# Patient Record
Sex: FEMALE | Race: WHITE | NOT HISPANIC OR LATINO | Employment: FULL TIME | URBAN - METROPOLITAN AREA
[De-identification: names, ages, dates, MRNs, and addresses within clinical notes are randomized per-mention and may not be internally consistent; named-entity substitution may affect disease eponyms.]

---

## 2018-12-17 ENCOUNTER — TELEPHONE (OUTPATIENT)
Dept: PSYCHIATRY | Facility: CLINIC | Age: 23
End: 2018-12-17

## 2018-12-17 NOTE — TELEPHONE ENCOUNTER
PSYCHIATRY CLINIC PHONE INTAKE     SERVICES REQUESTED / INTERESTED IN          Med Management    Presenting Problem and Brief History                              What would you like to be seen for? (brief description):  Patient has had diagnoses since around 2013. She experiences panic attacks a couple times per week. OCD has been better managed with the help of meds and therapeutic techniques. Obsessive ordering of things, arranging things a certain way (color coded, ordered in height), upsetting if not arranged correctly.   Have you received a mental health diagnosis? Yes   Which one (s): MDD, MADIHA, OCD, panic attacks  Is there any history of developmental delay?  No   Are you currently seeing a mental health provider?  No            Who / month last seen:  Saw a therapist last month - moved from [place]  Do you have mental health records elsewhere?  Yes  Will you sign a release so we can obtain them?  Yes    Have you ever been hospitalized for psychiatric reasons?  No  Describe:      Do you have current thoughts of self-harm?  No    Do you currently have thoughts of harming others?  No       Substance Use History     Do you have any history of alcohol / illicit drug use?  No  Describe:    Have you ever received treatment for this?  No    Describe:       Social History     Does the patient have a guardian?  No    Name / number:   Have you had an ACT team in last 12 months?  No  Describe:    Do you have any current or past legal issues?  No  Describe:    OK to leave a detailed voicemail?  Yes    Medical/ Surgical History                                 There is no problem list on file for this patient.         Medications             No current outpatient medications on file.     Sertraline 200mg  Klonopin 0.5mg PRN  Gabapentin 600mg 3x daily - anxiety, migraines, chronic pain  Propranolol XR 60mg  topamax - migraine  Oxycodone 3x daily -   Flexeril 3x daily  Phenergan (nausea) 25mg 2x daily or PRN  Doesn't take  "pain pills and klonopin together \"ever\"    DISPOSITION      Completed phone screen with patient. Scheduled for one time controlled substance evaluation with Dr. Blunt.    Lexi Fountain,   "

## 2019-01-08 ENCOUNTER — OFFICE VISIT (OUTPATIENT)
Dept: INTERNAL MEDICINE | Facility: CLINIC | Age: 24
End: 2019-01-08
Payer: COMMERCIAL

## 2019-01-08 VITALS
SYSTOLIC BLOOD PRESSURE: 119 MMHG | OXYGEN SATURATION: 97 % | DIASTOLIC BLOOD PRESSURE: 82 MMHG | HEART RATE: 99 BPM | WEIGHT: 121.3 LBS | RESPIRATION RATE: 16 BRPM

## 2019-01-08 DIAGNOSIS — N80.9 ENDOMETRIOSIS: Primary | ICD-10-CM

## 2019-01-08 DIAGNOSIS — G44.009 CLUSTER HEADACHE, NOT INTRACTABLE, UNSPECIFIED CHRONICITY PATTERN: ICD-10-CM

## 2019-01-08 RX ORDER — TOPIRAMATE 50 MG/1
50 TABLET, FILM COATED ORAL 2 TIMES DAILY
COMMUNITY
End: 2019-03-28

## 2019-01-08 RX ORDER — SERTRALINE HYDROCHLORIDE 100 MG/1
200 TABLET, FILM COATED ORAL DAILY
COMMUNITY
End: 2019-01-10

## 2019-01-08 RX ORDER — PROPRANOLOL HYDROCHLORIDE 60 MG/1
60 TABLET ORAL AT BEDTIME
COMMUNITY
End: 2019-01-10

## 2019-01-08 RX ORDER — OXYCODONE AND ACETAMINOPHEN 5; 325 MG/1; MG/1
1 TABLET ORAL EVERY 6 HOURS PRN
COMMUNITY
End: 2019-03-28

## 2019-01-08 RX ORDER — CYCLOBENZAPRINE HCL 10 MG
10 TABLET ORAL 3 TIMES DAILY PRN
COMMUNITY
End: 2024-05-10

## 2019-01-08 RX ORDER — GABAPENTIN 100 MG/1
600 CAPSULE ORAL 3 TIMES DAILY
COMMUNITY
End: 2019-01-08

## 2019-01-08 RX ORDER — GABAPENTIN 300 MG/1
600 CAPSULE ORAL 3 TIMES DAILY
COMMUNITY

## 2019-01-08 RX ORDER — PROMETHAZINE HYDROCHLORIDE 25 MG/1
25 TABLET ORAL EVERY 8 HOURS PRN
COMMUNITY
End: 2019-07-16

## 2019-01-08 RX ORDER — CLONAZEPAM 0.5 MG/1
0.5 TABLET ORAL PRN
COMMUNITY
End: 2019-02-01

## 2019-01-08 ASSESSMENT — PAIN SCALES - GENERAL: PAINLEVEL: SEVERE PAIN (7)

## 2019-01-08 NOTE — PROGRESS NOTES
PRIMARY CARE CENTER         HPI:       Ingris Trevino is a 23 year old female with history of depression, anxiety, chronic pain, endometriosis, headaches (chronic paroxysmal hemicrania), herniated lumbar disc, and cystic acne (on Accutane) who presents to [facility] care and for request of referrals after recently moving to [place] from [place].     Today, Ingris complains of feeling severely depressed. She reports having little interest in doing things, feeling depressed and hopeless, having trouble falling asleep, and having little energy. She reports that she is currently on sertraline, propranolol, gabapentin, and clonazepam for anxiety and depression. She reports having major depressive episodes despite being on medication. She has a history of several suicide attempts but no current SI/HI. She feels her current worsening depression is related to her recently moving and not being employed. 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 She is scheduled to be evaluated by Psychiatry and have review of medications on 1/10/18.     She also complains of chronic pain due to endometriosis, herniated lumbar disk, and headaches. She has been on Percocet for 5+ years. Ingris has not seen a Gynecologist for endometriosis. She has a Paraguard IUD. At age [age] she was on progesterone OCPs but this made her acne worse. Menstrual cycles are irregular, heavy and extremely painful, causing her to miss out on several days of work every month. She also has pain due to herniated lumbar disk and has gotten several injections into the lumbar joint with minimal to moderate pain relief. She reports that the pain has been 6-7/10 for 5+ years. She has requested an increase in dose of Percocet, but previous providers were hesitant to provide this. She reports trying other ways to manage pain- scheduled NSAIDs/Tylenol, meditation- but with little to no effect. She also takes  cyclobenzaprine for lower back muscle spasms. She also reports frequent severe headaches due to chronic paroxysmal hemicrania for which she takes topiramate, indomethacin, and gabapentin. These headaches are associated with nausea, for which she takes promethazine. She requests neurology referral.     Other topics discussed:   1. Flu vaccine: due  2. Td booster: 2014/2015 (pt to send in records)  3. Lipid panel: per pt, normal in 2016/2017 (pt to send in records)  4. PAP: most recent in 2016, NILM per patient (pt to send in records)  5. STI screening: patient declines.   6. Cystic acne: well managed on Accutane    Medications per patient:  Clonazepam   Cyclobenzaprine   Gabapentin  Percocet  Promethazine  Propranolol   Topiramate  Zoloft      Problem, Medication and Allergy Lists were reviewed and are current.  Patient is a new patient patient of this clinic. Medical and surgical history reviewed. Family history reviewed. Medications reviewed.      PMHx  Endometriosis  Chronic headaches  Depression  Chronic pain, chronic opioid use  Degenerative disc disease  Past Surgical History:   Procedure Laterality Date     HC TOOTH EXTRACTION W/FORCEP      local anaesthesia     Family History   Problem Relation Age of Onset     Bipolar Disorder Maternal Grandmother      Depression Mother      Depression Father      Social History     Socioeconomic History     Marital status:      Spouse name: Not on file     Number of children: Not on file     Years of education: Not on file     Highest education level: Not on file   Social Needs     Financial resource strain: Not on file     Food insecurity - worry: Not on file     Food insecurity - inability: Not on file     Transportation needs - medical: Not on file     Transportation needs - non-medical: Not on file   Occupational History     Not on file   Tobacco Use     Smoking status: Never Smoker     Smokeless tobacco: Never Used   Substance and Sexual Activity     Alcohol  use: Not on file     Drug use: Not on file     Sexual activity: Not on file   Other Topics Concern     Not on file   Social History Narrative     Not on file     Current Outpatient Medications   Medication     clonazePAM (KLONOPIN) 0.5 MG tablet     cyclobenzaprine (FLEXERIL) 10 MG tablet     gabapentin (NEURONTIN) 300 MG capsule     INDOMETHACIN PO     oxyCODONE-acetaminophen (PERCOCET) 5-325 MG tablet     promethazine (PHENERGAN) 25 MG tablet     topiramate (TOPAMAX) 50 MG tablet     mirtazapine (REMERON) 15 MG tablet     propranolol (INDERAL) 60 MG tablet     sertraline (ZOLOFT) 100 MG tablet     No current facility-administered medications for this visit.      No Known Allergies                   Review of Systems:   ROS  I have personally reviewed and updated the complete ROS on the day of the visit.    10 point ROS of systems including Constitutional, Eyes, Respiratory, Cardiovascular, Gastroenterology, Genitourinary, Integumentary, Muscularskeletal, Psychiatric were all negative except for pertinent positives noted in my HPI.         Physical Exam:   /82   Pulse 99   Resp 16   Wt 55 kg (121 lb 4.8 oz)   SpO2 97%   There is no height or weight on file to calculate BMI.  Vitals were reviewed       GENERAL APPEARANCE: healthy, alert and no distress     EYES: EOMI, PERRL     HENT: ear canals and TM's normal and nose and mouth without ulcers or lesions     NECK: no adenopathy, no asymmetry, masses, or scars and thyroid normal to palpation     RESP: lungs clear to auscultation - no rales, rhonchi or wheezes     CV: regular rates and rhythm, normal S1 S2, no S3 or S4 and no murmur, click or rub     ABDOMEN:  Obese. Abdominal striae. soft, nontender, no HSM or masses and bowel sounds normal     MS: extremities normal- no gross deformities noted, no evidence of inflammation in joints, FROM in all extremities.     SKIN: no suspicious lesions or rashes     NEURO: Normal strength and tone, sensory exam grossly  normal, mentation intact and speech normal though slightly delayed/slow     PSYCH: mentation appears normal. and affect normal/bright     LYMPHATICS: No cervical adenopathy      Results:      Results from the last 24 hoursNo results found for this or any previous visit (from the past 24 hour(s)).  Assessment and Plan       Ingris Trevino is a 23 year old female with history of depression, anxiety, chronic pain, endometriosis, headaches (chronic paroxysmal hemicrania), herniated lumbar disc, and cystic acne (on Accutane) who presents to [facility] care and for request of referrals after recently moving to [place] from [place].     Diagnoses and all orders for this visit:    1. Chronic pain: Due to herniated lumbar disc, endometriosis, headaches (chronic paroxysmal hemicrania)       -     PAIN MANAGEMENT REFERRAL       -     Additional referrals as below    2. Endometriosis  -     OB/GYN REFERRAL  -     PAIN MANAGEMENT REFERRAL    3. Headache due to chronic paroxysmal hemicrania  -     NEUROLOGY ADULT REFERRAL  -     PAIN MANAGEMENT REFERRAL    4. Health maintenance  -     FLU VACCINE, AGE >= 3 YR  -     Patient to bring in outside records    5. Depression        -     Patient to continue taking outside medications as prescribed and follow up with Psychiatry in 2 days per previously scheduled appointment      Options for treatment and follow-up care were reviewed with the patient. Ingris Trevino engaged in the decision making process and verbalized understanding of the options discussed and agreed with the final plan.    Tia Leonardo MD MPH  Intern, Internal Medicine  Jan 8, 2019    Pt was seen and plan of care discussed with Dr. SHAI Portillo.     Teaching Physician Note:  I was present during the visit and the patient was seen and examined by me.   I discussed the case with the resident and agree with the note as documented by the resident with the following exceptions:  None.    Helena Portillo,  M.D.  Internal Medicine   pager [phone]

## 2019-01-08 NOTE — NURSING NOTE
Chief Complaint   Patient presents with     Establish Care     pt is here to establish care with new PCP     Referral     pt is here for referrals        Fe Kim CMA at 8:44 AM on 1/8/2019

## 2019-01-08 NOTE — NURSING NOTE
Ingris Trevino    1.  Has the patient received the information for the influenza vaccine? YES    2.  Does the patient have any of the following contraindications?     Allergy to eggs? No     Allergic reaction to previous influenza vaccines? No     Any other problems to previous influenza vaccines? No     Paralyzed by Guillain-Clayton syndrome? No     Currently pregnant? NO     Current moderate or severe illness? No     Allergy to contact lens solution? No    3.  The vaccine has been administered in the usual fashion and the patient was instructed to wait 20 minutes before leaving the building in the event of an allergic reaction: YES    Recorded by Fe Kim CMA

## 2019-01-08 NOTE — PATIENT INSTRUCTIONS
Primary Care Center Phone Number [phone]  Primary Care Center Medication Refill Request Information:  * Please contact your pharmacy regarding ANY request for medication refills.  ** [facility] Prescription Fax = [phone]  * Please allow 3 business days for routine medication refills.  * Please allow 5 business days for controlled substance medication refills.     Primary Care Center Test Result notification information:  *You will be notified with in 7-10 days of your appointment day regarding the results of your test.  If you are on MyChart you will be notified as soon as the provider has reviewed the results and signed off on them.               [facility]         Internal Medicine Resident                   Continuity Clinic    Who We Are    Resident Continuity Clinic is a part of the [facility] Primary Care Clinic.  Resident physicians see patients independently and establish a relationship with them over the course of their three-year residency program.  As with the Primary Care Clinic, our Resident Continuity Clinic models a group practice.  If your doctor is not available, you will be able to see another resident physician.  At the end of a resident s training, patients will be transitioned to a new resident physician for ongoing care.     We treat patients with a wide array of medical needs from routine physicals, to acute illnesses, to diabetes and blood pressure management, to complex medical illness.  What is a Resident Physician?    Resident physicians hold medical degrees and are doctors. They are training to become specialists in Internal Medicine. They work under the supervision of board-certified faculty physicians.  Expectations for Your Care    We strive to provide accessible, quality care at all times.    In order to provide this care, it is best to see your primary care resident doctor consistently rather switching between providers.  In the event you do see another physician, you  should schedule a follow-up visit with your usual primary care doctor.    If you are transitioning your care from another clinic, it is helpful to have your records available for your doctor to review.    We do not prescribe controlled substances, such as ADD medications or narcotic pain medications, on your first visit.  We will review your health records and concerns prior to devising a treatment plan with you in order to provide the best care.      Clinic Services     Extended clinic hours; patient  to help navigate your visit;  parking; laboratory and imaging services with evening and weekend hours    Multiple medical and surgical specialties in one building    Complementary services, including Nutrition, Integrative Medicine, Pharmacy consultations, Mental and Behavioral Health, Sports Medicine and Physical Therapy    Thank You    We would like to thank you for choosing the [facility] Internal Medicine Resident Continuity Clinic for your primary care. You are making a priceless contribution to the training of the next generation of health care practitioners.     Contact us at [phone] for appointments or questions.    Resident Clinic Hours are Tuesdays and Thursdays, 7:30am-5:00pm    Residents   Edy Dockery MD  (Male)   Margareth Escudero MD (Female)  Fe Ortega MD   (Female)   July Cam MD   (Female)   Anthony Rinaldi MD  (Male)   Caleb Padilla MD  (Male)   Isabella Rubin MD    (Female)   Brandon Childs MD  (Male)   Harry Felix MD  (Male)    Tia Leonardo MD  (Female)   Isaac Betancur MD  (Male)   Shanika Brizuela MD  (Female)   Yesi Friedman MD   (Female)   Rock Luna MD  (Male)   Ko Lux MD  (Male)   Caleb Charles MD (Male)   Rigo Stafford MD  (Male)   Tali Mckeon MD (Female)    Bea Mock MD (Female)   Romina Lazo MD  (Male)   Tatiana Fulton MD    (Female)   Tracy Paulino MD  (Female)    Supervising  Physicians   MD Hermelinda Izaguirre MD Briar Duffy, MD James Langland, MD Mary Logeais, MD Tanya Melnik, MD Charles Moldow, MD Heather Thompson Buum, MD Kathleen Watson, MD            General resources in case you are feeling increasingly depressed and/or suicidal:    Call 911 or go directly to an Emergency Room (such as [facility] or [facility]) if you need help immediately      24/7 Crisis Hotlines:    National Suicide Prevention Hotline                                660-978-FMBB (8285)                [facility] HOPELINE NETWORK  3-671-227-7122 (9-289-GEZOKWN)     [facility]                                                                                                       [phone]    Additional Crisis Hotlines:  Crisis Connection                                                                                                                        993.857.3461  [facility] Social Service (S)                                                                                                          [phone]  National Suicide Prevention Hotline                                                                                        161-522-KZVD (1889)    Suicide Hotline                                                               637.908.9481    [employer] (formerly First Call for Help)                                                                                Dial 2-1-2 (127-597-0019)     [facility]                             [phone]  (24 hours a day)  [address], [place] [address]  DROP IN:  Monday - Friday: 8:00 am - 7:00 pm  Saturday: 11:00 am - 3:00 pm   Sunday and Holidays: Closed     Walk-In Centers and Mobile Teams:  [facility] Walk-In Crisis Intervention                   [phone]    [facility] (18+) Crisis Mobile Team                                  [phone]    [facility] Child (under 17) Crisis Mobile Team                 [phone]     [facility] Center                                                                       [phone] [address]:   MILAGROS BAILEY, F:  1:00-3:00PM    M-Th:  6:30-8:30PM                 [facility]IS [facility],        [phone]               [facility]                                                                                         [phone]  [facility] [place]:          LYNN, W:      5:00-7:00PM       [facility]                                                                                                                        [phone]  [address] [facility]        T, Th:      6:00-8:00PM         - Please schedule appointments with GYN, Neurology, and Pain Management    - Flu shot today

## 2019-01-10 ENCOUNTER — OFFICE VISIT (OUTPATIENT)
Dept: PSYCHIATRY | Facility: CLINIC | Age: 24
End: 2019-01-10
Attending: PSYCHIATRY & NEUROLOGY
Payer: COMMERCIAL

## 2019-01-10 VITALS — HEART RATE: 91 BPM | WEIGHT: 208.6 LBS | SYSTOLIC BLOOD PRESSURE: 123 MMHG | DIASTOLIC BLOOD PRESSURE: 81 MMHG

## 2019-01-10 DIAGNOSIS — F33.2 SEVERE EPISODE OF RECURRENT MAJOR DEPRESSIVE DISORDER, WITHOUT PSYCHOTIC FEATURES (H): Primary | ICD-10-CM

## 2019-01-10 PROCEDURE — G0463 HOSPITAL OUTPT CLINIC VISIT: HCPCS | Mod: ZF

## 2019-01-10 RX ORDER — MIRTAZAPINE 15 MG/1
15 TABLET, FILM COATED ORAL AT BEDTIME
Qty: 30 TABLET | Refills: 1 | Status: SHIPPED | OUTPATIENT
Start: 2019-01-10 | End: 2019-03-01

## 2019-01-10 RX ORDER — PROPRANOLOL HYDROCHLORIDE 60 MG/1
60 TABLET ORAL AT BEDTIME
Qty: 30 TABLET | Refills: 1 | Status: SHIPPED | OUTPATIENT
Start: 2019-01-10 | End: 2019-01-23 | Stop reason: DRUGHIGH

## 2019-01-10 RX ORDER — SERTRALINE HYDROCHLORIDE 100 MG/1
200 TABLET, FILM COATED ORAL DAILY
Qty: 60 TABLET | Refills: 1 | Status: SHIPPED | OUTPATIENT
Start: 2019-01-10 | End: 2019-03-01

## 2019-01-10 ASSESSMENT — PAIN SCALES - GENERAL: PAINLEVEL: SEVERE PAIN (7)

## 2019-01-10 ASSESSMENT — PATIENT HEALTH QUESTIONNAIRE - PHQ9: SUM OF ALL RESPONSES TO PHQ QUESTIONS 1-9: 23

## 2019-01-10 NOTE — Clinical Note
Alcides, we are giving you this patient by way of the [facility] consult clinic. We should talk off-line about her.

## 2019-01-10 NOTE — PROGRESS NOTES
"[facility] PSYCHIATRY CLINIC  Substance Use Disorder Consultation (and General Adult Eval)      CARE TEAM:  PCP- Tia Leonardo    Specialty Providers- unknown    Therapist- none currently  Community  Team- no.    Ingris EMERITA Trevino is a 23 year old female who prefers the name Margarita & pronouns she, her.    Referred by:  Self  Referred for evaluation of:  depression and substance use  History Provided by:  patient who was a good historian.    THIS APPOINTMENT IS A ONE-TIME CONSULTATION -- SEE BELOW FOR RECOMMENDATIONS REGARDING ONGOING PSYCHIATRIC CARE    CHIEF COMPLAINT                                                                                                    \"I called for an appointment and they told me I have a drug problem and I had to come see you.\"     HISTORY OF PRESENT ILLNESS                                                         4, 4      Pertinent Background:  This patient first experienced mental health issues in early teenage years and has received treatment   for depression and chronic pain.      Psych critical item history includes suicide attempt [unknown] and suicidal ideation.    .   MOST RECENT HISTORY    Patient reports she is in need of re-establishing psychiatric care after recently relocating back to [place] from [place]. Saw a new PCP yesterday, who referred to Pain Mgmt Clinic and Neurology.  Patient reports that she has a history of major depressive disorder, social anxiety, panic attacks, and \"suicidal tendencies.\"  She also reports medical diagnoses of \"central nerve syndrome.\"  Patient states that for the last 4-5 weeks she has been in the midst of a depressive episode.  Describes symptoms of insomnia, low appetite, passive thoughts about being dead, anergia, and anhedonia. \"I'm pretty much apathetic about everything right now.\" She typically enjoys crafting, but this has not been able to start anything of late. \"Absolutely no sex drive.\" She has been spending most " "of her time at home.  Unable to leave the house and spend a lot of time in bed.  Other times she will binge eat and feel extremely hungry.  In interview she is quite irritable with me and very defensive, though she did eventually open up about her symptoms.  She has had negative past experiences with mental health providers.  She also grew up with her mother threatening to \"institutionalize her\".     Patient endorses ongoing treatment with opioids for chronic non-malignant pain.  She does point out that the pain symptoms seem to intensify when she is depressed.  However can continue to have ongoing pain even when euthymic.  While the patient was living in [place], she reports that she knew at least 20 people who overdosed and  from opioid related deaths.  Their neighbor there was a  who, after [facility], committed suicide in his garage by hanging himself.    Patient indicates oscillating between being really depressed and feeling anxious and struggling with OCD. She also struggles with panic attacks.  She had a panic attack this morning after reading an article in the newspaper about opioid epidemic in [place].  She describes symptoms of shortness of breath, chest discomfort and tightness, feelings of being overwhelmed, unable to hear what other people are saying around her.  Sometimes she is struck by sudden panic attacks that come out of the blue.  Historically she has responded well, per her report to clonazepam.  However, a psychiatrist in [place] discontinue the clonazepam.  It was at that time that she was eventually put on propranolol for panic.    Patient describes a history of at least one suicide attempt.  Most recent was by carbon monoxide poisoning in .  She was found by her wife.  She was not hospitalized and did not get acute psychiatric care.  Patient states that she is currently having thoughts about being better off dead, but denies " active plan or intent about suicide.  She says that in the last couple of days she has felt perhaps a little bit less depressed.  She has applied for handful of jobs and has started to increase self-care, such as by taking a shower and opening up the blinds to her bedroom to get more daylight.    Has enjoyed being back in [place]. [place] [facility] and went to [employer]. Community much more accepting of her and her wife.    RECENT SYMPTOMS:   DEPRESSION:  reports-suicidal ideation without plan, without intent, depressed mood, anhedonia, low energy, insomnia, hypersomnia, appetite changes, feeling worthless and feeling hopeless;  DENIES- negative  MARIAH/HYPOMANIA:  reports-none;  DENIES- increased energy, decreased sleep need, increased activity, grandiosity and racing thoughts  PSYCHOSIS:  reports-none;  DENIES- delusions and auditory hallucinations  DYSREGULATION:  reports-mood dysregulation and irritable;  DENIES- SIB and physically agitated  PANIC ATTACK:  See above   ANXIETY:  Patient reports that she is too apathetic to have anxiety at this time.  TRAUMA RELATED:  Unable to discuss in greater detail  COMPULSIVE:  Did not discuss at length, though patient reports that historically she sought mental health evaluation to rule out bipolar disorder because she had a intrusive thought about having this diagnosis; chart review indicates she has concerns about OCD  ATTENTION:  none reported  SLEEP: insomnia during night but then will often sleep during the day  EATING DISORDER: variable eating patterns; binges during depressive episodes    RECENT SUBSTANCE USE:     ALCOHOL- none, reporting she and wife had had a single bottle of vodka in their house for at least 2 years      TOBACCO- no     CAFFEINE- Not discussed  OPIOIDS- 1 Percocet tablet 3x daily for chronic pain         NARCAN KIT- unknown        CANNABIS- unknown            OTHER ILLICIT DRUGS- none      CURRENT SOCIAL HISTORY:  FINANCIAL  SUPPORT- wife has job , pt currently searching for employment   CHILDREN- unknown      LIVING SITUATION- with wife in apt in [place]      SOCIAL/ SPIRITUAL SUPPORT- wife     FEELS SAFE AT HOME- yes     MEDICAL ROS (2,10):  Reports A comprehensive review of systems was performed and is negative other than noted in the HPI.      Denies A comprehensive review of systems was performed and is negative other than noted in the HPI.    SUBSTANCE USE HISTORY                                                                 Past Use- Rx opioids and benzos  Treatment- #- no   Most Recent- N/A  Medical Consequences- Patient reports physiologic dependence/tolerance to opioids  HIV/Hepatitis- no  Legal Consequences- no     PSYCHIATRIC HISTORY     SIB- unknown  Suicidal Ideation Hx- yes, see above  Suicide Attempt- #- At least once, most recent- 2014      Violence/Aggression Hx- no  Psychosis Hx- no  Psych Hosp- #- no, most recent- N/A   ECT- no    Eating Disorder: unknown  Outpatient Programs [ DBT, Day Treatment, Eating Disorder etc] : no    SOCIAL and FAMILY HISTORY                        patient reported                                 1ea, 1ea   Financial Support- documented above  Living Situation/Family/Relationships- documented above;  Children- documented above                                 Trauma History (self-report)- possible though unable to discuss at this time  Legal- unknown  Cultural/ Social/ Spiritual Support- primarily her wife  Early History/Education-  Grew in [place], [place]. Graduated HS. Attended college in [place], [place] and has bachelor's in psychology.    FAMILY MENTAL HEALTH HX  - Maternal Great-Gma had bipolar  - Depression in family; both mom and dad, maternal aunt (h/o SAs)     PAST PSYCH MED TRIALS     Per patient report:  Trazodone 150 mg  Quetiapine 25-50 mg  Hydroxyzine 25 mg  Buspirone 7.5 mg  Citalopram 20 mg  Escitalopram 10 mg  Nortriptyline 10-20 mg  Bupropion 100-150  mg  Fluoxetine 20  Brexpiprazole 0.5-1mg   Zolpidem     MEDICAL / SURGICAL HISTORY                                   Pregnant or breastfeeding- no      Contraception- unknown    Neurologic Hx- No history of head trauma or loss of consciousness or seizures   There is no problem list on file for this patient.      Past Surgical History:   Procedure Laterality Date     HC TOOTH EXTRACTION W/FORCEP      local anaesthesia      ALLERGY                                Patient has no known allergies.  MEDICATIONS                               Current Outpatient Medications   Medication Sig Dispense Refill     clonazePAM (KLONOPIN) 0.5 MG tablet Take 0.5 mg by mouth as needed for anxiety       cyclobenzaprine (FLEXERIL) 10 MG tablet Take 10 mg by mouth 3 times daily as needed for muscle spasms       gabapentin (NEURONTIN) 300 MG capsule Take 600 mg by mouth 3 times daily       INDOMETHACIN PO Take 50 mg by mouth 3 times daily       oxyCODONE-acetaminophen (PERCOCET) 5-325 MG tablet Take 1 tablet by mouth every 6 hours as needed for severe pain       promethazine (PHENERGAN) 25 MG tablet Take 25 mg by mouth every 8 hours as needed for nausea       propranolol (INDERAL) 60 MG tablet Take 60 mg by mouth At Bedtime       sertraline (ZOLOFT) 100 MG tablet Take 200 mg by mouth daily       topiramate (TOPAMAX) 50 MG tablet Take 50 mg by mouth 2 times daily       [place]:  Covering last 12 months, and including [place] database, indicates no clonazepam Rx since March 2018; single prescriber of several months of oxycodone-APAP 5-325 TID    VITALS                                                                                                                            3, 3   There were no vitals taken for this visit.   MENTAL STATUS EXAM                                                                                    9, 14 cog gs     Alertness: alert  and oriented  Appearance: casually groomed  Behavior/Demeanor: Patient  was initially irritable and guarded but became more forward and engaged in the interview as time went on, with good  eye contact   Speech: normal and regular rate and rhythm  Language: intact and no problems  Psychomotor: normal or unremarkable  Mood: depressed and irritable  Affect: full range; was congruent to mood; was congruent to content  Thought Process/Associations: unremarkable  Thought Content:  Reports Consistent with interim history provided above;  Denies violent ideation, delusions and obsessions   Perception:  Reports none;  Denies auditory hallucinations, visual hallucinations, depersonalization and derealization  Insight: adequate  Judgment: adequate for safety  Cognition: (6) does  appear grossly intact; formal cognitive testing was not done  Gait and Station: Walks slowly with a limp    LABS and DATA     AIMS:  N/A    PHQ9 TODAY = 26  No flowsheet data found.            D.I.R.E Score: Patient Selection for Chronic Opioid Analgesia    For each factor, rate the patient's score from 1 - 3 based on the explanations on the right.       Diagnosis             1 1 = Benign chronic condition with minimal objective findings or no definite medical diagnosis.  Examples:  fibromyalgia, migraine, headaches, non-specific back pain.  2 = Slowly progressive condition concordant with moderate pain, or fixed condition with moderate objective findings.  Examples: failed back surgery syndrome, back pain with moderate degenerative changes, neuropathic pain.  3 = Advanced condition concordant with severe pain with objective findings.  Examples: severe ischemic vascular disease, advanced neuropathy, severe spinal stenosis.    Intractability             1 1 = Few therapies have been tried and the patient takes a passive role in his/her pain management process.   2 = Most costomary treatments have been tried but the patient is not fully engaged in the pain management process, or barriers prevent (insurance, transportation,  medical illness)  3 = Patient fully engaged in a spectrum of appropriate treatments but with inadequate response.    Risk   (Risk = Total of P+C+R+S below)       Psychological            2 1 = Serious personality dysfunction or mental illness interfering with care.  Examples: personality disorder, severe affective disorder, significant personality issues.  2 = Personality or mental health interferes moderately.  Example: depression or anxiety disorder.  3 = Good communication with the clinic.  No significant personality dysfunction or mental illness.       Chemical      Health            3 1 = Active or very recent use of illicit drugs, excessive alcohol, or prescription drug abuse.  2 = Chemical coper (uses medications to cope with stress) or history of chemical dependency in remission.  3 = No CD history.  Not drug-focused or chemically reliant       Reliability             2 1 = History of numerous problems: medication misuse, missed appointments, rarely follows through.  2 = Occasional difficulties with compliance, but generally reliable.  3 = Highly reliable patient with medications, appointments and treatment.       Social      Support             2 1= Life in chaos.  Little family support and few close relationships.  Loss of most normal life roles.  2 = Reduction in some relationships and life roles.  3 = Supportive family/close relationships.  Involved in work or school and no social isolation.    Efficacy score             2 1 = Poor function or minimal pain relief despite moderate to high doses.  2 = Moderate benefit with function improved in a number of ways (or insufficient info - hasn't tried opioid yet or very low doses or too short a trial.  3 = Good improvement in pain and function and quality of life with stable doses over time.                                    13    Total score = D + I + R + E    Score 7-13: Not a suitable candidate for long-term opioid analgesia  Score 14-21: May be a good  "candidate for long-term opioid analgesia    Copyright 2013 Manjinder Jones MD, The DIRE Score: Predicting Outcomes of Opioid Prescribing for Chronic Pain. The Journal of Pain. 7(9) (September), 2006:671-681      PSYCHIATRIC DIAGNOSES                                                                                               Major Depressive Disorder, Recurrent, Severe; h/o suicide attempt  Panic Disorder  Obsessive-Compulsive Disorder, by pt report  Social Anxiety Disorder, by pt report     ASSESSMENT                                                                                                          m2, h3     Margarita Trevino is a 23-year-old  female who presents today for what essentially amounted to a general adult psychiatry evaluation.  Though, she did come to me by way of referral for a substance use consultation. Will require additional information regarding her reported diagnoses of OCD and social anxiety. Initially, patient presented quite guarded and irritated by the suggestion from scheduling staff that she may have \"a substance abuse problem.\"  Today we focused primarily on her depressive symptoms. She reports symptoms consistent with an active major depressive episode. She has also been suffering from panic attacks when she is not depressed and she reports that she has more significant issues with anxiety and obsessive-compulsive disorder symptoms.  She says that her current depressive episode was consistent with past ones and is hopeful it will pass. In the last few days has started to feel a bit better. She expresses a desire to continue on her sertraline and propranolol.  We did discuss adding on mirtazapine which she agrees to try, given her sleep difficulties.    Patient is not a candidate for benzodiazepine therapy given ongoing prescription opioid use.  This was discussed with the patient. I am concerned about patient's polypharmacy. Gradual efforts should be made to simplify med " "regimen. Particularly concerned about opioids, cyclobenzaprine. Topiramate is also a neuro-sedative. Pt does meet criteria for MDD but likely is also at higher risk for worsened mood symptoms given preponderance of neurosedative use.    Unclear that chronic opioids appropriate for her chronic non-malignant pain states. That said, with what information we have, no clear indication that she has used non-medically, escalated in use, or any other concerns. Patient may benefit from pharmacotherapy for opioid use, particularly if the new pain management provider will no longer provide opiate therapy for chronic pain, nor deem taper a safe plan. I did not have time to discuss this with her, but this could be an option at a future date.    Patient also expresses an interest in GeneSite testing, as she reports that she feels she \"rapidly metabolizes\" all medications.    Pt reports propranolol is helpful for anxiety and so will continue for now. Discussed that this is technically off-label use.    Will transfer pt to Dr. Shearer for ongoing psychiatric care. Also discussed referral for psychotherapy but at this time, she declines given past negative experiences.    SUICIDE RISK ASSESSMENT:  Ingris Trevino reports passive SI, no plan/intent.  In addition, she has notable risk factors for self-harm including previous suicide attempt, severe insomnia, financial/legal stress, significant pain and on opiates.  However, risk is mitigated by no plan or intent, symptom improvement, future oriented, none to minimal alcohol use , commitment to family and stable housing.  Based on all available evidence she does not appear to be at imminent risk for self-harm therefore does not meet criteria for a 72-hr hold/  involuntary hospitalization.  However, based on degree of symptoms close psych FU was recommended which the pt did agree to.    [place] PRESCRIPTION MONITORING PROGRAM [] was checked today:  indicates Rx opioids from [place] " provider.    PSYCHOTROPIC DRUG INTERACTIONS:   Concurrent use of OXYCODONE and SERTRALINE may result in an increased risk of serotonin syndrome (tachycardia, hyperthermia, myoclonus, mental status changes).    Concurrent use of MIRTAZAPINE and SEROTONERGIC AGENTS may result in increased risk of serotonin syndrome.  Concurrent use of OXYCODONE and CNS DEPRESSANTS may result in increased risk of respiratory and CNS depression.  Concurrent use of OXYCODONE and SEROTONERGIC AGENTS may result in increased risk of serotonin syndrome.  Concurrent use of MIRTAZAPINE and SEROTONERGIC AGENTS may result in increased risk of serotonin syndrome.  MANAGEMENT:  Monitoring for adverse effects, routine vitals and periodic EKGs     PLAN                                                                                                                        m2, h3     1) PSYCHOTROPIC MEDICATIONS:  - Start mirtazapine 15mg at bedtime for adjunctive MDD and insomnia treatment  - Continue sertraline 200mg daily  - Continue propranolol 60mg daily for anxiety disorder    2) THERAPY:    declined, but would continue to discuss    3) NEXT DUE:    Labs- N/A  EKG- recommend at next  Rating Scales- N/A    4) REFERRALS:    pending referrals for pain mgmt and neurology   Consider GeneSite testing at next appt, for which she should receive MTM referral    5) RTC: 2-4 weeks with Dr. Shearer for longitudinal psychiatric care    6) CRISIS NUMBERS:   Provided routinely in AVS.   [facility] [phone] (clinic)    [phone] (after hours)  ONLY if a [facility]: [facility] [phone] (clinic), [phone] (after hours)     TREATMENT RISK STATEMENT:  The risks, benefits, alternatives and potential adverse effects have been discussed and   are understood by the pt. The pt understands the risks of using street drugs or alcohol. There are no medical contraindications,   the pt agrees to treatment with the ability to do so. The pt knows to call  the clinic for any problems or to access emergency   care if needed.  Medical and substance use concerns are documented above.  Psychotropic drug interaction check was   done, including changes made today.    PROVIDER: Gomez Blunt MD    Patient staffed in clinic with Dr. Spencer who will sign the note.     Physician Attestation   I, Huan Spencer, saw this patient with the resident, Dr. Blunt, was present for key portions of history and exam, and agree with the findings and plan of care as documented in his note as edited by me.      Items personally reviewed/procedural attestation: vitals, labs, medication record, , and available chart notes.    Forty minutes of this 60 minute visit was spent in face-to-face supportive counseling, psychoeducation, and care coordination.     Huan Spencer MD

## 2019-01-10 NOTE — PATIENT INSTRUCTIONS
Margarita,    Thanks for coming in today. Here is a brief summary:    - We will set you up with a resident for ongoing psychiatric care. Follow-up in 2-4 weeks.  - Start the mirtazapine 15mg at bedtime as adjunct treatment for current depressive episode and sleep aid.  - Continue sertraline and propranolol as before.    Thanks!    Dr. MENDEZ Euceda Crisis Numbers:     Urgent Care Adult Mental Health: Drop-in, 24/7 crisis line and [place] Mobile Team [[phone]]   [facility] -   [phone]  Crisis Residence [facility]   [phone]  Walk-in counseling [facility]       [phone]  Walk-in counseling [facility]            [phone]    Thank you for coming to the PSYCHIATRY CLINIC.    Lab Testing:  If you had lab testing today and your results are reassuring or normal they will be mailed to you or sent through [facility] within 7 days.   If the lab tests need quick action we will call you with the results.  The phone number we will call with results is # [phone] (home) . If this is not the best number please call our clinic and change the number.    Medication Refills:  If you need any refills please call your pharmacy and they will contact us. Our fax number for refills is [phone]. Please allow three business for refill processing.   If you need to  your refill at a new pharmacy, please contact the new pharmacy directly. The new pharmacy will help you get your medications transferred.     Scheduling:  If you have any concerns about today's visit or wish to schedule another appointment please call our office during normal business hours [phone] (8-5:00 M-F)    Contact Us:  Please call [phone] during business hours (8-5:00 M-F).  If after clinic hours, or on the weekend, please call  [phone].    Financial Assistance [phone]  [employer] B[facility] [phone]  [place] [facility] [phone]  Medical Records  [phone]      MENTAL HEALTH CRISIS NUMBERS:  [facility]:   [facility] - [phone]   Crisis Residence [facility] - [phone]   Walk-In Counseling Center [facility] - [phone]   COPE 24/7 [place] Mobile Team for Adults - [[phone]]; Child - [[phone]]     Crisis Connection - [phone]     [facility] - [phone]   Walk-in counseling [facility] - [phone]   Walk-in counseling [facility] - [phone]   Crisis Residence [facility] - [phone]   Urgent Care Adult Mental Health:   --Drop-in, 24/7 crisis line, and [place] Mobile Team [[phone]]    CRISIS TEXT LINE: Text [phone] from anywhere, anytime, any crisis 24/7;    OR SEE [email or web address]     Poison Control Center - [phone]    CHILD: [facility] needs assessment team - [phone]     [facility] Lifeline - [phone]; or [facility] Lifeline - [phone]    If you have a medical emergency please call 911or go to the nearest ER.                    _____________________________________________    Again thank you for choosing PSYCHIATRY CLINIC and please let us know how we can best partner with you to improve you and your family's health.  You may be receiving a survey in the mail regarding this appointment. We would love to have your feedback, both positive and negative, so please fill out the survey and return it using the provided envelope. The survey is done by an external company, so your answers are anonymous.

## 2019-01-10 NOTE — NURSING NOTE
Chief Complaint   Patient presents with     Eval/Assessment     Severe episode of recurrent major depressive disorder, without psychotic features

## 2019-01-11 NOTE — TELEPHONE ENCOUNTER
FUTURE VISIT INFORMATION      FUTURE VISIT INFORMATION:    Date: 1/30/19    Time: 2p    [place] [facility]  REFERRAL INFORMATION:    Referring provider:  Dr. Helena Portillo/Dr. Tia Leonardo    Referring providers clinic:  [facility]    Reason for visit/diagnosis  Cluster headache    RECORDS REQUESTED FROM:       Clinic name Comments Records Status Imaging Status   [facility] Dr. Portillo/Dr. Leonardo 1/8/19 OV Internal                                      1/11/19: Internal referral from Dr. Portillo/Dr. Leonardo at [facility]. Records are in [facility].

## 2019-01-23 ENCOUNTER — MYC MEDICAL ADVICE (OUTPATIENT)
Dept: PSYCHIATRY | Facility: CLINIC | Age: 24
End: 2019-01-23

## 2019-01-23 DIAGNOSIS — F33.2 SEVERE EPISODE OF RECURRENT MAJOR DEPRESSIVE DISORDER, WITHOUT PSYCHOTIC FEATURES (H): ICD-10-CM

## 2019-01-23 RX ORDER — PROPRANOLOL HCL 60 MG
60 CAPSULE, EXTENDED RELEASE 24HR ORAL AT BEDTIME
Qty: 30 CAPSULE | Refills: 1 | Status: SHIPPED | OUTPATIENT
Start: 2019-01-23 | End: 2019-03-01

## 2019-01-23 NOTE — TELEPHONE ENCOUNTER
Writer reviewed pt's chart and it appears the provider sent a Rx of Zoloft to [facility] [employer] in [place]. Will inform pt of this and forward question about propranolol to the provider.

## 2019-01-23 NOTE — TELEPHONE ENCOUNTER
Gomez Blunt MD Pratt, Laura, RN   Caller: Unspecified (Today,  9:32 AM)   Phone Number: [phone]             Yep, that's fine. Thanks!      Writer refilled Rx as ER with request to discontinue regular tabs after receiving approval from the provider. Will notify pt via [employer].

## 2019-01-30 ENCOUNTER — OFFICE VISIT (OUTPATIENT)
Dept: NEUROLOGY | Facility: CLINIC | Age: 24
End: 2019-01-30

## 2019-01-30 ENCOUNTER — PRE VISIT (OUTPATIENT)
Dept: NEUROLOGY | Facility: CLINIC | Age: 24
End: 2019-01-30

## 2019-01-30 VITALS
HEIGHT: 64 IN | WEIGHT: 211.6 LBS | SYSTOLIC BLOOD PRESSURE: 135 MMHG | DIASTOLIC BLOOD PRESSURE: 85 MMHG | TEMPERATURE: 98 F | BODY MASS INDEX: 36.12 KG/M2 | OXYGEN SATURATION: 98 % | HEART RATE: 104 BPM | RESPIRATION RATE: 16 BRPM

## 2019-01-30 DIAGNOSIS — G43.719 INTRACTABLE CHRONIC MIGRAINE WITHOUT AURA AND WITHOUT STATUS MIGRAINOSUS: Primary | ICD-10-CM

## 2019-01-30 DIAGNOSIS — G44.049 CHRONIC PAROXYSMAL HEMICRANIA, NOT INTRACTABLE: ICD-10-CM

## 2019-01-30 RX ORDER — TOPIRAMATE 50 MG/1
50 TABLET, FILM COATED ORAL 2 TIMES DAILY
Qty: 180 TABLET | Refills: 3 | Status: SHIPPED | OUTPATIENT
Start: 2019-01-30 | End: 2019-07-16

## 2019-01-30 SDOH — HEALTH STABILITY: MENTAL HEALTH: HOW OFTEN DO YOU HAVE A DRINK CONTAINING ALCOHOL?: 2-4 TIMES A MONTH

## 2019-01-30 ASSESSMENT — PAIN SCALES - GENERAL: PAINLEVEL: SEVERE PAIN (6)

## 2019-01-30 ASSESSMENT — MIFFLIN-ST. JEOR: SCORE: 1688.78

## 2019-01-30 NOTE — LETTER
1/30/2019       RE: Ingris Trevino  [address]  [place] [address]     Dear Colleague,    Thank you for referring your patient, Ingris Trevino, to the [facility] NEUROLOGY at [facility]. Please see a copy of my visit note below.    [facility]   Clinics and Surgery Center  Neurology Consult     Ingris Trevino MRN# [id number]   YOB: 1995 Age: 24 year old     Requesting physician: Dr. Tia Leonardo         Assessment and Recommendations:   Ingris Trevino is a 24 year old woman who was referred to the neurology clinic to establish care for headaches.    Her headache presentation has been previously diagnosed as chronic paroxysmal hemicrania. Reviewing the diagnostic criteria, she does not meet all of the criteria, but has many of the features. She has had severe, unilateral headaches in the orbital, supraorbital, and temporal regions, lasting initially shorter than the 2-30 minutes described in the criteria. She did not endorse clear autonomic features today or restlessness today, although perhaps this was more prominent at onset. It previously occurred more than 5 times per day and has responded to to indomethacin, although not absolutely. She has not had a period of remission lasting at least three months, and her headaches have been occurring for over one year.    Alternatively, her headaches also have some features of chronic migraine without aura, with pain that switches side, association with photophobia, phonophobia, nausea, and vomiting, avoiding activity during headaches, prefers to lay down and sleep. Her headaches do not typically last at least four hours, however. There is a family history of migraine.    Overall, her headaches do not neatly fall into either category. Her neurologic exam in intact today. I did not recommend any further workup for her headaches, as they have not significantly changed and  reportedly, previous workup was negative. She is happy with her current headache treatments and does not desire to change medications today.  -She will continue indomethacin 50 mg three times daily.  -She will continue topiramate 50 mg twice daily. I offered nightly dosing of 100 mg as an alternative, and she declined this.  -She will continue promethazine for nausea.  -If additional therapies are needed in the future, amitriptyline retrial (if not on other antidepressants), Depakote (if not on topiramate), verapamil (if not on propranolol), botulinum toxin injections, or a CGRP inhibitor could be considered.    I will plan to see her back in 6 months, or sooner if needed.    Margarita Corbin MD  Neurology  Pager: [phone]            Chief Complaint:   Chief Complaint   Patient presents with     Consult     [facility] NEW CLUSTER HEADACHES     Headache           History is obtained from the patient and medical record.      Ingris Trevino is a 24 year old female who has been suffering from headaches since November 2016.  She was getting something from her garage, when she had a funny feeling and lost consciousness.  When she awoke, her fiancé said that she had a seizure, and they went to the hospital.  She returned to her baseline quickly.  She then developed a blinding pain lasting between 10 and 30 seconds, that was described as a dull ache over the left side of her head.  This was occurring 15-20 times a day, in addition to a constant background pain.  This was associated with nausea.  Her gabapentin was increased at that time, and topiramate was started, but this was not particularly effective.  She underwent testing with an EEG as well as sleep study, and these returned normal, reportedly.    She began looking for diagnoses online, and found that paroxysmal hemicrania continua best fit to the symptoms that she was describing.  She brought this information to her treating physician, and indomethacin trial was  initiated.  Her gabapentin and topiramate were decreased.  She reports significant improvement with indomethacin, and has continued taking this ever since, taking it since mid to late 2017.    She reports decreasing her dose of topiramate indomethacin over the last year, and her headaches returned, so her dosing was increased to her previous dose.    Currently, she reports 1-2 headaches a day.  They are described as retro-orbital blinding pain, occurring on the right side 85% of the time, and the left side 15% of the time.  The pain is described as an ice pick stabbing pain with reverberations lasting between 30 seconds and 2 minutes.  After this time, she reports an aftermath of a dull aching pain that lasts between 30 minutes and 2 hours, with treatment.  It is unclear how long this would last without treatment.  The severe pain rates between a 7 and 9 out of 10.  This pain is associated with photophobia, phonophobia, and nausea.  She also has rare vomiting.  This pain is generally worse with stress or decreased sleep.  She finds that her headache pain is improved with sleep.  Her pain is triggered by bright lights, such as the lighting at large stores.  She denies any positional component to her pain.  She denies autonomic features.  She denies fevers or other illness associated with headaches.    For treatment of her headache, she currently takes topiramate 50 mg twice a day and indomethacin 50 mg 3 times a day.  She will try deep breathing and ginger ale, cooling gel packs on her eyes and forehead, essential oils on her temples, and laying down.  She also takes Excedrin and cyclobenzaprine about once a week.    She has previously received IM medication injected in a clinic setting on one occasion as well as 3 courses of methylprednisolone, lasting between 5 days in a week since 2016.    She previously tried gabapentin up to 600 mg 3 times a day, but this made her feel tired and gain weight.  She is also tried  promethazine, NSAIDs, acetaminophen, cyclobenzaprine, Excedrin, Percocet, and meditation, without significant relief.  For prevention, she has tried mirtazapine (currently takes for psychiatric reasons), propranolol (currently takes for psychiatric reasons), sertraline (currently takes for psychiatric reasons), clonazepam, without significant relief.  She also recalls taking amitriptyline at a young age either 10 or 11, but is not tried this since the onset of her current headaches.      She denies ever taking Depakote, verapamil, botulinum toxin injections, or a CGRP inhibitor.              Past Medical History:   Endometriosis, depression, chronic pain  Past Medical History:   Diagnosis Date     Degenerative disc disease at L5-S1 level               Past Surgical History:     Past Surgical History:   Procedure Laterality Date     HC TOOTH EXTRACTION W/FORCEP      local anaesthesia             Social History:   She is .  She is currently unemployed.  She does not have any children.  She has 3 puppies at home.  She denies smoking, drinks alcohol rarely, and denies drug use.  She drinks 1 cup of coffee a day.  Social History     Socioeconomic History     Marital status:      Spouse name: Not on file     Number of children: Not on file     Years of education: Not on file     Highest education level: Not on file   Social Needs     Financial resource strain: Not on file     Food insecurity - worry: Not on file     Food insecurity - inability: Not on file     Transportation needs - medical: Not on file     Transportation needs - non-medical: Not on file   Occupational History     Not on file   Tobacco Use     Smoking status: Never Smoker     Smokeless tobacco: Never Used   Substance and Sexual Activity     Alcohol use: Yes     Frequency: 2-4 times a month     Drug use: No     Sexual activity: No   Other Topics Concern     Not on file   Social History Narrative     Not on file             Family History:  "  There is a family history of migraines in her mother and grandmother.  Family History   Problem Relation Age of Onset     Bipolar Disorder Maternal Grandmother      Depression Mother      Depression Father              Allergies:      Allergies   Allergen Reactions     Seasonal Allergies              Medications:     Current Outpatient Medications:      clonazePAM (KLONOPIN) 0.5 MG tablet, Take 0.5 mg by mouth as needed for anxiety, Disp: , Rfl:      cyclobenzaprine (FLEXERIL) 10 MG tablet, Take 10 mg by mouth 3 times daily as needed for muscle spasms, Disp: , Rfl:      gabapentin (NEURONTIN) 300 MG capsule, Take 600 mg by mouth 3 times daily, Disp: , Rfl:      INDOMETHACIN PO, Take 50 mg by mouth 3 times daily, Disp: , Rfl:      mirtazapine (REMERON) 15 MG tablet, Take 1 tablet (15 mg) by mouth At Bedtime, Disp: 30 tablet, Rfl: 1     oxyCODONE-acetaminophen (PERCOCET) 5-325 MG tablet, Take 1 tablet by mouth every 6 hours as needed for severe pain, Disp: , Rfl:      promethazine (PHENERGAN) 25 MG tablet, Take 25 mg by mouth every 8 hours as needed for nausea, Disp: , Rfl:      propranolol ER (INDERAL LA) 60 MG 24 hr capsule, Take 1 capsule (60 mg) by mouth At Bedtime, Disp: 30 capsule, Rfl: 1     sertraline (ZOLOFT) 100 MG tablet, Take 2 tablets (200 mg) by mouth daily, Disp: 60 tablet, Rfl: 1     topiramate (TOPAMAX) 50 MG tablet, Take 50 mg by mouth 2 times daily, Disp: , Rfl:           Review of Systems:   Negative, with the exception of chronic pain, depression, and as noted in the HPI.         Physical Exam:   /85   Pulse 104   Temp 98  F (36.7  C) (Oral)   Resp 16   Ht 1.632 m (5' 4.25\")   Wt 96 kg (211 lb 9.6 oz)   SpO2 98%   BMI 36.04 kg/m      Physical Exam:   General: NAD  Neurologic:   Mental Status Exam: Alert, awake and oriented to situation. No dysarthria. Speech of normal fluency.   Cranial Nerves: Fundoscopic exam with clear disc margins bilaterally. PERRLA, pupils 6 mm bilaterally, " EOMs intact, no nystagmus, facial movements symmetric, facial sensation intact to light touch, hearing intact to conversation, palate and uvula rise symmetrically, no deviation in uvula or tongue, trapezius and SCMs 5/5 bilaterally, tongue midline and fully mobile. No atrophy or fasciculations.    Motor: Normal tone in all four extremities, no atrophy or fasciculations. 5/5 strength bilaterally in shoulder abduction, elbow extensors and flexors, , hip flexors, knee extensors and flexors, dorsi- and plantarflexion. No tremors or abnormal movements noetd.   Sensory: Sensation intact to light touch on arms and legs bilaterally.    Coordination: Finger-nose-finger intact bilaterally. Normal Romberg.   Reflexes: 2+ and symmetric in triceps, biceps, brachioradialis, patellar, Achilles, and plantars downgoing bilaterally.   Gait: Normal gait. Tandem gait normal.  Head: Normocephalic, atraumatic. No tenderness to palpation over the supraorbital notches, occipital nerves, or temples.   Neck: Normal range of motion with lateral head movements and neck flexion.  Eyes: No conjunctival injection, no scleral icterus.   Mouth: No oral lesions, no erythema or exudate in the oropharynx.  Respiratory: No increased work of breathing.  Cardiovascular: No lower extremity edema.  Extremities: Warm, dry.          Data:   Previous head imaging not immediately available today, and is reportedly unremarkable.    Again, thank you for allowing me to participate in the care of your patient.      Sincerely,    Margarita Corbin MD

## 2019-01-30 NOTE — NURSING NOTE
Chief Complaint   Patient presents with     Consult     [facility] [employer]ER HEADACHES     Headache     Aubrey Watson, EMT

## 2019-01-30 NOTE — PROGRESS NOTES
[facility]  Neurology Consult     Ingris Trevino MRN# [id number]   YOB: 1995 Age: 24 year old     Requesting physician: Dr. Tia Leonardo         Assessment and Recommendations:   Ingris Trevino is a 24 year old woman who was referred to the neurology clinic to establish care for headaches.    Her headache presentation has been previously diagnosed as chronic paroxysmal hemicrania. Reviewing the diagnostic criteria, she does not meet all of the criteria, but has many of the features. She has had severe, unilateral headaches in the orbital, supraorbital, and temporal regions, lasting initially shorter than the 2-30 minutes described in the criteria. She did not endorse clear autonomic features today or restlessness today, although perhaps this was more prominent at onset. It previously occurred more than 5 times per day and has responded to to indomethacin, although not absolutely. She has not had a period of remission lasting at least three months, and her headaches have been occurring for over one year.    Alternatively, her headaches also have some features of chronic migraine without aura, with pain that switches side, association with photophobia, phonophobia, nausea, and vomiting, avoiding activity during headaches, prefers to lay down and sleep. Her headaches do not typically last at least four hours, however. There is a family history of migraine.    Overall, her headaches do not neatly fall into either category. Her neurologic exam in intact today. I did not recommend any further workup for her headaches, as they have not significantly changed and reportedly, previous workup was negative. She is happy with her current headache treatments and does not desire to change medications today.  -She will continue indomethacin 50 mg three times daily.  -She will continue topiramate 50 mg twice daily. I offered nightly dosing of 100 mg  as an alternative, and she declined this.  -She will continue promethazine for nausea.  -If additional therapies are needed in the future, amitriptyline retrial (if not on other antidepressants), Depakote (if not on topiramate), verapamil (if not on propranolol), botulinum toxin injections, or a CGRP inhibitor could be considered.    I will plan to see her back in 6 months, or sooner if needed.    Margarita Corbin MD  Neurology  Pager: [phone]            Chief Complaint:   Chief Complaint   Patient presents with     Consult     [facility] NEW CLUSTER HEADACHES     Headache           History is obtained from the patient and medical record.      Ingris Trevino is a 24 year old female who has been suffering from headaches since November 2016.  She was getting something from her garage, when she had a funny feeling and lost consciousness.  When she awoke, her fiancé said that she had a seizure, and they went to the hospital.  She returned to her baseline quickly.  She then developed a blinding pain lasting between 10 and 30 seconds, that was described as a dull ache over the left side of her head.  This was occurring 15-20 times a day, in addition to a constant background pain.  This was associated with nausea.  Her gabapentin was increased at that time, and topiramate was started, but this was not particularly effective.  She underwent testing with an EEG as well as sleep study, and these returned normal, reportedly.    She began looking for diagnoses online, and found that paroxysmal hemicrania continua best fit to the symptoms that she was describing.  She brought this information to her treating physician, and indomethacin trial was initiated.  Her gabapentin and topiramate were decreased.  She reports significant improvement with indomethacin, and has continued taking this ever since, taking it since mid to late 2017.    She reports decreasing her dose of topiramate indomethacin over the last year, and her headaches  returned, so her dosing was increased to her previous dose.    Currently, she reports 1-2 headaches a day.  They are described as retro-orbital blinding pain, occurring on the right side 85% of the time, and the left side 15% of the time.  The pain is described as an ice pick stabbing pain with reverberations lasting between 30 seconds and 2 minutes.  After this time, she reports an aftermath of a dull aching pain that lasts between 30 minutes and 2 hours, with treatment.  It is unclear how long this would last without treatment.  The severe pain rates between a 7 and 9 out of 10.  This pain is associated with photophobia, phonophobia, and nausea.  She also has rare vomiting.  This pain is generally worse with stress or decreased sleep.  She finds that her headache pain is improved with sleep.  Her pain is triggered by bright lights, such as the lighting at large stores.  She denies any positional component to her pain.  She denies autonomic features.  She denies fevers or other illness associated with headaches.    For treatment of her headache, she currently takes topiramate 50 mg twice a day and indomethacin 50 mg 3 times a day.  She will try deep breathing and ginger ale, cooling gel packs on her eyes and forehead, essential oils on her temples, and laying down.  She also takes Excedrin and cyclobenzaprine about once a week.    She has previously received IM medication injected in a clinic setting on one occasion as well as 3 courses of methylprednisolone, lasting between 5 days in a week since 2016.    She previously tried gabapentin up to 600 mg 3 times a day, but this made her feel tired and gain weight.  She is also tried promethazine, NSAIDs, acetaminophen, cyclobenzaprine, Excedrin, Percocet, and meditation, without significant relief.  For prevention, she has tried mirtazapine (currently takes for psychiatric reasons), propranolol (currently takes for psychiatric reasons), sertraline (currently takes for  psychiatric reasons), clonazepam, without significant relief.  She also recalls taking amitriptyline at a young age either 10 or 11, but is not tried this since the onset of her current headaches.      She denies ever taking Depakote, verapamil, botulinum toxin injections, or a CGRP inhibitor.              Past Medical History:   Endometriosis, depression, chronic pain  Past Medical History:   Diagnosis Date     Degenerative disc disease at L5-S1 level               Past Surgical History:     Past Surgical History:   Procedure Laterality Date     HC TOOTH EXTRACTION W/FORCEP      local anaesthesia             Social History:   She is .  She is currently unemployed.  She does not have any children.  She has 3 puppies at home.  She denies smoking, drinks alcohol rarely, and denies drug use.  She drinks 1 cup of coffee a day.  Social History     Socioeconomic History     Marital status:      Spouse name: Not on file     Number of children: Not on file     Years of education: Not on file     Highest education level: Not on file   Social Needs     Financial resource strain: Not on file     Food insecurity - worry: Not on file     Food insecurity - inability: Not on file     Transportation needs - medical: Not on file     Transportation needs - non-medical: Not on file   Occupational History     Not on file   Tobacco Use     Smoking status: Never Smoker     Smokeless tobacco: Never Used   Substance and Sexual Activity     Alcohol use: Yes     Frequency: 2-4 times a month     Drug use: No     Sexual activity: No   Other Topics Concern     Not on file   Social History Narrative     Not on file             Family History:   There is a family history of migraines in her mother and grandmother.  Family History   Problem Relation Age of Onset     Bipolar Disorder Maternal Grandmother      Depression Mother      Depression Father              Allergies:      Allergies   Allergen Reactions     Seasonal Allergies   "            Medications:     Current Outpatient Medications:      clonazePAM (KLONOPIN) 0.5 MG tablet, Take 0.5 mg by mouth as needed for anxiety, Disp: , Rfl:      cyclobenzaprine (FLEXERIL) 10 MG tablet, Take 10 mg by mouth 3 times daily as needed for muscle spasms, Disp: , Rfl:      gabapentin (NEURONTIN) 300 MG capsule, Take 600 mg by mouth 3 times daily, Disp: , Rfl:      INDOMETHACIN PO, Take 50 mg by mouth 3 times daily, Disp: , Rfl:      mirtazapine (REMERON) 15 MG tablet, Take 1 tablet (15 mg) by mouth At Bedtime, Disp: 30 tablet, Rfl: 1     oxyCODONE-acetaminophen (PERCOCET) 5-325 MG tablet, Take 1 tablet by mouth every 6 hours as needed for severe pain, Disp: , Rfl:      promethazine (PHENERGAN) 25 MG tablet, Take 25 mg by mouth every 8 hours as needed for nausea, Disp: , Rfl:      propranolol ER (INDERAL LA) 60 MG 24 hr capsule, Take 1 capsule (60 mg) by mouth At Bedtime, Disp: 30 capsule, Rfl: 1     sertraline (ZOLOFT) 100 MG tablet, Take 2 tablets (200 mg) by mouth daily, Disp: 60 tablet, Rfl: 1     topiramate (TOPAMAX) 50 MG tablet, Take 50 mg by mouth 2 times daily, Disp: , Rfl:           Review of Systems:   Negative, with the exception of chronic pain, depression, and as noted in the HPI.         Physical Exam:   /85   Pulse 104   Temp 98  F (36.7  C) (Oral)   Resp 16   Ht 1.632 m (5' 4.25\")   Wt 96 kg (211 lb 9.6 oz)   SpO2 98%   BMI 36.04 kg/m     Physical Exam:   General: NAD  Neurologic:   Mental Status Exam: Alert, awake and oriented to situation. No dysarthria. Speech of normal fluency.   Cranial Nerves: Fundoscopic exam with clear disc margins bilaterally. PERRLA, pupils 6 mm bilaterally, EOMs intact, no nystagmus, facial movements symmetric, facial sensation intact to light touch, hearing intact to conversation, palate and uvula rise symmetrically, no deviation in uvula or tongue, trapezius and SCMs 5/5 bilaterally, tongue midline and fully mobile. No atrophy or fasciculations. "    Motor: Normal tone in all four extremities, no atrophy or fasciculations. 5/5 strength bilaterally in shoulder abduction, elbow extensors and flexors, , hip flexors, knee extensors and flexors, dorsi- and plantarflexion. No tremors or abnormal movements noetd.   Sensory: Sensation intact to light touch on arms and legs bilaterally.    Coordination: Finger-nose-finger intact bilaterally. Normal Romberg.   Reflexes: 2+ and symmetric in triceps, biceps, brachioradialis, patellar, Achilles, and plantars downgoing bilaterally.   Gait: Normal gait. Tandem gait normal.  Head: Normocephalic, atraumatic. No tenderness to palpation over the supraorbital notches, occipital nerves, or temples.   Neck: Normal range of motion with lateral head movements and neck flexion.  Eyes: No conjunctival injection, no scleral icterus.   Mouth: No oral lesions, no erythema or exudate in the oropharynx.  Respiratory: No increased work of breathing.  Cardiovascular: No lower extremity edema.  Extremities: Warm, dry.          Data:   Previous head imaging not immediately available today, and is reportedly unremarkable.

## 2019-01-31 ENCOUNTER — TELEPHONE (OUTPATIENT)
Dept: PSYCHIATRY | Facility: CLINIC | Age: 24
End: 2019-01-31

## 2019-01-31 NOTE — TELEPHONE ENCOUNTER
1/31/2019, writer gave verbal ok to Sam/Pharmacist at [facility] [phone] regarding fax clarification for Propranolol ER and NOT Propranolol IR, per note from Sandra Liriano RN. See below.HALINA Ritter Laura, RN          1/23/19 12:27 PM   Note      Gomez Blunt MD Pratt, Laura, RN   Caller: Unspecified (Today,  9:32 AM)   Phone Number: [phone]              Yep, that's fine. Thanks!       Writer refilled Rx as ER with request to discontinue regular tabs after receiving approval from the provider. Will notify pt via [employer].

## 2019-02-01 ENCOUNTER — OFFICE VISIT (OUTPATIENT)
Dept: PSYCHIATRY | Facility: CLINIC | Age: 24
End: 2019-02-01
Attending: PSYCHIATRY & NEUROLOGY
Payer: COMMERCIAL

## 2019-02-01 VITALS — BODY MASS INDEX: 36.28 KG/M2 | SYSTOLIC BLOOD PRESSURE: 131 MMHG | DIASTOLIC BLOOD PRESSURE: 87 MMHG | WEIGHT: 213 LBS

## 2019-02-01 DIAGNOSIS — F33.2 SEVERE EPISODE OF RECURRENT MAJOR DEPRESSIVE DISORDER, WITHOUT PSYCHOTIC FEATURES (H): ICD-10-CM

## 2019-02-01 PROCEDURE — G0463 HOSPITAL OUTPT CLINIC VISIT: HCPCS | Mod: ZF

## 2019-02-01 RX ORDER — MIRTAZAPINE 7.5 MG/1
TABLET, FILM COATED ORAL
Qty: 30 TABLET | Refills: 0 | Status: SHIPPED | OUTPATIENT
Start: 2019-02-01 | End: 2019-03-01

## 2019-02-01 RX ORDER — PROPRANOLOL HCL 60 MG
60 CAPSULE, EXTENDED RELEASE 24HR ORAL AT BEDTIME
Qty: 30 CAPSULE | Refills: 1 | Status: CANCELLED | OUTPATIENT
Start: 2019-02-01

## 2019-02-01 RX ORDER — SERTRALINE HYDROCHLORIDE 100 MG/1
200 TABLET, FILM COATED ORAL DAILY
Qty: 60 TABLET | Refills: 1 | Status: CANCELLED | OUTPATIENT
Start: 2019-02-01

## 2019-02-01 ASSESSMENT — PAIN SCALES - GENERAL: PAINLEVEL: SEVERE PAIN (7)

## 2019-02-01 NOTE — PROGRESS NOTES
"[facility] PSYCHIATRY CLINIC TRANSFER DIAGNOSTIC ASSESSMENT       CARE TEAM:  PCP- Tia Leonardo    Specialty Providers- Neurology, ObGyn, Pain Management    Therapist- no    Community  Team- no    Ingris FELDER Uriel is a 24 year old female who prefers the name Margarita & pronouns she, her, hers, herself. Date of initial diagnostic assessment is 1/10/2019.  Date of most recent transfer of care assessment is 02/01/19.     Pertinent Background:  This patient first experienced mental health issues in late adolescence and has received treatment for depression and chronic pain.  First prescribed citalopram at the age of 14 and then later switched to sertraline in college which she reports was helpful for both mood and OCD.  Has also done CBT for OCD to endorsed benefit.  No history of hospitalizations but patient allows at least one suicide attempt via CO poisoning in 2014 (her most recent SA) and chart review suggests as many as 6 other SA's prior to that.  Notably, patient's first contact with this clinic was a diagnostic assessment completed as a one time SACHIN eval on 1/10/19, which had been recommended in the context of establishing care with new providers upon recently returning to live in [place] given her prolonged use of opiates.  From that DA: \"Unclear that chronic opioids appropriate for her chronic non-malignant pain states. That said, with what information we have, no clear indication that she has used non-medically, escalated in use, or any other concerns.\"  Chronic pain condition(s) experienced by the patient include endometriosis, herniated lumbar disk and migraines.  Social history highlights include raised in [place], received BA in Psychology from [employer] in [place], [place], where she met her present wife Jacqui, then lived in [place] from Dec 2015 up to recent return to [place] in Nov 2018.    Psych critical item history includes suicide attempt [via CO poisoning/asphyxiation] and suicidal " "ideation.    INTERIM HISTORY                                                                                              4, 4   The patient reports good treatment adherence.  History was provided by the patient who was a good historian.  The last visit ended with the following med change: initiation of mirtazapine at 15 mg qHS.   Since the last visit:   -The patient presents well groomed, in dark attire and heavy makeup.  Explains that she is leaving town shortly after our visit to drive to [place] in order to attend the  for her wife's grandfather.  States that his death has been a major event between this visit and her initial consult in the clinic.  Explains that she is extremely anxious about the trip to [place] because she will be forced to deal with her wife's family who are not universally supportive regarding their relationship.  -States that overall she is currently doing \"okay\" in terms of depressive symptoms with mild interval improvement since intake.  Elaborates that she thinks this is at least partly due to being very busy/focused on dealing with both the death in her wife's family as well as continuing to settle into their new life in [place].  Talks about how they had been told they needed to relocate here and given very little time to do so 2.5 months ago in 2018.  States that selling their house and transitioning her life from [place] to [place] has been a \"whirl-wind,\" and she is still figuring lots of things out, such as setting up medical care with a new batch of providers and \"waiting for the dust to settle.\"  Also is working on getting a job and has an interview next Tuesday.  -Discussed her past psychiatric history and she emphasized depression/anxiety in her teens which followed her to college, and her eventual recognition of OCD while attending school at [employer], with particular burden of cleaning/organizing.  Did CBT and started sertraline around the same time and says " "that together \"it really helped.\"  Denies counting or checking behaviors.  -In terms of depressive symptoms, states that she has depressive episodes over an overlay of her \"normal depressive state.\"  During depressive episodes has trouble enjoying things and feels marked apathy.  Elaborates that \"if I'm in my normal depressive state, I can enjoy things just fine.\"  Has had suicidal ideation off and on over the years, and when asked about her last SI answers \"probably spring of 2016.\"  Notably, at intake related background/passive thoughts of death, however does not report this today when asked directly about suicidal ideation.  -In terms of anxiety, she relates frequent panic episodes and states that she anticipates notable anxiety while in [place] visiting her wife's family for the .  Becomes tearful while discussing anxious acuity and states: \"My Klonopin is the only thing that has ever really helped with the panic attacks.\"  Allows that she has been told in the past she could take additional propranolol as needed during episodes of marked anxiousness.  Also states that she knows skills that she can use, teaching back the TIPP skills unprompted.  -In terms of the recent addition of mirtazapine to her regimen, she says that it has helped somewhat with helping her become sleepy in the evenings however apart from that it \"hasn't had a ton of impact.\"  -As we were discussing recommendations, asked the patient if there was anything she was hoping to address/request today.  She asked if we might consider prescribing Klonopin, and the concerns regarding this were reviewed, including the risk for increased respiratory suppression when paired with her narcotic pain relievers, in addition to the fact that these medications are not recommended for long-term use and carry the potential for dependence.  Stated that other PRN medications could be considered, however before these options could be related she voiced her " preference to not add another medication at this time.  Elaborated that she would use her present medications and skills to deal with the trip to [place].  Ultimately did agree to raise the dose of her mirtazapine for additional mood/sleep support.    RECENT SYMPTOMS:   DEPRESSION:  reports-depressed mood, anhedonia, low energy, hypersomnia, feeling worthless, excessive crying and overwhelmed;  DENIES- suicidal ideation and self-destructive thoughts  MARIAH/HYPOMANIA:  reports-none;  DENIES- increased energy, decreased sleep need, increased activity and grandiosity  PSYCHOSIS:  reports-none;  DENIES- delusions, auditory hallucinations and visual hallucinations  DYSREGULATION:  reports-mood dysregulation and irritable;  DENIES- suicidal ideation, violent ideation, SIB and aggressive  PANIC ATTACK:  SOB and chest tightness and inability to hear what those around her are saying, occur 1-2x a week  ANXIETY:  excessive worry, feeling fearful, social anxiety and nervous/overwhelmed  TRAUMA RELATED:  none  COMPULSIVE:  organization and arranging things  SLEEP:  some difficultly sleeping at night with napping during the day   EATING DISORDER: some intermittent binging behavior     RECENT SUBSTANCE USE:     ALCOHOL- none, reports she and wife had had a single bottle of vodka in their house for at least 2 years      TOBACCO- no  CAFFEINE- coffee  OPIOIDS- 1 Percocet tablet 3x daily for chronic pain         NARCAN KIT- unknown, will discuss at next vist  CANNABIS- none  OTHER ILLICIT DRUGS- none    CURRENT SOCIAL HISTORY:  FINANCIAL SUPPORT- wife has job, patient currently searching for employment  CHILDREN- none  LIVING SITUATION- with wife in apt in [place]      SOCIAL/ SPIRITUAL SUPPORT- wife, family, three dogs (Kerline a border collie aged 5-6 years, Sheila-Sheila a bichon frise aged 3 years, and Sarah lieberman constantin-poo aged 2 years)  FEELS SAFE AT HOME- yes    MEDICAL ROS (2,10):  A comprehensive review of systems was performed and is  "negative other than noted in the HPI.    SUBSTANCE USE HISTORY                                                                             Past Use- Rx opioids and benzos   Treatment- none, most recent- N/A  Medical Consequences- Patient reports physiologic dependence/tolerance to opioids  HIV/Hepatitis- no  Legal Consequences- no  PSYCHIATRIC HISTORY     SIB- denies  Suicidal Ideation Hx- yes, both active and passive, last active SI was \"spring of 2016\"  Suicide Attempt- #- patient has described at least one SA which was an attempted CO poisoning/asphyxiation in 2014, found by her wife, with none since - however a chart note from a provider in [place] alludes to 6 attempts previous to that    Violence/Aggression Hx- no  Psychosis Hx- no  Psych Hosp- #- no, most recent- N/A   ECT- no    Eating Disorder- yes, binging behavior  Outpatient Programs [DBT, [facility], Eating Disorder etc]- CBT for OCD and anxiety which was \"very helpful\"    SOCIAL and FAMILY HISTORY                           patient reported                          1ea, 1ea     Financial Support- documented above    Living Situation/Family/Relationships- documented above;  Children- documented above    Trauma History (self-report)- denies history of abuse    Legal- denies    Cultural/ Social/ Spiritual Support- wife, pets, family    Early History/Education-  Grew up in [place], [place]. Parents  when she was 7 years old and later got . Has one younger sister. Graduated HS. Attended college in [place], [place] and has BA in psychology.    Family Mental Health History:  Maternal Great-Grandmother had BPAD; Depression in family, both mom and dad, maternal aunt.    PAST PSYCH MED TRIALS   see EMR Problem List: Hx of psychiatric care    MEDICAL / SURGICAL HISTORY                                   Pregnant or breastfeeding- no      Contraception- Paraguard IUD    Neurologic Hx- no history of TBI or serizure    Patient Active Problem List "   Diagnosis     Intractable chronic migraine without aura and without status migrainosus      *  History of endometriosis     *  History of herniated lumbar disk     Past Surgical History:   Procedure Laterality Date     HC TOOTH EXTRACTION W/FORCEP      local anaesthesia     ALLERGY                                Seasonal allergies     MEDICATIONS                               **Clonazepam is not an active/current medication.**    Current Outpatient Medications   Medication Sig Dispense Refill     clonazePAM (KLONOPIN) 0.5 MG tablet Take 0.5 mg by mouth as needed for anxiety       cyclobenzaprine (FLEXERIL) 10 MG tablet Take 10 mg by mouth 3 times daily as needed for muscle spasms       gabapentin (NEURONTIN) 300 MG capsule Take 600 mg by mouth 3 times daily       INDOMETHACIN PO Take 50 mg by mouth 3 times daily       mirtazapine (REMERON) 15 MG tablet Take 1 tablet (15 mg) by mouth At Bedtime 30 tablet 1     oxyCODONE-acetaminophen (PERCOCET) 5-325 MG tablet Take 1 tablet by mouth every 6 hours as needed for severe pain       promethazine (PHENERGAN) 25 MG tablet Take 25 mg by mouth every 8 hours as needed for nausea       propranolol ER (INDERAL LA) 60 MG 24 hr capsule Take 1 capsule (60 mg) by mouth At Bedtime 30 capsule 1     sertraline (ZOLOFT) 100 MG tablet Take 2 tablets (200 mg) by mouth daily 60 tablet 1     topiramate (TOPAMAX) 50 MG tablet Take 1 tablet (50 mg) by mouth 2 times daily 180 tablet 3     topiramate (TOPAMAX) 50 MG tablet Take 50 mg by mouth 2 times daily       VITALS                                                                                                                                  3, 3   /87   Wt 96.6 kg (213 lb)   BMI 36.28 kg/m       MENTAL STATUS EXAM                                                                         9, 14 cog gs     Alertness: alert  and oriented  Appearance: well groomed  Behavior/Demeanor: cooperative and pleasant, with good eye contact    Speech: normal  Language: good  Psychomotor: normal or unremarkable  Mood: depressed, anxious and labile  Affect: full range, labile and dramatic; was congruent to mood; was congruent to content  Thought Process/Associations: unremarkable  Thought Content:  Reports none;  Denies suicidal ideation, violent ideation and delusions  Perception:  Reports none;  Denies auditory hallucinations and visual hallucinations  Insight: good  Judgment: good  Cognition: (6) oriented: time, person, and place  attention span: intact  concentration: intact  recent memory: intact  remote memory: intact  fund of knowledge: appropriate  Gait and Station: unremarkable    LABS and DATA     AIMS:  N/A    PHQ9 TODAY = Patient did not complete  PHQ-9 SCORE 1/10/2019   PHQ-9 Total Score 23     DIAGNOSIS     Major Depressive Disorder, recurrent episode, moderate   Rule out Persistent Depressive Disorder  Panic Disorder  Obsessive-Compulsive Disorder  Social Anxiety Disorder    ASSESSMENT                                                                                                   m2, h3     TODAY:  Margarita Trevino presented to [facility] Psychiatry for continuing medication management of MDD, Panic and OCD.  This patient has recently moved back to [place], where she grew up, from several years living in [place].  Is  to same-sex partner, Jacqui, and states that [place] had been a very conservative/unwelcoming environment for them, elaborating that so far the [facility] has been much preferable in this regard.  Relates mild interval mood improvements in terms of depression since her intake interview, which she partially attributes to having been very busy continuing to adjust to their new home and also dealing with the death of her wife's grandfather.  States that they are leaving this afternoon to travel to [place] for the , and that she's worried about having to spend time with her wife's family.  She asked if we might consider  re-adding Klonopin to her regimen for anxiety/panic, an agent which she states has been highly efficacious for her in the past.  We reviewed the contraindications of this regimen adjustment and while it was related that other PRN medications could be trialed for anxious acuity, she stated she would manage with her present meds and previously learned skills.  Notably, this patient has multiple sources of chronic pain, including endometriosis, lumbar disc disease and chronic migraines.  Initially had been seen here in the clinic for SACHIN assessment due to her long-term opiate use, however at that visit it was assessed by the consulting provider that the patient has demonstrated no evidence of excessive/non-medical opiate use.  Unclear at this time as to whether these medications will be perpetuated after she establishes with pain management here at [place].  Her current regimen includes topiramate, prescribed for migraine prophylaxis, and gabapentin, as well as sertraline, propranolol and recently initiated mirtazapine.  She states that sertraline has been beneficial for her OCD, something which had been worse while she was in college and notably improved with Zoloft and CBT.  Allows continued preoccupation with cleaning and organizing, but states that OCD is not a present chief complaint.  In particular, relates panic as a limiting presentation, elaborating that it reduces her willingness to leave the house and embrace a broader sphere of activity.  Have recommended that she consider reestablishing with a psychotherapy provider, and she states that she's willing to do this however would prefer to find a job first, adding that she has an interview next Tuesday.  Finally, in terms of depressive symptoms today she denies SI/SIB thoughts, and when asked about SI states that she hasn't had this for greater than 2 years.  This patient does have a history of suicide attempt (2014), and at her intake interview reported  background/passive thoughts of death, something which she does not report/reiterate today.  Describes a pattern of depressive episodes overlaying a baseline depressive condition.  Given this, moving forward will consider whether she merits a diagnosis of Persistent Depressive Disorder and will clarify the degree of symptom burden that typically characterizes this depressive baseline.  Today she relates tolerance of recently initiated mirtazapine, and due to her endorsement of improved but continued depressed mood and sleep difficulty, have agreed to increase the dose of this medication to 22.5 mg.  Otherwise, we will maintain her present regimen.    SUICIDE RISK ASSESSMENT:  Ingris Trevino reports an absence of present SI.  This patient does have notable risk factors for self-harm including previous suicide attempt, severe anxiety, significant pain and on opiates.  However, risk is mitigated by no plan or intent, describes a safety plan, symptom improvement, none to minimal alcohol use , commitment to family and stable housing.  Based on all available evidence she does not appear to be at imminent risk for self-harm therefore does not meet criteria for a 72-hr hold/  involuntary hospitalization.  However, based on degree of symptoms therapy and close psych FU was recommended which the pt did agree to.    [place] PRESCRIPTION MONITORING PROGRAM [] was checked today:  indicates that patient filled scripts for #90 tabs oxycodone-acetaminophen 5-325 on 12/5/2018 and 1/7/2019 which had both been prescribed by Dalia Sandoval in [place], [place] on 8/27/2018.  Also filled #270 capsules of gabapentin 600 mg on 1/28/2019 which had been prescribed by Francisco Hyatt MD in [place], [place] on 1/15/2019.    PSYCHOTROPIC DRUG INTERACTIONS:    Concurrent use of OXYCODONE and SERTRALINE may result in an increased risk of serotonin syndrome (tachycardia, hyperthermia, myoclonus, mental status changes).    Concurrent use of  MIRTAZAPINE and SEROTONERGIC AGENTS may result in increased risk of serotonin syndrome.    Concurrent use of OXYCODONE and CNS DEPRESSANTS may result in increased risk of respiratory and CNS depression.    Concurrent use of OXYCODONE and SEROTONERGIC AGENTS may result in increased risk of serotonin syndrome.    Concurrent use of MIRTAZAPINE and SEROTONERGIC AGENTS may result in increased risk of serotonin syndrome.    MANAGEMENT:  Monitoring for adverse effects, routine vitals and patient is aware of risks     PLAN                                                                                                              m2, h3     1) PSYCHOTROPIC MEDICATIONS:  - Increase mirtazapine to 22.5 mg at bedtime  - Continue sertraline 200 mg daily  - Continue propranolol 60 mg daily    2) THERAPY:  recommended, patient agrees to consider after she finds a job    3) NEXT DUE:    Labs- no  EKG- PRN  Rating Scales- N/A    4) REFERRALS:  MTM- have provided information for the gene testing/MTM study     5) RTC: 4-6 weeks    6) CRISIS NUMBERS:   Provided routinely in [facility].   [facility] [phone] (clinic)    [phone] (after hours)  CRISIS TEXT LINE: Text [id number] from anywhere in [place], anytime, any crisis 24/7;  OR SEE www.crisistextline.org    TREATMENT RISK STATEMENT:  The risks, benefits, alternatives and potential adverse effects have been discussed and are understood by the pt. The pt understands the risks of using street drugs or alcohol. There are no medical contraindications, the pt agrees to treatment with the ability to do so. The pt knows to call the clinic for any problems or to access emergency care if needed.  Medical and substance use concerns are documented above.  Psychotropic drug interaction check was done, including changes made today.    PROVIDER: Alcides Shearer, DO    Patient not staffed in clinic.  Note will be reviewed and signed by supervisor Dr. Miller.    Attestation:  I did not see this  patient directly. I have reviewed the documentation and I agree with the resident's plan of care.   Mateo Miller MD

## 2019-02-01 NOTE — NURSING NOTE
Chief Complaint   Patient presents with     Recheck Medication     Severe episode of recurrent major depressive disorder, without psychotic features

## 2019-02-11 ENCOUNTER — OFFICE VISIT (OUTPATIENT)
Dept: PALLIATIVE MEDICINE | Facility: CLINIC | Age: 24
End: 2019-02-11
Payer: COMMERCIAL

## 2019-02-11 VITALS
OXYGEN SATURATION: 98 % | SYSTOLIC BLOOD PRESSURE: 134 MMHG | HEART RATE: 85 BPM | BODY MASS INDEX: 35.94 KG/M2 | WEIGHT: 211 LBS | DIASTOLIC BLOOD PRESSURE: 79 MMHG

## 2019-02-11 DIAGNOSIS — M79.7 FIBROMYALGIA: Primary | ICD-10-CM

## 2019-02-11 PROCEDURE — 99205 OFFICE O/P NEW HI 60 MIN: CPT | Performed by: PHYSICAL MEDICINE & REHABILITATION

## 2019-02-11 ASSESSMENT — PAIN SCALES - GENERAL: PAINLEVEL: SEVERE PAIN (7)
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
## 2019-02-12 ENCOUNTER — TELEPHONE (OUTPATIENT)
Dept: INTERNAL MEDICINE | Facility: CLINIC | Age: 24
End: 2019-02-12

## 2019-02-12 NOTE — TELEPHONE ENCOUNTER
Patient was reached by phone and an appt was made for her to see Dr. Portillo tomorrow morning @ 0800 for follow up.    Rossi Braun RN on 2/12/2019 at 5:29 PM

## 2019-02-12 NOTE — TELEPHONE ENCOUNTER
Health Call Center    Phone Message    May a detailed message be left on voicemail: yes    Reason for Call: Medication Question or concern regarding medication   Prescription Clarification  Name of Medication: percocet  Prescribing Provider: none yet   Pharmacy: [facility] [id number] [facility] - [place], [place] - [address]   What on the order needs clarification? The patient was referred to see a specialist by her pcpc and the specialist  told her that her primary care provider is the one that needs to prescribe this medication patient needs this medication asap please call her asap to address her concerns            Action Taken: Message routed to:  [facility] ([facility]

## 2019-02-14 ENCOUNTER — OFFICE VISIT (OUTPATIENT)
Dept: INTERNAL MEDICINE | Facility: CLINIC | Age: 24
End: 2019-02-14

## 2019-02-14 VITALS
BODY MASS INDEX: 35.94 KG/M2 | HEART RATE: 99 BPM | WEIGHT: 211 LBS | SYSTOLIC BLOOD PRESSURE: 135 MMHG | RESPIRATION RATE: 16 BRPM | DIASTOLIC BLOOD PRESSURE: 83 MMHG

## 2019-02-14 DIAGNOSIS — M79.7 FIBROMYALGIA: Primary | ICD-10-CM

## 2019-02-14 ASSESSMENT — PAIN SCALES - GENERAL: PAINLEVEL: EXTREME PAIN (9)

## 2019-02-14 NOTE — PATIENT INSTRUCTIONS
Please schedule a visit with ,  or  who can offer you regular visits.   As mentioned please ensure you are NOT scheduled for a visit in the resident clinic again as we are unable to offer you regular appointments with the same provider or longitudinal conversations regarding your narcotic prescription.      Primary Care Center Phone Number [phone]  Primary Care Center Medication Refill Request Information:  * Please contact your pharmacy regarding ANY request for medication refills.  ** [facility] Prescription Fax = [phone]  * Please allow 3 business days for routine medication refills.  * Please allow 5 business days for controlled substance medication refills.     Primary Care Center Test Result notification information:  *You will be notified with in 7-10 days of your appointment day regarding the results of your test.  If you are on MyChart you will be notified as soon as the provider has reviewed the results and signed off on them.               [facility]         Internal Medicine Resident                   Continuity Clinic    Who We Are    Resident Continuity Clinic is a part of the [facility].  Resident physicians see patients independently and establish a relationship with them over the course of their three-year residency program.  As with the Primary Care Clinic, our Resident Continuity Clinic models a group practice.  If your doctor is not available, you will be able to see another resident physician.  At the end of a resident s training, patients will be transitioned to a new resident physician for ongoing care.     We treat patients with a wide array of medical needs from routine physicals, to acute illnesses, to diabetes and blood pressure management, to complex medical illness.  What is a Resident Physician?    Resident physicians hold medical degrees and are doctors. They are training to become specialists in Internal Medicine. They  work under the supervision of board-certified faculty physicians.  Expectations for Your Care    We strive to provide accessible, quality care at all times.    In order to provide this care, it is best to see your primary care resident doctor consistently rather switching between providers.  In the event you do see another physician, you should schedule a follow-up visit with your usual primary care doctor.    If you are transitioning your care from another clinic, it is helpful to have your records available for your doctor to review.    We do not prescribe controlled substances, such as ADD medications or narcotic pain medications, on your first visit.  We will review your health records and concerns prior to devising a treatment plan with you in order to provide the best care.      Clinic Services     Extended clinic hours; patient  to help navigate your visit;  parking; laboratory and imaging services with evening and weekend hours    Multiple medical and surgical specialties in one building    Complementary services, including Nutrition, Integrative Medicine, Pharmacy consultations, Mental and Behavioral Health, Sports Medicine and Physical Therapy    Thank You    We would like to thank you for choosing the [facility] Internal Medicine Resident Continuity Clinic for your primary care. You are making a priceless contribution to the training of the next generation of health care practitioners.     Contact us at [phone] for appointments or questions.    Resident Clinic Hours are Tuesdays and Thursdays, 7:30am-5:00pm    Residents   Edy Dockery MD  (Male)   Margareth Escudero MD (Female)  Fe Ortega MD   (Female)   July Cam MD   (Female)   Anthony Rinaldi MD  (Male)   Caleb Padilla MD  (Male)   Isabella Rubin MD    (Female)   Brandon Childs MD  (Male)   Harry Felix MD  (Male)    Tia Leonardo MD  (Female)   Isaac Betancur MD  (Male)   Shanika Brizuela,  MD  (Female)   Yesi Friedman MD   (Female)   Rock Luna MD  (Male)   Ko Lux MD  (Male)   Caleb Charles MD (Male)   Rigo Stafford MD  (Male)   Tali Mckeon MD (Female)    Bea Mock MD (Female)   Romina Lazo MD  (Male)   Tatiana Fulton MD    (Female)   Tracy Paulino MD  (Female)    Supervising Physicians   MD Hermelinda Izaguirre, MD Camden Feliz, MD Evangelist Camacho, MD Shiar Beckwith, MD Amber Swann, MD Shalom Esteban, MD Jazzy Taylor, MD Whitney Green MD

## 2019-02-14 NOTE — NURSING NOTE
Chief Complaint   Patient presents with     Pain     pt is here to follow up on pain       Fe Kim CMA at 12:42 PM on 2/14/2019

## 2019-02-14 NOTE — PROGRESS NOTES
PRIMARY CARE CENTER       SUBJECTIVE:  Ingris Trevino is a 24 year old female with a PMHx of anxiet, depression, fibromyalgia and chronic pain syndrome who presents today to discuss pain.     The patient seen by primary care physician Dr. Leonardo, and he was last seen on 01/08/2019.  At that time, her depression was discussed as well as complaints of chronic pain due to endometriosis, herniated lumbar disc as well as headache.  She has been on Percocet for 5+ years, and at the time had requested an increase in the dose of Percocet however previous providers were hesitant given age.  She was ultimately offered a pain management referral, and OB/GYN referral as well as a neurology referral to discuss chronic pain symptoms, endometriosis as well as her headache.  She saw neurology on 01/30/19 at which point for headaches and was told to continue indomethacin, topiramate as well as promethazine for nausea.  She was offered a repeat visit in 6 months.    She also saw psychiatry on 02/01/2019 and ultimately, was recommended to increase her mirtazapine, continue sertraline, continue propranolol for anxiety.  She was offered crisis numbers, and was recommended therapy.    She also saw pain and palliative management on 02/11/19 at which point a number of integrated pain management options were recommended however the patient was reluctant to participate in these therapy modalities and wanted a Percocet refill which was not offered.  She reportedly left the clinic shortly thereafter.    Today she wishes to discuss her pain. She tells me she knows that we are unable to provide pain medications and narcotics but was hoping for a brief interim refill until she can see an outside pain clinic.   She has no other complaints today.  She tells me that she used to seek care at [facility] in [place] at which point, Percocet was prescribed to her for her pain.  She also tells me she uses the  Percocet primarily for her back pain, not for fibromyalgia and is aware when her fibromyalgia flares.    Medications and allergies reviewed by me today.     ROS:   10 point ros negative apart from above. Positive for diffuse pain throughout body, back pain, arm pain, leg pain.     OBJECTIVE:    /83   Pulse 99   Resp 16   Wt 95.7 kg (211 lb)   BMI 35.94 kg/m     Wt Readings from Last 1 Encounters:   02/14/19 95.7 kg (211 lb)       GENERAL APPEARANCE: Chronically ill-appearing, somewhat disheveled female.  Obese.  Tearful at times, and with appropriate affect thereafter.  Seems somewhat emotionally labile.     RESP: lungs clear to auscultation - no rales, rhonchi or wheezes     CV: regular rates and rhythm, normal S1 S2, no S3 or S4 and no murmur, click or rub     ABDOMEN: Deferred     NEURO/MS: Patient did not let me palpate her extremities.  She was unwilling to perform a number of range of motion tasks given pain.  Neurologic exam was markedly limited     SKIN: no suspicious lesions or rashes     PSYCH: Tearful, worried affect.  Initially quite appropriate however when information delivered but was not amenable to patient's plan, she became extraordinarily tearful, and somewhat hostile.     ASSESSMENT/PLAN:  Ingris Trevino is a 24 year old female with a PMHx of anxiet, depression, fibromyalgia and chronic pain syndrome who presents today to discuss pain.     Chronic Pain syndrome   Likely secondary to her fibromyalgia with some added component of lumbosacral pain.    Per chart review, as noted in pain management clinic note, she has had MRI of cervical, thoracic spine and lumbar spine in 2016, which commented on posterior disc bulging at L5-S1 without evidence of spinal canal or foraminal stenosis.  Degenerative L2-L3, L3-L4 disc disease with loss of disc height but overall preservation of disc intensity.  Agree with pain management approach to more interdisciplinary approach to treating her pain,  "however the patient became extremely tearful when I mentioned this.  She states that she can barely function without her Percocet and has been on it for a number of years.  I discussed with her at length that Percocet is not a treatment for fibromyalgia, and her lumbar disc disease can be treated with physical therapy, and alternate modalities.    I also clearly mentioned to her that the resident clinic is not a clinic that offers narcotic prescriptions consistently for patients, and offered alternate providers in the [facility] that can offer her ongoing discussions as well as therapeutic relationship for possible narcotic wean.  I clearly mentioned to her at that given her age, and her chronic diagnoses, most providers would likely attempt to wean her Percocet use to which she did not seem pleased.  At one point she stated that she \"would rather die\" to which I asked her whether she truly felt this way and had a plan; she does have prolonged history of suicidal attempts. Ultimately she stated \"no, I don't want to kill myself, I don't have a plan.\"   She has been off oxycodone since 2/5/19 and thus withdrawal is not of significant concern at this time.      - Referrals provided for alternate providers.   - Patient should NOT return to resident clinic until she established care with a pain clinic that will offer her the capacity for continued management of her narcotics as we are unable to perform this function.   - Encouraged her to seek care in ED if she experiences suicidal thoughts   - I expressed my genuine concern that she does have pain however in our clinic we are unable to offer narcotic prescriptions consistently; she seemed understanding of this.     Tatiana Fulton MD  Feb 14, 2019    Pt was discussed with     While the patient was in clinic, I reviewed the pertinent medical history and results.  I discussed the current findings on physical examination, as well as the patient s " diagnosis and treatment plan with the resident and agree with the information as documented with the following exceptions: none.   Camden Feliz MD

## 2019-02-15 ENCOUNTER — HEALTH MAINTENANCE LETTER (OUTPATIENT)
Age: 24
End: 2019-02-15

## 2019-02-15 ENCOUNTER — ANCILLARY PROCEDURE (OUTPATIENT)
Dept: GENERAL RADIOLOGY | Facility: CLINIC | Age: 24
End: 2019-02-15
Attending: FAMILY MEDICINE

## 2019-02-15 ENCOUNTER — OFFICE VISIT (OUTPATIENT)
Dept: FAMILY MEDICINE | Facility: CLINIC | Age: 24
End: 2019-02-15

## 2019-02-15 VITALS — SYSTOLIC BLOOD PRESSURE: 134 MMHG | HEART RATE: 93 BPM | DIASTOLIC BLOOD PRESSURE: 80 MMHG

## 2019-02-15 DIAGNOSIS — G89.29 CHRONIC BACK PAIN GREATER THAN 3 MONTHS DURATION: ICD-10-CM

## 2019-02-15 DIAGNOSIS — M54.9 CHRONIC BACK PAIN GREATER THAN 3 MONTHS DURATION: ICD-10-CM

## 2019-02-15 DIAGNOSIS — G89.29 CHRONIC BACK PAIN GREATER THAN 3 MONTHS DURATION: Primary | ICD-10-CM

## 2019-02-15 DIAGNOSIS — M54.9 CHRONIC BACK PAIN GREATER THAN 3 MONTHS DURATION: Primary | ICD-10-CM

## 2019-02-15 RX ORDER — OXYCODONE AND ACETAMINOPHEN 5; 325 MG/1; MG/1
TABLET ORAL
Qty: 90 TABLET | Refills: 0 | Status: SHIPPED | OUTPATIENT
Start: 2019-02-15 | End: 2022-12-06

## 2019-02-15 ASSESSMENT — PAIN SCALES - GENERAL: PAINLEVEL: EXTREME PAIN (9)

## 2019-02-15 NOTE — NURSING NOTE
Chief Complaint   Patient presents with     Pain     pt here for pain in lower back and knees       Lynn Jones CMA at 12:08 PM on 2/15/2019.

## 2019-02-15 NOTE — PROGRESS NOTES
[facility]  Primary Care Center   Francisco Leiva MD  02/15/2019      Chief Complaint:   Pain       History of Present Illness:   Ingris Trevino is a 24 year old female with a history of endometriosis, fibromyalgia, chronic pain, degenerative disc disease at L5-S1 and intractable chronic migraines who presents for evaluation of chronic pain.     Chronic pain: She has been treated for chronic pain due to endometriosis, herniated disc and headache with Percocet for 5+ years. She visited pain and palliative management on 02/11/2019 where she was given a number of integrated pain management options. However, she was reluctant to participate in the therapies offered and wanted a Percocet Refill which was not offered. Yesterday, she visited Dr. Abel where it was discussed that her pain was most likely due to Fibromyalgia and lumbar disease and that Percocet was not a treatment. They discussed that a better treatment option would be physical therapy.   Yesterday, she began to use a wheelchair due to excruciating pain. If she were to not to use a wheelchair, she would be hunched over and limping. Notably, she has had the wheelchair for 2 years and has a chair in her shower.   Today, she is here for the second time in two days. She can not walk and has not showered in a week. She describes her pain at a 10/10.  She just got a job that starts next week and she can not walk. She reports that she has no quality of life. She has been taking pain medication for 6 years for degenerative disc disorder. She was visiting a spine doctor in [place] for 3 years who recommended Cortisone injections which did not help and made it significantly worse, physical therapy and spinal cord stimulator. She believes that the injections were in the S1 region. Her pain radiates to other areas of her body, most significant in her hips. She brought in CDs with all of her imaging and diagnostics. She denied trying heated pool therapy,  however, she has tried pool therapy on her own without a physical therapist. She denies tingling, redness or loss of bowel control. Notably, she has fibromyalgia (not in mychart) and takes gabapentin for relief. Doctors have tried increasing her gabapentin dosage in the past to help with pain, however, increasing the dosage made her not feel well. She only wants a single script of pain medication with a referral to the pain clinic.   Notably. She was also diagnosed with endometriosis and has a copper IUD placed. She has not noticed any reduction in frequency of periods or pain.   She was given oral steroids in the past, however, she did not like how they made her gain weight. The steroids were mainly used to treat her headaches and did not help her back pain.   Notably, the last medication she was prescribed in [place] was 5 325 mg Percocet 3 times per day and 10 mg Flexeril 3 times per day. Notably, she has about 20 Flexeril left.     Headaches: Her wife has known her since 2015 where her pain was still pretty bad. In 2016, she began to experience seizures causing her to fall as a result of her severe headaches. She feels like her pain became worse after this episode where everything seemed to spiral. Around the same times she began to experience seizures, she went through a lot of stress and trauma in a short period of time. She visited neurology on 01/30/2019 and was told to continue Indomethacin, Topiramate and Promethazine (for nausea).     Anxiety: She visited psychiatry on 02/01/2019 and was told to increase her Mirtazapine, continue Sertraline and continue Propranolol.  Today, she reports that she does not feel suicidal thoughts. However, if she were to go off her Sertraline, that may be a possibility.     Snoring: She never wakes up and feels rested. Her wife reports that she snores every night. She had a sleep study performed in 2017 which showed no sleep apnea. Her weight has not changed since the study.      Other:  1. Flu vaccination- completed   2. Last pap smear in 2016.     Review of Systems:   Pertinent items are noted in HPI, remainder of complete ROS is negative.      Active Medications:      cyclobenzaprine (FLEXERIL) 10 MG tablet, Take 10 mg by mouth 3 times daily as needed for muscle spasms, Disp: , Rfl:      gabapentin (NEURONTIN) 300 MG capsule, Take 600 mg by mouth 3 times daily, Disp: , Rfl:      INDOMETHACIN PO, Take 50 mg by mouth 3 times daily, Disp: , Rfl:      mirtazapine (REMERON) 7.5 MG tablet, Take 1 tablet (7.5 mg) along with 1 tablet (15 mg) for a total dose of 22.5 mg at bedtime., Disp: 30 tablet, Rfl: 0     oxyCODONE-acetaminophen (PERCOCET) 5-325 MG tablet, Take 1 tablet by mouth every 6 hours as needed for severe pain, Disp: , Rfl:      promethazine (PHENERGAN) 25 MG tablet, Take 25 mg by mouth every 8 hours as needed for nausea, Disp: , Rfl:      propranolol ER (INDERAL LA) 60 MG 24 hr capsule, Take 1 capsule (60 mg) by mouth At Bedtime, Disp: 30 capsule, Rfl: 1     topiramate (TOPAMAX) 50 MG tablet, Take 1 tablet (50 mg) by mouth 2 times daily, Disp: 180 tablet, Rfl: 3     topiramate (TOPAMAX) 50 MG tablet, Take 50 mg by mouth 2 times daily, Disp: , Rfl:      mirtazapine (REMERON) 15 MG tablet, Take 1 tablet (15 mg) by mouth At Bedtime, Disp: 30 tablet, Rfl: 1     sertraline (ZOLOFT) 100 MG tablet, Take 2 tablets (200 mg) by mouth daily, Disp: 60 tablet, Rfl: 1      Allergies:   Seasonal allergies      Past Medical History:  Endometriosis   Fibromyalgia   Chronic pain   Degenerative disc disease at L5-S1 level   Intractable chronic migraine   Seizure     Past Surgical History:  Tooth extraction   IUD placement- [id number]    Family History:   Bipolar disorder   Depression   Rheumatoid arthritis   Acute spondylolisthesis   Several back surgeries      Social History:   The patient was accompanied to the appointment by: wife   Smoking Status: never   Smokeless Tobacco: never    Alcohol Use:  yes    She has a desk job      Physical Exam:   /80   Pulse 93    Constitutional: In wheelchair. Alert. In no distress.  Head: Normocephalic.   Cardiovascular: RRR. No murmurs, clicks, gallops, or rub.  Respiratory: Clear to auscultation bilaterally, no wheezes or crackles.  Musculoskeletal: Very tender over low back muscles and SI joints.  Walks to table and gets up/down very slowly due to back pain but does do this.   Neurologic: Lower extremety neurvascular status intact.   Psychiatric: Tearful affect describing pain and its limitations, worried affect. Normal congnition. Mentation appears normal. Normal affect.      Assessment and Plan:  Chronic back pain greater than 3 months duration  We had a one hour visit with the majority discussing her current back pain and previous treatment.  Her mother has acute spondylolisthesis. Per her memory, no one has imaged her SI joints, therefore, we will do so today. I check [place] database with no unusual prescriptions seen. She agreed to have a urine drug screen. I agreed to a one month of Percocet with the discussion that I will not fill again. This will function as a bridge until she visits the pain clinic as I have referred today. They may or may continue narcotics. She has been off of narcotics for a week as seen in chart notes. Does not clinically have any signs of withdraw. She is going to pain clinic soon so we will not start any new therapies. I did recommended pool therapy as she has not done in the past.   - PAIN MANAGEMENT REFERRAL  - XR Sacroiliac Joint 1/2 Views  - XR Lumbar Spine 2-3 Views  - Urine drug screen  - oxyCODONE-acetaminophen (PERCOCET) 5-325 MG tablet  Dispense: 90 tablet; Refill: 0    Endometriosis:   She agrees to reschedule with gynecology on her own.     Anxiety:   She states that she will keep following with psychiatry and keep her medications as is in the meantime.     1 hour visit over half [facility] care, lengthy discussion with pt and  wife about chronic pain eval/treat options       Follow-up: Data Unavailable         Scribe Disclosure:   I, Pili Rodriguez, am serving as a scribe to document services personally performed by Francisco Leiva MD at this visit, based upon the provider's statements to me. All documentation has been reviewed by the aforementioned provider prior to being entered into the official medical record.     Portions of this medical record were completed by a scribe. UPON MY REVIEW AND AUTHENTICATION BY ELECTRONIC SIGNATURE, this confirms (a) I performed the applicable clinical services, and (b) the record is accurate.   Francisco Leiva MD

## 2019-02-15 NOTE — PATIENT INSTRUCTIONS
[facility] Medication Refill Request Information:  * Please contact your pharmacy regarding ANY request for medication refills.  ** [facility] Prescription Fax = [phone]  * Please allow 3 business days for routine medication refills.  * Please allow 5 business days for controlled substance medication refills.     [facility] Test Result notification information:  *You will be notified with in 7-10 days of your appointment day regarding the results of your test.  If you are on MyChart you will be notified as soon as the provider has reviewed the results and signed off on them.    [facility]): [phone]

## 2019-02-16 LAB
ACETAMINOPHEN QUAL: NEGATIVE
AMANTADINE: NEGATIVE
AMITRIPTYLINE QUAL: NEGATIVE
AMOXAPINE: NEGATIVE
AMPHETAMINES QUAL: NEGATIVE
ATROPINE: NEGATIVE
BENZODIAZ UR QL: NEGATIVE
BUPROPION QUAL: NEGATIVE
CAFFEINE QUAL: POSITIVE
CANNABINOIDS UR QL SCN: NEGATIVE
CARBAMAZEPINE QUAL: NEGATIVE
CHLORPHENIRAMINE: NEGATIVE
CHLORPROMAZINE: NEGATIVE
CITALOPRAM QUAL: NEGATIVE
CLOMIPRAMINE QUAL: NEGATIVE
COCAINE QUAL: NEGATIVE
COCAINE UR QL: NEGATIVE
CODEINE QUAL: NEGATIVE
DESIPRAMINE QUAL: NEGATIVE
DEXTROMETHORPHAN: NEGATIVE
DIPHENHYDRAMINE: NEGATIVE
DOXEPIN/METABOLITE: NEGATIVE
DOXYLAMINE: NEGATIVE
EPHEDRINE OR PSEUDO: NEGATIVE
FENTANYL QUAL: NEGATIVE
FLUOXETINE AND METAB: NEGATIVE
HYDROCODONE QUAL: NEGATIVE
HYDROMORPHONE QUAL: NEGATIVE
IBUPROFEN QUAL: NEGATIVE
IMIPRAMINE QUAL: NEGATIVE
KETAMINE QUAL: NEGATIVE
LAMOTRIGINE QUAL: NEGATIVE
LIDOCAIN SPEC QL: NEGATIVE
LOXAPINE: NEGATIVE
MAPROTYLINE: NEGATIVE
MDMA QUAL: NEGATIVE
MEPERIDINE QUAL: NEGATIVE
METHAMPHETAMINE: NEGATIVE
METHODONE QUAL: NEGATIVE
MIRTAZAPINE QUAL: POSITIVE
MORPHINE QUAL: NEGATIVE
NICOTINE: NEGATIVE
NORTRIPTYLINE QUAL: NEGATIVE
OLANZAPINE QUAL: NEGATIVE
OPIATES UR QL SCN: NEGATIVE
OXYCODONE QUAL: NEGATIVE
PENTAZOCINE: NEGATIVE
PHENCYCLIDINE QUAL: NEGATIVE
PHENTERMINE: NEGATIVE
PROPOFOL QUAL: NEGATIVE
PROPOXPHENE QUAL: NEGATIVE
PROPRANOLOL QUAL: NEGATIVE
PYRILAMINE: NEGATIVE
QUETIAPINE METAB QUAL: NEGATIVE
SALICYLATE QUAL: NEGATIVE
SERTRALINE QUAL: NEGATIVE
THEOBROMINE: POSITIVE
TOPIRAMATE QUAL: NEGATIVE
TRAMADOL QUAL: NEGATIVE
TRIMIPRAMINE QUAL: NEGATIVE
VENLAFAXINE QUAL: NEGATIVE

## 2019-03-01 ENCOUNTER — OFFICE VISIT (OUTPATIENT)
Dept: PSYCHIATRY | Facility: CLINIC | Age: 24
End: 2019-03-01
Attending: PSYCHIATRY & NEUROLOGY
Payer: COMMERCIAL

## 2019-03-01 VITALS
SYSTOLIC BLOOD PRESSURE: 114 MMHG | WEIGHT: 213 LBS | BODY MASS INDEX: 36.28 KG/M2 | DIASTOLIC BLOOD PRESSURE: 70 MMHG | HEART RATE: 91 BPM

## 2019-03-01 DIAGNOSIS — F33.2 SEVERE EPISODE OF RECURRENT MAJOR DEPRESSIVE DISORDER, WITHOUT PSYCHOTIC FEATURES (H): ICD-10-CM

## 2019-03-01 DIAGNOSIS — F41.9 ANXIETY: Primary | ICD-10-CM

## 2019-03-01 PROCEDURE — G0463 HOSPITAL OUTPT CLINIC VISIT: HCPCS | Mod: ZF

## 2019-03-01 RX ORDER — PROPRANOLOL HCL 60 MG
60 CAPSULE, EXTENDED RELEASE 24HR ORAL AT BEDTIME
Qty: 30 CAPSULE | Refills: 1 | Status: SHIPPED | OUTPATIENT
Start: 2019-03-18 | End: 2019-05-15

## 2019-03-01 RX ORDER — PROPRANOLOL HYDROCHLORIDE 10 MG/1
10 TABLET ORAL DAILY PRN
Qty: 30 TABLET | Refills: 0 | Status: SHIPPED | OUTPATIENT
Start: 2019-03-01 | End: 2019-04-05

## 2019-03-01 RX ORDER — SERTRALINE HYDROCHLORIDE 100 MG/1
200 TABLET, FILM COATED ORAL DAILY
Qty: 60 TABLET | Refills: 2 | Status: SHIPPED | OUTPATIENT
Start: 2019-03-01 | End: 2019-05-15

## 2019-03-01 RX ORDER — MIRTAZAPINE 30 MG/1
30 TABLET, FILM COATED ORAL AT BEDTIME
Qty: 30 TABLET | Refills: 1 | Status: SHIPPED | OUTPATIENT
Start: 2019-03-01 | End: 2019-04-08

## 2019-03-01 ASSESSMENT — PAIN SCALES - GENERAL: PAINLEVEL: SEVERE PAIN (6)

## 2019-03-01 NOTE — PROGRESS NOTES
"  [facility] PSYCHIATRY CLINIC PROGRESS NOTE     CARE TEAM:  PCP- Tia Leonardo    Specialty Providers- Neurology, ObGyn, Pain Management    Therapist- no    Community  Team- no     Ingris FELDER Uriel is a 24 year old female who prefers the name Margarita & pronouns she, her, hers, herself. Date of initial diagnostic assessment is 1/10/2019.  Date of most recent transfer of care assessment is 02/01/19.      Pertinent Background:  This patient first experienced mental health issues in late adolescence and has received treatment for depression and chronic pain.  First prescribed citalopram at the age of 14 and then later switched to sertraline in college which she reports was helpful for both mood and OCD.  Has also done CBT for OCD to endorsed benefit.  No history of hospitalizations but patient allows at least one suicide attempt via CO poisoning in 2014 (her most recent SA) and chart review suggests as many as 6 other SA's prior to that.  Notably, patient's first contact with this clinic was a diagnostic assessment completed as a one time SACHIN eval on 1/10/19, which had been recommended in the context of establishing care with new providers upon recently returning to live in [place] given her prolonged use of opiates.  From that DA: \"Unclear that chronic opioids appropriate for her chronic non-malignant pain states. That said, with what information we have, no clear indication that she has used non-medically, escalated in use, or any other concerns.\"  Chronic pain condition(s) experienced by the patient include endometriosis, herniated lumbar disk and migraines.  Social history highlights include raised in [place], received BA in Psychology from [employer] in [place], [place], where she met her present wife Jacqui, then lived in [place] from Dec 2015 up to recent return to [place] in Nov 2018.     Psych critical item history includes suicide attempt [via CO poisoning/asphyxiation] and suicidal ideation.    INTERIM " "HISTORY                                                                                                                 4, 4   The patient reports good treatment adherence.  History was provided by the patient who was a good historian.  The last visit ended with the following med change: increase mirtazapine to 22.5 mg at bedtime.   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  Likes the job so far.  States that having a job has improved her sleeping pattern, and that she is sleeping at night again, stating \"working has regulated my sleep cycle.\"  Is uncertain about the benefits provided by mirtazapine, but allows it does provide some help with sleep initiation and otherwise has no side effect concerns regarding it.  Endorses that overall her interim mood has been low/stable, with one notable exception which she discusses at length.  Evidently had gone off of her opiate pain relievers due to running out and this led to a significant fibromyalgia flare.  Is frustrated regarding her pain management options and states that negotiating care/management for her chronic pain conditions has been challenging for her.  Relates that the combination of experiencing opiate withdrawal (from suddenly going without narcotic pain relievers) in addition to her pain flare led to her feeling helpless and depressed, elaborating that due to this associated stress her fibromyalgia became severe enough that she wound up \"confined to a wheelchair\" for a short period of time.  Ultimately, she was able to work with another provider and received a month supply of her previous opiate regimen.  States that her pain flare/withdrawal subsequently resolved.  Allows that she experienced fleeting SI without intent or plan during the worst of her pain episode, approximately two weeks ago, " "characterized as \"I don't know if I can live this way,\" which she states that she processed with her wife at the time.  Reports that this instance of SI passed and has not reoccurred since this episode.  We reviewed her safety plan and she states that this involves speaking with her wife, who will ensure that she would not have access to pills or other means in the event the SI should be marked, and that from there they would seek emergency assessment/management if patient felt/appeared unsafe.  Counseled the patient regarding crisis resources and also revisited previous recommendations of finding psychotherapy, which she agreed was an important addition to her mental health regimen.  We agreed that the [facility] center would likely be convenient as she works in the building and comes here for psychiatry.  In terms of other psychiatric symptoms, she relates that she has had continued generalized anxiety which worsens when she leaves the house.  We discussed her regimen, and in addition to her propranolol long-acting agreed to add a small dose of propranolol IR that she could use as needed.  Notably, this patient had taken clonazepam for some time prior to coming to our clinic, and transitioning her off of that medication has constituted a positive step forward in terms of establishing a safer/more sustainable regimen.  Also agreed to increase her mirtazapine to 30 mg for additional mood support.    RECENT SYMPTOMS:   DEPRESSION:  reports-depressed mood, anhedonia, low energy, hypersomnia, feeling worthless, excessive crying and overwhelmed;  DENIES- suicidal ideation and self-destructive thoughts  MARIAH/HYPOMANIA:  reports-none;  DENIES- increased energy, decreased sleep need, increased activity and grandiosity  PSYCHOSIS:  reports-none;  DENIES- delusions, auditory hallucinations and visual hallucinations  DYSREGULATION:  reports-mood dysregulation and irritable;  DENIES- suicidal ideation, violent " ideation, SIB and aggressive  PANIC ATTACK:  SOB and chest tightness and inability to hear what those around her are saying, occur 1-2x a week   ANXIETY:  excessive worry, feeling fearful, social anxiety and nervous/overwhelmed  TRAUMA RELATED:  none  COMPULSIVE:  organization and arranging things  SLEEP:  reports normalization of sleep pattern since last visit  EATING DISORDER: some intermittent binging behavior    RECENT SUBSTANCE USE:     ALCOHOL- none, reports she and wife had had a single bottle of vodka in their house for at least 2 years      TOBACCO- no  CAFFEINE- coffee  OPIOIDS- 1 Percocet tablet 3x daily for chronic pain         NARCAN KIT- unknown, will discuss at next vist  CANNABIS- none  OTHER ILLICIT DRUGS- none     CURRENT SOCIAL HISTORY:  FINANCIAL SUPPORT- wife has job, patient currently searching for employment  CHILDREN- none  LIVING SITUATION- with wife in apt in [place]      SOCIAL/ SPIRITUAL SUPPORT- wife, family, three dogs (Kerline a border collie aged 5-6 years, Serene a bichon frise aged 3 years, and Sarah lieberman constantin-poo aged 2 years)  FEELS SAFE AT HOME- yes    MEDICAL ROS (2,10):  A comprehensive review of systems was performed and is negative other than noted in the HPI.    PSYCH and CD Critical Summary Points since July 2018 January 2019: Clinic intake/CD assessment and initiation of mirtazapine at 15 mg at bedtime.  February 2019: Mirtazapine dose increased to 22.5 mg at bedtime.    PAST PSYCH MED TRIALS   see EMR Problem List: Hx of psychiatric care    MEDICAL / SURGICAL HISTORY                                   Pregnant or breastfeeding- no      Contraception- Paraguard IUD    Neurologic Hx-  no history of TBI or serizure    Patient Active Problem List   Diagnosis     Intractable chronic migraine without aura and without status migrainosus     Past Surgical History:   Procedure Laterality Date     HC TOOTH EXTRACTION W/FORCEP      local anaesthesia     ALLERGY                                 Seasonal allergies     MEDICATIONS                               Current Outpatient Medications   Medication Sig Dispense Refill     cyclobenzaprine (FLEXERIL) 10 MG tablet Take 10 mg by mouth 3 times daily as needed for muscle spasms       gabapentin (NEURONTIN) 300 MG capsule Take 600 mg by mouth 3 times daily       INDOMETHACIN PO Take 50 mg by mouth 3 times daily       mirtazapine (REMERON) 15 MG tablet Take 1 tablet (15 mg) by mouth At Bedtime 30 tablet 1     mirtazapine (REMERON) 7.5 MG tablet Take 1 tablet (7.5 mg) along with 1 tablet (15 mg) for a total dose of 22.5 mg at bedtime. 30 tablet 0     oxyCODONE-acetaminophen (PERCOCET) 5-325 MG tablet Take one po tid prn severe pain. 90 tablet 0     oxyCODONE-acetaminophen (PERCOCET) 5-325 MG tablet Take 1 tablet by mouth every 6 hours as needed for severe pain       promethazine (PHENERGAN) 25 MG tablet Take 25 mg by mouth every 8 hours as needed for nausea       propranolol ER (INDERAL LA) 60 MG 24 hr capsule Take 1 capsule (60 mg) by mouth At Bedtime 30 capsule 1     sertraline (ZOLOFT) 100 MG tablet Take 2 tablets (200 mg) by mouth daily 60 tablet 1     topiramate (TOPAMAX) 50 MG tablet Take 1 tablet (50 mg) by mouth 2 times daily 180 tablet 3     topiramate (TOPAMAX) 50 MG tablet Take 50 mg by mouth 2 times daily       VITALS                                                                                                                          3, 3   /70   Pulse 91   Wt 96.6 kg (213 lb)   BMI 36.28 kg/m       MENTAL STATUS EXAM                                                                                           9, 14 cog gs     Alertness: alert  and oriented  Appearance: well groomed  Behavior/Demeanor: cooperative and pleasant, with good eye contact   Speech: normal  Language: good  Psychomotor: normal or unremarkable  Mood: from consistent with euthymia to worried and irritable  Affect: labile and dramatic; was congruent to mood;  was congruent to content  Thought Process/Associations: unremarkable  Thought Content:  Reports none;  Denies suicidal ideation, violent ideation and delusions  Perception:  Reports none;  Denies auditory hallucinations and visual hallucinations  Insight: fair  Judgment: good  Cognition: (6) oriented: time, person, and place  attention span: intact  concentration: intact  recent memory: intact  remote memory: intact  fund of knowledge: appropriate  Gait and Station: unremarkable    LABS and DATA     AIMS:  N/A    PHQ9 TODAY = Patient did not complete  PHQ-9 SCORE 1/10/2019   PHQ-9 Total Score 23       DIAGNOSIS     Major Depressive Disorder, recurrent episode, moderate  R/o Persistent Depressive Disorder  Panic Disorder  Obsessive-Compulsive Disorder  Social Anxiety Disorder    ASSESSMENT                                                                                                                   m2, h3     TODAY:  Margarita Trevino presents to [facility] Psych[facility] for continuing medication management of MDD, Panic and OCD. Initially had been seen here in the clinic for SACHIN assessment due to her long-term opiate use, however at that visit it was assessed by the consulting provider that the patient has demonstrated no evidence of excessive/non-medical opiate use.  Today she relates overall interim stable/low mood with one notably low/depressed episode two weeks ago wherein she had fleeting SI without intent/plan in the setting of a severe pain flare that left her using a wheelchair after her narcotic pain relief regimen was withdrawn.  Managed to work with another provider who prescribed a month's worth of her previous opiate regimen, reporting that her symptoms improved after that.  Relates that when she experienced the suicidal thoughts, she processed them with her wife and that the thoughts passed and have not returned since then.  We reviewed her safety plan, which features reaching out to her wife who would  "remove medications/other possible means and help the patient to reach out for emergent assessment/management in the event that her SI was severe enough to present a safety concern.  Also counseled the patient that she can reach out to this provider/this clinic as well if SI should return/worsen, and explained that we have an after hours number with someone available to talk at all times.  Also provided a thorough list of crisis resources in her after visit summary.  Revisited our previous recommendation of finding psychotherapy, and she stated that now that she has a job she feels ready for this.  Agreed that the [facility] might be a good option as she works here in the building.  In terms of medications, we agreed to a further dose increase of mirtazapine to 30 mg for mood support.  Additionally, the patient has expressed interest in finding a helpful PRN medication for her anxiety, and as she is already taking propranolol LA 60 mg, have agreed to try a small dose of propranolol IR which she could use as needed for anxious acuity.  Have agreed to start at propranolol IR 10 mg daily as needed.  We will plan to also continue her sertraline 200 mg unadjusted.  Moving forward, would wish to encourage the patient towards a consideration of meeting with pain psychology to help with her experience of chronic pain - notably however, the patient has spoken critically of such resources during our visits, alluding to her perception that this form of therapy constitutes telling people to \"suck it up.\"  Has voiced her experience of receiving these referrals as being invalidating.  We will plan to broach this subject carefully in the future, and in the meantime encouraging/referring the patient for general psychotherapy constitutes a vital next step in addressing multiple concerns.    SUICIDE RISK ASSESSMENT:  Ingris Trevino reports an absence of present SI, however does allow passing SI without intent or " plan that occurred approximately two weeks ago.  This patient does have notable risk factors for self-harm including previous suicide attempt, severe anxiety, significant pain and on opiates.  However, risk is mitigated by no plan or intent, describes a safety plan, symptom improvement, none to minimal alcohol use, commitment to family and stable housing.  Based on all available evidence she does not appear to be at imminent risk for self-harm therefore does not meet criteria for a 72-hr hold/  involuntary hospitalization.  However, based on degree of symptoms, psychotherapy, medication adjustments and close psych FU was recommended which the pt did agree to.    [place] PRESCRIPTION MONITORING PROGRAM [] was not checked today:  will be checked next visit.    PSYCHOTROPIC DRUG INTERACTIONS:    Concurrent use of OXYCODONE and SERTRALINE may result in an increased risk of serotonin syndrome (tachycardia, hyperthermia, myoclonus, mental status changes).    Concurrent use of MIRTAZAPINE and SEROTONERGIC AGENTS may result in increased risk of serotonin syndrome.     Concurrent use of OXYCODONE and CNS DEPRESSANTS may result in increased risk of respiratory and CNS depression.     Concurrent use of OXYCODONE and SEROTONERGIC AGENTS may result in increased risk of serotonin syndrome.     Concurrent use of MIRTAZAPINE and SEROTONERGIC AGENTS may result in increased risk of serotonin syndrome.    MANAGEMENT:  Monitoring for adverse effects, routine vitals and patient is aware of risks     PLAN                                                                                                                                m2, h3     1) PSYCHOTROPIC MEDICATIONS:  - Increase mirtazapine to 30 mg at bedtime  - Continue sertraline 200 mg daily  - Continue propranolol LA 60 mg daily  - Add propranolol IR 10 mg PRN daily for acute anxiety    2) THERAPY: strongly recommended    3) NEXT DUE:    Labs- no  EKG- PRN  Rating Scales- N/A    4)  REFERRALS:  Therapy- suggested [facility]    5) RTC: 3-4 weeks    6) CRISIS NUMBERS:   Provided routinely in AVS.  [facility] [phone] (clinic)    [phone] (after hours)  [facility] - [phone]  Crisis Residence [facility] - [phone]  Walk-In Counseling Center  [facility]     [phone]  C[facility]is Connection - [phone]  Urgent Care Adult Mental Health: Drop-in, 24/7 crisis line and Iker Edgar Mobile Team [[phone]]   [facility] -   [phone]  Crisis Residence [facility]   [phone]  Walk-in counseling [facility]       [phone]  Walk-in counseling [facility]            [phone]  CRISIS TEXT LINE: Text 027015 from anywhere in [place], anytime, any crisis 24/7;  OR SEE www.crisistextline.org  ONLY if a [facility] PT: [facility] [place] [facility] [phone] (clinic), [phone] (after hours)     TREATMENT RISK STATEMENT:  The risks, benefits, alternatives and potential adverse effects have been discussed and are understood by the pt. The pt understands the risks of using street drugs or alcohol. There are no medical contraindications, the pt agrees to treatment with the ability to do so. The pt knows to call the clinic for any problems or to access emergency care if needed.  Medical and substance use concerns are documented above.  Psychotropic drug interaction check was done, including changes made today.     PSYCHIATRY CLINIC INDIVIDUAL PSYCHOTHERAPY NOTE                                                [16]   Start time- N/A        End time- N/A  Date last reviewed - 03/01/19       Date next due - 5/30/19 (or 12 months if Medicare)     Subjective: This supportive psychotherapy session addressed issues related to orientation to therapy, goals of therapy, patient's history, current stressors, life stressors, relationship, work, family of origin and health.  Patient's reaction:  Contemplation in the context of mental status appropriate for ambulatory setting.  Progress: fair  Plan: RTC 4-6 weeks  Psychotherapy services during this visit included  myself and the patient.   TREATMENT  PLAN          SYMPTOMS;PROBLEMS   MEASURABLE GOALS;    FUNCTIONAL IMPROVEMENT INTERVENTIONS;    GAINS MADE DISCHARGE CRITERIA   Depression: depressed mood, hypersomnia, feeling hopelesss and excessive crying   reduce depressive symptoms, reduce depressive episodes and find enjoyment at least once a day Supportive and cognitive psychotherapeutic interventions. marked symptom improvement   Anxiety: excessive worry, social anxiety and nervous/overwhelmed   make a plan to manage 2-3 anxiety-provoking situations, reduce feeling overwhelmed/ improve decision making skills and walk away from situations that trigger strong emotions Supportive and cognitive psychotherapeutic interventions. marked symptom improvement     PROVIDER:  Alcides Shearer, DO    Patient not staffed in clinic.  Note will be reviewed and signed by supervisor Dr. Miller.    Attestation:  I did not see Ingris Trevino directly. I have reviewed the documentation and I agree with the resident's plan of care.   Mateo Miller MD

## 2019-03-01 NOTE — NURSING NOTE
Chief Complaint   Patient presents with     Recheck Medication     Severe episode of recurrent major depressive disorder, without psychotic features (H)

## 2019-03-01 NOTE — PATIENT INSTRUCTIONS
- Increase mirtazapine to 30 mg at bedtime  - Continue sertraline 200 mg daily  - Continue propranolol LA 60 mg daily  - Add propranolol IR 10 mg PRN daily for acute anxiety  - Check out [facility] next to [address] here at [facility] for therapy options    CRISIS NUMBERS:  [facility] [phone] (clinic)    [phone] (after hours)  [facility] - [phone]  Crisis Residence [facility] - [phone]  Walk-In Counseling [facility]     [phone]  [facility] - [phone]  Urgent Care Adult Mental Health: Drop-in, 24/7 crisis line and Iker Edgar Mobile Team [[phone]]   [facility] -   [phone]  Crisis Residence [facility]   [phone]  Walk-in counseling [facility]       [phone]  Walk-in counseling [facility]            [phone]  CRISIS TEXT LINE: Text [id number] from anywhere in [place], anytime, any crisis 24/7;  OR SEE www.crisistextline.org  ONLY if a [facility] PT: [facility] [place] [facility] [phone] (clinic), [phone] (after hours)

## 2019-03-28 ENCOUNTER — HOSPITAL ENCOUNTER (OUTPATIENT)
Facility: CLINIC | Age: 24
Setting detail: OBSERVATION
Discharge: HOME | End: 2019-03-29
Attending: EMERGENCY MEDICINE | Admitting: INTERNAL MEDICINE
Payer: COMMERCIAL

## 2019-03-28 ENCOUNTER — APPOINTMENT (OUTPATIENT)
Dept: GENERAL RADIOLOGY | Facility: CLINIC | Age: 24
End: 2019-03-28
Attending: EMERGENCY MEDICINE
Payer: COMMERCIAL

## 2019-03-28 ENCOUNTER — APPOINTMENT (OUTPATIENT)
Dept: CT IMAGING | Facility: CLINIC | Age: 24
End: 2019-03-28
Attending: EMERGENCY MEDICINE
Payer: COMMERCIAL

## 2019-03-28 DIAGNOSIS — R19.7 DIARRHEA, UNSPECIFIED TYPE: ICD-10-CM

## 2019-03-28 DIAGNOSIS — R19.7 NAUSEA VOMITING AND DIARRHEA: Primary | ICD-10-CM

## 2019-03-28 DIAGNOSIS — R11.2 NAUSEA VOMITING AND DIARRHEA: Primary | ICD-10-CM

## 2019-03-28 DIAGNOSIS — R11.2 INTRACTABLE VOMITING WITH NAUSEA, UNSPECIFIED VOMITING TYPE: ICD-10-CM

## 2019-03-28 LAB
ALBUMIN SERPL-MCNC: 3.9 G/DL (ref 3.4–5)
ALBUMIN UR-MCNC: NEGATIVE MG/DL
ALP SERPL-CCNC: 108 U/L (ref 40–150)
ALT SERPL W P-5'-P-CCNC: 42 U/L (ref 0–50)
ANION GAP SERPL CALCULATED.3IONS-SCNC: 12 MMOL/L (ref 3–14)
APPEARANCE UR: ABNORMAL
AST SERPL W P-5'-P-CCNC: 24 U/L (ref 0–45)
BACTERIA #/AREA URNS HPF: ABNORMAL /HPF
BASOPHILS # BLD AUTO: 0 10E9/L (ref 0–0.2)
BASOPHILS NFR BLD AUTO: 0.4 %
BILIRUB SERPL-MCNC: 0.3 MG/DL (ref 0.2–1.3)
BILIRUB UR QL STRIP: NEGATIVE
BUN SERPL-MCNC: 9 MG/DL (ref 7–30)
CALCIUM SERPL-MCNC: 9 MG/DL (ref 8.5–10.1)
CHLORIDE SERPL-SCNC: 106 MMOL/L (ref 94–109)
CO2 BLDCOV-SCNC: 19 MMOL/L (ref 21–28)
CO2 SERPL-SCNC: 21 MMOL/L (ref 20–32)
COLOR UR AUTO: YELLOW
CREAT SERPL-MCNC: 0.67 MG/DL (ref 0.52–1.04)
DIFFERENTIAL METHOD BLD: NORMAL
EOSINOPHIL # BLD AUTO: 0.1 10E9/L (ref 0–0.7)
EOSINOPHIL NFR BLD AUTO: 1.2 %
ERYTHROCYTE [DISTWIDTH] IN BLOOD BY AUTOMATED COUNT: 13.2 % (ref 10–15)
FLUAV+FLUBV AG SPEC QL: NEGATIVE
FLUAV+FLUBV AG SPEC QL: NEGATIVE
GFR SERPL CREATININE-BSD FRML MDRD: >90 ML/MIN/{1.73_M2}
GLUCOSE SERPL-MCNC: 113 MG/DL (ref 70–99)
GLUCOSE UR STRIP-MCNC: NEGATIVE MG/DL
HCG SERPL QL: NEGATIVE
HCT VFR BLD AUTO: 42.5 % (ref 35–47)
HGB BLD-MCNC: 13.9 G/DL (ref 11.7–15.7)
HGB UR QL STRIP: ABNORMAL
IMM GRANULOCYTES # BLD: 0 10E9/L (ref 0–0.4)
IMM GRANULOCYTES NFR BLD: 0.2 %
KETONES UR STRIP-MCNC: 10 MG/DL
LACTATE BLD-SCNC: 0.9 MMOL/L (ref 0.7–2.1)
LEUKOCYTE ESTERASE UR QL STRIP: ABNORMAL
LIPASE SERPL-CCNC: 120 U/L (ref 73–393)
LYMPHOCYTES # BLD AUTO: 3.1 10E9/L (ref 0.8–5.3)
LYMPHOCYTES NFR BLD AUTO: 37.4 %
MAGNESIUM SERPL-MCNC: 2.1 MG/DL (ref 1.6–2.3)
MCH RBC QN AUTO: 29.5 PG (ref 26.5–33)
MCHC RBC AUTO-ENTMCNC: 32.7 G/DL (ref 31.5–36.5)
MCV RBC AUTO: 90 FL (ref 78–100)
MONOCYTES # BLD AUTO: 0.5 10E9/L (ref 0–1.3)
MONOCYTES NFR BLD AUTO: 5.5 %
MUCOUS THREADS #/AREA URNS LPF: PRESENT /LPF
NEUTROPHILS # BLD AUTO: 4.6 10E9/L (ref 1.6–8.3)
NEUTROPHILS NFR BLD AUTO: 55.3 %
NITRATE UR QL: NEGATIVE
NRBC # BLD AUTO: 0 10*3/UL
NRBC BLD AUTO-RTO: 0 /100
PCO2 BLDV: 36 MM HG (ref 40–50)
PH BLDV: 7.32 PH (ref 7.32–7.43)
PH UR STRIP: 5.5 PH (ref 5–7)
PLATELET # BLD AUTO: 274 10E9/L (ref 150–450)
PO2 BLDV: 40 MM HG (ref 25–47)
POTASSIUM SERPL-SCNC: 3.1 MMOL/L (ref 3.4–5.3)
POTASSIUM SERPL-SCNC: 3.4 MMOL/L (ref 3.4–5.3)
PROT SERPL-MCNC: 8.2 G/DL (ref 6.8–8.8)
RBC # BLD AUTO: 4.71 10E12/L (ref 3.8–5.2)
RBC #/AREA URNS AUTO: 2 /HPF (ref 0–2)
SAO2 % BLDV FROM PO2: 71 %
SODIUM SERPL-SCNC: 139 MMOL/L (ref 133–144)
SOURCE: ABNORMAL
SP GR UR STRIP: 1.01 (ref 1–1.03)
SPECIMEN SOURCE: NORMAL
SQUAMOUS #/AREA URNS AUTO: 5 /HPF (ref 0–1)
TSH SERPL DL<=0.005 MIU/L-ACNC: 1.86 MU/L (ref 0.4–4)
UROBILINOGEN UR STRIP-MCNC: NORMAL MG/DL (ref 0–2)
WBC # BLD AUTO: 8.4 10E9/L (ref 4–11)
WBC #/AREA URNS AUTO: 3 /HPF (ref 0–5)

## 2019-03-28 PROCEDURE — 80053 COMPREHEN METABOLIC PANEL: CPT | Performed by: EMERGENCY MEDICINE

## 2019-03-28 PROCEDURE — 83735 ASSAY OF MAGNESIUM: CPT | Performed by: EMERGENCY MEDICINE

## 2019-03-28 PROCEDURE — 96375 TX/PRO/DX INJ NEW DRUG ADDON: CPT

## 2019-03-28 PROCEDURE — 25000125 ZZHC RX 250: Performed by: STUDENT IN AN ORGANIZED HEALTH CARE EDUCATION/TRAINING PROGRAM

## 2019-03-28 PROCEDURE — 82803 BLOOD GASES ANY COMBINATION: CPT

## 2019-03-28 PROCEDURE — 84132 ASSAY OF SERUM POTASSIUM: CPT | Performed by: NURSE PRACTITIONER

## 2019-03-28 PROCEDURE — 36415 COLL VENOUS BLD VENIPUNCTURE: CPT | Performed by: NURSE PRACTITIONER

## 2019-03-28 PROCEDURE — 83605 ASSAY OF LACTIC ACID: CPT

## 2019-03-28 PROCEDURE — 85025 COMPLETE CBC W/AUTO DIFF WBC: CPT | Performed by: EMERGENCY MEDICINE

## 2019-03-28 PROCEDURE — 87804 INFLUENZA ASSAY W/OPTIC: CPT | Performed by: EMERGENCY MEDICINE

## 2019-03-28 PROCEDURE — 71046 X-RAY EXAM CHEST 2 VIEWS: CPT

## 2019-03-28 PROCEDURE — 84443 ASSAY THYROID STIM HORMONE: CPT | Performed by: EMERGENCY MEDICINE

## 2019-03-28 PROCEDURE — 99285 EMERGENCY DEPT VISIT HI MDM: CPT | Mod: 25 | Performed by: EMERGENCY MEDICINE

## 2019-03-28 PROCEDURE — 74177 CT ABD & PELVIS W/CONTRAST: CPT

## 2019-03-28 PROCEDURE — 83690 ASSAY OF LIPASE: CPT | Performed by: EMERGENCY MEDICINE

## 2019-03-28 PROCEDURE — 96366 THER/PROPH/DIAG IV INF ADDON: CPT | Performed by: EMERGENCY MEDICINE

## 2019-03-28 PROCEDURE — 25800030 ZZH RX IP 258 OP 636: Performed by: NURSE PRACTITIONER

## 2019-03-28 PROCEDURE — G0378 HOSPITAL OBSERVATION PER HR: HCPCS

## 2019-03-28 PROCEDURE — 96365 THER/PROPH/DIAG IV INF INIT: CPT | Mod: 59 | Performed by: EMERGENCY MEDICINE

## 2019-03-28 PROCEDURE — 25000128 H RX IP 250 OP 636: Performed by: EMERGENCY MEDICINE

## 2019-03-28 PROCEDURE — 25000128 H RX IP 250 OP 636: Performed by: STUDENT IN AN ORGANIZED HEALTH CARE EDUCATION/TRAINING PROGRAM

## 2019-03-28 PROCEDURE — 84703 CHORIONIC GONADOTROPIN ASSAY: CPT | Performed by: EMERGENCY MEDICINE

## 2019-03-28 PROCEDURE — 96376 TX/PRO/DX INJ SAME DRUG ADON: CPT | Performed by: EMERGENCY MEDICINE

## 2019-03-28 PROCEDURE — 81001 URINALYSIS AUTO W/SCOPE: CPT | Performed by: EMERGENCY MEDICINE

## 2019-03-28 PROCEDURE — 96375 TX/PRO/DX INJ NEW DRUG ADDON: CPT | Performed by: EMERGENCY MEDICINE

## 2019-03-28 PROCEDURE — 99219 ZZC INITIAL OBSERVATION CARE,LEVL II: CPT | Mod: Z6 | Performed by: NURSE PRACTITIONER

## 2019-03-28 PROCEDURE — 96361 HYDRATE IV INFUSION ADD-ON: CPT | Performed by: EMERGENCY MEDICINE

## 2019-03-28 PROCEDURE — 25000128 H RX IP 250 OP 636: Performed by: NURSE PRACTITIONER

## 2019-03-28 RX ORDER — NALOXONE HYDROCHLORIDE 0.4 MG/ML
.1-.4 INJECTION, SOLUTION INTRAMUSCULAR; INTRAVENOUS; SUBCUTANEOUS
Status: DISCONTINUED | OUTPATIENT
Start: 2019-03-28 | End: 2019-03-29 | Stop reason: HOSPADM

## 2019-03-28 RX ORDER — DIPHENHYDRAMINE HYDROCHLORIDE 50 MG/ML
25 INJECTION INTRAMUSCULAR; INTRAVENOUS ONCE
Status: COMPLETED | OUTPATIENT
Start: 2019-03-28 | End: 2019-03-28

## 2019-03-28 RX ORDER — LIDOCAINE 40 MG/G
CREAM TOPICAL
Status: DISCONTINUED | OUTPATIENT
Start: 2019-03-28 | End: 2019-03-29 | Stop reason: HOSPADM

## 2019-03-28 RX ORDER — GABAPENTIN 300 MG/1
600 CAPSULE ORAL 3 TIMES DAILY
Status: DISCONTINUED | OUTPATIENT
Start: 2019-03-28 | End: 2019-03-29 | Stop reason: HOSPADM

## 2019-03-28 RX ORDER — POTASSIUM CL/LIDO/0.9 % NACL 10MEQ/0.1L
10 INTRAVENOUS SOLUTION, PIGGYBACK (ML) INTRAVENOUS ONCE
Status: COMPLETED | OUTPATIENT
Start: 2019-03-28 | End: 2019-03-28

## 2019-03-28 RX ORDER — MIRTAZAPINE 30 MG/1
30 TABLET, FILM COATED ORAL AT BEDTIME
Status: DISCONTINUED | OUTPATIENT
Start: 2019-03-28 | End: 2019-03-29 | Stop reason: HOSPADM

## 2019-03-28 RX ORDER — ACETAMINOPHEN 325 MG/1
650 TABLET ORAL EVERY 4 HOURS PRN
Status: DISCONTINUED | OUTPATIENT
Start: 2019-03-28 | End: 2019-03-29 | Stop reason: HOSPADM

## 2019-03-28 RX ORDER — ONDANSETRON 2 MG/ML
4 INJECTION INTRAMUSCULAR; INTRAVENOUS EVERY 6 HOURS PRN
Status: DISCONTINUED | OUTPATIENT
Start: 2019-03-28 | End: 2019-03-29 | Stop reason: HOSPADM

## 2019-03-28 RX ORDER — LORAZEPAM 2 MG/ML
1 INJECTION INTRAMUSCULAR ONCE
Status: COMPLETED | OUTPATIENT
Start: 2019-03-28 | End: 2019-03-28

## 2019-03-28 RX ORDER — SODIUM CHLORIDE 9 MG/ML
1000 INJECTION, SOLUTION INTRAVENOUS CONTINUOUS
Status: DISCONTINUED | OUTPATIENT
Start: 2019-03-28 | End: 2019-03-28

## 2019-03-28 RX ORDER — DEXTROSE MONOHYDRATE, SODIUM CHLORIDE, AND POTASSIUM CHLORIDE 50; 1.49; 4.5 G/1000ML; G/1000ML; G/1000ML
INJECTION, SOLUTION INTRAVENOUS CONTINUOUS
Status: DISCONTINUED | OUTPATIENT
Start: 2019-03-28 | End: 2019-03-29 | Stop reason: HOSPADM

## 2019-03-28 RX ORDER — INDOMETHACIN 50 MG/1
50 CAPSULE ORAL
COMMUNITY
End: 2019-07-16

## 2019-03-28 RX ORDER — TOPIRAMATE 50 MG/1
50 TABLET, FILM COATED ORAL 2 TIMES DAILY
Status: DISCONTINUED | OUTPATIENT
Start: 2019-03-28 | End: 2019-03-29 | Stop reason: HOSPADM

## 2019-03-28 RX ORDER — OXYCODONE AND ACETAMINOPHEN 5; 325 MG/1; MG/1
1 TABLET ORAL EVERY 8 HOURS PRN
Status: DISCONTINUED | OUTPATIENT
Start: 2019-03-28 | End: 2019-03-29 | Stop reason: HOSPADM

## 2019-03-28 RX ORDER — PROMETHAZINE HYDROCHLORIDE 25 MG/ML
12.5 INJECTION, SOLUTION INTRAMUSCULAR; INTRAVENOUS EVERY 6 HOURS PRN
Status: DISCONTINUED | OUTPATIENT
Start: 2019-03-28 | End: 2019-03-29 | Stop reason: HOSPADM

## 2019-03-28 RX ORDER — SERTRALINE HYDROCHLORIDE 100 MG/1
200 TABLET, FILM COATED ORAL DAILY
Status: DISCONTINUED | OUTPATIENT
Start: 2019-03-29 | End: 2019-03-29 | Stop reason: HOSPADM

## 2019-03-28 RX ORDER — PROMETHAZINE HYDROCHLORIDE 25 MG/1
25 TABLET ORAL EVERY 8 HOURS PRN
Status: DISCONTINUED | OUTPATIENT
Start: 2019-03-28 | End: 2019-03-29 | Stop reason: HOSPADM

## 2019-03-28 RX ORDER — IOPAMIDOL 755 MG/ML
123 INJECTION, SOLUTION INTRAVASCULAR ONCE
Status: COMPLETED | OUTPATIENT
Start: 2019-03-28 | End: 2019-03-28

## 2019-03-28 RX ORDER — ONDANSETRON 2 MG/ML
4 INJECTION INTRAMUSCULAR; INTRAVENOUS ONCE
Status: COMPLETED | OUTPATIENT
Start: 2019-03-28 | End: 2019-03-28

## 2019-03-28 RX ORDER — PROPRANOLOL HCL 60 MG
60 CAPSULE, EXTENDED RELEASE 24HR ORAL AT BEDTIME
Status: DISCONTINUED | OUTPATIENT
Start: 2019-03-28 | End: 2019-03-29 | Stop reason: HOSPADM

## 2019-03-28 RX ORDER — ONDANSETRON 4 MG/1
4 TABLET, ORALLY DISINTEGRATING ORAL EVERY 6 HOURS PRN
Status: DISCONTINUED | OUTPATIENT
Start: 2019-03-28 | End: 2019-03-29 | Stop reason: HOSPADM

## 2019-03-28 RX ORDER — DIPHENHYDRAMINE HYDROCHLORIDE 50 MG/ML
25 INJECTION INTRAMUSCULAR; INTRAVENOUS EVERY 6 HOURS PRN
Status: DISCONTINUED | OUTPATIENT
Start: 2019-03-28 | End: 2019-03-29 | Stop reason: HOSPADM

## 2019-03-28 RX ADMIN — PROMETHAZINE HYDROCHLORIDE 12.5 MG: 25 INJECTION INTRAMUSCULAR; INTRAVENOUS at 21:44

## 2019-03-28 RX ADMIN — ONDANSETRON 4 MG: 2 INJECTION INTRAMUSCULAR; INTRAVENOUS at 06:42

## 2019-03-28 RX ADMIN — PROCHLORPERAZINE EDISYLATE 10 MG: 5 INJECTION INTRAMUSCULAR; INTRAVENOUS at 07:47

## 2019-03-28 RX ADMIN — DIPHENHYDRAMINE HYDROCHLORIDE 25 MG: 50 INJECTION INTRAMUSCULAR; INTRAVENOUS at 08:27

## 2019-03-28 RX ADMIN — SODIUM CHLORIDE 1000 ML: 9 INJECTION, SOLUTION INTRAVENOUS at 06:42

## 2019-03-28 RX ADMIN — PROCHLORPERAZINE EDISYLATE 10 MG: 5 INJECTION INTRAMUSCULAR; INTRAVENOUS at 17:13

## 2019-03-28 RX ADMIN — SODIUM CHLORIDE, PRESERVATIVE FREE 81 ML: 5 INJECTION INTRAVENOUS at 11:16

## 2019-03-28 RX ADMIN — SODIUM CHLORIDE 1000 ML: 9 INJECTION, SOLUTION INTRAVENOUS at 07:46

## 2019-03-28 RX ADMIN — LORAZEPAM 1 MG: 2 INJECTION INTRAMUSCULAR; INTRAVENOUS at 11:33

## 2019-03-28 RX ADMIN — SODIUM CHLORIDE 1000 ML: 9 INJECTION, SOLUTION INTRAVENOUS at 17:14

## 2019-03-28 RX ADMIN — POTASSIUM CHLORIDE, DEXTROSE MONOHYDRATE AND SODIUM CHLORIDE: 150; 5; 450 INJECTION, SOLUTION INTRAVENOUS at 21:45

## 2019-03-28 RX ADMIN — SODIUM CHLORIDE 1000 ML: 9 INJECTION, SOLUTION INTRAVENOUS at 11:31

## 2019-03-28 RX ADMIN — IOPAMIDOL 123 ML: 755 INJECTION, SOLUTION INTRAVENOUS at 11:16

## 2019-03-28 RX ADMIN — Medication 10 MEQ: at 09:24

## 2019-03-28 RX ADMIN — Medication 10 MEQ: at 13:08

## 2019-03-28 RX ADMIN — Medication 10 MEQ: at 08:57

## 2019-03-28 RX ADMIN — DIPHENHYDRAMINE HYDROCHLORIDE 25 MG: 50 INJECTION INTRAMUSCULAR; INTRAVENOUS at 19:34

## 2019-03-28 ASSESSMENT — ENCOUNTER SYMPTOMS
NECK STIFFNESS: 0
CONFUSION: 0
COUGH: 1
EYE REDNESS: 0
HEADACHES: 0
DIARRHEA: 1
COLOR CHANGE: 0
FEVER: 1
DIFFICULTY URINATING: 0
ABDOMINAL PAIN: 0
SHORTNESS OF BREATH: 0
ARTHRALGIAS: 0
VOMITING: 1
NAUSEA: 1

## 2019-03-28 ASSESSMENT — MIFFLIN-ST. JEOR: SCORE: 1632.19

## 2019-03-28 NOTE — PHARMACY-ADMISSION MEDICATION HISTORY
Admission medication history interview status for the 3/28/2019 admission is complete. See Epic admission navigator for allergy information, pharmacy, prior to admission medications and immunization status.     Medication history interview sources:  patient, [facility], [facility]    Changes made to PTA medication list (reason)  Added: none    Deleted:   -Oxycodone (duplicate)  -Topiramate (duplicate)    Changed:   -Oxycodone-acetaminophen 5-325 mg tab: take 1 tablet PO TID PRN severe pain --> patient taking 1 tablet PO TID scheduled    Additional medication history information (including reliability of information, actions taken by pharmacist):  -Patient was a reliable medication historian as she knew the names, strengths, and frequencies of all her medications  -Of note, patient has not taken any of her medications since Sunday morning due to not feeling well  -Patient reports requiring her PRN propranolol about 2 to 3 times per week  -Patient reports requiring her PRN cyclobenzaprine about 3 to 5 times per week  -Confirmed patient has no known drug allergies  -Influenza vaccine received 1/8/19      Prior to Admission medications    Medication Sig Last Dose Taking? Auth Provider   cyclobenzaprine (FLEXERIL) 10 MG tablet Take 10 mg by mouth 3 times daily as needed for muscle spasms Past Week at Unknown time Yes Reported, Patient   indomethacin (INDOCIN) 50 MG capsule Take 50 mg by mouth 3 times daily (with meals) 3/24/2019 at AM Yes Unknown, Entered By History   promethazine (PHENERGAN) 25 MG tablet Take 25 mg by mouth every 8 hours as needed for nausea associated with migranes Past Week at Unknown time Yes Reported, Patient   propranolol (INDERAL) 10 MG tablet Take 1 tablet (10 mg) by mouth daily as needed (for anxiety) Past Week at Unknown time Yes Mateo Miller MD   gabapentin (NEURONTIN) 300 MG capsule Take 600 mg by mouth 3 times daily 3/24/2019 at AM  Reported,  Patient   mirtazapine (REMERON) 30 MG tablet Take 1 tablet (30 mg) by mouth At Bedtime 3/23/2019 at PM  Mateo Miller MD   oxyCODONE-acetaminophen (PERCOCET) 5-325 MG tablet Take one po tid prn severe pain.  Patient taking differently: Take 1 tablet by mouth 3 times daily Take one po tid prn severe pain. 3/24/2019 at AM  Francisco Leiva MD   propranolol ER (INDERAL LA) 60 MG 24 hr capsule Take 1 capsule (60 mg) by mouth At Bedtime 3/23/2019 at PM  Mateo Miller MD   sertraline (ZOLOFT) 100 MG tablet Take 2 tablets (200 mg) by mouth daily 3/24/2019 at AM  Mateo Miller MD   topiramate (TOPAMAX) 50 MG tablet Take 1 tablet (50 mg) by mouth 2 times daily 3/24/2019 at AM  Margarita Corbin MD         Medication history completed by:     Kathryn Shannon, PharmD  PGY2 Infectious Diseases Pharmacy Resident  Pager: [phone]

## 2019-03-28 NOTE — ED NOTES
[facility], [place]   ED Nurse to Floor Handoff     Ingris Trevino is a 24 year old female who speaks English and lives with a spouse,  in a home  They arrived in the ED by car from home    ED Chief Complaint: Flu Symptoms    ED Dx;   Final diagnoses:   Intractable vomiting with nausea, unspecified vomiting type   Diarrhea, unspecified type         Needed?: No    Allergies:   Allergies   Allergen Reactions     Seasonal Allergies    .  Past Medical Hx:   Past Medical History:   Diagnosis Date     Chronic pain      Degenerative disc disease at L5-S1 level       Baseline Mental status: WDL  Current Mental Status changes: at basesline    Infection present or suspected this encounter: no  Sepsis suspected: No  Isolation type: Enteric     Activity level - Baseline/Home:  Independent  Activity Level - Current:   Independent    Bariatric equipment needed?: No    In the ED these meds were given:   Medications   potassium chloride 10 mEq in 100 mL intermittent infusion with 10 mg lidocaine (10 mEq Intravenous New Bag 3/28/19 1308)   0.9% sodium chloride BOLUS (0 mLs Intravenous Stopped 3/28/19 0741)   ondansetron (ZOFRAN) injection 4 mg (4 mg Intravenous Given 3/28/19 0642)   0.9% sodium chloride BOLUS (0 mLs Intravenous Stopped 3/28/19 0900)   prochlorperazine (COMPAZINE) injection 10 mg (10 mg Intravenous Given 3/28/19 0747)   diphenhydrAMINE (BENADRYL) injection 25 mg (25 mg Intravenous Given 3/28/19 0827)   potassium chloride 10 mEq in 100 mL intermittent infusion with 10 mg lidocaine (0 mEq Intravenous Stopped 3/28/19 1001)   potassium chloride 10 mEq in 100 mL intermittent infusion with 10 mg lidocaine (0 mEq Intravenous Stopped 3/28/19 1110)   0.9% sodium chloride BOLUS (0 mLs Intravenous Stopped 3/28/19 1234)   LORazepam (ATIVAN) injection 1 mg (1 mg Intravenous Given 3/28/19 1133)   iopamidol (ISOVUE-370) solution 123 mL (123 mLs Intravenous Given 3/28/19 1116)   sodium  chloride 0.9 % bag 500mL for CT scan flush use (81 mLs Intravenous Given 3/28/19 1116)       Drips running?  No    Home pump  No    Current LDAs  Peripheral IV 03/28/19 Right Lower forearm (Active)   Site Assessment WDL 3/28/2019  7:25 AM   Line Status Infusing 3/28/2019  7:25 AM   Phlebitis Scale 0-->no symptoms 3/28/2019  7:25 AM   Infiltration Scale 0 3/28/2019  7:25 AM   Number of days: 0       Labs results:   Labs Ordered and Resulted from Time of ED Arrival Up to the Time of Departure from the ED   COMPREHENSIVE METABOLIC PANEL - Abnormal; Notable for the following components:       Result Value    Potassium 3.1 (*)     Glucose 113 (*)     All other components within normal limits   ROUTINE UA WITH MICROSCOPIC - Abnormal; Notable for the following components:    Ketones Urine 10 (*)     Blood Urine Moderate (*)     Leukocyte Esterase Urine Small (*)     Bacteria Urine Few (*)     Squamous Epithelial /HPF Urine 5 (*)     Mucous Urine Present (*)     All other components within normal limits   ISTAT  GASES LACTATE GOPAL POCT - Abnormal; Notable for the following components:    PCO2 Venous 36 (*)     Bicarbonate Venous 19 (*)     All other components within normal limits   CBC WITH PLATELETS DIFFERENTIAL   HCG QUALITATIVE   MAGNESIUM   LIPASE   TSH WITH FREE T4 REFLEX   ISTAT CG4 GASES LACTATE GOPAL NURSING POCT   INFLUENZA A/B ANTIGEN   ENTERIC BACTERIA AND VIRUS PANEL BY KEZIA STOOL   CLOSTRIDIUM DIFFICILE TOXIN B       Imaging Studies:   Recent Results (from the past 24 hour(s))   XR Chest 2 Views    Narrative    PRELIMINARY REPORT - The following report is a preliminary  interpretation.      Impression    Impression: No acute cardiopulmonary abnormalities.   CT Abdomen Pelvis w Contrast    Narrative    Exam: CT abdomen and pelvis with contrast    Comparison: None    History: Epigastric and left-sided abdominal pain, vomiting    Technique: CT of the abdomen and pelvis was obtained following the  administration of  "intravenous contrast. Coronal and sagittal  reconstructions were obtained and reviewed.    Contrast dose: 123ml isovue 370    Findings:    Lower chest: No pulmonary nodules. No consolidative airspace  opacities. No pleural effusion or pneumothorax. Heart size is normal.  No pericardial effusion.    Abdomen/pelvis: No enhancing hepatic lesions. Mild hypoattenuation of  the liver at the falciform ligament, likely focal fatty infiltration.  No intra or extrahepatic biliary ductal dilatation. Normal  gallbladder, pancreas, spleen, and adrenal glands.    Symmetric enhancement of the kidneys. Nonobstructing right renal  calculi. Small right renal cyst. Normal left kidney. No hydronephrosis  or hydroureter. Bladder is partially distended and unremarkable.  Normal uterus with appropriately placed IUD normal ovaries with  multiple small follicular cysts. No pelvic masses.    Normal caliber of the small and large bowel. Normal appendix. No bowel  wall thickening. No diverticulosis. No free air or free fluid in the  abdomen or pelvis. No abdominal or pelvic lymphadenopathy.    Bones: Mild retrolisthesis of L5 on S1. No acute or suspicious bony  abnormality. Soft tissues are within normal limits.      Impression    Impression:   1. No acute findings in the abdomen or pelvis to explain patient's  symptoms. Specifically, no evidence of pancreatitis, obstructing  kidney stone, ovarian pathology, or diverticulitis.  2. Nonobstructing right renal calculi.  3. Appropriate positioning of the IUD.    I have personally reviewed the examination and initial interpretation  and I agree with the findings.    YADIEL JOINER MD       Recent vital signs:   /72   Pulse 110   Temp 98.3  F (36.8  C) (Oral)   Resp 20   Ht 1.626 m (5' 4\")   Wt 90.7 kg (200 lb)   LMP 03/27/2019 (Exact Date)   SpO2 97%   BMI 34.33 kg/m      Cardiac Rhythm: Normal Sinus  Pt needs tele? No  Skin/wound Issues: None    Code Status: Full Code    Pain control: " pt had none    Nausea control: fair    Abnormal labs/tests/findings requiring intervention: Low potassium, given 30mEq K+    Family present during ED course? Yes   Family Comments/Social Situation comments: Wife at bedside and supportive.    Tasks needing completion: None    Jory Vieira RN  [facility]-- [phone] [place] [facility]  [phone] [facility]

## 2019-03-28 NOTE — ED PROVIDER NOTES
"  History     Chief Complaint   Patient presents with     Flu Symptoms     HPI  Ingris Trevino is a 24 year old female who presents to the emergency department with a 4-day history of fever, cough, vomiting, and diarrhea.  Patient states that she began feeling ill on Sunday.  At that time, she developed a low-grade fever to 100.4.  She is also had a cough is been nonproductive.  She denies any chest pain or dyspnea.  Patient has had multiple episodes of vomiting and diarrhea over the past several days.  She states that she vomits gastric contents.  Her diarrhea has been yellow in color.  There is been no blood in either her emesis or diarrhea.  Patient does have some occasional epigastric abdominal pain.  She denies any dysuria, urgency, or frequency.  Patient denies any recent antibiotic use.  She was around her younger sister and stepmother this weekend who ultimately became ill with the same symptoms but theirs were more short-lived.  Patient denies any recent travel.    Patient did take Imodium and has not had a bowel movement today.    I have reviewed the Medications, Allergies, Past Medical and Surgical History, and Social History in the Epic system.    Review of Systems   Constitutional: Positive for fever.   HENT: Negative for congestion.    Eyes: Negative for redness.   Respiratory: Positive for cough. Negative for shortness of breath.    Cardiovascular: Negative for chest pain.   Gastrointestinal: Positive for diarrhea, nausea and vomiting. Negative for abdominal pain.   Genitourinary: Negative for difficulty urinating.   Musculoskeletal: Negative for arthralgias and neck stiffness.   Skin: Negative for color change.   Neurological: Negative for headaches.   Psychiatric/Behavioral: Negative for confusion.   All other systems reviewed and are negative.      Physical Exam   BP: (!) 142/97  Pulse: 121  Temp: 98.3  F (36.8  C)  Resp: 20  Height: 162.6 cm (5' 4\")  Weight: 90.7 kg (200 lb)  SpO2: 97 " %      Physical Exam   Constitutional: She appears well-developed and well-nourished. She appears distressed.   HENT:   Head: Normocephalic and atraumatic.   Mouth/Throat: Oropharynx is clear and moist. Mucous membranes are dry. No oropharyngeal exudate.   Eyes: Pupils are equal, round, and reactive to light. No scleral icterus.   Neck: Normal range of motion. No Brudzinski's sign and no Kernig's sign noted.   Cardiovascular: Normal heart sounds and intact distal pulses. Tachycardia present.   Pulmonary/Chest: Effort normal and breath sounds normal. No respiratory distress.   Abdominal: Soft. Bowel sounds are normal. There is no tenderness.   Musculoskeletal: Normal range of motion. She exhibits no edema or tenderness.   Neurological: She is alert. She has normal strength.   Skin: Skin is warm. Capillary refill takes less than 2 seconds. No rash noted. She is not diaphoretic.   Psychiatric: She has a normal mood and affect. Her behavior is normal.   Nursing note and vitals reviewed.      ED Course        Procedures            Critical Care time:    Results for orders placed or performed during the hospital encounter of 03/28/19   XR Chest 2 Views    Narrative    PRELIMINARY REPORT - The following report is a preliminary  interpretation.      Impression    Impression: No acute cardiopulmonary abnormalities.   CT Abdomen Pelvis w Contrast    Narrative    Exam: CT abdomen and pelvis with contrast    Comparison: None    History: Epigastric and left-sided abdominal pain, vomiting    Technique: CT of the abdomen and pelvis was obtained following the  administration of intravenous contrast. Coronal and sagittal  reconstructions were obtained and reviewed.    Contrast dose: 123ml isovue 370    Findings:    Lower chest: No pulmonary nodules. No consolidative airspace  opacities. No pleural effusion or pneumothorax. Heart size is normal.  No pericardial effusion.    Abdomen/pelvis: No enhancing hepatic lesions. Mild  hypoattenuation of  the liver at the falciform ligament, likely focal fatty infiltration.  No intra or extrahepatic biliary ductal dilatation. Normal  gallbladder, pancreas, spleen, and adrenal glands.    Symmetric enhancement of the kidneys. Nonobstructing right renal  calculi. Small right renal cyst. Normal left kidney. No hydronephrosis  or hydroureter. Bladder is partially distended and unremarkable.  Normal uterus with appropriately placed IUD normal ovaries with  multiple small follicular cysts. No pelvic masses.    Normal caliber of the small and large bowel. Normal appendix. No bowel  wall thickening. No diverticulosis. No free air or free fluid in the  abdomen or pelvis. No abdominal or pelvic lymphadenopathy.    Bones: Mild retrolisthesis of L5 on S1. No acute or suspicious bony  abnormality. Soft tissues are within normal limits.      Impression    Impression:   1. No acute findings in the abdomen or pelvis to explain patient's  symptoms. Specifically, no evidence of pancreatitis, obstructing  kidney stone, ovarian pathology, or diverticulitis.  2. Nonobstructing right renal calculi.  3. Appropriate positioning of the IUD.    I have personally reviewed the examination and initial interpretation  and I agree with the findings.    YADIEL JOINER MD   Comprehensive metabolic panel   Result Value Ref Range    Sodium 139 133 - 144 mmol/L    Potassium 3.1 (L) 3.4 - 5.3 mmol/L    Chloride 106 94 - 109 mmol/L    Carbon Dioxide 21 20 - 32 mmol/L    Anion Gap 12 3 - 14 mmol/L    Glucose 113 (H) 70 - 99 mg/dL    Urea Nitrogen 9 7 - 30 mg/dL    Creatinine 0.67 0.52 - 1.04 mg/dL    GFR Estimate >90 >60 mL/min/[1.73_m2]    GFR Estimate If Black >90 >60 mL/min/[1.73_m2]    Calcium 9.0 8.5 - 10.1 mg/dL    Bilirubin Total 0.3 0.2 - 1.3 mg/dL    Albumin 3.9 3.4 - 5.0 g/dL    Protein Total 8.2 6.8 - 8.8 g/dL    Alkaline Phosphatase 108 40 - 150 U/L    ALT 42 0 - 50 U/L    AST 24 0 - 45 U/L   CBC with platelets differential    Result Value Ref Range    WBC 8.4 4.0 - 11.0 10e9/L    RBC Count 4.71 3.8 - 5.2 10e12/L    Hemoglobin 13.9 11.7 - 15.7 g/dL    Hematocrit 42.5 35.0 - 47.0 %    MCV 90 78 - 100 fl    MCH 29.5 26.5 - 33.0 pg    MCHC 32.7 31.5 - 36.5 g/dL    RDW 13.2 10.0 - 15.0 %    Platelet Count 274 150 - 450 10e9/L    Diff Method Automated Method     % Neutrophils 55.3 %    % Lymphocytes 37.4 %    % Monocytes 5.5 %    % Eosinophils 1.2 %    % Basophils 0.4 %    % Immature Granulocytes 0.2 %    Nucleated RBCs 0 0 /100    Absolute Neutrophil 4.6 1.6 - 8.3 10e9/L    Absolute Lymphocytes 3.1 0.8 - 5.3 10e9/L    Absolute Monocytes 0.5 0.0 - 1.3 10e9/L    Absolute Eosinophils 0.1 0.0 - 0.7 10e9/L    Absolute Basophils 0.0 0.0 - 0.2 10e9/L    Abs Immature Granulocytes 0.0 0 - 0.4 10e9/L    Absolute Nucleated RBC 0.0    UA with Microscopic   Result Value Ref Range    Color Urine Yellow     Appearance Urine Slightly Cloudy     Glucose Urine Negative NEG^Negative mg/dL    Bilirubin Urine Negative NEG^Negative    Ketones Urine 10 (A) NEG^Negative mg/dL    Specific Gravity Urine 1.014 1.003 - 1.035    Blood Urine Moderate (A) NEG^Negative    pH Urine 5.5 5.0 - 7.0 pH    Protein Albumin Urine Negative NEG^Negative mg/dL    Urobilinogen mg/dL Normal 0.0 - 2.0 mg/dL    Nitrite Urine Negative NEG^Negative    Leukocyte Esterase Urine Small (A) NEG^Negative    Source Midstream Urine     WBC Urine 3 0 - 5 /HPF    RBC Urine 2 0 - 2 /HPF    Bacteria Urine Few (A) NEG^Negative /HPF    Squamous Epithelial /HPF Urine 5 (H) 0 - 1 /HPF    Mucous Urine Present (A) NEG^Negative /LPF   HCG qualitative   Result Value Ref Range    HCG Qualitative Serum Negative NEG^Negative   Magnesium   Result Value Ref Range    Magnesium 2.1 1.6 - 2.3 mg/dL   Lipase   Result Value Ref Range    Lipase 120 73 - 393 U/L   TSH with free T4 reflex   Result Value Ref Range    TSH 1.86 0.40 - 4.00 mU/L   ISTAT gases lactate glenis POCT   Result Value Ref Range    Ph Venous 7.32 7.32 -  7.43 pH    PCO2 Venous 36 (L) 40 - 50 mm Hg    PO2 Venous 40 25 - 47 mm Hg    Bicarbonate Venous 19 (L) 21 - 28 mmol/L    O2 Sat Venous 71 %    Lactic Acid 0.9 0.7 - 2.1 mmol/L   Influenza A/B antigen   Result Value Ref Range    Influenza A/B Agn Specimen Nasopharyngeal     Influenza A Negative NEG^Negative    Influenza B Negative NEG^Negative      Medications   potassium chloride 10 mEq in 100 mL intermittent infusion with 10 mg lidocaine (10 mEq Intravenous New Bag 3/28/19 1308)   0.9% sodium chloride BOLUS (0 mLs Intravenous Stopped 3/28/19 0741)   ondansetron (ZOFRAN) injection 4 mg (4 mg Intravenous Given 3/28/19 0642)   0.9% sodium chloride BOLUS (0 mLs Intravenous Stopped 3/28/19 0900)   prochlorperazine (COMPAZINE) injection 10 mg (10 mg Intravenous Given 3/28/19 0747)   diphenhydrAMINE (BENADRYL) injection 25 mg (25 mg Intravenous Given 3/28/19 0827)   potassium chloride 10 mEq in 100 mL intermittent infusion with 10 mg lidocaine (0 mEq Intravenous Stopped 3/28/19 1001)   potassium chloride 10 mEq in 100 mL intermittent infusion with 10 mg lidocaine (0 mEq Intravenous Stopped 3/28/19 1110)   0.9% sodium chloride BOLUS (0 mLs Intravenous Stopped 3/28/19 1234)   LORazepam (ATIVAN) injection 1 mg (1 mg Intravenous Given 3/28/19 1133)   iopamidol (ISOVUE-370) solution 123 mL (123 mLs Intravenous Given 3/28/19 1116)   sodium chloride 0.9 % bag 500mL for CT scan flush use (81 mLs Intravenous Given 3/28/19 1116)      11:01 AM again feeling nauseous.  Persistently tachycardic.  Will check lactate and perform abdominal CT.  Abdomen is soft with some tenderness in the epigastrium and left upper and mid abdomen.  Lorazepam ordered for nausea.  Third liter of IV fluids ordered.              Assessments & Plan (with Medical Decision Making)   24 year old female to the emergency department with several days of vomiting and diarrhea.  The patient took Imodium yesterday and has not had diarrhea since 3:00 this morning.   Patient was treated with IV fluids and Zofran initially with little improvement.  Compazine and Benadryl was subsequently given with improvement although patient felt quite nauseous when attempting to take ice chips.  Additionally, she was persistently tachycardic to the 120s without being febrile.  A third Liter of IV fluids were given as well as a milligram of Ativan for her nausea.  The patient is still nauseous subsequent to that treatment and unable to take p.o.  Due to her ongoing symptoms, abdominal CT was performed which did not reveal any acute intra-abdominal pathology.  The patient has normal hemodynamics and heart rate improved to the low 100s.  Discussion was held with the patient regarding observation admission.  She would like to pursue that for further IV fluids and IV antiemetics.    I have reviewed the nursing notes.    I have reviewed the findings, diagnosis, plan and need for follow up with the patient.       Medication List      There are no discharge medications for this visit.         Final diagnoses:   Intractable vomiting with nausea, unspecified vomiting type   Diarrhea, unspecified type       3/28/2019   [facility], [place], EMERGENCY DEPARTMENT     Luca Avelar MD  03/28/19 [id number]

## 2019-03-28 NOTE — H&P
"[facility]   History & Physical    Ingris Trevino MRN# [id number]   Age: 24 year old YOB: 1995     Date of Admission: 3/28/2019    Primary Care Provider: Tia Leonardo         Chief Complaint:   \"nausea, vomiting, diarhea\"         History of Present Illness:   Ingris Trevino is a 24 year old female with a history of chronic pain who presented to the ED with nausea, vomiting, diarrhea.  Per ER MD, \"Patient states that she began feeling ill on Sunday.  At that time, she developed a low-grade fever to 100.4.  She is also had a cough is been nonproductive.  She denies any chest pain or dyspnea.  Patient has had multiple episodes of vomiting and diarrhea over the past several days.  She states that she vomits gastric contents.  Her diarrhea has been yellow in color.  There is been no blood in either her emesis or diarrhea.  Patient does have some occasional epigastric abdominal pain.  She denies any dysuria, urgency, or frequency.  Patient denies any recent antibiotic use.  She was around her younger sister and stepmother this weekend who ultimately became ill with the same symptoms but theirs were more short-lived.  Patient denies any recent travel.     Patient did take Imodium and has not had a bowel movement today.\"    In the ED the patient's vital signs were stable despite her HR being 110-120's.  She was afebrile.  Labs: CMP unremarkable besides potassium 3.1. HCG negative.  CBC unremarkable.  TSH 1.86.  Lipase 120. Influenza negative.  UA with small LE and 3 WBCs.  Lactic acid 0.9.  CT abdomen pelvis negative for acute cause of the patient's pain.  Chest x-ray unremarkable.  She was given 3L boluses of NS, benadryl, ativan, zofran, 30 MEQ potassium, compazine.  She continued to have nausea and was subsequently admitted to the observation unit.            Review of Systems:     All others reviewed and are negative         Past Medical " History:     Past Medical History:   Diagnosis Date     Chronic pain      Degenerative disc disease at L5-S1 level              Past Surgical History:      Past Surgical History:   Procedure Laterality Date     HC TOOTH EXTRACTION W/FORCEP      local anaesthesia             Family History:     Family History   Problem Relation Age of Onset     Bipolar Disorder Maternal Grandmother      Depression Mother      Depression Father              Social History:     Social History     Tobacco Use     Smoking status: Never Smoker     Smokeless tobacco: Never Used   Substance Use Topics     Alcohol use: Yes     Frequency: 2-4 times a month             Medications:     No current facility-administered medications on file prior to encounter.   Current Outpatient Medications on File Prior to Encounter:  cyclobenzaprine (FLEXERIL) 10 MG tablet Take 10 mg by mouth 3 times daily as needed for muscle spasms   gabapentin (NEURONTIN) 300 MG capsule Take 600 mg by mouth 3 times daily   INDOMETHACIN PO Take 50 mg by mouth 3 times daily   mirtazapine (REMERON) 30 MG tablet Take 1 tablet (30 mg) by mouth At Bedtime   oxyCODONE-acetaminophen (PERCOCET) 5-325 MG tablet Take one po tid prn severe pain.   oxyCODONE-acetaminophen (PERCOCET) 5-325 MG tablet Take 1 tablet by mouth every 6 hours as needed for severe pain   promethazine (PHENERGAN) 25 MG tablet Take 25 mg by mouth every 8 hours as needed for nausea   propranolol (INDERAL) 10 MG tablet Take 1 tablet (10 mg) by mouth daily as needed (for anxiety)   propranolol ER (INDERAL LA) 60 MG 24 hr capsule Take 1 capsule (60 mg) by mouth At Bedtime   sertraline (ZOLOFT) 100 MG tablet Take 2 tablets (200 mg) by mouth daily   topiramate (TOPAMAX) 50 MG tablet Take 1 tablet (50 mg) by mouth 2 times daily   topiramate (TOPAMAX) 50 MG tablet Take 50 mg by mouth 2 times daily            Allergies:     Allergies   Allergen Reactions     Seasonal Allergies              Physical Exam:   /72    "Pulse 110   Temp 98.3  F (36.8  C) (Oral)   Resp 20   Ht 1.626 m (5' 4\")   Wt 90.7 kg (200 lb)   LMP 03/27/2019 (Exact Date)   SpO2 97%   BMI 34.33 kg/m     GENERAL: Alert and oriented x 3. NAD.   HEENT: Anicteric sclera. PERRL. Mucous membranes moist and without lesions.   CV: RRR. S1, S2. No murmurs appreciated.   RESPIRATORY: Effort normal. Lungs CTAB with no wheezing, rales, rhonchi.   GI: Abdomen soft and non distended with normoactive bowel sounds present in all quadrants. No tenderness, rebound, guarding.   MUSCULOSKELETAL: No joint swelling or tenderness. Moves all extremities.   NEUROLOGICAL: No focal deficits. Strength 5/5 bilaterally in upper and lower extremities.   EXTREMITIES: No peripheral edema. Intact bilateral pedal pulses.   SKIN: No jaundice. No rashes.          Labs:   CBC:  Recent Labs   Lab Test 03/28/19  0626   WBC 8.4   RBC 4.71   HGB 13.9   HCT 42.5   MCV 90   MCH 29.5   MCHC 32.7   RDW 13.2          CMP:  Recent Labs   Lab Test 03/28/19  0626      POTASSIUM 3.1*   CHLORIDE 106   MAURICIO 9.0   CO2 21   BUN 9   CR 0.67   *   AST 24   ALT 42   BILITOTAL 0.3   ALBUMIN 3.9   PROTTOTAL 8.2   ALKPHOS 108       INR:   No results for input(s): INR in the last 07869 hours.         Imaging:   Exam: CT abdomen and pelvis with contrast     Comparison: None     History: Epigastric and left-sided abdominal pain, vomiting     Technique: CT of the abdomen and pelvis was obtained following the  administration of intravenous contrast. Coronal and sagittal  reconstructions were obtained and reviewed.     Contrast dose: 123ml isovue 370     Findings:     Lower chest: No pulmonary nodules. No consolidative airspace  opacities. No pleural effusion or pneumothorax. Heart size is normal.  No pericardial effusion.     Abdomen/pelvis: No enhancing hepatic lesions. Mild hypoattenuation of  the liver at the falciform ligament, likely focal fatty infiltration.  No intra or extrahepatic biliary " ductal dilatation. Normal  gallbladder, pancreas, spleen, and adrenal glands.     Symmetric enhancement of the kidneys. Nonobstructing right renal  calculi. Small right renal cyst. Normal left kidney. No hydronephrosis  or hydroureter. Bladder is partially distended and unremarkable.  Normal uterus with appropriately placed IUD normal ovaries with  multiple small follicular cysts. No pelvic masses.     Normal caliber of the small and large bowel. Normal appendix. No bowel  wall thickening. No diverticulosis. No free air or free fluid in the  abdomen or pelvis. No abdominal or pelvic lymphadenopathy.     Bones: Mild retrolisthesis of L5 on S1. No acute or suspicious bony  abnormality. Soft tissues are within normal limits.                                                                      Impression:   1. No acute findings in the abdomen or pelvis to explain patient's  symptoms. Specifically, no evidence of pancreatitis, obstructing  kidney stone, ovarian pathology, or diverticulitis.  2. Nonobstructing right renal calculi.  3. Appropriate positioning of the IUD.     I have personally reviewed the examination and initial interpretation  and I agree with the findings.     YADIEL JOINER MD    PRELIMINARY REPORT - The following report is a preliminary  interpretation.                                                                      Impression: No acute cardiopulmonary abnormalities.         Assessment and Plan:   Ingris Trevino is a 24 year old female with a history of chronic pain who presented to the ED with nausea, vomiting, diarrhea.      1. Nausea, vomiting, diarrhea:  In the ED the patient's vital signs were stable despite her HR being 110-120's.  She was afebrile.  Labs: CMP unremarkable besides potassium 3.1. HCG negative.  CBC unremarkable.  TSH 1.86.  Lipase 120. Influenza negative.  UA with small LE and 3 WBCs.  Lactic acid 0.9.  CT abdomen pelvis negative for acute cause of the patient's pain.   Chest x-ray unremarkable.  She was given 3L boluses of NS, benadryl, ativan, zofran, 30 MEQ potassium, compazine.  She continued to have nausea and was subsequently admitted to the observation unit.   -admit to the observation unit   -continue IVF (D5 1/2 NS with 20K+ at 100 ml/hour)  -anti emetics prn: zofran, phenergan PO   -obtain stool samples if able  -clear liquid diet for now  -repeat CBC and BMP in am    Chronic medical problems:  #chronic pain:  Continue PTA percocet 1 tab TID PRN (she filled 90 day prescription on 2/15), gabapentin.  Hold indomethacin and flexeril for now.  #anxiety: continue PTA Remeron, sertraline, propranolol  #migraines: continue PTA topamax    Discussed with Dr. Garcia.     FEN: clear liquid  Prophylaxis: anticipate short stay  Code Status: Full        Jacqui Ye, APRN, CNP  Ascom #[id number]

## 2019-03-29 VITALS
TEMPERATURE: 99 F | HEIGHT: 64 IN | BODY MASS INDEX: 34.15 KG/M2 | SYSTOLIC BLOOD PRESSURE: 122 MMHG | WEIGHT: 200 LBS | DIASTOLIC BLOOD PRESSURE: 79 MMHG | OXYGEN SATURATION: 95 % | HEART RATE: 82 BPM | RESPIRATION RATE: 16 BRPM

## 2019-03-29 LAB
ALBUMIN SERPL-MCNC: 3.5 G/DL (ref 3.4–5)
ALP SERPL-CCNC: 91 U/L (ref 40–150)
ALT SERPL W P-5'-P-CCNC: 41 U/L (ref 0–50)
ANION GAP SERPL CALCULATED.3IONS-SCNC: 11 MMOL/L (ref 3–14)
AST SERPL W P-5'-P-CCNC: 25 U/L (ref 0–45)
BASOPHILS # BLD AUTO: 0 10E9/L (ref 0–0.2)
BASOPHILS NFR BLD AUTO: 0.5 %
BILIRUB SERPL-MCNC: 0.3 MG/DL (ref 0.2–1.3)
BUN SERPL-MCNC: 3 MG/DL (ref 7–30)
CALCIUM SERPL-MCNC: 8.6 MG/DL (ref 8.5–10.1)
CHLORIDE SERPL-SCNC: 111 MMOL/L (ref 94–109)
CO2 SERPL-SCNC: 21 MMOL/L (ref 20–32)
CREAT SERPL-MCNC: 0.61 MG/DL (ref 0.52–1.04)
DIFFERENTIAL METHOD BLD: ABNORMAL
EOSINOPHIL # BLD AUTO: 0.1 10E9/L (ref 0–0.7)
EOSINOPHIL NFR BLD AUTO: 1.3 %
ERYTHROCYTE [DISTWIDTH] IN BLOOD BY AUTOMATED COUNT: 13.2 % (ref 10–15)
GFR SERPL CREATININE-BSD FRML MDRD: >90 ML/MIN/{1.73_M2}
GLUCOSE SERPL-MCNC: 98 MG/DL (ref 70–99)
HCT VFR BLD AUTO: 40.4 % (ref 35–47)
HGB BLD-MCNC: 12.5 G/DL (ref 11.7–15.7)
IMM GRANULOCYTES # BLD: 0.1 10E9/L (ref 0–0.4)
IMM GRANULOCYTES NFR BLD: 0.8 %
LIPASE SERPL-CCNC: 128 U/L (ref 73–393)
LYMPHOCYTES # BLD AUTO: 3 10E9/L (ref 0.8–5.3)
LYMPHOCYTES NFR BLD AUTO: 38.3 %
MCH RBC QN AUTO: 29.4 PG (ref 26.5–33)
MCHC RBC AUTO-ENTMCNC: 30.9 G/DL (ref 31.5–36.5)
MCV RBC AUTO: 95 FL (ref 78–100)
MONOCYTES # BLD AUTO: 0.4 10E9/L (ref 0–1.3)
MONOCYTES NFR BLD AUTO: 4.8 %
NEUTROPHILS # BLD AUTO: 4.2 10E9/L (ref 1.6–8.3)
NEUTROPHILS NFR BLD AUTO: 54.3 %
NRBC # BLD AUTO: 0 10*3/UL
NRBC BLD AUTO-RTO: 0 /100
PLATELET # BLD AUTO: 216 10E9/L (ref 150–450)
POTASSIUM SERPL-SCNC: 3.7 MMOL/L (ref 3.4–5.3)
PROT SERPL-MCNC: 6.9 G/DL (ref 6.8–8.8)
RBC # BLD AUTO: 4.25 10E12/L (ref 3.8–5.2)
SODIUM SERPL-SCNC: 143 MMOL/L (ref 133–144)
WBC # BLD AUTO: 7.7 10E9/L (ref 4–11)

## 2019-03-29 PROCEDURE — 36415 COLL VENOUS BLD VENIPUNCTURE: CPT | Performed by: NURSE PRACTITIONER

## 2019-03-29 PROCEDURE — 85025 COMPLETE CBC W/AUTO DIFF WBC: CPT | Performed by: NURSE PRACTITIONER

## 2019-03-29 PROCEDURE — 99217 ZZC OBSERVATION CARE DISCHARGE: CPT | Mod: Z6 | Performed by: EMERGENCY MEDICINE

## 2019-03-29 PROCEDURE — 25000132 ZZH RX MED GY IP 250 OP 250 PS 637: Performed by: NURSE PRACTITIONER

## 2019-03-29 PROCEDURE — 83690 ASSAY OF LIPASE: CPT | Performed by: NURSE PRACTITIONER

## 2019-03-29 PROCEDURE — 25000131 ZZH RX MED GY IP 250 OP 636 PS 637: Performed by: NURSE PRACTITIONER

## 2019-03-29 PROCEDURE — 25000128 H RX IP 250 OP 636: Performed by: NURSE PRACTITIONER

## 2019-03-29 PROCEDURE — 80053 COMPREHEN METABOLIC PANEL: CPT | Performed by: NURSE PRACTITIONER

## 2019-03-29 PROCEDURE — G0378 HOSPITAL OBSERVATION PER HR: HCPCS

## 2019-03-29 RX ORDER — ONDANSETRON 4 MG/1
4 TABLET, ORALLY DISINTEGRATING ORAL EVERY 6 HOURS PRN
Qty: 20 TABLET | Refills: 0 | Status: SHIPPED | OUTPATIENT
Start: 2019-03-29 | End: 2019-07-16

## 2019-03-29 RX ADMIN — TOPIRAMATE 50 MG: 50 TABLET ORAL at 07:51

## 2019-03-29 RX ADMIN — ONDANSETRON 4 MG: 4 TABLET, ORALLY DISINTEGRATING ORAL at 02:15

## 2019-03-29 RX ADMIN — SERTRALINE HYDROCHLORIDE 200 MG: 100 TABLET ORAL at 07:51

## 2019-03-29 RX ADMIN — SODIUM CHLORIDE 1000 ML: 9 INJECTION, SOLUTION INTRAVENOUS at 03:28

## 2019-03-29 RX ADMIN — GABAPENTIN 600 MG: 300 CAPSULE ORAL at 07:51

## 2019-03-29 RX ADMIN — GABAPENTIN 600 MG: 300 CAPSULE ORAL at 14:46

## 2019-03-29 RX ADMIN — ONDANSETRON 4 MG: 4 TABLET, ORALLY DISINTEGRATING ORAL at 07:54

## 2019-03-29 NOTE — PLAN OF CARE
Outpatient/Observation goals to be met before discharge home:     - Nausea/vomiting (diarrhea if present) improved: PENDING, no emesis or diarrhea overnight but pt continues to c/o nausea with minimal relief from antiemetics     - Tolerating oral intake to maintain hydration: YES    - Vital signs normal or at patient baseline and orthostatic vitals are normal and patient not lightheaded with standing: NO, tachy    - Diagnostic tests and consults completed and resulted if ordered: YES    - Adequate pain control on oral analgesia: YES    - Tolerating oral antibiotics if ordered: NA    - Safe disposition plan has been identified: PENDING

## 2019-03-29 NOTE — PLAN OF CARE
Discharge medication and instructions reviewed with patient, verbalized the understanding. Took all her belongings and Zofran prescription.

## 2019-03-29 NOTE — PROGRESS NOTES
The patient was seen and examined by me and the case was discussed with the BRENNAN. We are in agreement with the assessment and plan.    Past Medical History:   Diagnosis Date     Chronic pain      Degenerative disc disease at L5-S1 level      Social History     Socioeconomic History     Marital status:      Spouse name: Not on file     Number of children: Not on file     Years of education: Not on file     Highest education level: Not on file   Occupational History     Not on file   Social Needs     Financial resource strain: Not on file     Food insecurity:     Worry: Not on file     Inability: Not on file     Transportation needs:     Medical: Not on file     Non-medical: Not on file   Tobacco Use     Smoking status: Never Smoker     Smokeless tobacco: Never Used   Substance and Sexual Activity     Alcohol use: Yes     Frequency: 2-4 times a month     Drug use: No     Sexual activity: No   Lifestyle     Physical activity:     Days per week: Not on file     Minutes per session: Not on file     Stress: Not on file   Relationships     Social connections:     Talks on phone: Not on file     Gets together: Not on file     Attends Taoism service: Not on file     Active member of club or organization: Not on file     Attends meetings of clubs or organizations: Not on file     Relationship status: Not on file     Intimate partner violence:     Fear of current or ex partner: Not on file     Emotionally abused: Not on file     Physically abused: Not on file     Forced sexual activity: Not on file   Other Topics Concern     Not on file   Social History Narrative     Not on file     This is a 24-year-old female admitted through the emergency department for nausea vomiting small amount of diarrhea.  She has a history of chronic pain thought to be related to degenerative change in her spine.  In the emergency department she had a negative workup including chest x-ray CT abdomen.  No infection was found she has not had  any diarrhea since she is been up here she is getting IV fluids and her baseline oral pain medication.  She is doing fine this morning and can be discharged home.    Physical exam:  General: Awake alert no acute distress  Cardiac: Regular rate and rhythm no murmurs rubs or gallops  Respiratory: Lungs are clear  Abdomen: Obese soft and nontender no right upper quadrant tenderness    Assessment and plan: Resolving symptoms of mild gastroenteritis without objective findings on imaging or labs.  Patient is tolerating p.o. and can be discharged home today.  Doubt significant enteric pathogens given the lack of any diarrhea since admitted.

## 2019-03-29 NOTE — PLAN OF CARE
Outpatient/Observation goals to be met before discharge home:     - Nausea/vomiting (diarrhea if present) improved: PENDING     - Tolerating oral intake to maintain hydration: YES    - Vital signs normal or at patient baseline and orthostatic vitals are normal and patient not lightheaded with standing: NO, tachy    - Diagnostic tests and consults completed and resulted if ordered: YES    - Adequate pain control on oral analgesia: YES    - Tolerating oral antibiotics if ordered: NA    - Safe disposition plan has been identified: PENDING

## 2019-03-29 NOTE — PLAN OF CARE
Outpatient/Observation goals to be met before discharge home:     - Nausea/vomiting (diarrhea if present) improved:No emesis or diarrhea noted at the moment, but continues to c/o nausea with minimal relief from antiemetics.     - Tolerating oral intake to maintain hydration: YES    - Vital signs normal or at patient baseline and orthostatic vitals are normal and patient not lightheaded with standing:VSS    - Diagnostic tests and consults completed and resulted if ordered: YES    - Adequate pain control on oral analgesia: YES    - Tolerating oral antibiotics if ordered: NA    - Safe disposition plan has been identified: Discharge order in place

## 2019-03-29 NOTE — DISCHARGE SUMMARY
Discharge Summary    Ingris Trevino MRN# [id number]   YOB: 1995 Age: 24 year old     Date of Admission:  3/28/2019  Date of Discharge:  3/29/2019  Admitting Physician:  Lety Garcia MD  Discharge Physician:  ARLEY LAMAR  Discharging Service:  Emergency Department Observation Unit     Primary Provider: Tia Leonardo          Discharge Diagnosis:     Gastroenteritis              Discharge Disposition:   Discharged to home           Condition on Discharge:   Discharge condition: Stable   Code status on discharge: Full Code           Procedures:   Imaging performed:   Abdomen CT             Discharge Medications:     Current Discharge Medication List      START taking these medications    Details   ondansetron (ZOFRAN-ODT) 4 MG ODT tab Take 1 tablet (4 mg) by mouth every 6 hours as needed for nausea or vomiting  Qty: 20 tablet, Refills: 0    Associated Diagnoses: Nausea vomiting and diarrhea         CONTINUE these medications which have NOT CHANGED    Details   cyclobenzaprine (FLEXERIL) 10 MG tablet Take 10 mg by mouth 3 times daily as needed for muscle spasms      indomethacin (INDOCIN) 50 MG capsule Take 50 mg by mouth 3 times daily (with meals)      promethazine (PHENERGAN) 25 MG tablet Take 25 mg by mouth every 8 hours as needed for nausea associated with migranes      propranolol (INDERAL) 10 MG tablet Take 1 tablet (10 mg) by mouth daily as needed (for anxiety)  Qty: 30 tablet, Refills: 0    Associated Diagnoses: Anxiety      gabapentin (NEURONTIN) 300 MG capsule Take 600 mg by mouth 3 times daily      mirtazapine (REMERON) 30 MG tablet Take 1 tablet (30 mg) by mouth At Bedtime  Qty: 30 tablet, Refills: 1    Associated Diagnoses: Severe episode of recurrent major depressive disorder, without psychotic features (H)      oxyCODONE-acetaminophen (PERCOCET) 5-325 MG tablet Take one po tid prn severe pain.  Qty: 90 tablet, Refills: 0    Comments: No further  refills.  Associated Diagnoses: Chronic back pain greater than 3 months duration      propranolol ER (INDERAL LA) 60 MG 24 hr capsule Take 1 capsule (60 mg) by mouth At Bedtime  Qty: 30 capsule, Refills: 1    Associated Diagnoses: Severe episode of recurrent major depressive disorder, without psychotic features (H)      sertraline (ZOLOFT) 100 MG tablet Take 2 tablets (200 mg) by mouth daily  Qty: 60 tablet, Refills: 2    Associated Diagnoses: Severe episode of recurrent major depressive disorder, without psychotic features (H)      topiramate (TOPAMAX) 50 MG tablet Take 1 tablet (50 mg) by mouth 2 times daily  Qty: 180 tablet, Refills: 3    Associated Diagnoses: Intractable chronic migraine without aura and without status migrainosus                   Consultations:   No consultations were requested during this admission             Brief History of Illness:   Ingris Trevino is a 24 year old female with a history of chronic pain who presented to the ED with nausea, vomiting, diarrhea.            Hospital Course:   1. Nausea, vomiting, diarrhea: Ingris Trevino is a 24 year old female with a history of chronic pain who presented to the ED with nausea, vomiting, diarrhea. In the ED the patient's vital signs were stable despite her HR being 110-120's.  She was afebrile.  Labs: CMP unremarkable besides potassium 3.1. HCG negative.  CBC unremarkable.  TSH 1.86.  Lipase 120. Influenza negative.  UA with small LE and 3 WBCs.  Lactic acid 0.9.  CT abdomen pelvis negative for acute cause of the patient's pain.  Chest x-ray unremarkable.  She was given 3L boluses of NS, benadryl, ativan, zofran, 30 MEQ potassium, compazine.  She continued to have nausea and was subsequently admitted to the observation unit for symptomatic management. Patient is able to tolerated po this morning. She is afebrile. No leukocytosis. Abdominal pain, nausea, vomiting, and diarrhea significantly improved. Suspect viral gastroenteritis  "given symptoms. Advised to increase liquid intake to stay hydrated and bland diet. Follow up with primary as needed.    Chronic medical problems:  #chronic pain:  Continue PTA percocet 1 tab TID PRN (she filled 90 day prescription on 2/15), gabapentin.   #anxiety: Continue PTA Remeron, sertraline, propranolol  #migraines: Continue PTA topamax               Final Day of Progress before Discharge:       Physical Exam:  Blood pressure 122/79, pulse 82, temperature 99  F (37.2  C), temperature source Oral, resp. rate 16, height 1.626 m (5' 4\"), weight 90.7 kg (200 lb), last menstrual period 03/27/2019, SpO2 95 %.    EXAM:  Physical Exam   Constitutional: Pt is oriented to person, place, and time.Pt appears well-developed and well-nourished.   HENT:   Head: Normocephalic and atraumatic.   Eyes: Conjunctivae are normal. Pupils are equal, round, and reactive to light.   Neck: Normal range of motion. Neck supple.   Cardiovascular: Normal rate, regular rhythm, normal heart sounds and intact distal pulses.    Pulmonary/Chest: Effort normal and breath sounds normal. No respiratory distress. Pt has no wheezes. Pt has no rales  Abdominal: Soft. Bowel sounds are normal. Pt exhibits no distension and no mass. No tenderness. Pt has no rebound and no guarding.   Musculoskeletal: Normal range of motion. Pt exhibits no edema.   Neurological: Pt is alert and oriented to person, place, and time. Normal reflexes.   Skin: Skin is warm and dry. No rash noted.   Psychiatric: Pt has a normal mood and affect. Behavior is normal. Judgment and thought content normal.             Data:  All laboratory data reviewed             Significant Results:   None  Results for orders placed or performed during the hospital encounter of 03/28/19   XR Chest 2 Views    Narrative    Exam:  Chest X-ray 3/28/2019 8:11 AM    History: fever, cough    Comparison: None available    Findings: Cardiac size within normal limits. Pulmonary vasculature is  distinct. No " pleural effusion or pneumothorax. No focal pulmonary  opacities. No acute bony abnormalities.      Impression    Impression: No acute cardiopulmonary abnormalities.    I have personally reviewed the examination and initial interpretation  and I agree with the findings.    SUMIT BASS MD   CT Abdomen Pelvis w Contrast    Narrative    Exam: CT abdomen and pelvis with contrast    Comparison: None    History: Epigastric and left-sided abdominal pain, vomiting    Technique: CT of the abdomen and pelvis was obtained following the  administration of intravenous contrast. Coronal and sagittal  reconstructions were obtained and reviewed.    Contrast dose: 123ml isovue 370    Findings:    Lower chest: No pulmonary nodules. No consolidative airspace  opacities. No pleural effusion or pneumothorax. Heart size is normal.  No pericardial effusion.    Abdomen/pelvis: No enhancing hepatic lesions. Mild hypoattenuation of  the liver at the falciform ligament, likely focal fatty infiltration.  No intra or extrahepatic biliary ductal dilatation. Normal  gallbladder, pancreas, spleen, and adrenal glands.    Symmetric enhancement of the kidneys. Nonobstructing right renal  calculi. Small right renal cyst. Normal left kidney. No hydronephrosis  or hydroureter. Bladder is partially distended and unremarkable.  Normal uterus with appropriately placed IUD normal ovaries with  multiple small follicular cysts. No pelvic masses.    Normal caliber of the small and large bowel. Normal appendix. No bowel  wall thickening. No diverticulosis. No free air or free fluid in the  abdomen or pelvis. No abdominal or pelvic lymphadenopathy.    Bones: Mild retrolisthesis of L5 on S1. No acute or suspicious bony  abnormality. Soft tissues are within normal limits.      Impression    Impression:   1. No acute findings in the abdomen or pelvis to explain patient's  symptoms. Specifically, no evidence of pancreatitis, obstructing  kidney stone, ovarian  pathology, or diverticulitis.  2. Nonobstructing right renal calculi.  3. Appropriate positioning of the IUD.    I have personally reviewed the examination and initial interpretation  and I agree with the findings.    YADIEL JOINER MD   Comprehensive metabolic panel   Result Value Ref Range    Sodium 139 133 - 144 mmol/L    Potassium 3.1 (L) 3.4 - 5.3 mmol/L    Chloride 106 94 - 109 mmol/L    Carbon Dioxide 21 20 - 32 mmol/L    Anion Gap 12 3 - 14 mmol/L    Glucose 113 (H) 70 - 99 mg/dL    Urea Nitrogen 9 7 - 30 mg/dL    Creatinine 0.67 0.52 - 1.04 mg/dL    GFR Estimate >90 >60 mL/min/[1.73_m2]    GFR Estimate If Black >90 >60 mL/min/[1.73_m2]    Calcium 9.0 8.5 - 10.1 mg/dL    Bilirubin Total 0.3 0.2 - 1.3 mg/dL    Albumin 3.9 3.4 - 5.0 g/dL    Protein Total 8.2 6.8 - 8.8 g/dL    Alkaline Phosphatase 108 40 - 150 U/L    ALT 42 0 - 50 U/L    AST 24 0 - 45 U/L   CBC with platelets differential   Result Value Ref Range    WBC 8.4 4.0 - 11.0 10e9/L    RBC Count 4.71 3.8 - 5.2 10e12/L    Hemoglobin 13.9 11.7 - 15.7 g/dL    Hematocrit 42.5 35.0 - 47.0 %    MCV 90 78 - 100 fl    MCH 29.5 26.5 - 33.0 pg    MCHC 32.7 31.5 - 36.5 g/dL    RDW 13.2 10.0 - 15.0 %    Platelet Count 274 150 - 450 10e9/L    Diff Method Automated Method     % Neutrophils 55.3 %    % Lymphocytes 37.4 %    % Monocytes 5.5 %    % Eosinophils 1.2 %    % Basophils 0.4 %    % Immature Granulocytes 0.2 %    Nucleated RBCs 0 0 /100    Absolute Neutrophil 4.6 1.6 - 8.3 10e9/L    Absolute Lymphocytes 3.1 0.8 - 5.3 10e9/L    Absolute Monocytes 0.5 0.0 - 1.3 10e9/L    Absolute Eosinophils 0.1 0.0 - 0.7 10e9/L    Absolute Basophils 0.0 0.0 - 0.2 10e9/L    Abs Immature Granulocytes 0.0 0 - 0.4 10e9/L    Absolute Nucleated RBC 0.0    UA with Microscopic   Result Value Ref Range    Color Urine Yellow     Appearance Urine Slightly Cloudy     Glucose Urine Negative NEG^Negative mg/dL    Bilirubin Urine Negative NEG^Negative    Ketones Urine 10 (A) NEG^Negative mg/dL     Specific Gravity Urine 1.014 1.003 - 1.035    Blood Urine Moderate (A) NEG^Negative    pH Urine 5.5 5.0 - 7.0 pH    Protein Albumin Urine Negative NEG^Negative mg/dL    Urobilinogen mg/dL Normal 0.0 - 2.0 mg/dL    Nitrite Urine Negative NEG^Negative    Leukocyte Esterase Urine Small (A) NEG^Negative    Source Midstream Urine     WBC Urine 3 0 - 5 /HPF    RBC Urine 2 0 - 2 /HPF    Bacteria Urine Few (A) NEG^Negative /HPF    Squamous Epithelial /HPF Urine 5 (H) 0 - 1 /HPF    Mucous Urine Present (A) NEG^Negative /LPF   HCG qualitative   Result Value Ref Range    HCG Qualitative Serum Negative NEG^Negative   Magnesium   Result Value Ref Range    Magnesium 2.1 1.6 - 2.3 mg/dL   Lipase   Result Value Ref Range    Lipase 120 73 - 393 U/L   TSH with free T4 reflex   Result Value Ref Range    TSH 1.86 0.40 - 4.00 mU/L   Potassium   Result Value Ref Range    Potassium 3.4 3.4 - 5.3 mmol/L   Comprehensive metabolic panel   Result Value Ref Range    Sodium 143 133 - 144 mmol/L    Potassium 3.7 3.4 - 5.3 mmol/L    Chloride 111 (H) 94 - 109 mmol/L    Carbon Dioxide 21 20 - 32 mmol/L    Anion Gap 11 3 - 14 mmol/L    Glucose 98 70 - 99 mg/dL    Urea Nitrogen 3 (L) 7 - 30 mg/dL    Creatinine 0.61 0.52 - 1.04 mg/dL    GFR Estimate >90 >60 mL/min/[1.73_m2]    GFR Estimate If Black >90 >60 mL/min/[1.73_m2]    Calcium 8.6 8.5 - 10.1 mg/dL    Bilirubin Total 0.3 0.2 - 1.3 mg/dL    Albumin 3.5 3.4 - 5.0 g/dL    Protein Total 6.9 6.8 - 8.8 g/dL    Alkaline Phosphatase 91 40 - 150 U/L    ALT 41 0 - 50 U/L    AST 25 0 - 45 U/L   CBC with platelets differential   Result Value Ref Range    WBC 7.7 4.0 - 11.0 10e9/L    RBC Count 4.25 3.8 - 5.2 10e12/L    Hemoglobin 12.5 11.7 - 15.7 g/dL    Hematocrit 40.4 35.0 - 47.0 %    MCV 95 78 - 100 fl    MCH 29.4 26.5 - 33.0 pg    MCHC 30.9 (L) 31.5 - 36.5 g/dL    RDW 13.2 10.0 - 15.0 %    Platelet Count 216 150 - 450 10e9/L    Diff Method Automated Method     % Neutrophils 54.3 %    % Lymphocytes  38.3 %    % Monocytes 4.8 %    % Eosinophils 1.3 %    % Basophils 0.5 %    % Immature Granulocytes 0.8 %    Nucleated RBCs 0 0 /100    Absolute Neutrophil 4.2 1.6 - 8.3 10e9/L    Absolute Lymphocytes 3.0 0.8 - 5.3 10e9/L    Absolute Monocytes 0.4 0.0 - 1.3 10e9/L    Absolute Eosinophils 0.1 0.0 - 0.7 10e9/L    Absolute Basophils 0.0 0.0 - 0.2 10e9/L    Abs Immature Granulocytes 0.1 0 - 0.4 10e9/L    Absolute Nucleated RBC 0.0    Lipase   Result Value Ref Range    Lipase 128 73 - 393 U/L   ISTAT gases lactate glenis POCT   Result Value Ref Range    Ph Venous 7.32 7.32 - 7.43 pH    PCO2 Venous 36 (L) 40 - 50 mm Hg    PO2 Venous 40 25 - 47 mm Hg    Bicarbonate Venous 19 (L) 21 - 28 mmol/L    O2 Sat Venous 71 %    Lactic Acid 0.9 0.7 - 2.1 mmol/L   Influenza A/B antigen   Result Value Ref Range    Influenza A/B Agn Specimen Nasopharyngeal     Influenza A Negative NEG^Negative    Influenza B Negative NEG^Negative      Recent Results (from the past 48 hour(s))   XR Chest 2 Views    Narrative    Exam:  Chest X-ray 3/28/2019 8:11 AM    History: fever, cough    Comparison: None available    Findings: Cardiac size within normal limits. Pulmonary vasculature is  distinct. No pleural effusion or pneumothorax. No focal pulmonary  opacities. No acute bony abnormalities.      Impression    Impression: No acute cardiopulmonary abnormalities.    I have personally reviewed the examination and initial interpretation  and I agree with the findings.    SUMIT BASS MD   CT Abdomen Pelvis w Contrast    Narrative    Exam: CT abdomen and pelvis with contrast    Comparison: None    History: Epigastric and left-sided abdominal pain, vomiting    Technique: CT of the abdomen and pelvis was obtained following the  administration of intravenous contrast. Coronal and sagittal  reconstructions were obtained and reviewed.    Contrast dose: 123ml isovue 370    Findings:    Lower chest: No pulmonary nodules. No consolidative airspace  opacities. No  pleural effusion or pneumothorax. Heart size is normal.  No pericardial effusion.    Abdomen/pelvis: No enhancing hepatic lesions. Mild hypoattenuation of  the liver at the falciform ligament, likely focal fatty infiltration.  No intra or extrahepatic biliary ductal dilatation. Normal  gallbladder, pancreas, spleen, and adrenal glands.    Symmetric enhancement of the kidneys. Nonobstructing right renal  calculi. Small right renal cyst. Normal left kidney. No hydronephrosis  or hydroureter. Bladder is partially distended and unremarkable.  Normal uterus with appropriately placed IUD normal ovaries with  multiple small follicular cysts. No pelvic masses.    Normal caliber of the small and large bowel. Normal appendix. No bowel  wall thickening. No diverticulosis. No free air or free fluid in the  abdomen or pelvis. No abdominal or pelvic lymphadenopathy.    Bones: Mild retrolisthesis of L5 on S1. No acute or suspicious bony  abnormality. Soft tissues are within normal limits.      Impression    Impression:   1. No acute findings in the abdomen or pelvis to explain patient's  symptoms. Specifically, no evidence of pancreatitis, obstructing  kidney stone, ovarian pathology, or diverticulitis.  2. Nonobstructing right renal calculi.  3. Appropriate positioning of the IUD.    I have personally reviewed the examination and initial interpretation  and I agree with the findings.    YADIEL JOINER MD                Pending Results:   Unresulted Labs Ordered in the Past 30 Days of this Admission     No orders found for last 61 day(s).                  Discharge Instructions and Follow-Up:     Discharge Procedure Orders   Reason for your hospital stay   Order Comments: Gastroenteritis     Follow Up and recommended labs and tests   Order Comments: Follow up with primary in 1 week     Activity   Order Comments: Your activity upon discharge: activity as tolerated     Order Specific Question Answer Comments   Is discharge order? Yes       When to contact your care team   Order Comments: Return to the ED with fever, uncontrolled nausea, vomiting, unrelieved pain, dizziness, chest pain, shortness of breath, loss of consciousness, and any new or concerning symptoms.     Discharge Instructions   Order Comments: You were admitted for abdominal pain, nausea, vomiting, and diarhea. You blood work up was unremarkable. Your symptoms are improving after you were treated with intervenous fluids and anti nausea medications. Drink liquids as directed.  Eat bland foods. When you feel hungry, begin eating soft, bland foods. Examples are bananas, clear soup, potatoes, and applesauce.  Do not have dairy products, alcohol, sugary drinks, or drinks with caffeine until you feel better.  Rest as much as possible. Slowly start to do more each day when you begin to feel better.     Full Code     Order Specific Question Answer Comments   Code status determined by: Discussion with patient/legal decision maker      Diet   Order Comments: Follow this diet upon discharge: Advance to a regular diet as tolerated     Order Specific Question Answer Comments   Is discharge order? Yes           Attestation:  Mare Burnette PA-C

## 2019-03-29 NOTE — PLAN OF CARE
Outpatient/Observation goals to be met before discharge home:     - Nausea/vomiting (diarrhea if present) improved:No emesis or diarrhea noted at the moment, but continues to c/o nausea with minimal relief from antiemetics.     - Tolerating oral intake to maintain hydration: YES    - Vital signs normal or at patient baseline and orthostatic vitals are normal and patient not lightheaded with standing:VSS    - Diagnostic tests and consults completed and resulted if ordered: YES    - Adequate pain control on oral analgesia: YES    - Tolerating oral antibiotics if ordered: NA    - Safe disposition plan has been identified: PENDING

## 2019-04-02 ENCOUNTER — OFFICE VISIT (OUTPATIENT)
Dept: PSYCHIATRY | Facility: CLINIC | Age: 24
End: 2019-04-02
Attending: PSYCHIATRY & NEUROLOGY
Payer: COMMERCIAL

## 2019-04-02 VITALS
WEIGHT: 212.8 LBS | DIASTOLIC BLOOD PRESSURE: 74 MMHG | SYSTOLIC BLOOD PRESSURE: 110 MMHG | BODY MASS INDEX: 36.53 KG/M2 | HEART RATE: 83 BPM

## 2019-04-02 DIAGNOSIS — F33.2 SEVERE EPISODE OF RECURRENT MAJOR DEPRESSIVE DISORDER, WITHOUT PSYCHOTIC FEATURES (H): ICD-10-CM

## 2019-04-02 PROCEDURE — G0463 HOSPITAL OUTPT CLINIC VISIT: HCPCS | Mod: ZF

## 2019-04-02 ASSESSMENT — PAIN SCALES - GENERAL: PAINLEVEL: SEVERE PAIN (6)

## 2019-04-02 NOTE — PATIENT INSTRUCTIONS
Thank you for coming to the [facility].    Lab Testing:  If you had lab testing today and your results are reassuring or normal they will be mailed to you or sent through [employer] within 7 days.   If the lab tests need quick action we will call you with the results.  The phone number we will call with results is # [phone] (home) . If this is not the best number please call our clinic and change the number.    Medication Refills:  If you need any refills please call your pharmacy and they will contact us. Our fax number for refills is [phone]. Please allow three business for refill processing.   If you need to  your refill at a new pharmacy, please contact the new pharmacy directly. The new pharmacy will help you get your medications transferred.     Scheduling:  If you have any concerns about today's visit or wish to schedule another appointment please call our office during normal business hours [phone] (8-5:00 M-F)    Contact Us:  Please call [phone] during business hours (8-5:00 M-F).  If after clinic hours, or on the weekend, please call  [phone].    Financial Assistance [phone]  [facility] [phone]  [facility] Billing [phone]  Medical Records [phone]      MENTAL HEALTH CRISIS NUMBERS:  [facility]:   [facility] - [phone]   Crisis Residence [facility] - [phone]   Walk-In Counseling [facility] [phone]   COPE 24/7 [place] Mobile Team for Adults - [[phone]]; Child - [[phone]]        [facility]:   [facility] - [phone]   Walk-in counseling [facility] - [phone]   Walk-in counseling [facility] - [phone]   Crisis Residence [facility] - [phone]   Urgent Care Adult Mental Health:   --Drop-in, 24/7 crisis line, and [place] Mobile Team [[phone]]    CRISIS TEXT LINE: Text [phone] from anywhere,  anytime, any crisis 24/7;    OR SEE www.crisistextline.org     Poison Control Center - [phone]    CHILD: [facility] needs assessment team - [phone]     [facility] - [phone]; or [employer] - [phone]    If you have a medical emergency please call 911or go to the nearest ER.                    _____________________________________________    Again thank you for choosing [facility] and please let us know how we can best partner with you to improve you and your family's health.  You may be receiving a survey in the mail regarding this appointment. We would love to have your feedback, both positive and negative, so please fill out the survey and return it using the provided envelope. The survey is done by an external company, so your answers are anonymous.

## 2019-04-02 NOTE — PROGRESS NOTES
"  [facility] PSYCHIATRY CLINIC PROGRESS NOTE     CARE TEAM:  PCP- Tia Leonardo    Specialty Providers- Neurology, ObGyn, Pain Management    Therapist- no    Community  Team- no     Ingris FELDER Uriel is a 24 year old female who prefers the name Margarita & pronouns she, her, hers, herself. Date of initial diagnostic assessment is 1/10/2019.  Date of most recent transfer of care assessment is 02/01/19.      Pertinent Background:  This patient first experienced mental health issues in late adolescence and has received treatment for depression and chronic pain.  First prescribed citalopram at the age of 14 and then later switched to sertraline in college which she reports was helpful for both mood and OCD.  Has also done CBT for OCD to endorsed benefit.  No history of hospitalizations but patient allows at least one suicide attempt via CO poisoning in 2014 (her most recent SA) and chart review suggests as many as 6 other SA's prior to that.  Notably, patient's first contact with this clinic was a diagnostic assessment completed as a one time SACHIN eval on 1/10/19, which had been recommended in the context of establishing care with new providers upon recently returning to live in [place] given her prolonged use of opiates.  From that DA: \"Unclear that chronic opioids appropriate for her chronic non-malignant pain states. That said, with what information we have, no clear indication that she has used non-medically, escalated in use, or any other concerns.\"  Chronic pain condition(s) experienced by the patient include endometriosis, herniated lumbar disk and migraines.  Social history highlights include raised in [place], received BA in Psychology from [employer] in [place], [place], where she met her present wife Jacqui, then lived in [place] from Dec 2015 up to recent return to [place] in Nov 2018.     Psych critical item history includes suicide attempt [via CO poisoning/asphyxiation] and suicidal ideation.    INTERIM " "HISTORY                                                                                                                 4, 4   The patient reports good treatment adherence.  History was provided by the patient who was a good historian.  The last visit ended with the following med change: increased mirtazapine to 30 mg at bedtime and added propranolol IR 10 mg PRN daily for acute anxiety.   Since the last visit:   Margarita appears well groomed, with bright affect.  States that she is doing \"okay.\"  Elaborates that mood is \"pretty good,\" and that while she still has some low moods, has not been feeling markedly anxious, sad or depressed.  States that things are going well with her job and that she really likes it.  Reports \"I love my coworkers, and I really like it here.\"  States that she likes the propranolol IR, and has been using it 3-4 times a week during intermittent anxiousness.  States that her sleep is better.  Denies SI/SIB thoughts, violent/aggressive ideation, elevated mood hallmarks, panic attacks, OCD symptom flare or other additional hallmarks of psychiatric decompensation or dangerousness.  Finally, states that she has found a pain management doctor who will assume management of her opiate pain relief regimen.  Endorses her feeling that medications are presently efficacious/well tolerated and agreed to continue same without adjustment.    RECENT SYMPTOMS:   DEPRESSION:  reports-low mood (which has improved), anhedonia, low energy, hypersomnia and overwhelmed;  DENIES- suicidal ideation and self-destructive thoughts  MARIAH/HYPOMANIA:  reports-none;  DENIES- increased energy, decreased sleep need, increased activity and grandiosity  PSYCHOSIS:  reports-none;  DENIES- delusions, auditory hallucinations and visual hallucinations  DYSREGULATION:  reports-mood dysregulation and irritable at times;  DENIES- suicidal ideation, violent ideation, SIB and aggressive  PANIC ATTACK:  SOB and chest tightness and " inability to hear what those around her are saying, occur 1-2x a week   ANXIETY:  excessive worry, feeling fearful, social anxiety and nervous/overwhelmed  TRAUMA RELATED:  none  COMPULSIVE:  organization and arranging things  SLEEP:  recent improvements  EATING DISORDER: some intermittent binging behavior    RECENT SUBSTANCE USE:     ALCOHOL- none, reports she and wife had had a single bottle of vodka in their house for at least 2 years      TOBACCO- no  CAFFEINE- coffee  OPIOIDS- 1 Percocet tablet 3x daily for chronic pain (as prescribed)        NARCAN KIT- unknown, will discuss at next vist  CANNABIS- none  OTHER ILLICIT DRUGS- none     CURRENT SOCIAL HISTORY:  FINANCIAL SUPPORT- wife has job, patient currently searching for employment  CHILDREN- none  LIVING SITUATION- with wife in apt in [place]      SOCIAL/ SPIRITUAL SUPPORT- wife, family, three dogs (Kerline a border collie aged 5-6 years, Serene a bichon frise aged 3 years, and Sarah lieberman constantin-poo aged 2 years)  FEELS SAFE AT HOME- yes    MEDICAL ROS (2,10):  A comprehensive review of systems was performed and is negative other than noted in the HPI.    PSYCH and CD Critical Summary Points since July 2018 January 2019: Clinic intake/CD assessment and initiation of mirtazapine at 15 mg at bedtime.  February 2019: Mirtazapine dose increased to 22.5 mg at bedtime.  March 2019: Mirtazapine dose increased to 30 mg at bedtime and added propranolol IR 10 mg PRN daily for acute anxiety.    PAST PSYCH MED TRIALS   see EMR Problem List: Hx of psychiatric care    MEDICAL / SURGICAL HISTORY                                   Pregnant or breastfeeding- no      Contraception- Paraguard IUD    Neurologic Hx-  no history of TBI or serizure    Patient Active Problem List   Diagnosis     Intractable chronic migraine without aura and without status migrainosus     Nausea vomiting and diarrhea     Past Surgical History:   Procedure Laterality Date     HC TOOTH EXTRACTION  W/FORCEP      local anaesthesia     ALLERGY                                Seasonal allergies     MEDICATIONS                               Current Outpatient Medications   Medication Sig Dispense Refill     cyclobenzaprine (FLEXERIL) 10 MG tablet Take 10 mg by mouth 3 times daily as needed for muscle spasms       gabapentin (NEURONTIN) 300 MG capsule Take 600 mg by mouth 3 times daily       indomethacin (INDOCIN) 50 MG capsule Take 50 mg by mouth 3 times daily (with meals)       mirtazapine (REMERON) 30 MG tablet Take 1 tablet (30 mg) by mouth At Bedtime 30 tablet 1     ondansetron (ZOFRAN-ODT) 4 MG ODT tab Take 1 tablet (4 mg) by mouth every 6 hours as needed for nausea or vomiting 20 tablet 0     oxyCODONE-acetaminophen (PERCOCET) 5-325 MG tablet Take one po tid prn severe pain. (Patient taking differently: Take 1 tablet by mouth 3 times daily Take one po tid prn severe pain.) 90 tablet 0     promethazine (PHENERGAN) 25 MG tablet Take 25 mg by mouth every 8 hours as needed for nausea associated with migranes       propranolol (INDERAL) 10 MG tablet Take 1 tablet (10 mg) by mouth daily as needed (for anxiety) 30 tablet 0     propranolol ER (INDERAL LA) 60 MG 24 hr capsule Take 1 capsule (60 mg) by mouth At Bedtime 30 capsule 1     sertraline (ZOLOFT) 100 MG tablet Take 2 tablets (200 mg) by mouth daily 60 tablet 2     topiramate (TOPAMAX) 50 MG tablet Take 1 tablet (50 mg) by mouth 2 times daily 180 tablet 3     VITALS                                                                                                                          3, 3   /74   Pulse 83   Wt 96.5 kg (212 lb 12.8 oz)   LMP 03/27/2019 (Exact Date)   BMI 36.53 kg/m       MENTAL STATUS EXAM                                                                                           9, 14 cog gs     Alertness: alert  and oriented  Appearance: well groomed  Behavior/Demeanor: cooperative and pleasant, with good eye contact   Speech:  normal  Language: good  Psychomotor: normal or unremarkable  Mood: description consistent with euthymia  Affect: labile and dramatic; was congruent to mood; was congruent to content  Thought Process/Associations: unremarkable  Thought Content:  Reports none;  Denies suicidal ideation, violent ideation and delusions  Perception:  Reports none;  Denies auditory hallucinations and visual hallucinations  Insight: fair  Judgment: good  Cognition: (6) oriented: time, person, and place  attention span: intact  concentration: intact  recent memory: intact  remote memory: intact  fund of knowledge: appropriate  Gait and Station: unremarkable    LABS and DATA     AIMS:  N/A    PHQ9 TODAY = 5  PHQ-9 SCORE 1/10/2019   PHQ-9 Total Score 23       DIAGNOSIS     Major Depressive Disorder, recurrent episode, partial remission to mild  R/o Persistent Depressive Disorder  Panic Disorder  Obsessive-Compulsive Disorder  Social Anxiety Disorder    ASSESSMENT                                                                                                                   m2, h3     TODAY:  Margarita Trevino presents to [facility] for continuing medication management of MDD, Panic and OCD.  Today she relates relative interim improvements in mood, stating that she has not been feeling markedly depressed or anxious since our last visit.  Also denies SI/SIB thoughts, violent/aggressive ideation, elevated mood states, panic attacks, OCD symptom flare or other hallmarks of psychiatric decompensation or dangerousness.  Does have occasional anxiousness which she states that the recently added propranolol IR PRN has been helpful for.  Also feels like the mirtazapine we have titrated to 30 mg has been beneficial.  Finally, states that she has found a pain management doctor who has agreed to manage her opiate pain relievers, and has signed a pain contract with him.  States this has been a major relief for her.  Endorses her self-assessment of  the efficacy/tolerability of her present regimen and we have agreed to continue same without adjustment.    SUICIDE RISK ASSESSMENT:  Ingris Trevino denies present SI.  This patient does have notable risk factors for self-harm including previous suicide attempt, severe anxiety, significant pain and on opiates.  However, risk is mitigated by no plan or intent, describes a safety plan, symptom improvement, none to minimal alcohol use, commitment to family and stable housing.  Based on all available evidence she does not appear to be at imminent risk for self-harm therefore does not meet criteria for a 72-hr hold/  involuntary hospitalization.  However, based on degree of symptoms, psychotherapy, medication adjustments and close psych FU was recommended which the pt did agree to.    [place] PRESCRIPTION MONITORING PROGRAM [] was not checked today:  will be checked next visit.    PSYCHOTROPIC DRUG INTERACTIONS:    Concurrent use of OXYCODONE and SERTRALINE may result in an increased risk of serotonin syndrome (tachycardia, hyperthermia, myoclonus, mental status changes).    Concurrent use of MIRTAZAPINE and SEROTONERGIC AGENTS may result in increased risk of serotonin syndrome.     Concurrent use of OXYCODONE and CNS DEPRESSANTS may result in increased risk of respiratory and CNS depression.     Concurrent use of MIRTAZAPINE and SEROTONERGIC AGENTS may result in increased risk of serotonin syndrome.    MANAGEMENT:  Monitoring for adverse effects, routine vitals and patient is aware of risks     PLAN                                                                                                                                m2, h3     1) PSYCHOTROPIC MEDICATIONS:  - Continue mirtazapine 30 mg at bedtime  - Continue sertraline 200 mg daily  - Continue propranolol LA 60 mg daily  - Continue propranolol IR 10 mg PRN daily for acute anxiety    2) THERAPY: recommended    3) NEXT DUE:    Labs- no  EKG- PRN  Rating Scales-  N/A    4) REFERRALS:  Therapy- suggested [facility]    5) RTC: 3-4 weeks    6) CRISIS NUMBERS:   Provided routinely in [facility].  [facility] [phone] (clinic)    [phone] (after hours)  CRISIS TEXT LINE: Text 147290 from anywhere in [place], anytime, any crisis 24/7;  OR SEE www.crisistextline.org    TREATMENT RISK STATEMENT:  The risks, benefits, alternatives and potential adverse effects have been discussed and are understood by the pt. The pt understands the risks of using street drugs or alcohol. There are no medical contraindications, the pt agrees to treatment with the ability to do so. The pt knows to call the clinic for any problems or to access emergency care if needed.  Medical and substance use concerns are documented above.  Psychotropic drug interaction check was done, including changes made today.     PSYCHIATRY CLINIC INDIVIDUAL PSYCHOTHERAPY NOTE                                                [16]   Start time- N/A        End time- N/A  Date last reviewed - 03/01/19       Date next due - 5/30/19 (or 12 months if Medicare)     Subjective: This supportive psychotherapy session addressed issues related to orientation to therapy, goals of therapy, patient's history, current stressors, life stressors, relationship, work, family of origin and health.  Patient's reaction: Contemplation in the context of mental status appropriate for ambulatory setting.  Progress: fair  Plan: RTC 4-6 weeks  Psychotherapy services during this visit included  myself and the patient.   TREATMENT  PLAN          SYMPTOMS;PROBLEMS   MEASURABLE GOALS;    FUNCTIONAL IMPROVEMENT INTERVENTIONS;    GAINS MADE DISCHARGE CRITERIA   Depression: depressed mood, hypersomnia, feeling hopelesss and excessive crying   reduce depressive symptoms, reduce depressive episodes and find enjoyment at least once a day Supportive and cognitive psychotherapeutic interventions. marked symptom improvement   Anxiety: excessive worry,  social anxiety and nervous/overwhelmed   make a plan to manage 2-3 anxiety-provoking situations, reduce feeling overwhelmed/ improve decision making skills and walk away from situations that trigger strong emotions Supportive and cognitive psychotherapeutic interventions. marked symptom improvement     PROVIDER:  Alcides Shearer, DO    Patient not staffed in clinic.  Note will be reviewed and signed by supervisor Dr. Miller.    Attestation:  I did not see Ingris Trevino directly. I have reviewed the documentation and I agree with the resident's plan of care.   Mateo Miller MD

## 2019-04-05 DIAGNOSIS — F41.9 ANXIETY: ICD-10-CM

## 2019-04-05 RX ORDER — PROPRANOLOL HYDROCHLORIDE 10 MG/1
10 TABLET ORAL DAILY PRN
Qty: 30 TABLET | Refills: 2 | Status: SHIPPED | OUTPATIENT
Start: 2019-04-05 | End: 2019-07-06

## 2019-04-05 NOTE — TELEPHONE ENCOUNTER
Medication requested: propranolol (INDERAL) 10 MG tablet  Last refilled: 3/1/19  Qty: 30      Last seen: 4/2/19  RTC: 3-4 weeks  Cancel: 0  No-show: 0  Next appt: 5/14/19    Refill decision:   Refill pended and routed to the provider for review/determination due to note from 4/2/19 is not signed

## 2019-04-08 RX ORDER — MIRTAZAPINE 30 MG/1
30 TABLET, FILM COATED ORAL AT BEDTIME
Qty: 30 TABLET | Refills: 2 | Status: SHIPPED | OUTPATIENT
Start: 2019-04-26 | End: 2019-08-23

## 2019-04-08 ASSESSMENT — PATIENT HEALTH QUESTIONNAIRE - PHQ9: SUM OF ALL RESPONSES TO PHQ QUESTIONS 1-9: 5

## 2019-05-07 ENCOUNTER — THERAPY VISIT (OUTPATIENT)
Dept: PHYSICAL THERAPY | Facility: CLINIC | Age: 24
End: 2019-05-07
Payer: COMMERCIAL

## 2019-05-07 DIAGNOSIS — G89.29 CHRONIC BILATERAL LOW BACK PAIN WITH BILATERAL SCIATICA: Primary | ICD-10-CM

## 2019-05-07 DIAGNOSIS — M54.41 CHRONIC BILATERAL LOW BACK PAIN WITH BILATERAL SCIATICA: Primary | ICD-10-CM

## 2019-05-07 DIAGNOSIS — M54.42 CHRONIC BILATERAL LOW BACK PAIN WITH BILATERAL SCIATICA: Primary | ICD-10-CM

## 2019-05-07 DIAGNOSIS — M54.2 CERVICALGIA: ICD-10-CM

## 2019-05-07 PROCEDURE — 97112 NEUROMUSCULAR REEDUCATION: CPT | Mod: GP | Performed by: PHYSICAL THERAPIST

## 2019-05-07 PROCEDURE — 97162 PT EVAL MOD COMPLEX 30 MIN: CPT | Mod: GP | Performed by: PHYSICAL THERAPIST

## 2019-05-07 PROCEDURE — 97110 THERAPEUTIC EXERCISES: CPT | Mod: GP | Performed by: PHYSICAL THERAPIST

## 2019-05-07 NOTE — PROGRESS NOTES
[facility] Initial Evaluation -- Cervical and Lumbar    Date: May 7, 2019  Ingris Trevino is a 24 year old female with a Cervcal and LB condition.   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  Occas bilat Upper arm and forearm  LB:  Bilat Lumbar, buttocks, hips, and post thigh and calf - sharp stabbing, burning  Pain quality (sharp/shooting/stabbing/aching/burning/cramping):  See above   Paresthesia (yes/no):  no    Present since (onset date): 2012-13.  MD referral 4-.     Symptoms (improving/unchanging/worsening):  worsening.     Symptoms commenced as a result of: Unknown - was doing [place] and Kickboxing at the time   Condition occurred in the following environment:   unknown     Cervical:  Symptoms at onset (neck/arm/forearm/headache): neck??  Constant symptoms (neck/arm/forearm/headache): neck and UB  Intermittent symptoms (neck/arm/forearm/headache): arm    Lumbar:  Symptoms at onset (back/thigh/leg): back??  Constant symptoms (back/thigh/leg): LB, hips and buttocks  Intermittent symptoms (back/thigh/leg): leg    Cervical:  Symptoms are made worse with the following: Always Bending, Sometimes Turning, time of day - Always AM and Sometimes When still   Symptoms are made better with the following: heat and ice, CBD biofreeze, moving, TNS    Lumbar:  Symptoms are made worse with the following: Always Bending, Always Sitting, Always Rising, Always Standing, Always Walking and Time of day - Sometimes AM and Always as  the day progresses   Symptoms are made better with the following: Other - heat TNS, CBD Biofreeze, moving    Disturbed sleep (yes/no):  Yes- wakes 2-3 x/night  Sleeping postures (prone/sup/side R/L): L side > back  Number of pillows:  1 pillow head    Previous episodes (0/1-5/6-10/11+): 0 Year of first episode: na    Previous history: na  Previous treatments: Chiro and PT - heat, e-stim and exer - still does exer occas, steroid injections      Specific Questions:  Cough/Sneeze/Strain (pos/neg): sometimes pos  Bowel/Bladder (normal/abnormal): normal  Dizziness/Tinnitus/Nausea/Swallowing (pos/neg): neg  Gait/Upper Limbs (normal/abnormal): occas drops things with flare ups  Medications (nil/NSAIDS/analg/steroids/anticoag/other):  Narcotics/Opiods, Muscle relaxants and Other - Anti-seizure, Sleep and Anti-depressants  Medical allergies:  NKA  General health (excellent/good/fair/poor):  Poor  Pertinent medical history:  Asthma, Depression, Fibromyalgia, High blood pressure, Mental illness, Migraines/Headaches and Overweight  Imaging (None/Xray/MRI/Other):  None recently - MRI 2018 - HNP  Recent or major surgery (yes/no):  none  Night pain (yes/no): yes can usually reposition and get comfortable  Accidents (yes/no): no  Unexplained weight loss (yes/no): no  Barriers at home: Occas needs assist  Other red flags: pain at rest, severe HA's    EXAMINATION    Posture:   Sitting (good/fair/poor): fair-poor  Standing (good/fair/poor):good    Protruded head (yes/no): yes    Wry Neck (right/left/nil):  nil  Relevant (yes/no):      Lordosis (red/acc/normal): normal  Lateral Shift (right/left/nil): nil  Relevant (yes/no):      Correction of posture (better/worse/no effect): better    Other Observations:     Neurological:    Motor deficit:  NA d/t time constraints  Reflexes:  NA  Sensory deficit:  NA  Dural signs:  NA    Movement Loss:  Cervical:   NA - complete next session   Antony Mod Min Nil Pain   Protrusion         Flexion         Retraction        Extension        Lateral flexion R        Lateral flexion L        Rotation R        Rotation L        Movement Loss:  Lumbar   Antony Mod Min Nil Pain   Flexion  X   To knees - [ulling LB   Extension  X   LBP   Side Gliding R  X   R LBP   Side Gliding L  X   R LBP       Test Movements:  Cervical  During: produces, abolishes, increases, decreases, no effect, centralizing, peripheralizing  After: better, worse, no better, no worse, no effect, centralized, peripheralized    Pretest symptoms sitting: NA   Symptoms During Symptoms After ROM increased ROM decreased No Effect   PRO        Rep PRO        RET        Rep RET        RET EXT        Rep RET EXT          Test Movements: Lumbar   During: produces, abolishes, increases, decreases, no effect, centralizing, peripheralizing   After: better, worse, no better, no worse, no effect, centralized, peripheralized    Pretest symptoms standing: Bilat LB and buttocks   Symptoms During Symptoms After ROM increased ROM decreased No Effect   FIS No Effect    No Effect         Rep FIS No Effect    No Effect      X   EIS Increases  LB    No Worse         Rep EIS Increases  LB    No Worse    X       Static Tests:  Cervical  Protrusion:  NA  Flexion:  NA  Retraction:  NA  Extension (sitting/prone/supine):  NA    Static Tests:  Lumbar  Sitting slouched:  Worse  Sitting erect:  better  Standing slouched:  NA   Standing erect:  NA  Lying prone in extension:  NA Long sitting:  NA    Other Tests:     Provisional Classification:    Cervical:  Assessment not completed    Lumbar:  Inconclusive/Other - Derangement ??? Needs further assessment    Principle of Management:  Education:  Posture - Neutral Spine, use of L-roll, affect of posture on spine/pain, no couch, recliner, or propping up on pillows in bed, specificity of exer, centralisation/peripheralisation, and HEP   Equipment provided:  none  Mechanical therapy (Y/N):  Y??   Extension principle:  EIS (Hips against the  table/counter) x10 6 x/day  Lateral Principle:    Flexion principle:    Other:      ASSESSMENT/PLAN:    Patient is a 24 year old female with cervical and lumbar complaints.    Patient has the following significant findings with corresponding treatment plan.                Diagnosis 1:  Cervicalgia   Pain -  manual therapy, education and home program  Decreased ROM/flexibility - manual therapy, therapeutic exercise and home program  Decreased function - therapeutic activities and home program  Impaired posture - neuro re-education and home program  Diagnosis 2:  LBP   Pain -  manual therapy, education, directional preference exercise and home program  Decreased ROM/flexibility - manual therapy, therapeutic exercise and home program  Decreased function - therapeutic activities and home program  Impaired posture - neuro re-education and home program    Therapy Evaluation Codes:   1) History comprised of:   Personal factors that impact the plan of care:      Past/current experiences and Time since onset of symptoms.    Comorbidity factors that impact the plan of care are:      Asthma, Depression, Fibromyalgia, High blood pressure, Mental illness, Migraines/headaches, Overweight and Pain at night/rest.     Medications impacting care: Anti-depressant, Muscle relaxant, Pain, Sleep and anti-seizure.  2) Examination of Body Systems comprised of:   Body structures and functions that impact the plan of care:      Cervical spine and Lumbar spine.   Activity limitations that impact the plan of care are:      Bending, Driving, Sitting, Walking and Sleeping.  3) Clinical presentation characteristics are:   Evolving/Changing.  4) Decision-Making    Moderate complexity using standardized patient assessment instrument and/or measureable assessment of functional outcome.  Cumulative Therapy Evaluation is: Moderate complexity.    Previous and current functional limitations:  (See Goal Flow Sheet for this information)    Short term and  Long term goals: (See Goal Flow Sheet for this information)     Communication ability:  Patient appears to be able to clearly communicate and understand verbal and written communication and follow directions correctly.  Treatment Explanation - The following has been discussed with the patient:   RX ordered/plan of care  Anticipated outcomes  Possible risks and side effects  This patient would benefit from PT intervention to resume normal activities.   Rehab potential is good.    Frequency:  1 X week, once daily  Duration:  for 8 weeks  Discharge Plan:  Achieve all LTG.  Independent in home treatment program.  Reach maximal therapeutic benefit.    Please refer to the daily flowsheet for treatment today, total treatment time and time spent performing 1:1 timed codes.

## 2019-05-07 NOTE — LETTER
[facility] ATHLETIC [facility] PHYSICAL THERAPY  [address]  [facility] [address]  [phone]    May 13, 2019    Re: Ingris Trevino   :   1995  MRN:  [id number]   REFERRING PHYSICIAN:   Josep Gillis    [facility] ATHLETIC [facility] PHYSICAL THERAPY    Date of Initial Evaluation:  2019  Visits:   1  Reason for Referral:     Cervicalgia  Chronic bilateral low back pain with bilateral sciatica    [facility] Athletic [facility] Initial Evaluation -- Cervical and Lumbar  Date: May 7, 2019  Ingris Trevino is a 24 year old female with a Cervcal and LB condition.   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  Occas bilat Upper arm and forearm  LB:  Bilat Lumbar, buttocks, hips, and post thigh and calf - sharp stabbing, burning  Pain quality (sharp/shooting/stabbing/aching/burning/cramping):  See above   Paresthesia (yes/no):  no  Present since (onset date): .  MD referral 2019.     Symptoms (improving/unchanging/worsening):  worsening.   Symptoms commenced as a result of: Unknown - was doing [place] and Kickboxing at the time   Condition occurred in the following environment:   unknown     Cervical:  Symptoms at onset (neck/arm/forearm/headache): neck??  Constant symptoms (neck/arm/forearm/headache): neck and UB  Intermittent symptoms (neck/arm/forearm/headache): arm    Lumbar:  Symptoms at onset (back/thigh/leg): back??  Constant symptoms (back/thigh/leg): LB, hips and  buttocks  Intermittent symptoms (back/thigh/leg): leg    Re: Ingris Trevino   :   1995    Cervical:  Symptoms are made worse with the following: Always Bending, Sometimes Turning, time of day - Always AM and Sometimes When still   Symptoms are made better with the following: heat and ice, CBD biofreeze, moving, TNS    Lumbar:  Symptoms are made worse with the following: Always Bending, Always Sitting, Always Rising, Always Standing, Always Walking and Time of day - Sometimes AM and Always as the day progresses   Symptoms are made better with the following: Other - heat TNS, CBD Biofreeze, moving  Disturbed sleep (yes/no):  Yes- wakes 2-3 x/night    Sleeping postures (prone/sup/side R/L): L side > back  Number of pillows:  1 pillow head  Previous episodes (0/1-5/6-10/11+): 0   Year of first episode: na  Previous history: na  Previous treatments: Chiro and PT - heat, e-stim and exer - still does exer occas, steroid injections    Specific Questions:  Cough/Sneeze/Strain (pos/neg): sometimes pos  Bowel/Bladder (normal/abnormal): normal  Dizziness/Tinnitus/Nausea/Swallowing (pos/neg): neg  Gait/Upper Limbs (normal/abnormal): occas drops things with flare ups  Medications (nil/NSAIDS/analg/steroids/anticoag/other):  Narcotics/Opiods, Muscle relaxants and Other - Anti-seizure, Sleep and Anti-depressants  Medical allergies:  NKA  General health (excellent/good/fair/poor):  Poor  Pertinent medical history:  Asthma, Depression, Fibromyalgia, High blood pressure, Mental illness, Migraines/Headaches and Overweight  Imaging (None/Xray/MRI/Other):  None recently - MRI 2018 - HNP  Recent or major surgery (yes/no):  none  Night pain (yes/no): yes can usually reposition and get comfortable  Accidents (yes/no): no  Unexplained weight loss (yes/no): no  Barriers at home: Occas needs assist  Other red flags: pain at rest, severe HA's    EXAMINATION  Posture:   Sitting (good/fair/poor): fair-poor  Standing  (good/fair/poor):good  Protruded head (yes/no): yes    Wry Neck (right/left/nil):  nil    Relevant (yes/no):    Lordosis (red/acc/normal): normal  Lateral Shift (right/left/nil): nil  Relevant (yes/no):    Correction of posture (better/worse/no effect): better  Re: Ingris Trevino   :   1995    Other Observations:   Neurological:  Motor deficit:  NA d/t time constraints    Reflexes:  NA  Sensory deficit:  NA    Dural signs:  NA  Movement Loss:  Cervical:   NA - complete next session   Antony Mod Min Nil Pain   Protrusion         Flexion        Retraction        Extension        Lateral flexion R        Lateral flexion L        Rotation R        Rotation L        Movement Loss:  Lumbar   Antony Mod Min Nil Pain   Flexion  X   To knees - [ulling LB   Extension  X   LBP   Side Gliding R  X   R LBP   Side Gliding L  X   R LBP   Test Movements:  Cervical  During: produces, abolishes, increases, decreases, no effect, centralizing, peripheralizing  After: better, worse, no better, no worse, no effect, centralized, peripheralized  Pretest symptoms sitting: NA   Symptoms During Symptoms After ROM increased ROM decreased No Effect   PRO        Rep PRO        RET        Rep RET        RET EXT        Rep RET EXT        Test Movements: Lumbar   During: produces, abolishes, increases, decreases, no effect, centralizing, peripheralizing   After: better, worse, no better, no worse, no effect, centralized, peripheralized                      Re: Ingris Trevino   :   1995    Pretest symptoms standing: Bilat LB and buttocks   Symptoms During Symptoms After ROM increased ROM decreased No Effect   FIS No Effect    No Effect         Rep FIS No Effect    No Effect      X   EIS Increases  LB    No Worse         Rep EIS Increases  LB    No Worse    X     Static Tests:  Cervical  Protrusion:  NA  Flexion:  NA  Retraction:  NA  Extension (sitting/prone/supine):  NA  Static Tests:  Lumbar  Sitting slouched:  Worse  Sitting  erect:  better  Standing slouched:  NA   Standing erect:  NA  Lying prone in extension:  NA Long sitting:  NA    Other Tests:   Provisional Classification:    Cervical:  Assessment not completed    Lumbar:  Inconclusive/Other - Derangement ??? Needs further assessment  Principle of Management:  Education:  Posture - Neutral Spine, use of L-roll, affect of posture on spine/pain, no couch, recliner, or propping up on pillows in bed, specificity of exer, centralisation/peripheralisation, and HEP     Equipment provided:  none  Mechanical therapy (Y/N):  Y??   Extension principle:  EIS (Hips against the table/counter) x10 6 x/day   Lateral Principle:    Flexion principle:      Other:      ASSESSMENT/PLAN:  Patient is a 24 year old female with cervical and lumbar complaints.    Patient has the following significant findings with corresponding treatment plan.                Diagnosis 1:  Cervicalgia   Pain -  manual therapy, education and home program  Decreased ROM/flexibility - manual therapy, therapeutic exercise and home program  Decreased function - therapeutic activities and home program  Impaired posture - neuro re-education and home program  Diagnosis 2:  LBP   Pain -  manual therapy, education, directional preference exercise and home program  Decreased ROM/flexibility - manual therapy, therapeutic exercise and home program  Decreased function - therapeutic activities and home program  Impaired posture - neuro re-education and home program      Re: Ingris Trevino   :   1995    Therapy Evaluation Codes:   1) History comprised of:   Personal factors that impact the plan of care:      Past/current experiences and Time since onset of symptoms.    Comorbidity factors that impact the plan of care are:      Asthma, Depression, Fibromyalgia, High blood pressure, Mental illness,    Migraines/headaches, Overweight and Pain at night/rest.     Medications impacting care: Anti-depressant, Muscle relaxant, Pain,  Sleep and    anti-seizure.  2) Examination of Body Systems comprised of:   Body structures and functions that impact the plan of care:      Cervical spine and Lumbar spine.   Activity limitations that impact the plan of care are:      Bending, Driving, Sitting, Walking and Sleeping.  3) Clinical presentation characteristics are:   Evolving/Changing.  4) Decision-Making    Moderate complexity using standardized patient assessment instrument and/or   measureable assessment of functional outcome.  Cumulative Therapy Evaluation is: Moderate complexity.    Previous and current functional limitations:  (See Goal Flow Sheet for this information)    Short term and Long term goals: (See Goal Flow Sheet for this information)   Communication ability:  Patient appears to be able to clearly communicate and understand verbal and written communication and follow directions correctly.  Treatment Explanation - The following has been discussed with the patient:   RX ordered/plan of care, Anticipated outcomes, Possible risks and side effects    This patient would benefit from PT intervention to resume normal activities.   Rehab potential is good.  Frequency:  1 X week, once daily  Duration:  for 8 weeks  Discharge Plan:  Achieve all LTG.  Independent in home treatment program.  Reach maximal therapeutic benefit.    Thank you for your referral.    INQUIRIES        Therapist:  Carisa Irene, PT, Cert. MDT  [facility] PHYSICAL THERAPY  [address] [facility] [address]  [facility] [address]  Phone: [phone]  Fax: [phone]

## 2019-05-11 PROBLEM — M54.41 CHRONIC BILATERAL LOW BACK PAIN WITH BILATERAL SCIATICA: Status: ACTIVE | Noted: 2019-05-11

## 2019-05-11 PROBLEM — G89.29 CHRONIC BILATERAL LOW BACK PAIN WITH BILATERAL SCIATICA: Status: ACTIVE | Noted: 2019-05-11

## 2019-05-11 PROBLEM — M54.42 CHRONIC BILATERAL LOW BACK PAIN WITH BILATERAL SCIATICA: Status: ACTIVE | Noted: 2019-05-11

## 2019-05-11 PROBLEM — M54.2 CERVICALGIA: Status: ACTIVE | Noted: 2019-05-11

## 2019-05-14 ENCOUNTER — OFFICE VISIT (OUTPATIENT)
Dept: PSYCHIATRY | Facility: CLINIC | Age: 24
End: 2019-05-14
Attending: PSYCHIATRY & NEUROLOGY
Payer: COMMERCIAL

## 2019-05-14 VITALS
BODY MASS INDEX: 36.42 KG/M2 | SYSTOLIC BLOOD PRESSURE: 127 MMHG | WEIGHT: 212.2 LBS | DIASTOLIC BLOOD PRESSURE: 85 MMHG | HEART RATE: 98 BPM

## 2019-05-14 DIAGNOSIS — F33.2 SEVERE EPISODE OF RECURRENT MAJOR DEPRESSIVE DISORDER, WITHOUT PSYCHOTIC FEATURES (H): ICD-10-CM

## 2019-05-14 PROCEDURE — G0463 HOSPITAL OUTPT CLINIC VISIT: HCPCS | Mod: ZF

## 2019-05-14 ASSESSMENT — PAIN SCALES - GENERAL: PAINLEVEL: SEVERE PAIN (6)

## 2019-05-14 NOTE — PROGRESS NOTES
"  [facility] PSYCHIATRY CLINIC PROGRESS NOTE     CARE TEAM:  PCP- Tia Leonardo    Specialty Providers- Neurology, ObGyn, Pain Management    Therapist- no    Community  Team- no     Ingris FELDER Uriel is a 24 year old female who prefers the name Margarita & pronouns she, her, hers, herself. Date of initial diagnostic assessment is 1/10/2019.  Date of most recent transfer of care assessment is 02/01/19.      Pertinent Background:  This patient first experienced mental health issues in late adolescence and has received treatment for depression and chronic pain.  First prescribed citalopram at the age of 14 and then later switched to sertraline in college which she reports was helpful for both mood and OCD.  Has also done CBT for OCD to endorsed benefit.  No history of hospitalizations but patient allows at least one suicide attempt via CO poisoning in 2014 (her most recent SA) and chart review suggests as many as 6 other SA's prior to that.  Notably, patient's first contact with this clinic was a diagnostic assessment completed as a one time SACHIN eval on 1/10/19, which had been recommended in the context of establishing care with new providers upon recently returning to live in [place] given her prolonged use of opiates.  From that DA: \"Unclear that chronic opioids appropriate for her chronic non-malignant pain states. That said, with what information we have, no clear indication that she has used non-medically, escalated in use, or any other concerns.\"  Chronic pain condition(s) experienced by the patient include endometriosis, herniated lumbar disk and migraines.  Social history highlights include raised in [place], received BA in Psychology from [employer] in [place], [place], where she met her present wife Jacqui, then lived in [place] from Dec 2015 up to recent return to [place] in Nov 2018.     Psych critical item history includes suicide attempt [via CO poisoning/asphyxiation] and suicidal ideation.    INTERIM " "HISTORY                                                                                                                 4, 4   The patient reports good treatment adherence.  History was provided by the patient who was a good historian.  The last visit ended with no change to the med regimen.   Since the last visit:   -Patient states that she's been \"good.\"  Endorses stability of depressed mood and anxiety.  -Has had some frustration with the recent change of her work milieu to an office in the [place], and proximity to a particularly irritating coworker.  States that she is managing her way through this however and has had no acute concerns related to same.  -Says that her pain is presently stable.  Had had a recent flare but that is better.  -One item she brought up was feeling as if she has unprocessed grief from the passing of her stepfather and is looking into grief therapy at a place and [place].  Provided the patient with positive reinforcement for this step.  -States that overall, things are going well with meds.  Also she and her wife regularly go to the gym, and have some fun things planned for the summer.  -Agreed to maintain present regimen.    RECENT SYMPTOMS:   DEPRESSION:  reports-low mood (which has improved), low energy, hypersomnia and overwhelmed at times;  DENIES- suicidal ideation, self-destructive thoughts and feeling hopeless  MARIAH/HYPOMANIA:  reports-none;  DENIES- increased energy, decreased sleep need, increased activity and grandiosity  PSYCHOSIS:  reports-none;  DENIES- delusions, auditory hallucinations and visual hallucinations  DYSREGULATION:  reports-mood dysregulation and irritable at times;  DENIES- suicidal ideation, violent ideation, SIB and aggressive  PANIC ATTACK:  SOB and chest tightness and inability to hear what those around her are saying, occur 1-2x a week   ANXIETY:  excessive worry, feeling fearful, social anxiety and nervous/overwhelmed  TRAUMA RELATED:  " none  COMPULSIVE:  organization and arranging things  SLEEP:  recent improvements  EATING DISORDER: some intermittent binging behavior    RECENT SUBSTANCE USE:     ALCOHOL- none, reports she and wife had had a single bottle of vodka in their house for at least 2 years      TOBACCO- no  CAFFEINE- coffee  OPIOIDS- 1 Percocet tablet 3x daily for chronic pain (as prescribed)        NARCAN KIT- unknown, will discuss at next vist  CANNABIS- none  OTHER ILLICIT DRUGS- none     CURRENT SOCIAL HISTORY:  FINANCIAL SUPPORT- 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  SOCIAL/ SPIRITUAL SUPPORT- wife, family, three dogs (Kerline a border collie aged 5-6 years, Serene a bichon frise aged 3 years, and Sarah lieberman constantin-poo aged 2 years)  FEELS SAFE AT HOME- yes    MEDICAL ROS (2,10):  A comprehensive review of systems was performed and is negative other than noted in the HPI.    PSYCH and CD Critical Summary Points since July 2018 January 2019:  Clinic intake/CD assessment and initiation of mirtazapine at 15 mg at bedtime.  February 2019:  Mirtazapine dose increased to 22.5 mg at bedtime.  March 2019:  Mirtazapine dose increased to 30 mg at bedtime and added propranolol IR 10 mg PRN daily for acute anxiety.    PAST PSYCH MED TRIALS   see EMR Problem List: Hx of psychiatric care    MEDICAL / SURGICAL HISTORY                                   Pregnant or breastfeeding- no      Contraception- Paraguard IUD    Neurologic Hx-  no history of TBI or serizure    Patient Active Problem List   Diagnosis     Intractable chronic migraine without aura and without status migrainosus     Nausea vomiting and diarrhea     Cervicalgia     Chronic bilateral low back pain with bilateral sciatica     Past Surgical History:   Procedure Laterality Date     HC TOOTH EXTRACTION W/FORCEP      local anaesthesia     ALLERGY                                 Seasonal allergies     MEDICATIONS                               Current Outpatient Medications   Medication Sig Dispense Refill     cyclobenzaprine (FLEXERIL) 10 MG tablet Take 10 mg by mouth 3 times daily as needed for muscle spasms       gabapentin (NEURONTIN) 300 MG capsule Take 600 mg by mouth 3 times daily       indomethacin (INDOCIN) 50 MG capsule Take 50 mg by mouth 3 times daily (with meals)       mirtazapine (REMERON) 30 MG tablet Take 1 tablet (30 mg) by mouth At Bedtime 30 tablet 2     ondansetron (ZOFRAN-ODT) 4 MG ODT tab Take 1 tablet (4 mg) by mouth every 6 hours as needed for nausea or vomiting 20 tablet 0     oxyCODONE-acetaminophen (PERCOCET) 5-325 MG tablet Take one po tid prn severe pain. (Patient taking differently: Take 1 tablet by mouth 3 times daily Take one po tid prn severe pain.) 90 tablet 0     promethazine (PHENERGAN) 25 MG tablet Take 25 mg by mouth every 8 hours as needed for nausea associated with migranes       propranolol (INDERAL) 10 MG tablet Take 1 tablet (10 mg) by mouth daily as needed (for anxiety) 30 tablet 2     propranolol ER (INDERAL LA) 60 MG 24 hr capsule Take 1 capsule (60 mg) by mouth At Bedtime 30 capsule 1     sertraline (ZOLOFT) 100 MG tablet Take 2 tablets (200 mg) by mouth daily 60 tablet 2     topiramate (TOPAMAX) 50 MG tablet Take 1 tablet (50 mg) by mouth 2 times daily 180 tablet 3     VITALS                                                                                                                          3, 3   /85   Pulse 98   Wt 96.3 kg (212 lb 3.2 oz)   BMI 36.42 kg/m       MENTAL STATUS EXAM                                                                                           9, 14 cog gs     Alertness: alert  and oriented  Appearance: well groomed  Behavior/Demeanor: cooperative and pleasant, with good eye contact   Speech: normal  Language: good  Psychomotor: normal or unremarkable  Mood: description consistent with euthymia  Affect:  labile and dramatic at times, less so today; was congruent to mood; was congruent to content  Thought Process/Associations: unremarkable  Thought Content:  Reports none;  Denies suicidal ideation, violent ideation and delusions  Perception:  Reports none;  Denies auditory hallucinations and visual hallucinations  Insight: fair  Judgment: good  Cognition: (6) oriented: time, person, and place  attention span: intact  concentration: intact  recent memory: intact  remote memory: intact  fund of knowledge: appropriate  Gait and Station: unremarkable    LABS and DATA     AIMS:  N/A    PHQ9 TODAY = 6  PHQ-9 SCORE 1/10/2019 4/2/2019   PHQ-9 Total Score 23 5       DIAGNOSIS     Major Depressive Disorder, recurrent episode, partial remission to mild  R/o Persistent Depressive Disorder  Panic Disorder  Obsessive-Compulsive Disorder  Social Anxiety Disorder    ASSESSMENT                                                                                                                   m2, h3     TODAY:  Margarita Trevino presents to [facility] Psychiatry for continuing medication management of MDD, Panic and OCD.  Today she relates interim stability of mood, including depression and anxiety.  Also denies SI/SIB thoughts, violent/aggressive ideation, elevated mood states, panic attacks, OCD symptom flare or other hallmarks of psychiatric decompensation or dangerousness.  One item that came up was she is considering pursuing grief counseling due to unprocessed grief related to the passing of her stepfather.  Provided her with positive reinforcement for taking this initiative.  Overall, endorses her self-assessment of the efficacy/tolerability of her present regimen and we have agreed to continue same without adjustment.    SUICIDE RISK ASSESSMENT:  Ingris Trevino denies present SI.  This patient does have notable risk factors for self-harm including previous suicide attempt, severe anxiety, significant pain and on opiates.   However, risk is mitigated by no plan or intent, describes a safety plan, symptom improvement, none to minimal alcohol use, commitment to family and stable housing.  Based on all available evidence she does not appear to be at imminent risk for self-harm therefore does not meet criteria for a 72-hr hold/involuntary hospitalization.  However, based on degree of symptoms, psychotherapy, medication adjustments and close psych FU was recommended which the pt did agree to.    [place] PRESCRIPTION MONITORING PROGRAM [] was not checked today:  will be checked next visit.    PSYCHOTROPIC DRUG INTERACTIONS:    Concurrent use of OXYCODONE and SERTRALINE may result in an increased risk of serotonin syndrome (tachycardia, hyperthermia, myoclonus, mental status changes).    Concurrent use of MIRTAZAPINE and SEROTONERGIC AGENTS may result in increased risk of serotonin syndrome.     Concurrent use of OXYCODONE and CNS DEPRESSANTS may result in increased risk of respiratory and CNS depression.     Concurrent use of MIRTAZAPINE and SEROTONERGIC AGENTS may result in increased risk of serotonin syndrome.    MANAGEMENT:  Monitoring for adverse effects, routine vitals and patient is aware of risks     PLAN                                                                                                                                m2, h3     1) PSYCHOTROPIC MEDICATIONS:  Continue mirtazapine 30 mg at bedtime.  Continue sertraline 200 mg daily.  Continue propranolol LA 60 mg daily.  Continue propranolol IR 10 mg PRN daily for acute anxiety.    2) THERAPY: recommended    3) NEXT DUE:    Labs- no  EKG- PRN  Rating Scales- N/A    4) REFERRALS:  Therapy- patient currently looking into [facility] in [place]    5) RTC: 6-8 weeks    6) CRISIS NUMBERS:   Provided routinely in [facility] [phone] (clinic)    [phone] (after hours)  CRISIS TEXT LINE: Text [id number] from anywhere in [place], anytime, any crisis  24/7;  OR SEE www.crisistextline.org    TREATMENT RISK STATEMENT:  The risks, benefits, alternatives and potential adverse effects have been discussed and are understood by the pt. The pt understands the risks of using street drugs or alcohol. There are no medical contraindications, the pt agrees to treatment with the ability to do so. The pt knows to call the clinic for any problems or to access emergency care if needed.  Medical and substance use concerns are documented above.  Psychotropic drug interaction check was done, including changes made today.     PSYCHIATRY CLINIC INDIVIDUAL PSYCHOTHERAPY NOTE                                                [16]   Start time- N/A        End time- N/A  Date last reviewed - 03/01/19       Date next due - 5/30/19 (or 12 months if Medicare)     Subjective: This supportive psychotherapy session addressed issues related to orientation to therapy, goals of therapy, patient's history, current stressors, life stressors, relationship, work, family of origin and health.  Patient's reaction: Contemplation in the context of mental status appropriate for ambulatory setting.  Progress: fair  Plan: RTC 4-6 weeks  Psychotherapy services during this visit included  myself and the patient.   TREATMENT  PLAN          SYMPTOMS;PROBLEMS   MEASURABLE GOALS;    FUNCTIONAL IMPROVEMENT INTERVENTIONS;    GAINS MADE DISCHARGE CRITERIA   Depression: depressed mood, hypersomnia, feeling hopelesss and excessive crying   reduce depressive symptoms, reduce depressive episodes and find enjoyment at least once a day Supportive and cognitive psychotherapeutic interventions. marked symptom improvement   Anxiety: excessive worry, social anxiety and nervous/overwhelmed   make a plan to manage 2-3 anxiety-provoking situations, reduce feeling overwhelmed/ improve decision making skills and walk away from situations that trigger strong emotions Supportive and cognitive psychotherapeutic interventions. marked  symptom improvement     PROVIDER:  Alcides Shearer, DO    Patient not staffed in clinic.  Note will be reviewed and signed by supervisor Dr. Miller.    Attestation:  I did not see Ingris Trevino directly. I have reviewed the documentation and I agree with the resident's plan of care.   Mateo Miller MD

## 2019-05-15 RX ORDER — PROPRANOLOL HCL 60 MG
60 CAPSULE, EXTENDED RELEASE 24HR ORAL AT BEDTIME
Qty: 30 CAPSULE | Refills: 2 | Status: SHIPPED | OUTPATIENT
Start: 2019-05-15 | End: 2019-08-23

## 2019-05-15 RX ORDER — SERTRALINE HYDROCHLORIDE 100 MG/1
200 TABLET, FILM COATED ORAL DAILY
Qty: 60 TABLET | Refills: 2 | Status: SHIPPED | OUTPATIENT
Start: 2019-05-15 | End: 2019-08-23

## 2019-05-16 ENCOUNTER — THERAPY VISIT (OUTPATIENT)
Dept: PHYSICAL THERAPY | Facility: CLINIC | Age: 24
End: 2019-05-16
Payer: COMMERCIAL

## 2019-05-16 DIAGNOSIS — G89.29 CHRONIC BILATERAL LOW BACK PAIN WITH BILATERAL SCIATICA: ICD-10-CM

## 2019-05-16 DIAGNOSIS — M54.2 CERVICALGIA: ICD-10-CM

## 2019-05-16 DIAGNOSIS — M54.41 CHRONIC BILATERAL LOW BACK PAIN WITH BILATERAL SCIATICA: ICD-10-CM

## 2019-05-16 DIAGNOSIS — M54.42 CHRONIC BILATERAL LOW BACK PAIN WITH BILATERAL SCIATICA: ICD-10-CM

## 2019-05-16 PROCEDURE — 97530 THERAPEUTIC ACTIVITIES: CPT | Mod: GP | Performed by: PHYSICAL THERAPIST

## 2019-05-16 PROCEDURE — 97110 THERAPEUTIC EXERCISES: CPT | Mod: GP | Performed by: PHYSICAL THERAPIST

## 2019-05-16 NOTE — LETTER
[facility] ATHLETIC [facility] PHYSICAL THERAPY  [address] [place] [address]  [phone]    May 17, 2019    Re: Ingris Trevino   :   1995  MRN:  [id number]   REFERRING PHYSICIAN:   Josep Gillis    [facility] ATHLETIC [facility] PHYSICAL [facility]  Date of Initial Evaluation:  19  Visits:  Rxs Used: 2  Reason for Referral:     Cervicalgia  Chronic bilateral low back pain with bilateral sciatica    PROGRESS  REPORT  Progress reporting period is from 2019 to 2019.       SUBJECTIVE  No significant change with the lumbar extension.  Pain goes down both sides and is more at the base of the neck.  Pain is worse in the morning.     Current pain level is 3/10  .     Previous pain level was 3/10(Cerv ranges 2-8/10, Lumbar 5/10, ranges 5-9/10).   Changes in function:  Yes (See Goal flowsheet attached for changes in current functional level)  Adverse reaction to treatment or activity: None    OBJECTIVE  Changes noted in objective findings:  The objective findings below are from DOS 2019    Shoulder: AROM WFL screen (-)     Palpation: mod TTP L suboccipital, hypomobility L C3, C5, R C4.  Tightness through suboccipital region.      Passive traction: no significant change    Cervical/Thoracic Evaluation  AROM:  AROM Cervical:  Flexion:            32  Extension:       27  Rotation:         Left: 29     Right: 42  Side Bend:      Left: 26     Right:  22    Headaches: cervical    ASSESSMENT/PLAN  Updated problem list and treatment plan: Diagnosis 1:  Neck and low back pain    Pain -  hot/cold therapy, US, mechanical traction, manual therapy, self management, education and home program  Decreased ROM/flexibility - manual therapy and therapeutic exercise  Decreased joint mobility - manual therapy and therapeutic exercise  Impaired muscle performance - neuro re-education  Decreased function - therapeutic activities  Re: Ingris Trevino     Impaired  posture - neuro re-education  STG/LTGs have been met or progress has been made towards goals:  Yes (See Goal flow sheet completed today.)  Assessment of Progress: The patient's condition has potential to improve.  Self Management Plans:  Patient has been instructed in a home treatment program.  Patient  has been instructed in self management of symptoms.  I have re-evaluated this patient and find that the nature, scope, duration and intensity of the therapy is appropriate for the medical condition of the patient.  Ingris continues to require the following intervention to meet STG and LTG's:  PT    Recommendations:  This patient would benefit from continued therapy.     Frequency:  1 X week, once daily  Duration:  for 2 months  - Per original POC, 8 visits total    Please refer to the daily flowsheet for treatment today, total treatment time and time spent performing 1:1 timed codes.    Thank you for your referral.    INQUIRIES  Therapist: Kathryn Medeiros DPT  [facility] PHYSICAL THERAPY  [address] [place] [address]  Phone: [phone]  Fax: [phone]

## 2019-05-16 NOTE — PROGRESS NOTES
Subjective:  HPI                    Objective:  System              Cervical/Thoracic Evaluation    AROM:  AROM Cervical:    Flexion:            32  Extension:       27  Rotation:         Left: 29     Right: 42  Side Bend:      Left: 26     Right:  22      Headaches: cervical                                                            General     ROS    Assessment/Plan:    PROGRESS  REPORT    Progress reporting period is from 5/7/2019 to 5/16/2019.       SUBJECTIVE  No significant change with the lumbar extension.  Pain goes down both sides and is more at the base of the neck.  Pain is worse in the morning.     Current pain level is 3/10  .     Previous pain level was 3/10(Cerv ranges 2-8/10, Lumbar 5/10, ranges 5-9/10).   Changes in function:  Yes (See Goal flowsheet attached for changes in current functional level)  Adverse reaction to treatment or activity: None    OBJECTIVE  Changes noted in objective findings:  The objective findings below are from DOS 5/16/2019    Shoulder: AROM WFL screen (-)     Palpation: mod TTP L suboccipital, hypomobility L C3, C5, R C4.  Tightness through suboccipital region.      Passive traction: no significant change              ASSESSMENT/PLAN  Updated problem list and treatment plan: Diagnosis 1:  Neck and low back pain    Pain -  hot/cold therapy, US, mechanical traction, manual therapy, self management, education and home program  Decreased ROM/flexibility - manual therapy and therapeutic exercise  Decreased joint mobility - manual therapy and therapeutic exercise  Impaired muscle performance - neuro re-education  Decreased function - therapeutic activities  Impaired posture - neuro re-education  STG/LTGs have been met or progress has been made towards goals:  Yes (See Goal flow sheet completed today.)  Assessment of Progress: The patient's condition has potential to improve.  Self Management Plans:  Patient has been instructed in a home treatment program.  Patient  has been  instructed in self management of symptoms.  I have re-evaluated this patient and find that the nature, scope, duration and intensity of the therapy is appropriate for the medical condition of the patient.  Ingris continues to require the following intervention to meet STG and LTG's:  PT    Recommendations:  This patient would benefit from continued therapy.     Frequency:  1 X week, once daily  Duration:  for 2 months  - Per original POC, 8 visits total      Please refer to the daily flowsheet for treatment today, total treatment time and time spent performing 1:1 timed codes.

## 2019-05-23 ENCOUNTER — THERAPY VISIT (OUTPATIENT)
Dept: PHYSICAL THERAPY | Facility: CLINIC | Age: 24
End: 2019-05-23
Payer: COMMERCIAL

## 2019-05-23 DIAGNOSIS — M54.41 CHRONIC BILATERAL LOW BACK PAIN WITH BILATERAL SCIATICA: ICD-10-CM

## 2019-05-23 DIAGNOSIS — M54.2 CERVICALGIA: ICD-10-CM

## 2019-05-23 DIAGNOSIS — M54.42 CHRONIC BILATERAL LOW BACK PAIN WITH BILATERAL SCIATICA: ICD-10-CM

## 2019-05-23 DIAGNOSIS — G89.29 CHRONIC BILATERAL LOW BACK PAIN WITH BILATERAL SCIATICA: ICD-10-CM

## 2019-05-23 PROCEDURE — 97140 MANUAL THERAPY 1/> REGIONS: CPT | Mod: GP | Performed by: PHYSICAL THERAPIST

## 2019-05-23 PROCEDURE — 97010 HOT OR COLD PACKS THERAPY: CPT | Mod: GP | Performed by: PHYSICAL THERAPIST

## 2019-05-23 PROCEDURE — 97110 THERAPEUTIC EXERCISES: CPT | Mod: GP | Performed by: PHYSICAL THERAPIST

## 2019-05-23 PROCEDURE — 97112 NEUROMUSCULAR REEDUCATION: CPT | Mod: GP | Performed by: PHYSICAL THERAPIST

## 2019-05-28 ENCOUNTER — THERAPY VISIT (OUTPATIENT)
Dept: PHYSICAL THERAPY | Facility: CLINIC | Age: 24
End: 2019-05-28
Payer: COMMERCIAL

## 2019-05-28 DIAGNOSIS — M54.2 CERVICALGIA: ICD-10-CM

## 2019-05-28 DIAGNOSIS — M54.41 CHRONIC BILATERAL LOW BACK PAIN WITH BILATERAL SCIATICA: ICD-10-CM

## 2019-05-28 DIAGNOSIS — G89.29 CHRONIC BILATERAL LOW BACK PAIN WITH BILATERAL SCIATICA: ICD-10-CM

## 2019-05-28 DIAGNOSIS — M54.42 CHRONIC BILATERAL LOW BACK PAIN WITH BILATERAL SCIATICA: ICD-10-CM

## 2019-05-28 PROCEDURE — 97110 THERAPEUTIC EXERCISES: CPT | Mod: GP | Performed by: PHYSICAL THERAPIST

## 2019-05-28 PROCEDURE — 97140 MANUAL THERAPY 1/> REGIONS: CPT | Mod: GP | Performed by: PHYSICAL THERAPIST

## 2019-05-28 PROCEDURE — 97112 NEUROMUSCULAR REEDUCATION: CPT | Mod: GP | Performed by: PHYSICAL THERAPIST

## 2019-05-28 PROCEDURE — 97010 HOT OR COLD PACKS THERAPY: CPT | Mod: GP | Performed by: PHYSICAL THERAPIST

## 2019-06-06 ENCOUNTER — THERAPY VISIT (OUTPATIENT)
Dept: PHYSICAL THERAPY | Facility: CLINIC | Age: 24
End: 2019-06-06
Payer: COMMERCIAL

## 2019-06-06 DIAGNOSIS — G89.29 CHRONIC BILATERAL LOW BACK PAIN WITH BILATERAL SCIATICA: ICD-10-CM

## 2019-06-06 DIAGNOSIS — M54.42 CHRONIC BILATERAL LOW BACK PAIN WITH BILATERAL SCIATICA: ICD-10-CM

## 2019-06-06 DIAGNOSIS — M54.41 CHRONIC BILATERAL LOW BACK PAIN WITH BILATERAL SCIATICA: ICD-10-CM

## 2019-06-06 DIAGNOSIS — M54.2 CERVICALGIA: ICD-10-CM

## 2019-06-06 PROCEDURE — 97140 MANUAL THERAPY 1/> REGIONS: CPT | Mod: GP | Performed by: PHYSICAL THERAPIST

## 2019-06-06 PROCEDURE — 97110 THERAPEUTIC EXERCISES: CPT | Mod: GP | Performed by: PHYSICAL THERAPIST

## 2019-06-13 ENCOUNTER — THERAPY VISIT (OUTPATIENT)
Dept: PHYSICAL THERAPY | Facility: CLINIC | Age: 24
End: 2019-06-13
Payer: COMMERCIAL

## 2019-06-13 DIAGNOSIS — M54.2 CERVICALGIA: ICD-10-CM

## 2019-06-13 DIAGNOSIS — M54.42 CHRONIC BILATERAL LOW BACK PAIN WITH BILATERAL SCIATICA: ICD-10-CM

## 2019-06-13 DIAGNOSIS — M54.41 CHRONIC BILATERAL LOW BACK PAIN WITH BILATERAL SCIATICA: ICD-10-CM

## 2019-06-13 DIAGNOSIS — G89.29 CHRONIC BILATERAL LOW BACK PAIN WITH BILATERAL SCIATICA: ICD-10-CM

## 2019-06-13 PROCEDURE — 97140 MANUAL THERAPY 1/> REGIONS: CPT | Mod: GP | Performed by: PHYSICAL THERAPIST

## 2019-06-13 PROCEDURE — 97112 NEUROMUSCULAR REEDUCATION: CPT | Mod: GP | Performed by: PHYSICAL THERAPIST

## 2019-06-13 PROCEDURE — 97110 THERAPEUTIC EXERCISES: CPT | Mod: GP | Performed by: PHYSICAL THERAPIST

## 2019-06-20 ENCOUNTER — THERAPY VISIT (OUTPATIENT)
Dept: PHYSICAL THERAPY | Facility: CLINIC | Age: 24
End: 2019-06-20
Payer: COMMERCIAL

## 2019-06-20 DIAGNOSIS — M54.42 CHRONIC BILATERAL LOW BACK PAIN WITH BILATERAL SCIATICA: ICD-10-CM

## 2019-06-20 DIAGNOSIS — M54.2 CERVICALGIA: ICD-10-CM

## 2019-06-20 DIAGNOSIS — G89.29 CHRONIC BILATERAL LOW BACK PAIN WITH BILATERAL SCIATICA: ICD-10-CM

## 2019-06-20 DIAGNOSIS — M54.41 CHRONIC BILATERAL LOW BACK PAIN WITH BILATERAL SCIATICA: ICD-10-CM

## 2019-06-20 PROCEDURE — 97110 THERAPEUTIC EXERCISES: CPT | Mod: GP | Performed by: PHYSICAL THERAPIST

## 2019-06-20 PROCEDURE — 97140 MANUAL THERAPY 1/> REGIONS: CPT | Mod: GP | Performed by: PHYSICAL THERAPIST

## 2019-06-20 PROCEDURE — 97012 MECHANICAL TRACTION THERAPY: CPT | Mod: GP | Performed by: PHYSICAL THERAPIST

## 2019-07-06 DIAGNOSIS — F41.9 ANXIETY: ICD-10-CM

## 2019-07-09 RX ORDER — PROPRANOLOL HYDROCHLORIDE 10 MG/1
10 TABLET ORAL DAILY PRN
Qty: 30 TABLET | Refills: 1 | Status: SHIPPED | OUTPATIENT
Start: 2019-07-09 | End: 2019-08-23

## 2019-07-10 DIAGNOSIS — F41.9 ANXIETY: ICD-10-CM

## 2019-07-10 NOTE — TELEPHONE ENCOUNTER
Medication requested: propranolol (INDERAL) 10 MG tablet  Last refilled: 6/3/19  Qty: 30      Last seen: 5/14/19  RTC: 6 weeks  Cancel: 0  No-show: 0  Next appt: 8/23/19    Refill decision:   Refilled for 30 days per protocol.

## 2019-07-11 RX ORDER — PROPRANOLOL HYDROCHLORIDE 10 MG/1
10 TABLET ORAL DAILY PRN
Qty: 30 TABLET | Refills: 2 | OUTPATIENT
Start: 2019-07-11

## 2019-07-16 ENCOUNTER — OFFICE VISIT (OUTPATIENT)
Dept: NEUROLOGY | Facility: CLINIC | Age: 24
End: 2019-07-16

## 2019-07-16 VITALS — HEART RATE: 104 BPM | SYSTOLIC BLOOD PRESSURE: 141 MMHG | DIASTOLIC BLOOD PRESSURE: 91 MMHG | OXYGEN SATURATION: 97 %

## 2019-07-16 DIAGNOSIS — G43.719 INTRACTABLE CHRONIC MIGRAINE WITHOUT AURA AND WITHOUT STATUS MIGRAINOSUS: ICD-10-CM

## 2019-07-16 RX ORDER — TOPIRAMATE 50 MG/1
50 TABLET, FILM COATED ORAL 2 TIMES DAILY
Qty: 180 TABLET | Refills: 3 | Status: SHIPPED | OUTPATIENT
Start: 2019-07-16 | End: 2019-11-11

## 2019-07-16 RX ORDER — PROMETHAZINE HYDROCHLORIDE 25 MG/1
25 TABLET ORAL EVERY 8 HOURS PRN
Qty: 30 TABLET | Refills: 11 | Status: SHIPPED | OUTPATIENT
Start: 2019-07-16 | End: 2021-04-14

## 2019-07-16 RX ORDER — INDOMETHACIN 50 MG/1
50 CAPSULE ORAL
Qty: 90 CAPSULE | Refills: 11 | Status: SHIPPED | OUTPATIENT
Start: 2019-07-16 | End: 2021-04-14

## 2019-07-16 ASSESSMENT — PAIN SCALES - GENERAL: PAINLEVEL: SEVERE PAIN (6)

## 2019-07-16 NOTE — PROGRESS NOTES
[facility]  Neurology Progress Note    Subjective:    Margarita Trevino is a 24yr old female w/ PMH anxiety, depression, and back pain who comes to Neurology clinic for follow up of chronic headaches suspected to be chronic Migraines w/o aura vs. Paroxysmal hemicrania.   Overall her symptoms have improved with her current regiment (topiramate 50mg BID and indomethecin TID). She currently has only 10-15 headache days per month that last 2hrs. The severity and nature has not changed and remains a 10/10 burning stabbing pain for first 10-30 seconds followed by a 1-3hr dull (1-3/10)ache. Her R side is still affected more than her left but symptoms remain on both sides, non-radiating or spreading. Endorses nausea, blurry vision, and smell sensitivity with episodes. Denies changes in hearing, taste, weakness, numbness or tingling. Of note states her life has recently been a lot less stressful and she attributes much of her symptoms improvement to her medication regiment and stress reduction.  She has had no significant changes in her meds or medical history but was hospitalized in February with viral gastroenteritis. She has also recently started PT for her back pain which is also focusing on her neck and she states she feels she is improving with therapy, however she still takes percocet up to 3 times a day for back pain.    Objective:    Vitals: BP (!) 141/91 (BP Location: Left arm, Patient Position: Sitting, Cuff Size: Adult Large)   Pulse 104   SpO2 97%   General: Cooperative, NAD  Head: Atraumatic  Neck: Normal range of motion  Cardiac: no lower extremity edema  Neurologic:  Mental Status: Fully alert, attentive and oriented. Speech clear and fluent.   Cranial Nerves: Facial movements symmetric.   Motor: No abnormal movements.  Moving all extremities.    Station/Gait: Normal casual gait.     Assessment/Plan:   Margarita Trevino is a 24yr old female w/ PMH  anxiety, depression, and back pain who comes to Neurology clinic for follow up of chronic headaches suspected to be chronic Migraines w/o aura vs. Paroxysmal hemicrania. The nature of her chronic headaches is still undetermined but overall her symptoms have improved drastically. Previously she had 2 episodes a day and now is improved to one episode every 2-3days without any changes in meds but with significant reduction in psychosocial stressors.     Plan:  1. Continue Indomethacin 50mg TID PRN, consider reducing dosage if headaches continue improving, refilled prescription  2. Continue topiramate 50mg BID, refilled prescription  3. Continue Promethazine 25mg PRN, refilled prescription  4. Follow up Neurology clinic 1 yr or sooner if needed    I spent 15 minutes with the patient, over half of which was counseling.    Margarita Corbin MD  Neurology   Pager [phone]

## 2019-07-16 NOTE — LETTER
7/16/2019       RE: Ingris Trevino  [address]  [place] [address]     Dear Colleague,    Thank you for referring your patient, Ingris Trevino, to the [facility] NEUROLOGY at [facility]. Please see a copy of my visit note below.    [facility]    Clinics and Surgery Center  Neurology Progress Note    Subjective:    Margarita Trevino is a 24yr old female w/ PMH anxiety, depression, and back pain who comes to Neurology clinic for follow up of chronic headaches suspected to be chronic Migraines w/o aura vs. Paroxysmal hemicrania.   Overall her symptoms have improved with her current regiment (topiramate 50mg BID and indomethecin TID). She currently has only 10-15 headache days per month that last 2hrs. The severity and nature has not changed and remains a 10/10 burning stabbing pain for first 10-30 seconds followed by a 1-3hr dull (1-3/10)ache. Her R side is still affected more than her left but symptoms remain on both sides, non-radiating or spreading. Endorses nausea, blurry vision, and smell sensitivity with episodes. Denies changes in hearing, taste, weakness, numbness or tingling. Of note states her life has recently been a lot less stressful and she attributes much of her symptoms improvement to her medication regiment and stress reduction.  She has had no significant changes in her meds or medical history but was hospitalized in February with viral gastroenteritis. She has also recently started PT for her back pain which is also focusing on her neck and she states she feels she is improving with therapy, however she still takes percocet up to 3 times a day for back pain.    Objective:    Vitals: BP (!) 141/91 (BP Location: Left arm, Patient Position: Sitting, Cuff Size: Adult Large)   Pulse 104   SpO2 97%   General: Cooperative, NAD  Head: Atraumatic  Neck: Normal range of motion  Cardiac: no lower extremity  edema  Neurologic:  Mental Status: Fully alert, attentive and oriented. Speech clear and fluent.   Cranial Nerves: Facial movements symmetric.   Motor: No abnormal movements.  Moving all extremities.    Station/Gait: Normal casual gait.     Assessment/Plan:   Margarita Trevino is a 24yr old female w/ PMH anxiety, depression, and back pain who comes to Neurology clinic for follow up of chronic headaches suspected to be chronic Migraines w/o aura vs. Paroxysmal hemicrania. The nature of her chronic headaches is still undetermined but overall her symptoms have improved drastically. Previously she had 2 episodes a day and now is improved to one episode every 2-3days without any changes in meds but with significant reduction in psychosocial stressors.     Plan:  1. Continue Indomethacin 50mg TID PRN, consider reducing dosage if headaches continue improving, refilled prescription  2. Continue topiramate 50mg BID, refilled prescription  3. Continue Promethazine 25mg PRN, refilled prescription  4. Follow up Neurology clinic 1 yr or sooner if needed    I spent 15 minutes with the patient, over half of which was counseling.    Margarita Corbin MD  Neurology   Pager [phone]

## 2019-07-16 NOTE — NURSING NOTE
Chief Complaint   Patient presents with     RECHECK     [facility] RETURN - 6 MONTH FOLLOW UP       Dominguez Cast, EMT

## 2019-07-18 ENCOUNTER — THERAPY VISIT (OUTPATIENT)
Dept: PHYSICAL THERAPY | Facility: CLINIC | Age: 24
End: 2019-07-18
Payer: COMMERCIAL

## 2019-07-18 DIAGNOSIS — M54.41 CHRONIC BILATERAL LOW BACK PAIN WITH BILATERAL SCIATICA: ICD-10-CM

## 2019-07-18 DIAGNOSIS — G89.29 CHRONIC BILATERAL LOW BACK PAIN WITH BILATERAL SCIATICA: ICD-10-CM

## 2019-07-18 DIAGNOSIS — M54.2 CERVICALGIA: ICD-10-CM

## 2019-07-18 DIAGNOSIS — M54.42 CHRONIC BILATERAL LOW BACK PAIN WITH BILATERAL SCIATICA: ICD-10-CM

## 2019-07-18 PROCEDURE — 97110 THERAPEUTIC EXERCISES: CPT | Mod: GP | Performed by: PHYSICAL THERAPIST

## 2019-07-18 PROCEDURE — 97112 NEUROMUSCULAR REEDUCATION: CPT | Mod: GP | Performed by: PHYSICAL THERAPIST

## 2019-07-18 NOTE — LETTER
[facility] ATHLETIC [facility] PHYSICAL THERAPY   [address] [address]  [phone]    2019    Re: Ingris Trevino   :   1995  MRN:  [id number]   REFERRING PHYSICIAN:   Josep Gillis    [facility] ATHLETIC [facility] PHYSICAL [facility]    Date of Initial Evaluation:  19  Visits:  Rxs Used: 8  Reason for Referral:     Cervicalgia  Chronic bilateral low back pain with bilateral sciatica    PROGRESS  REPORT  Progress reporting period is from 2019 to 2019.       SUBJECTIVE  I was doing really really well until this week.  I think it is the weather.  We also took a road trip down to [place] this weekend and maybe the 6 horus of siting messed things up.  I am worried about that because [place] a 5 hour drive.  The exercises are going well.  The hips are aching on my side and I cannot sleep on my side.           Previous pain level was  3/10(Cerv ranges 2-8/10, Lumbar 5/10, ranges 5-9/10).   Changes in function:  Yes (See Goal flowsheet attached for changes in current functional level)  Adverse reaction to treatment or activity: None  Oswestry Score: 66 %     OBJECTIVE  Changes noted in objective findings:  The objective findings below are from DOS 2019.  Focused today on continued exercise progression and management for current level of dysfunction.  Continued deficits evident with lower abdominal; strength 3/5.  Encouraged continued extension movement pattern with use of a lumbar roll for prolonged driving in the car.      Measurements from 2019  AROM: lumbar flexion 60%, ext 50% Rot R WFL, L 25% painful, SB WFL B painfree. Cervical flexion 30 deg, ext 22 deg, rotation 55 deg bilaterally; SB R 28 deg, L 26 deg.  More pain to the left with nothing down into the UE.  Supine: slight SB L at rest     ASSESSMENT/PLAN  Updated problem list and treatment plan: Diagnosis 1:  Cervical and lumbar    Pain -  hot/cold therapy, US, manual therapy, self management, education and home program  Decreased ROM/flexibility - manual therapy and therapeutic exercise  Decreased strength - therapeutic exercise and therapeutic activities  Impaired balance - neuro re-education and therapeutic activities  Impaired muscle performance - neuro re-education  Decreased function - therapeutic activities  Impaired posture - neuro re-education  STG/LTGs have been met or progress has been made towards goals:  Yes (See Goal flow sheet completed today.)  Assessment of Progress: The patient's condition has potential to improve.  Self Management Plans:  Patient has been instructed in a home treatment program.  Re: Ingris Trevino    Patient  has been instructed in self management of symptoms.  I have re-evaluated this patient and find that the nature, scope, duration and intensity of the therapy is appropriate for the medical condition of the patient.  Ingris continues to require the following intervention to meet STG and LTG's:  PT    Recommendations:  This patient would benefit from continued therapy.     Frequency:  3 X a month, once daily  Duration:  for 2 months    Please refer to the daily flowsheet for treatment today, total treatment time and time spent performing 1:1 timed codes.    Thank you for your referral.    INQUIRIES  Therapist: Kathryn Medeiros DPT   [facility] PHYSICAL THERAPY  [address] [place] [address]  Phone: [phone]  Fax: [phone]

## 2019-07-19 NOTE — PROGRESS NOTES
Subjective:  HPI  Oswestry Score: 66 %                 Objective:  System    Physical Exam    General     ROS    Assessment/Plan:    PROGRESS  REPORT    Progress reporting period is from 5/16/2019 to 7/18/2019.       SUBJECTIVE  I was doing really really well until this week.  I think it is the weather.  We also took a road trip down to [place] this weekend and maybe the 6 horus of siting messed things up.  I am worried about that because [place] it is a 5 hour drive.  The exercises are going well.  The hips are aching on my side and I cannot sleep on my side.           Previous pain level was  3/10(Cerv ranges 2-8/10, Lumbar 5/10, ranges 5-9/10).   Changes in function:  Yes (See Goal flowsheet attached for changes in current functional level)  Adverse reaction to treatment or activity: None    OBJECTIVE  Changes noted in objective findings:  The objective findings below are from DOS 7/18/2019.  Focused today on continued exercise progression and management for current level of dysfunction.  Continued deficits evident with lower abdominal; strength 3/5.  Encouraged continued extension movement pattern with use of a lumbar roll for prolonged driving in the car.      Measurements from 6/20/2019  AROM: lumbar flexion 60%, ext 50% Rot R WFL, L 25% painful, SB WFL B painfree. Cervical flexion 30 deg, ext 22 deg, rotation 55 deg bilaterally; SB R 28 deg, L 26 deg.  More pain to the left with nothing down into the UE.  Supine: slight SB L at rest     ASSESSMENT/PLAN  Updated problem list and treatment plan: Diagnosis 1:  Cervical and lumbar   Pain -  hot/cold therapy, US, manual therapy, self management, education and home program  Decreased ROM/flexibility - manual therapy and therapeutic exercise  Decreased strength - therapeutic exercise and therapeutic activities  Impaired balance - neuro re-education and therapeutic activities  Impaired muscle performance - neuro re-education  Decreased  function - therapeutic activities  Impaired posture - neuro re-education  STG/LTGs have been met or progress has been made towards goals:  Yes (See Goal flow sheet completed today.)  Assessment of Progress: The patient's condition has potential to improve.  Self Management Plans:  Patient has been instructed in a home treatment program.  Patient  has been instructed in self management of symptoms.  I have re-evaluated this patient and find that the nature, scope, duration and intensity of the therapy is appropriate for the medical condition of the patient.  Ingris continues to require the following intervention to meet STG and LTG's:  PT    Recommendations:  This patient would benefit from continued therapy.     Frequency:  3 X a month, once daily  Duration:  for 2 months        Please refer to the daily flowsheet for treatment today, total treatment time and time spent performing 1:1 timed codes.

## 2019-07-23 ENCOUNTER — THERAPY VISIT (OUTPATIENT)
Dept: PHYSICAL THERAPY | Facility: CLINIC | Age: 24
End: 2019-07-23
Payer: COMMERCIAL

## 2019-07-23 DIAGNOSIS — M54.42 CHRONIC BILATERAL LOW BACK PAIN WITH BILATERAL SCIATICA: ICD-10-CM

## 2019-07-23 DIAGNOSIS — M54.41 CHRONIC BILATERAL LOW BACK PAIN WITH BILATERAL SCIATICA: ICD-10-CM

## 2019-07-23 DIAGNOSIS — G89.29 CHRONIC BILATERAL LOW BACK PAIN WITH BILATERAL SCIATICA: ICD-10-CM

## 2019-07-23 DIAGNOSIS — M54.2 CERVICALGIA: ICD-10-CM

## 2019-07-23 PROCEDURE — 97110 THERAPEUTIC EXERCISES: CPT | Mod: GP | Performed by: PHYSICAL THERAPIST

## 2019-07-23 PROCEDURE — 97112 NEUROMUSCULAR REEDUCATION: CPT | Mod: GP | Performed by: PHYSICAL THERAPIST

## 2019-07-23 PROCEDURE — 97140 MANUAL THERAPY 1/> REGIONS: CPT | Mod: GP | Performed by: PHYSICAL THERAPIST

## 2019-07-24 DIAGNOSIS — F33.2 SEVERE EPISODE OF RECURRENT MAJOR DEPRESSIVE DISORDER, WITHOUT PSYCHOTIC FEATURES (H): ICD-10-CM

## 2019-07-24 NOTE — TELEPHONE ENCOUNTER
Last Written Prescription Date:  04/8/2019  Last Fill Quantity: 30,  # refills: 2   Last office visit: Previous visit found with prescribing provider:  07/16/2019   Future Office Visit: 08/07/2019

## 2019-08-05 RX ORDER — MIRTAZAPINE 30 MG/1
30 TABLET, FILM COATED ORAL AT BEDTIME
Qty: 30 TABLET | Refills: 2 | OUTPATIENT
Start: 2019-08-05

## 2019-08-23 ENCOUNTER — OFFICE VISIT (OUTPATIENT)
Dept: PSYCHIATRY | Facility: CLINIC | Age: 24
End: 2019-08-23
Attending: PSYCHIATRY & NEUROLOGY
Payer: COMMERCIAL

## 2019-08-23 VITALS
BODY MASS INDEX: 36.56 KG/M2 | DIASTOLIC BLOOD PRESSURE: 81 MMHG | SYSTOLIC BLOOD PRESSURE: 118 MMHG | WEIGHT: 213 LBS | HEART RATE: 97 BPM

## 2019-08-23 DIAGNOSIS — F33.2 SEVERE EPISODE OF RECURRENT MAJOR DEPRESSIVE DISORDER, WITHOUT PSYCHOTIC FEATURES (H): ICD-10-CM

## 2019-08-23 DIAGNOSIS — F41.9 ANXIETY: ICD-10-CM

## 2019-08-23 PROCEDURE — G0463 HOSPITAL OUTPT CLINIC VISIT: HCPCS | Mod: ZF

## 2019-08-23 RX ORDER — MIRTAZAPINE 30 MG/1
30 TABLET, FILM COATED ORAL AT BEDTIME
Qty: 30 TABLET | Refills: 2 | Status: SHIPPED | OUTPATIENT
Start: 2019-08-23 | End: 2019-10-24

## 2019-08-23 RX ORDER — SERTRALINE HYDROCHLORIDE 100 MG/1
200 TABLET, FILM COATED ORAL DAILY
Qty: 60 TABLET | Refills: 2 | Status: SHIPPED | OUTPATIENT
Start: 2019-08-23 | End: 2019-10-24

## 2019-08-23 RX ORDER — PROPRANOLOL HYDROCHLORIDE 10 MG/1
10 TABLET ORAL DAILY PRN
Qty: 30 TABLET | Refills: 1 | Status: SHIPPED | OUTPATIENT
Start: 2019-08-23 | End: 2019-10-24

## 2019-08-23 RX ORDER — PROPRANOLOL HCL 60 MG
60 CAPSULE, EXTENDED RELEASE 24HR ORAL AT BEDTIME
Qty: 30 CAPSULE | Refills: 2 | Status: SHIPPED | OUTPATIENT
Start: 2019-08-23 | End: 2019-10-24

## 2019-08-23 ASSESSMENT — PAIN SCALES - GENERAL: PAINLEVEL: SEVERE PAIN (7)

## 2019-08-23 ASSESSMENT — PATIENT HEALTH QUESTIONNAIRE - PHQ9: SUM OF ALL RESPONSES TO PHQ QUESTIONS 1-9: 8

## 2019-08-23 NOTE — PATIENT INSTRUCTIONS
For therapy intake, you can call [facility] at [phone].   Say you are struggling with anxiety and feeling overwhelmed and have benefited from CBT in the past.  No medication changes today.  Please come back in 8 weeks.  Please call with any concerns.      Thank you for coming to the PSYCHIATRY CLINIC.    Lab Testing:  If you had lab testing today and your results are reassuring or normal they will be mailed to you or sent through TalkSession within 7 days.   If the lab tests need quick action we will call you with the results.  The phone number we will call with results is # [phone] (home) [phone] (work). If this is not the best number please call our clinic and change the number.    Medication Refills:  If you need any refills please call your pharmacy and they will contact us. Our fax number for refills is [phone]. Please allow three business for refill processing.   If you need to  your refill at a new pharmacy, please contact the new pharmacy directly. The new pharmacy will help you get your medications transferred.     Scheduling:  If you have any concerns about today's visit or wish to schedule another appointment please call our office during normal business hours [phone] (8-5:00 M-F)    Contact Us:  Please call [phone] during business hours (8-5:00 M-F).  If after clinic hours, or on the weekend, please call  [phone].    Financial Assistance [phone]  [facility] Billing [phone]  Central Billing Office, [facility]: [phone]  [place] Billing [phone]  Medical Records [phone]      MENTAL HEALTH CRISIS NUMBERS:  [facility]:   [facility] - [phone]   Crisis Residence [facility] - [phone]   Walk-In Counseling Center [facility] - [phone]   COPE 24/7 [place] Mobile Team for Adults - [[phone]]; Child - [[phone]]        [facility]:   [facility] - [phone]   Walk-in  counseling [facility] - [phone]   Walk-in counseling [facility] - [phone]   Crisis Residence [facility] - [phone]   Urgent Care Adult Mental Health:   --Drop-in, 24/7 crisis line, and [facility] Mobile Team [[phone]]    CRISIS TEXT LINE: Text [phone] from anywhere, anytime, any crisis 24/7;    OR SEE www.crisistextline.org     Poison Control Center - [phone]    CHILD: [facility] needs assessment team - [phone]     [facility] Lifeline - [phone]; or [employer] [facility] - [phone]    If you have a medical emergency please call 911or go to the nearest ER.                    _____________________________________________    Again thank you for choosing PSYCHIATRY CLINIC and please let us know how we can best partner with you to improve you and your family's health.  You may be receiving a survey in the mail regarding this appointment. We would love to have your feedback, both positive and negative, so please fill out the survey and return it using the provided envelope. The survey is done by an external company, so your answers are anonymous.

## 2019-08-23 NOTE — PROGRESS NOTES
"     [facility]  Psychiatry Clinic  TRANSFER of CARE DIAGNOSTIC ASSESSMENT     Date of initial Diagnostic Assessment (DA) is 1/10/2019 and most recent Transfer of Care DA is 08/23/19.    CARE TEAM:  PCP- Tia Leonardo    Specialties: Neuro, Ob-Gyn, Pain.              Ingris Trevino is a 24 year old female who prefers the name Margarita & pronouns she, her, hers, herself.      Pertinent Background:  Previous diagnoses include MDD, OCD, Panic Disorder, and chronic pain. She first began citalopram for depression at the age of 14 and then later switched to sertraline in college which she reports was helpful for both mood and OCD.  Has also done CBT for OCD to endorsed benefit.  No history of hospitalizations but patient allows at least one suicide attempt via CO poisoning in 2014 (her most recent SA) and chart review suggests as many as 6 other SA's prior to that.  Notably, patient's first contact with this clinic was a diagnostic assessment completed as a one time SACHIN eval on 1/10/19, which had been recommended in the context of establishing care with new providers upon recently returning to live in [place] given her prolonged use of opiates.  From that DA: \"Unclear that chronic opioids appropriate for her chronic non-malignant pain states. That said, with what information we have, no clear indication that she has used non-medically, escalated in use, or any other concerns.\"  Chronic pain condition(s) experienced by the patient include endometriosis, herniated lumbar disk and migraines.  Social history highlights include raised in [place], received BA in Psychology from [employer] in [place], [place], where she met her present wife Jacqui, then lived in [place] from Dec 2015 up to recent return to [place] in Nov 2018.    Psych critical item history includes suicide attempt [multiple] and suicidal ideation.    INTERIM HISTORY      [4, 4]   The patient reports good treatment " "adherence.  History was provided by the patient who was a good historian.    The last visit ended with no change to the med regimen.   Since the last visit:   Margarita reports that things have been \"pretty good,\" though she does experience significant job stress; she works for the [employer]. She reports that after the merger with [employer], [employer] had to \"take over a bunch of people's jobs,\" which has caused tension between the former [employer] employees and the [employer] ones. She feels like she is \"doing 3 jobs\" at work.     She describes her mood as \"overwhelmed\" and reports she feels she has a tremendous amount of emotional labor to do at home. This is particularly around her relationship with her wife, who she does not feel takes enough initiative with helping around the house, making appointments, etc. Her wife also doesn't like her job, and is bored there. It frustrates Margarita when she complains about it because she \"was willing to pay for a graduate degree and professional training, and has always been in a job she loves.\" Margarita wishes her wife would just put up with the boring job for the time being, in order to make money, like she is. She says the conflict with her wife \"might break her,\" but denies any recent SI and is not worried that she is going to develop SI.    In her free time, she has been enjoying container gardening and taking care of her three dogs that are \"annoying and crazy\"--they are a border collie, a Bichon, and a constantin-poo. She likes volunteering, describing herself as \"very social-justice minded.\" She has been involved in [employer] activities and a softball team, but is not currently. She likes to stay at home, read books, and craft--particularly making paper crafts.    She continues to grieve the death of her stepfather, who was hit and killed by a drunk  in 2011. She tried reaching out to [facility] in [place], and hasn't gotten a call back. She reports that " "CBT for OCD was helpful; she was never able to get rid of skin picking around nails and lips.    RECENT PSYCH ROS:   Depression:  depressed mood and low energy  Elevated:  none  Psychosis:  none  Anxiety:  excessive worry  Panic Attack:  SOB, chest tightness and derealization, frequency varies but none in past month  Trauma Related:  none  Dysregulation:  irritable  Eating Disorder: no     Pertinent Negative Symptoms:  NO self-injurious behavior/urges, suicidal and violent ideation, aggression, psychosis, jackie and hypomania    RECENT SUBSTANCE USE:     ALCOHOL- none, reports she and wife had had a single bottle of vodka in their house for at least 2 years      TOBACCO- no  CAFFEINE- coffee  OPIOIDS- 1 Percocet tablet 3x daily for chronic pain (as prescribed)        NARCAN KIT- unknown, will discuss at next vist  CANNABIS- none  OTHER ILLICIT DRUGS- none    CURRENT SOCIAL HISTORY:  FINANCIAL SUPPORT- 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, wife is also working  CHILDREN- none  LIVING SITUATION- with wife in [facility] in [place]  SOCIAL/ SPIRITUAL SUPPORT- wife, family, three dogs (Kerline a border collie aged 5-6 years, Serene a bichon frise aged 3 years, and Sarah lieberman constantin-poo aged 2 years)  FEELS SAFE AT HOME- yes    PSYCHIATRIC HISTORY                                                            SIB- denies  Suicidal Ideation Hx- yes, both active and passive, last active SI was \"spring of 2016\"  Suicide Attempt- #- patient has described at least one SA which was an attempted CO poisoning/asphyxiation in 2014, found by her wife, with none since - however a chart note from a provider in [place] alludes to 6 attempts previous to that     Violence/Aggression Hx- no  Psychosis Hx- no  Psych Hosp- #- no, most recent- N/A   ECT- no     Eating Disorder- yes, binging behavior  Outpatient Programs [[facility], [facility], Eating Disorder etc]- CBT for OCD and anxiety which was \"very helpful\"  Past Psychotropic Med Trials- " see next section    PAST MED TRIALS                                                          Per patient report:  Trazodone 150 mg  Quetiapine 25-50 mg  Hydroxyzine 25 mg  Buspirone 7.5 mg  Citalopram 20 mg  Escitalopram 10 mg  Nortriptyline 10-20 mg  Bupropion 100-150 mg  Fluoxetine 20  Brexpiprazole 0.5-1mg   Zolpidem     SUBSTANCE USE HISTORY                                               Past Use- occasional alcohol use. On chronic opioids for pain, seen by pain specialist. No diagnosis of use disorder  Treatment- #, most recent- none  Medical Consequences- no  HIV/Hepatitis- no  Legal Consequences- no    SOCIAL and FAMILY HISTORY      [1ea, 1ea]       patient reported                  Financial Support- documented above  Living Situation/Family/Relationships- documented above;  Children- documented above  Trauma History (self-report)- denies history of abuse  Legal- denies   Cultural/ Social/ Spiritual Support- wife, pets, family  Early History/Education-  Grew up in [place], [place]. Parents  when she was 7 years old and later got . Has one younger sister. Graduated HS. Attended college in [place], [place] and has BA in psychology.  Family Mental Health History:  Maternal Great-Grandmother had BPAD; Depression in family, both mom and dad, maternal aunt.  MEDICAL / SURGICAL HISTORY          Pregnant or breastfeeding- no      Contraception- Paragard IUD    Patient Active Problem List   Diagnosis     Intractable chronic migraine without aura and without status migrainosus     Nausea vomiting and diarrhea     Cervicalgia     Chronic bilateral low back pain with bilateral sciatica       Major Surgery-   Past Surgical History:   Procedure Laterality Date     HC TOOTH EXTRACTION W/FORCEP      local anaesthesia       MEDICAL REVIEW OF SYSTEMS    [2, 10]     A comprehensive review of systems was performed and is negative other than noted in the HPI.    ALLERGY       Seasonal  "allergies  MEDICATIONS        Current Outpatient Medications   Medication Sig Dispense Refill     cyclobenzaprine (FLEXERIL) 10 MG tablet Take 10 mg by mouth 3 times daily as needed for muscle spasms       gabapentin (NEURONTIN) 300 MG capsule Take 600 mg by mouth 3 times daily       indomethacin (INDOCIN) 50 MG capsule Take 1 capsule (50 mg) by mouth 3 times daily (with meals) 90 capsule 11     mirtazapine (REMERON) 30 MG tablet Take 1 tablet (30 mg) by mouth At Bedtime 30 tablet 2     oxyCODONE-acetaminophen (PERCOCET) 5-325 MG tablet Take one po tid prn severe pain. (Patient taking differently: Take 1 tablet by mouth 3 times daily Take one po tid prn severe pain.) 90 tablet 0     promethazine (PHENERGAN) 25 MG tablet Take 1 tablet (25 mg) by mouth every 8 hours as needed for nausea associated with migranes 30 tablet 11     propranolol (INDERAL) 10 MG tablet Take 1 tablet (10 mg) by mouth daily as needed (for anxiety) 30 tablet 1     propranolol ER (INDERAL LA) 60 MG 24 hr capsule Take 1 capsule (60 mg) by mouth At Bedtime 30 capsule 2     sertraline (ZOLOFT) 100 MG tablet Take 2 tablets (200 mg) by mouth daily 60 tablet 2     topiramate (TOPAMAX) 50 MG tablet Take 1 tablet (50 mg) by mouth 2 times daily 180 tablet 3     VITALS      [3, 3]   /81   Pulse 97   Wt 96.6 kg (213 lb)   BMI 36.56 kg/m     MENTAL STATUS EXAM      [9, 14 cog gs]     Alertness: alert  and oriented  Appearance: well groomed and casually dressed  Behavior/Demeanor: cooperative and pleasant, with good  eye contact   Speech: moderate volume, often with long pauses to think  Language: intact  Psychomotor: normal or unremarkable  Mood: \"OK\"  Affect: restricted and tearful at times was congruent to mood; was congruent to content  Thought Process/Associations: unremarkable and though there are pauses in speech, no evidence of thought blocking  Thought Content:  Reports none;  Denies suicidal ideation, violent ideation and paranoid " ideation  Perception:  Reports none;  Denies auditory hallucinations and visual hallucinations  Insight: good  Judgment: good and adequate for safety  Cognition: (6) does  appear grossly intact; formal cognitive testing was not done  Gait and Station: unremarkable    LABS and DATA     AIMS:  not needed    PHQ9 TODAY = 8  PHQ-9 SCORE 1/10/2019 4/2/2019 8/23/2019   PHQ-9 Total Score 23 5 8       Recent Labs   Lab Test 03/29/19  0647 03/28/19  0626   CR 0.61 0.67   GFRESTIMATED >90 >90     Recent Labs   Lab Test 03/29/19  0647 03/28/19  0626   AST 25 24   ALT 41 42   ALKPHOS 91 108       PSYCHOTROPIC DRUG INTERACTIONS     Concurrent use of OXYCODONE and SERTRALINE may result in an increased risk of serotonin syndrome (tachycardia, hyperthermia, myoclonus, mental status changes).    Concurrent use of MIRTAZAPINE and SEROTONERGIC AGENTS may result in increased risk of serotonin syndrome.     Concurrent use of OXYCODONE and CNS DEPRESSANTS may result in increased risk of respiratory and CNS depression.     Concurrent use of MIRTAZAPINE and SEROTONERGIC AGENTS may result in increased risk of serotonin syndrome.     MANAGEMENT:  Monitoring for adverse effects, routine vitals and patient is aware of risks    RISK STATEMENT for SAFETY     Ingris Trevino did not appear to be an imminent safety risk to self or others.    PSYCHIATRIC DIAGNOSIS     Major Depressive Disorder, recurrent episode, partial remission to mild  R/o Persistent Depressive Disorder  Panic Disorder  Obsessive-Compulsive Disorder  Social Anxiety Disorder     ASSESSMENT      [m2, h3]     TODAY:  Margarita Trevino presents [facility] for continuing medication management of MDD, Panic Disorder and OCD. She relates interim stability of mood, including depression and anxiety.  Also denies SI/SIB thoughts, violent/aggressive ideation, elevated mood states, OCD symptom flare or other hallmarks of psychiatric decompensation or dangerousness. Having  infrequent panic attacks that she manages with propranolol and coping skills. She expressed interest in returning to individual therapy and is thinking about pursuing couples' therapy with her wife as well.  Provided her with the number for [facility] and related that we have an LMFT in clinic.  Overall, endorses her self-assessment of the efficacy/tolerability of her present regimen and we have agreed to continue same without adjustment.     PLAN      [m2, h3]     1) PSYCHOTROPIC MEDICATIONS:  Continue mirtazapine 30 mg at bedtime.  Continue sertraline 200 mg daily.  Continue propranolol LA 60 mg daily.  Continue propranolol IR 10 mg PRN daily for acute anxiety.    2) [place]  was checked today: indicates expected use in accordance with chart review/patient report.    3) THERAPY:    -Provided with [facility] number as patient would like to pursue CBT again for anxiety and feeling overwhelmed.  -Possibly interested in couples' therapy, offered referral to Alicia Miranda in clinic. She will discuss with her wife.    4) NEXT DUE:    Labs- N/A  EKG- PRN  Rating Scales- N/A    5) REFERRALS:    No Referrals needed  Therapy- see above     6) RTC: 8 weeks     7) CRISIS NUMBERS:   Provided routinely in AVS   CRISIS TEXT LINE: Text 261980 from anywhere in [place], anytime, any crisis 24/7;  OR SEE www.crisistextline.org  ONLY if a KIM PT: [facility] [phone] (clinic), [phone] (after hours)     TREATMENT RISK STATEMENT:  The risks, benefits, alternatives and potential adverse effects have been discussed and are understood by the pt. The pt understands the risks of using street drugs or alcohol. There are no medical contraindications, the pt agrees to treatment with the ability to do so. The pt knows to call the clinic for any problems or to access emergency care if needed.  Medical and substance use concerns are documented above.  Psychotropic drug interaction check was done, including  changes made today.    PROVIDER: Esthela Dye MD    Patient not staffed in clinic.  Note will be reviewed and signed by supervisor Dr. Miller.    Attestation:  I did not see Ingris Trevino directly. I have reviewed the documentation and I agree with the resident's plan of care.   Mateo Miller MD, MD

## 2019-09-30 ENCOUNTER — HEALTH MAINTENANCE LETTER (OUTPATIENT)
Age: 24
End: 2019-09-30

## 2019-10-24 ENCOUNTER — TELEPHONE (OUTPATIENT)
Dept: PSYCHIATRY | Facility: CLINIC | Age: 24
End: 2019-10-24

## 2019-10-24 DIAGNOSIS — F33.2 SEVERE EPISODE OF RECURRENT MAJOR DEPRESSIVE DISORDER, WITHOUT PSYCHOTIC FEATURES (H): ICD-10-CM

## 2019-10-24 DIAGNOSIS — F41.9 ANXIETY: ICD-10-CM

## 2019-10-24 RX ORDER — PROPRANOLOL HCL 60 MG
60 CAPSULE, EXTENDED RELEASE 24HR ORAL AT BEDTIME
Qty: 30 CAPSULE | Refills: 0 | Status: SHIPPED | OUTPATIENT
Start: 2019-10-24 | End: 2019-12-19

## 2019-10-24 RX ORDER — MIRTAZAPINE 30 MG/1
30 TABLET, FILM COATED ORAL AT BEDTIME
Qty: 30 TABLET | Refills: 0 | Status: SHIPPED | OUTPATIENT
Start: 2019-10-24 | End: 2019-12-19

## 2019-10-24 RX ORDER — SERTRALINE HYDROCHLORIDE 100 MG/1
200 TABLET, FILM COATED ORAL DAILY
Qty: 60 TABLET | Refills: 0 | Status: SHIPPED | OUTPATIENT
Start: 2019-10-24 | End: 2019-12-19

## 2019-10-24 RX ORDER — PROPRANOLOL HYDROCHLORIDE 10 MG/1
10 TABLET ORAL DAILY PRN
Qty: 30 TABLET | Refills: 0 | Status: SHIPPED | OUTPATIENT
Start: 2019-10-24 | End: 2019-11-14

## 2019-10-24 NOTE — TELEPHONE ENCOUNTER
Health Call Center    Phone Message    May a detailed message be left on voicemail: yes    Reason for Call: Medication Refill Request    Has the patient contacted the pharmacy for the refill? Yes   Name of medication being requested: mirtazapine 30mg, propranolol 10mg, propranolol 60mg, sertraline 100mg  Provider who prescribed the medication: Esthela Dye MD  Pharmacy: [facility] [id number] [facility] - [place], [place] - [address]  Date medication is needed: ASAP    Patient came in on 10/24/19 before 4:45pm because she thought her appointment was at 4:45pm instead of 3:45p. Patient requested refills on her medications and did a check in with Pippa Hagan, JAYLYNCC.    Action Taken: Message routed to:  Other: Nursing pool

## 2019-10-24 NOTE — TELEPHONE ENCOUNTER
Patient came in for her appointment today but thought that it was at 4:45 PM (it was at 3:45 PM).  The  requested that writer check in with patient.  Met with Margarita to follow up on how she has been doing since her last appointment on 8/23/2019.  She reported that she has started going to therapy at [facility].  She has had a couple of sessions and finds this helpful.  She sees her therapist weekly.      She reported that things have been the same since her last visit.  Life continues to be stressful in different areas like work, family, and pets, although not all at the same time.  She has been coping by stuffing her feelings in and moving on.  She said that she does not really have anything in particular that she does to alleviate stress.  She used to have a punching bag that she used for kickboxing, which she did find helpful.  The punching bag did not make it to [place] when they moved from [place].  She reported that she does not have a lot of energy anyway, and is usually very tired when she gets home from work.  In terms of a support system, she has a very supportive wife.      Sleep is the same.  She goes to bed between 8:30 and 9 PM, and falls asleep by about 10:30 PM.  She has been experiencing middle and terminal insomnia.  She sometimes wakes up at 3:30 AM and is unable to go back to sleep.  She has tried zolpidem in the past, which she did find effective for sleep, but she hallucinated on it.  She said that she saw people on her hands/fingers, and she got a pair of scissors and was going to cut her fingers off.      She has been taking sertraline and propranolol for depression and anxiety.  She finds sertraline to be very beneficial, but is unsure about propranolol.  She needs refills of all her medications, and I seent a 30 ds with 0 refills to her pharmacy for mirtazapine, propranol LA, propranolol, and sertraline.  She has been taking indomethacin 50 mg TID for her migraines, and there  was a warning that came up re: increased risk of GI bleed with indomethacin and sertraline.  Discussed this with her, and she said that she does have tarry stools more often than not.  She has been taking indomethacin since 2017, and sertraline since 2014.  Her most recent CBC from March 2019 did not indicate anemia.  Encouraged her to set up a follow up appointment with her neurologist to discuss other medications for her headaches that do not interact with the medications that she is on.  She will contact her neurologist via [employer].      She will follow up with Dr. Dye in 2 months, and she will contact the clinic for a sooner appointment if needed.  There are no risk/safety concerns today.  Routed to Dr. Dye for FYI.

## 2019-10-28 DIAGNOSIS — G43.719 INTRACTABLE CHRONIC MIGRAINE WITHOUT AURA AND WITHOUT STATUS MIGRAINOSUS: ICD-10-CM

## 2019-10-28 NOTE — TELEPHONE ENCOUNTER
Rx Authorization:    Requested Medication/ Dose topiramate (TOPAMAX) 50 MG tablet    Date last refill ordered: 07/16/19    Quantity ordered: 180    # refills: 3    Date of last clinic visit with ordering provider: 07/16/19    Date of next clinic visit with ordering provider: None Scheduled     All pertinent protocol data (lab date/result):     Include pertinent information from patients message:

## 2019-11-11 RX ORDER — TOPIRAMATE 50 MG/1
50 TABLET, FILM COATED ORAL 2 TIMES DAILY
Qty: 180 TABLET | Refills: 3 | Status: SHIPPED | OUTPATIENT
Start: 2019-11-11 | End: 2020-10-27

## 2019-11-14 DIAGNOSIS — F41.9 ANXIETY: ICD-10-CM

## 2019-11-14 RX ORDER — PROPRANOLOL HYDROCHLORIDE 10 MG/1
10 TABLET ORAL DAILY PRN
Qty: 30 TABLET | Refills: 0 | Status: SHIPPED | OUTPATIENT
Start: 2019-11-14 | End: 2019-12-19

## 2019-11-14 NOTE — TELEPHONE ENCOUNTER
Medication requested: propranolol (INDERAL) 10 MG tablet  Last refilled: 10-24-19  Qty: 30    Pharm called      Last seen: 8-23-19  RTC: 8 weeks  Cancel: 1  No-show: 0  Next appt: 12-19-19    Refill decision:   Refill pended and routed to the provider for review/determination due to 1 cancel appt  Next appt. Outside or RTC timeframe

## 2019-12-19 ENCOUNTER — OFFICE VISIT (OUTPATIENT)
Dept: PSYCHIATRY | Facility: CLINIC | Age: 24
End: 2019-12-19
Attending: PSYCHIATRY & NEUROLOGY
Payer: COMMERCIAL

## 2019-12-19 VITALS — HEART RATE: 96 BPM | SYSTOLIC BLOOD PRESSURE: 113 MMHG | DIASTOLIC BLOOD PRESSURE: 68 MMHG

## 2019-12-19 DIAGNOSIS — F33.2 SEVERE EPISODE OF RECURRENT MAJOR DEPRESSIVE DISORDER, WITHOUT PSYCHOTIC FEATURES (H): ICD-10-CM

## 2019-12-19 DIAGNOSIS — F41.9 ANXIETY: ICD-10-CM

## 2019-12-19 PROCEDURE — G0463 HOSPITAL OUTPT CLINIC VISIT: HCPCS | Mod: ZF

## 2019-12-19 RX ORDER — PROPRANOLOL HCL 60 MG
60 CAPSULE, EXTENDED RELEASE 24HR ORAL AT BEDTIME
Qty: 30 CAPSULE | Refills: 0 | Status: SHIPPED | OUTPATIENT
Start: 2019-12-19 | End: 2020-01-16

## 2019-12-19 RX ORDER — SERTRALINE HYDROCHLORIDE 100 MG/1
200 TABLET, FILM COATED ORAL DAILY
Qty: 60 TABLET | Refills: 0 | Status: SHIPPED | OUTPATIENT
Start: 2019-12-19 | End: 2020-01-21

## 2019-12-19 RX ORDER — MIRTAZAPINE 15 MG/1
15 TABLET, FILM COATED ORAL AT BEDTIME
Qty: 30 TABLET | Refills: 0 | Status: SHIPPED | OUTPATIENT
Start: 2019-12-19 | End: 2020-01-16

## 2019-12-19 RX ORDER — PROPRANOLOL HYDROCHLORIDE 10 MG/1
20 TABLET ORAL DAILY PRN
Qty: 30 TABLET | Refills: 0 | Status: SHIPPED | OUTPATIENT
Start: 2019-12-19 | End: 2020-01-16

## 2019-12-19 ASSESSMENT — PAIN SCALES - GENERAL: PAINLEVEL: SEVERE PAIN (6)

## 2019-12-19 NOTE — PROGRESS NOTES
"     [facility]  Psychiatry Clinic  TRANSFER of CARE DIAGNOSTIC ASSESSMENT     Date of initial Diagnostic Assessment (DA) is 1/10/2019 and most recent Transfer of Care DA is 08/23/19.    CARE TEAM:  PCP- Tia Leonardo    Specialties: Neuro, Ob-Gyn, Pain.              Ingris Trevino is a 24 year old female who prefers the name Margarita & pronouns she, her, hers, herself.      Pertinent Background:  Previous diagnoses include MDD, OCD, Panic Disorder, and chronic pain. She first began citalopram for depression at the age of 14 and then later switched to sertraline in college which she reports was helpful for both mood and OCD.  Has also done CBT for OCD to endorsed benefit.  No history of hospitalizations but patient allows at least one suicide attempt via CO poisoning in 2014 (her most recent SA) and chart review suggests as many as 6 other SA's prior to that.  Notably, patient's first contact with this clinic was a diagnostic assessment completed as a one time SACHIN eval on 1/10/19, which had been recommended in the context of establishing care with new providers upon recently returning to live in [place] given her prolonged use of opiates.  From that DA: \"Unclear that chronic opioids appropriate for her chronic non-malignant pain states. That said, with what information we have, no clear indication that she has used non-medically, escalated in use, or any other concerns.\"  Chronic pain condition(s) experienced by the patient include endometriosis, herniated lumbar disk and migraines.  Social history highlights include raised in [place], received BA in Psychology from [employer] in [place], [place], where she met her present wife Jacqui, then lived in [place] from Dec 2015 up to recent return to [place] in Nov 2018.    Psych critical item history includes suicide attempt [multiple] and suicidal ideation.    INTERIM HISTORY      [4, 4]   The patient reports good treatment " "adherence.  History was provided by the patient who was a good historian.    The last visit ended with no change to the med regimen.   Since the last visit:   Margarita reports that things have been \"not great.\" She \"doesn't think she will have a job here coming up,\" due to the [employer] merger.  She is very angry and feels betrayed because she has only been in this position for 10 months, feels that she has been doing 2-3 people's jobs, 60-70 hours per week.  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  She feels a lot of tension in her body at these times, and feels her heart racing.  Over the past few months, she has been having more difficulty falling asleep, sometimes due to anxious thoughts and \"not being able to [turn] my brain off,\" but sometimes even when she is not feeling anxious.  No appetite changes.  She denies any increased feelings of depression or low mood, or any thoughts of suicide or SIB/HI.    She has been meeting with a therapist, she has a calm, mellow energy, she is out on medical leave but Margarita is currently seeing somebody else at the practice. She states that at the start, she \"wanted to work on trauma, but we changed course and are now dealing with the impending doom and gloom.\"  She has found it helpful to be able to meet with someone on a regular basis and process her feelings.     She wonders about potentially having a sensory processing issue rather than OCD, stating that it's \"physically uncomfortable for me when things aren't a certain way.\"  She also describes difficulty with loud or overstimulating environments, and has noticed that she seems to have more of a problem tuning out environmental noise than her coworkers do.  She wonders about assessment and " treatment strategies for sensory processing disorders.    RECENT PSYCH ROS:   Depression:  irritability, depressed mood and low energy; denies SI/SIB, denies HI  Elevated:  none  Psychosis:  none  Anxiety:  excessive worry  Panic Attack:  SOB, chest tightness, palpitations and derealization, frequency varies, up to 2x/week.  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 No medication changes.    PAST MED TRIALS          See last Diagnostic Assessment    MEDICAL / SURGICAL HISTORY          Pregnant or breastfeeding- no      Contraception- Paragard IUD    Patient Active Problem List   Diagnosis     Intractable chronic migraine without aura and without status migrainosus     Nausea vomiting and diarrhea     Cervicalgia     Chronic bilateral low back pain with bilateral sciatica       Major Surgery-   Past Surgical History:   Procedure Laterality Date     HC TOOTH EXTRACTION W/FORCEP      local anaesthesia       MEDICAL REVIEW OF SYSTEMS    [2, 10]     A comprehensive review of systems was performed and is negative other than noted in the  "HPI.    ALLERGY       Seasonal allergies  MEDICATIONS        Current Outpatient Medications   Medication Sig Dispense Refill     cyclobenzaprine (FLEXERIL) 10 MG tablet Take 10 mg by mouth 3 times daily as needed for muscle spasms       gabapentin (NEURONTIN) 300 MG capsule Take 600 mg by mouth 3 times daily       indomethacin (INDOCIN) 50 MG capsule Take 1 capsule (50 mg) by mouth 3 times daily (with meals) 90 capsule 11     mirtazapine (REMERON) 30 MG tablet Take 1 tablet (30 mg) by mouth At Bedtime 30 tablet 0     oxyCODONE-acetaminophen (PERCOCET) 5-325 MG tablet Take one po tid prn severe pain. (Patient taking differently: Take 1 tablet by mouth 3 times daily Take one po tid prn severe pain.) 90 tablet 0     promethazine (PHENERGAN) 25 MG tablet Take 1 tablet (25 mg) by mouth every 8 hours as needed for nausea associated with migranes 30 tablet 11     propranolol (INDERAL) 10 MG tablet Take 1 tablet (10 mg) by mouth daily as needed (for anxiety) 30 tablet 0     propranolol ER (INDERAL LA) 60 MG 24 hr capsule Take 1 capsule (60 mg) by mouth At Bedtime 30 capsule 0     sertraline (ZOLOFT) 100 MG tablet Take 2 tablets (200 mg) by mouth daily 60 tablet 0     topiramate (TOPAMAX) 50 MG tablet Take 1 tablet (50 mg) by mouth 2 times daily 180 tablet 3     VITALS      [3, 3]   /68   Pulse 96    MENTAL STATUS EXAM      [9, 14 cog gs]     Alertness: alert  and oriented  Appearance: well groomed and casually dressed  Behavior/Demeanor: cooperative and pleasant, with good  eye contact   Speech: moderate volume, often with long pauses to think  Language: intact  Psychomotor: normal or unremarkable  Mood: \"angry and betrayed\"  Affect: restricted and appropriate; was congruent to mood; was congruent to content  Thought Process/Associations: unremarkable and though there are pauses in speech, no evidence of thought blocking  Thought Content:  Reports none;  Denies suicidal ideation, violent ideation and paranoid " ideation  Perception:  Reports none;  Denies auditory hallucinations and visual hallucinations  Insight: good  Judgment: good and adequate for safety  Cognition: (6) does  appear grossly intact; formal cognitive testing was not done  Gait and Station: unremarkable    LABS and DATA     AIMS:  not needed    PHQ9 TODAY = 8  PHQ-9 SCORE 1/10/2019 4/2/2019 8/23/2019   PHQ-9 Total Score 23 5 8       Recent Labs   Lab Test 03/29/19  0647 03/28/19  0626   CR 0.61 0.67   GFRESTIMATED >90 >90     Recent Labs   Lab Test 03/29/19  0647 03/28/19  0626   AST 25 24   ALT 41 42   ALKPHOS 91 108       PSYCHOTROPIC DRUG INTERACTIONS     Concurrent use of OXYCODONE and SERTRALINE may result in an increased risk of serotonin syndrome (tachycardia, hyperthermia, myoclonus, mental status changes).    Concurrent use of MIRTAZAPINE and SEROTONERGIC AGENTS may result in increased risk of serotonin syndrome.     Concurrent use of OXYCODONE and CNS DEPRESSANTS may result in increased risk of respiratory and CNS depression.     Concurrent use of MIRTAZAPINE and SEROTONERGIC AGENTS may result in increased risk of serotonin syndrome.     MANAGEMENT:  Monitoring for adverse effects, routine vitals and patient is aware of risks    RISK STATEMENT for SAFETY     Ingris Trevino did not appear to be an imminent safety risk to self or others.    PSYCHIATRIC DIAGNOSIS     Major Depressive Disorder, recurrent episode, partial remission to mild  R/o Persistent Depressive Disorder  Panic Disorder  Obsessive-Compulsive Disorder  Social Anxiety Disorder     ASSESSMENT      [m2, h3]     TODAY:  Margarita Trevino presents to [facility]iat[facility] for continuing medication management of MDD, Panic Disorder and OCD. 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  Associated with this work stressor, she has had  more difficulty falling asleep, without any other neurovegetative symptoms of depression. Denies SI/SIB thoughts, violent/aggressive ideation, elevated mood states, OCD symptom flare or other hallmarks of psychiatric decompensation or dangerousness. Having more frequent panic attacks likely flaring up in the context of work stress, and sometimes propranolol is only partially helpful.  Discussed potential options for addressing initial insomnia, currently given stability of mood and long-term efficacy of sertraline for mood, anxiety, and OCD symptoms, proposed decreasing mirtazapine dose in order to reap the sedating benefits of this medication.  She would like to give this a try.  Discussed potential symptoms of antidepressant withdrawal.  In addition, given recent worsening of anxiety symptoms, and patient's description of partial benefit from either propranolol, discussed an increase in the dose of this medication to 20 mg.  Discussed common side effects of propranolol, including lightheadedness, dizziness, palpitations. Patient agrees to contact me if she experiences these symptoms.     PLAN      [m2, h3]     1) PSYCHOTROPIC MEDICATIONS:  Decrease mirtazapine to 15 mg.  Continue sertraline 200 mg daily.  Continue propranolol LA 60 mg daily.  Increase propranolol IR to 20 mg PRN daily for acute anxiety.    2) [place]  was checked today: indicates expected use in accordance with chart review/patient report.    3) THERAPY:    -Provided with  [facility] as patient would like to pursue CBT again for anxiety and feeling overwhelmed.  -Possibly interested in couples' therapy, offered referral to Alicia Miranda in clinic. She will discuss with her wife.    4) NEXT DUE:    Labs- N/A  EKG- PRN  Rating Scales- N/A    5) REFERRALS:    No Referrals needed  Therapy- see above     6) RTC: 8 weeks     7) CRISIS NUMBERS:   Provided routinely in AVS   CRISIS TEXT LINE: Text [id number] from anywhere in [place], anytime, any  crisis 24/7;  OR SEE www.crisistextline.org  ONLY if a LAURA PT: [facility] [phone] (clinic), [phone] (after hours)     TREATMENT RISK STATEMENT:  The risks, benefits, alternatives and potential adverse effects have been discussed and are understood by the pt. The pt understands the risks of using street drugs or alcohol. There are no medical contraindications, the pt agrees to treatment with the ability to do so. The pt knows to call the clinic for any problems or to access emergency care if needed.  Medical and substance use concerns are documented above.  Psychotropic drug interaction check was done, including changes made today.    PROVIDER: Esthela Dye MD    Patient not staffed in clinic.  Note will be reviewed and signed by supervisor Dr. Miller.    Attestation:  I did not see Ingris FELDER Uriel directly. I have reviewed the documentation and I agree with the resident's plan of care.   Mateo Miller MD

## 2019-12-19 NOTE — PATIENT INSTRUCTIONS
-Decrease mirtazapine to 15 mg at bedtime.  -Maintain sertraline at 200 mg daily.  -Increase propranolol to 20 mg PRN for anxiety.  -Apps for mindfulness and stress reduction:   -Calm is free  -Abhilash Resendez also has an batsheva  -1 month    Thank you for coming to the PSYCHIATRY CLINIC.    Lab Testing:  If you had lab testing today and your results are reassuring or normal they will be mailed to you or sent through [place] within 7 days.   If the lab tests need quick action we will call you with the results.  The phone number we will call with results is # [phone] (home) [phone] (work). If this is not the best number please call our clinic and change the number.    Medication Refills:  If you need any refills please call your pharmacy and they will contact us. Our fax number for refills is [phone]. Please allow three business for refill processing.   If you need to  your refill at a new pharmacy, please contact the new pharmacy directly. The new pharmacy will help you get your medications transferred.     Scheduling:  If you have any concerns about today's visit or wish to schedule another appointment please call our office during normal business hours [phone] (8-5:00 M-F)    Contact Us:  Please call [phone] during business hours (8-5:00 M-F).  If after clinic hours, or on the weekend, please call  [phone].    Financial Assistance [phone]  [facility] Billing [phone]  [facility] Billing [facility], [facility]: [phone]  [facility] Billing [phone]  Medical Records [phone]      MENTAL HEALTH CRISIS NUMBERS:  [facility]:   [facility] - [phone]   Crisis Residence [facility] - [phone]   Walk-In Counseling [facility] - [phone]   COPE 24/7 [place] Mobile Team for Adults - [[phone]]; Child - [[phone]]        [facility]:   [facility] - [phone]   Walk-in counseling [facility]  [phone]   Walk-in counseling [facility] - [phone]   Crisis Residence [facility] - [phone]   Urgent Care Adult Mental Health:   --Drop-in, 24/7 crisis line, [facility] Mobile Team [[phone]]    CRISIS TEXT LINE: Text [phone] from anywhere, anytime, any crisis 24/7;    OR SEE www.crisistextline.org     Poison Control Center - [phone]    CHILD: [facility] needs assessment team - [phone]     [facility] Lif[facility] - [phone]; or [place] Lif[facility] - 1-840.450.3228    If you have a medical emergency please call 911or go to the nearest ER.                    _____________________________________________    Again thank you for choosing PSYCHIATRY CLINIC and please let us know how we can best partner with you to improve you and your family's health.  You may be receiving a survey in the mail regarding this appointment. We would love to have your feedback, both positive and negative, so please fill out the survey and return it using the provided envelope. The survey is done by an external company, so your answers are anonymous.

## 2019-12-27 ASSESSMENT — PATIENT HEALTH QUESTIONNAIRE - PHQ9: SUM OF ALL RESPONSES TO PHQ QUESTIONS 1-9: 14

## 2019-12-28 ENCOUNTER — OFFICE VISIT (OUTPATIENT)
Dept: URGENT CARE | Facility: URGENT CARE | Age: 24
End: 2019-12-28
Payer: COMMERCIAL

## 2019-12-28 VITALS
BODY MASS INDEX: 34.99 KG/M2 | SYSTOLIC BLOOD PRESSURE: 119 MMHG | TEMPERATURE: 98.8 F | HEART RATE: 59 BPM | DIASTOLIC BLOOD PRESSURE: 75 MMHG | OXYGEN SATURATION: 98 % | HEIGHT: 65 IN | WEIGHT: 210 LBS

## 2019-12-28 DIAGNOSIS — R07.0 THROAT PAIN: ICD-10-CM

## 2019-12-28 DIAGNOSIS — J02.9 ACUTE PHARYNGITIS, UNSPECIFIED ETIOLOGY: Primary | ICD-10-CM

## 2019-12-28 LAB
DEPRECATED S PYO AG THROAT QL EIA: NORMAL
SPECIMEN SOURCE: NORMAL

## 2019-12-28 PROCEDURE — 99213 OFFICE O/P EST LOW 20 MIN: CPT | Performed by: PHYSICIAN ASSISTANT

## 2019-12-28 PROCEDURE — 87880 STREP A ASSAY W/OPTIC: CPT | Performed by: PHYSICIAN ASSISTANT

## 2019-12-28 PROCEDURE — 87081 CULTURE SCREEN ONLY: CPT | Performed by: PHYSICIAN ASSISTANT

## 2019-12-28 ASSESSMENT — MIFFLIN-ST. JEOR: SCORE: 1685.49

## 2019-12-28 NOTE — PROGRESS NOTES
SUBJECTIVE:  Ingris Trevino is a 24 year old female with a chief complaint of sore throat.  Onset of symptoms was 3 day(s) ago.    Course of illness: sudden onset and constant.  Severity moderate  Current and Associated symptoms: chills, runny nose, stuffy nose, sore throat, body aches and fatigue  Treatment measures tried include None tried.  Predisposing factors include None.    Past Medical History:   Diagnosis Date     Chronic pain      Degenerative disc disease at L5-S1 level      Current Outpatient Medications   Medication Sig Dispense Refill     cyclobenzaprine (FLEXERIL) 10 MG tablet Take 10 mg by mouth 3 times daily as needed for muscle spasms       gabapentin (NEURONTIN) 300 MG capsule Take 600 mg by mouth 3 times daily       indomethacin (INDOCIN) 50 MG capsule Take 1 capsule (50 mg) by mouth 3 times daily (with meals) 90 capsule 11     mirtazapine (REMERON) 15 MG tablet Take 1 tablet (15 mg) by mouth At Bedtime 30 tablet 0     oxyCODONE-acetaminophen (PERCOCET) 5-325 MG tablet Take one po tid prn severe pain. (Patient taking differently: Take 1 tablet by mouth 3 times daily Take one po tid prn severe pain.) 90 tablet 0     promethazine (PHENERGAN) 25 MG tablet Take 1 tablet (25 mg) by mouth every 8 hours as needed for nausea associated with migranes 30 tablet 11     propranolol (INDERAL) 10 MG tablet Take 2 tablets (20 mg) by mouth daily as needed (for anxiety) 30 tablet 0     propranolol ER (INDERAL LA) 60 MG 24 hr capsule Take 1 capsule (60 mg) by mouth At Bedtime 30 capsule 0     sertraline (ZOLOFT) 100 MG tablet Take 2 tablets (200 mg) by mouth daily 60 tablet 0     topiramate (TOPAMAX) 50 MG tablet Take 1 tablet (50 mg) by mouth 2 times daily 180 tablet 3     Social History     Tobacco Use     Smoking status: Never Smoker     Smokeless tobacco: Never Used   Substance Use Topics     Alcohol use: Yes     Frequency: 2-4 times a month       ROS:  CONSTITUTIONAL:chills and fatigue  EYES: NEGATIVE  "for vision changes or irritation  ENT/MOUTH: sore throat  RESP:NEGATIVE for significant cough or SOB    OBJECTIVE:   /75   Pulse 59   Temp 98.8  F (37.1  C) (Oral)   Ht 1.638 m (5' 4.5\")   Wt 95.3 kg (210 lb)   SpO2 98%   BMI 35.49 kg/m    GENERAL APPEARANCE: healthy, alert and no distress  EYES: EOMI,  PERRL, conjunctiva clear  HENT: ear canals and TM's normal.  Nose normal.  Pharynx erythematous with some exudate noted.  NECK: supple, non-tender to palpation, no adenopathy noted  RESP: lungs clear to auscultation - no rales, rhonchi or wheezes  CV: regular rates and rhythm, normal S1 S2, no murmur noted  SKIN: no suspicious lesions or rashes    Rapid Strep test is negative; await throat culture results.    ASSESSMENT:    1. Acute pharyngitis, unspecified etiology      2. Throat pain    - Rapid strep screen  - Beta strep group A culture    PLAN:   See orders in epic.   Symptomatic treat with gargles, lozenges, and OTC analgesic as needed. Follow-up with primary clinic if not improving over the next week.      "

## 2019-12-29 LAB
BACTERIA SPEC CULT: NORMAL
SPECIMEN SOURCE: NORMAL

## 2020-01-09 ENCOUNTER — ANESTHESIA EVENT (OUTPATIENT)
Dept: SURGERY | Facility: CLINIC | Age: 25
End: 2020-01-09
Payer: COMMERCIAL

## 2020-01-09 ENCOUNTER — ANESTHESIA (OUTPATIENT)
Dept: SURGERY | Facility: CLINIC | Age: 25
End: 2020-01-09
Payer: COMMERCIAL

## 2020-01-09 ENCOUNTER — APPOINTMENT (OUTPATIENT)
Dept: GENERAL RADIOLOGY | Facility: CLINIC | Age: 25
End: 2020-01-09
Attending: UROLOGY
Payer: COMMERCIAL

## 2020-01-09 ENCOUNTER — HOSPITAL ENCOUNTER (OUTPATIENT)
Facility: CLINIC | Age: 25
Setting detail: OBSERVATION
Discharge: HOME | End: 2020-01-10
Attending: EMERGENCY MEDICINE | Admitting: UROLOGY
Payer: COMMERCIAL

## 2020-01-09 ENCOUNTER — APPOINTMENT (OUTPATIENT)
Dept: CT IMAGING | Facility: CLINIC | Age: 25
End: 2020-01-09
Attending: EMERGENCY MEDICINE
Payer: COMMERCIAL

## 2020-01-09 DIAGNOSIS — N20.1 URETEROLITHIASIS: ICD-10-CM

## 2020-01-09 DIAGNOSIS — N20.1 RIGHT URETERAL STONE: Primary | ICD-10-CM

## 2020-01-09 LAB
ALBUMIN SERPL-MCNC: 3.9 G/DL (ref 3.4–5)
ALBUMIN UR-MCNC: 10 MG/DL
ALP SERPL-CCNC: 114 U/L (ref 40–150)
ALT SERPL W P-5'-P-CCNC: 33 U/L (ref 0–50)
ANION GAP SERPL CALCULATED.3IONS-SCNC: 5 MMOL/L (ref 3–14)
APPEARANCE UR: CLEAR
AST SERPL W P-5'-P-CCNC: 16 U/L (ref 0–45)
BACTERIA #/AREA URNS HPF: ABNORMAL /HPF
BASOPHILS # BLD AUTO: 0.1 10E9/L (ref 0–0.2)
BASOPHILS NFR BLD AUTO: 0.4 %
BILIRUB SERPL-MCNC: 0.3 MG/DL (ref 0.2–1.3)
BILIRUB UR QL STRIP: NEGATIVE
BUN SERPL-MCNC: 13 MG/DL (ref 7–30)
CALCIUM SERPL-MCNC: 9.2 MG/DL (ref 8.5–10.1)
CHLORIDE SERPL-SCNC: 112 MMOL/L (ref 94–109)
CO2 SERPL-SCNC: 21 MMOL/L (ref 20–32)
COLOR UR AUTO: YELLOW
CREAT SERPL-MCNC: 0.81 MG/DL (ref 0.52–1.04)
DIFFERENTIAL METHOD BLD: ABNORMAL
EOSINOPHIL # BLD AUTO: 0.2 10E9/L (ref 0–0.7)
EOSINOPHIL NFR BLD AUTO: 1.2 %
ERYTHROCYTE [DISTWIDTH] IN BLOOD BY AUTOMATED COUNT: 12.9 % (ref 10–15)
GFR SERPL CREATININE-BSD FRML MDRD: >90 ML/MIN/{1.73_M2}
GLUCOSE BLDC GLUCOMTR-MCNC: 104 MG/DL (ref 70–99)
GLUCOSE SERPL-MCNC: 110 MG/DL (ref 70–99)
GLUCOSE UR STRIP-MCNC: NEGATIVE MG/DL
HCG SERPL QL: NEGATIVE
HCT VFR BLD AUTO: 42.8 % (ref 35–47)
HGB BLD-MCNC: 13.9 G/DL (ref 11.7–15.7)
HGB UR QL STRIP: NEGATIVE
IMM GRANULOCYTES # BLD: 0.1 10E9/L (ref 0–0.4)
IMM GRANULOCYTES NFR BLD: 0.4 %
KETONES UR STRIP-MCNC: NEGATIVE MG/DL
LEUKOCYTE ESTERASE UR QL STRIP: NEGATIVE
LIPASE SERPL-CCNC: 129 U/L (ref 73–393)
LYMPHOCYTES # BLD AUTO: 2.4 10E9/L (ref 0.8–5.3)
LYMPHOCYTES NFR BLD AUTO: 18.8 %
MCH RBC QN AUTO: 29.3 PG (ref 26.5–33)
MCHC RBC AUTO-ENTMCNC: 32.5 G/DL (ref 31.5–36.5)
MCV RBC AUTO: 90 FL (ref 78–100)
MONOCYTES # BLD AUTO: 0.6 10E9/L (ref 0–1.3)
MONOCYTES NFR BLD AUTO: 4.8 %
MUCOUS THREADS #/AREA URNS LPF: PRESENT /LPF
NEUTROPHILS # BLD AUTO: 9.6 10E9/L (ref 1.6–8.3)
NEUTROPHILS NFR BLD AUTO: 74.4 %
NITRATE UR QL: NEGATIVE
NRBC # BLD AUTO: 0 10*3/UL
NRBC BLD AUTO-RTO: 0 /100
PH UR STRIP: 8.5 PH (ref 5–7)
PLATELET # BLD AUTO: 269 10E9/L (ref 150–450)
POTASSIUM SERPL-SCNC: 4 MMOL/L (ref 3.4–5.3)
PROT SERPL-MCNC: 8 G/DL (ref 6.8–8.8)
RBC # BLD AUTO: 4.75 10E12/L (ref 3.8–5.2)
RBC #/AREA URNS AUTO: <1 /HPF (ref 0–2)
SODIUM SERPL-SCNC: 139 MMOL/L (ref 133–144)
SOURCE: ABNORMAL
SP GR UR STRIP: 1.03 (ref 1–1.03)
SQUAMOUS #/AREA URNS AUTO: 2 /HPF (ref 0–1)
UROBILINOGEN UR STRIP-MCNC: NORMAL MG/DL (ref 0–2)
WBC # BLD AUTO: 12.9 10E9/L (ref 4–11)
WBC #/AREA URNS AUTO: <1 /HPF (ref 0–5)

## 2020-01-09 PROCEDURE — 96375 TX/PRO/DX INJ NEW DRUG ADDON: CPT | Performed by: EMERGENCY MEDICINE

## 2020-01-09 PROCEDURE — 25800030 ZZH RX IP 258 OP 636: Performed by: ANESTHESIOLOGY

## 2020-01-09 PROCEDURE — 25000128 H RX IP 250 OP 636: Performed by: EMERGENCY MEDICINE

## 2020-01-09 PROCEDURE — C2617 STENT, NON-COR, TEM W/O DEL: HCPCS | Performed by: UROLOGY

## 2020-01-09 PROCEDURE — 84703 CHORIONIC GONADOTROPIN ASSAY: CPT | Performed by: EMERGENCY MEDICINE

## 2020-01-09 PROCEDURE — 25800030 ZZH RX IP 258 OP 636: Performed by: EMERGENCY MEDICINE

## 2020-01-09 PROCEDURE — C1769 GUIDE WIRE: HCPCS | Performed by: UROLOGY

## 2020-01-09 PROCEDURE — 25000566 ZZH SEVOFLURANE, EA 15 MIN: Performed by: UROLOGY

## 2020-01-09 PROCEDURE — G0378 HOSPITAL OBSERVATION PER HR: HCPCS

## 2020-01-09 PROCEDURE — 36000053 ZZH SURGERY LEVEL 2 EA 15 ADDTL MIN - UMMC: Performed by: UROLOGY

## 2020-01-09 PROCEDURE — 99285 EMERGENCY DEPT VISIT HI MDM: CPT | Mod: 25 | Performed by: EMERGENCY MEDICINE

## 2020-01-09 PROCEDURE — 37000008 ZZH ANESTHESIA TECHNICAL FEE, 1ST 30 MIN: Performed by: UROLOGY

## 2020-01-09 PROCEDURE — 37000009 ZZH ANESTHESIA TECHNICAL FEE, EACH ADDTL 15 MIN: Performed by: UROLOGY

## 2020-01-09 PROCEDURE — 25000128 H RX IP 250 OP 636: Performed by: PHYSICIAN ASSISTANT

## 2020-01-09 PROCEDURE — 27210794 ZZH OR GENERAL SUPPLY STERILE: Performed by: UROLOGY

## 2020-01-09 PROCEDURE — 80053 COMPREHEN METABOLIC PANEL: CPT | Performed by: EMERGENCY MEDICINE

## 2020-01-09 PROCEDURE — 25000128 H RX IP 250 OP 636: Performed by: STUDENT IN AN ORGANIZED HEALTH CARE EDUCATION/TRAINING PROGRAM

## 2020-01-09 PROCEDURE — 99285 EMERGENCY DEPT VISIT HI MDM: CPT | Mod: Z6 | Performed by: EMERGENCY MEDICINE

## 2020-01-09 PROCEDURE — 25500064 ZZH RX 255 OP 636: Performed by: UROLOGY

## 2020-01-09 PROCEDURE — 74177 CT ABD & PELVIS W/CONTRAST: CPT

## 2020-01-09 PROCEDURE — 71000014 ZZH RECOVERY PHASE 1 LEVEL 2 FIRST HR: Performed by: UROLOGY

## 2020-01-09 PROCEDURE — 40000170 ZZH STATISTIC PRE-PROCEDURE ASSESSMENT II: Performed by: UROLOGY

## 2020-01-09 PROCEDURE — 25800030 ZZH RX IP 258 OP 636: Performed by: STUDENT IN AN ORGANIZED HEALTH CARE EDUCATION/TRAINING PROGRAM

## 2020-01-09 PROCEDURE — 00000146 ZZHCL STATISTIC GLUCOSE BY METER IP

## 2020-01-09 PROCEDURE — 96374 THER/PROPH/DIAG INJ IV PUSH: CPT | Mod: 59 | Performed by: EMERGENCY MEDICINE

## 2020-01-09 PROCEDURE — 85025 COMPLETE CBC W/AUTO DIFF WBC: CPT | Performed by: EMERGENCY MEDICINE

## 2020-01-09 PROCEDURE — 25000132 ZZH RX MED GY IP 250 OP 250 PS 637: Performed by: ANESTHESIOLOGY

## 2020-01-09 PROCEDURE — 83690 ASSAY OF LIPASE: CPT | Performed by: EMERGENCY MEDICINE

## 2020-01-09 PROCEDURE — 25000132 ZZH RX MED GY IP 250 OP 250 PS 637: Performed by: PHYSICIAN ASSISTANT

## 2020-01-09 PROCEDURE — 36000055 ZZH SURGERY LEVEL 2 W FLUORO 1ST 30 MIN - UMMC: Performed by: UROLOGY

## 2020-01-09 PROCEDURE — 25000128 H RX IP 250 OP 636: Performed by: UROLOGY

## 2020-01-09 PROCEDURE — 96376 TX/PRO/DX INJ SAME DRUG ADON: CPT | Performed by: EMERGENCY MEDICINE

## 2020-01-09 PROCEDURE — 25000125 ZZHC RX 250: Performed by: STUDENT IN AN ORGANIZED HEALTH CARE EDUCATION/TRAINING PROGRAM

## 2020-01-09 PROCEDURE — 40000277 XR SURGERY CARM FLUORO LESS THAN 5 MIN W STILLS: Mod: TC

## 2020-01-09 PROCEDURE — 81001 URINALYSIS AUTO W/SCOPE: CPT | Performed by: EMERGENCY MEDICINE

## 2020-01-09 PROCEDURE — 96361 HYDRATE IV INFUSION ADD-ON: CPT | Mod: 59 | Performed by: EMERGENCY MEDICINE

## 2020-01-09 DEVICE — STENT URETERAL PERCUFLEX PLUS 6FRX24CM M0061752620: Type: IMPLANTABLE DEVICE | Site: URETER | Status: FUNCTIONAL

## 2020-01-09 RX ORDER — LABETALOL 20 MG/4 ML (5 MG/ML) INTRAVENOUS SYRINGE
10
Status: DISCONTINUED | OUTPATIENT
Start: 2020-01-09 | End: 2020-01-09 | Stop reason: HOSPADM

## 2020-01-09 RX ORDER — HYDRALAZINE HYDROCHLORIDE 20 MG/ML
2.5-5 INJECTION INTRAMUSCULAR; INTRAVENOUS EVERY 10 MIN PRN
Status: DISCONTINUED | OUTPATIENT
Start: 2020-01-09 | End: 2020-01-09 | Stop reason: HOSPADM

## 2020-01-09 RX ORDER — ACETAMINOPHEN 650 MG/1
650 SUPPOSITORY RECTAL EVERY 4 HOURS PRN
Status: DISCONTINUED | OUTPATIENT
Start: 2020-01-09 | End: 2020-01-10 | Stop reason: HOSPADM

## 2020-01-09 RX ORDER — DIMENHYDRINATE 50 MG/ML
25 INJECTION, SOLUTION INTRAMUSCULAR; INTRAVENOUS
Status: DISCONTINUED | OUTPATIENT
Start: 2020-01-09 | End: 2020-01-09 | Stop reason: HOSPADM

## 2020-01-09 RX ORDER — FENTANYL CITRATE 50 UG/ML
25-50 INJECTION, SOLUTION INTRAMUSCULAR; INTRAVENOUS
Status: CANCELLED | OUTPATIENT
Start: 2020-01-09

## 2020-01-09 RX ORDER — NALOXONE HYDROCHLORIDE 0.4 MG/ML
.1-.4 INJECTION, SOLUTION INTRAMUSCULAR; INTRAVENOUS; SUBCUTANEOUS
Status: DISCONTINUED | OUTPATIENT
Start: 2020-01-09 | End: 2020-01-09 | Stop reason: HOSPADM

## 2020-01-09 RX ORDER — METOPROLOL TARTRATE 1 MG/ML
1-2 INJECTION, SOLUTION INTRAVENOUS EVERY 5 MIN PRN
Status: DISCONTINUED | OUTPATIENT
Start: 2020-01-09 | End: 2020-01-09 | Stop reason: HOSPADM

## 2020-01-09 RX ORDER — ONDANSETRON 4 MG/1
4 TABLET, ORALLY DISINTEGRATING ORAL EVERY 30 MIN PRN
Status: DISCONTINUED | OUTPATIENT
Start: 2020-01-09 | End: 2020-01-09 | Stop reason: HOSPADM

## 2020-01-09 RX ORDER — ACETAMINOPHEN 325 MG/1
975 TABLET ORAL
Status: COMPLETED | OUTPATIENT
Start: 2020-01-09 | End: 2020-01-09

## 2020-01-09 RX ORDER — SODIUM CHLORIDE 9 MG/ML
1000 INJECTION, SOLUTION INTRAVENOUS CONTINUOUS
Status: DISCONTINUED | OUTPATIENT
Start: 2020-01-09 | End: 2020-01-10 | Stop reason: HOSPADM

## 2020-01-09 RX ORDER — ONDANSETRON 4 MG/1
4 TABLET, ORALLY DISINTEGRATING ORAL EVERY 6 HOURS PRN
Status: DISCONTINUED | OUTPATIENT
Start: 2020-01-09 | End: 2020-01-10 | Stop reason: HOSPADM

## 2020-01-09 RX ORDER — ONDANSETRON 4 MG/1
4 TABLET, ORALLY DISINTEGRATING ORAL EVERY 30 MIN PRN
Status: CANCELLED | OUTPATIENT
Start: 2020-01-09

## 2020-01-09 RX ORDER — ONDANSETRON 2 MG/ML
4 INJECTION INTRAMUSCULAR; INTRAVENOUS EVERY 6 HOURS PRN
Status: DISCONTINUED | OUTPATIENT
Start: 2020-01-09 | End: 2020-01-10 | Stop reason: HOSPADM

## 2020-01-09 RX ORDER — ONDANSETRON 2 MG/ML
4 INJECTION INTRAMUSCULAR; INTRAVENOUS EVERY 30 MIN PRN
Status: DISCONTINUED | OUTPATIENT
Start: 2020-01-09 | End: 2020-01-09

## 2020-01-09 RX ORDER — TOPIRAMATE 50 MG/1
50 TABLET, FILM COATED ORAL 2 TIMES DAILY
Status: DISCONTINUED | OUTPATIENT
Start: 2020-01-09 | End: 2020-01-10 | Stop reason: HOSPADM

## 2020-01-09 RX ORDER — HYDROMORPHONE HYDROCHLORIDE 1 MG/ML
.3-.5 INJECTION, SOLUTION INTRAMUSCULAR; INTRAVENOUS; SUBCUTANEOUS
Status: DISCONTINUED | OUTPATIENT
Start: 2020-01-09 | End: 2020-01-10 | Stop reason: HOSPADM

## 2020-01-09 RX ORDER — INDOMETHACIN 50 MG/1
50 CAPSULE ORAL
Status: DISCONTINUED | OUTPATIENT
Start: 2020-01-09 | End: 2020-01-10 | Stop reason: HOSPADM

## 2020-01-09 RX ORDER — PROPRANOLOL HCL 60 MG
60 CAPSULE, EXTENDED RELEASE 24HR ORAL AT BEDTIME
Status: DISCONTINUED | OUTPATIENT
Start: 2020-01-09 | End: 2020-01-10 | Stop reason: HOSPADM

## 2020-01-09 RX ORDER — IOPAMIDOL 755 MG/ML
123 INJECTION, SOLUTION INTRAVASCULAR ONCE
Status: COMPLETED | OUTPATIENT
Start: 2020-01-09 | End: 2020-01-09

## 2020-01-09 RX ORDER — ACETAMINOPHEN 325 MG/1
650 TABLET ORAL EVERY 4 HOURS PRN
Status: DISCONTINUED | OUTPATIENT
Start: 2020-01-09 | End: 2020-01-10 | Stop reason: HOSPADM

## 2020-01-09 RX ORDER — NALOXONE HYDROCHLORIDE 0.4 MG/ML
.1-.4 INJECTION, SOLUTION INTRAMUSCULAR; INTRAVENOUS; SUBCUTANEOUS
Status: DISCONTINUED | OUTPATIENT
Start: 2020-01-09 | End: 2020-01-10 | Stop reason: HOSPADM

## 2020-01-09 RX ORDER — GABAPENTIN 300 MG/1
300 CAPSULE ORAL
Status: COMPLETED | OUTPATIENT
Start: 2020-01-09 | End: 2020-01-09

## 2020-01-09 RX ORDER — FENTANYL CITRATE 50 UG/ML
INJECTION, SOLUTION INTRAMUSCULAR; INTRAVENOUS PRN
Status: DISCONTINUED | OUTPATIENT
Start: 2020-01-09 | End: 2020-01-09

## 2020-01-09 RX ORDER — MORPHINE SULFATE 4 MG/ML
4 INJECTION, SOLUTION INTRAMUSCULAR; INTRAVENOUS
Status: DISCONTINUED | OUTPATIENT
Start: 2020-01-09 | End: 2020-01-09

## 2020-01-09 RX ORDER — ONDANSETRON 2 MG/ML
4 INJECTION INTRAMUSCULAR; INTRAVENOUS EVERY 30 MIN PRN
Status: CANCELLED | OUTPATIENT
Start: 2020-01-09

## 2020-01-09 RX ORDER — KETOROLAC TROMETHAMINE 30 MG/ML
15 INJECTION, SOLUTION INTRAMUSCULAR; INTRAVENOUS EVERY 6 HOURS PRN
Status: DISCONTINUED | OUTPATIENT
Start: 2020-01-09 | End: 2020-01-09

## 2020-01-09 RX ORDER — SODIUM CHLORIDE 9 MG/ML
INJECTION, SOLUTION INTRAVENOUS CONTINUOUS
Status: DISCONTINUED | OUTPATIENT
Start: 2020-01-09 | End: 2020-01-10 | Stop reason: HOSPADM

## 2020-01-09 RX ORDER — HYDROMORPHONE HYDROCHLORIDE 1 MG/ML
.3-.5 INJECTION, SOLUTION INTRAMUSCULAR; INTRAVENOUS; SUBCUTANEOUS EVERY 10 MIN PRN
Status: CANCELLED | OUTPATIENT
Start: 2020-01-09

## 2020-01-09 RX ORDER — DEXAMETHASONE SODIUM PHOSPHATE 4 MG/ML
INJECTION, SOLUTION INTRA-ARTICULAR; INTRALESIONAL; INTRAMUSCULAR; INTRAVENOUS; SOFT TISSUE PRN
Status: DISCONTINUED | OUTPATIENT
Start: 2020-01-09 | End: 2020-01-09

## 2020-01-09 RX ORDER — CEFAZOLIN SODIUM 2 G/100ML
2 INJECTION, SOLUTION INTRAVENOUS
Status: COMPLETED | OUTPATIENT
Start: 2020-01-09 | End: 2020-01-09

## 2020-01-09 RX ORDER — SODIUM CHLORIDE, SODIUM LACTATE, POTASSIUM CHLORIDE, CALCIUM CHLORIDE 600; 310; 30; 20 MG/100ML; MG/100ML; MG/100ML; MG/100ML
INJECTION, SOLUTION INTRAVENOUS CONTINUOUS
Status: CANCELLED | OUTPATIENT
Start: 2020-01-09

## 2020-01-09 RX ORDER — GABAPENTIN 300 MG/1
600 CAPSULE ORAL 3 TIMES DAILY
Status: DISCONTINUED | OUTPATIENT
Start: 2020-01-09 | End: 2020-01-10 | Stop reason: HOSPADM

## 2020-01-09 RX ORDER — CYCLOBENZAPRINE HCL 5 MG
10 TABLET ORAL 3 TIMES DAILY PRN
Status: DISCONTINUED | OUTPATIENT
Start: 2020-01-09 | End: 2020-01-10 | Stop reason: HOSPADM

## 2020-01-09 RX ORDER — PROMETHAZINE HYDROCHLORIDE 25 MG/1
25 TABLET ORAL EVERY 8 HOURS PRN
Status: DISCONTINUED | OUTPATIENT
Start: 2020-01-09 | End: 2020-01-10 | Stop reason: HOSPADM

## 2020-01-09 RX ORDER — KETOROLAC TROMETHAMINE 15 MG/ML
15 INJECTION, SOLUTION INTRAMUSCULAR; INTRAVENOUS ONCE
Status: COMPLETED | OUTPATIENT
Start: 2020-01-09 | End: 2020-01-09

## 2020-01-09 RX ORDER — HYDROMORPHONE HYDROCHLORIDE 1 MG/ML
.3-.5 INJECTION, SOLUTION INTRAMUSCULAR; INTRAVENOUS; SUBCUTANEOUS EVERY 5 MIN PRN
Status: DISCONTINUED | OUTPATIENT
Start: 2020-01-09 | End: 2020-01-09 | Stop reason: HOSPADM

## 2020-01-09 RX ORDER — MIRTAZAPINE 15 MG/1
15 TABLET, FILM COATED ORAL AT BEDTIME
Status: DISCONTINUED | OUTPATIENT
Start: 2020-01-09 | End: 2020-01-10 | Stop reason: HOSPADM

## 2020-01-09 RX ORDER — CEFAZOLIN SODIUM 1 G/3ML
1 INJECTION, POWDER, FOR SOLUTION INTRAMUSCULAR; INTRAVENOUS SEE ADMIN INSTRUCTIONS
Status: DISCONTINUED | OUTPATIENT
Start: 2020-01-09 | End: 2020-01-09 | Stop reason: HOSPADM

## 2020-01-09 RX ORDER — SODIUM CHLORIDE, SODIUM LACTATE, POTASSIUM CHLORIDE, CALCIUM CHLORIDE 600; 310; 30; 20 MG/100ML; MG/100ML; MG/100ML; MG/100ML
INJECTION, SOLUTION INTRAVENOUS CONTINUOUS
Status: DISCONTINUED | OUTPATIENT
Start: 2020-01-09 | End: 2020-01-09 | Stop reason: HOSPADM

## 2020-01-09 RX ORDER — FENTANYL CITRATE 50 UG/ML
25-50 INJECTION, SOLUTION INTRAMUSCULAR; INTRAVENOUS
Status: DISCONTINUED | OUTPATIENT
Start: 2020-01-09 | End: 2020-01-09 | Stop reason: HOSPADM

## 2020-01-09 RX ORDER — LIDOCAINE 40 MG/G
CREAM TOPICAL
Status: DISCONTINUED | OUTPATIENT
Start: 2020-01-09 | End: 2020-01-09 | Stop reason: HOSPADM

## 2020-01-09 RX ORDER — ONDANSETRON 2 MG/ML
4 INJECTION INTRAMUSCULAR; INTRAVENOUS EVERY 30 MIN PRN
Status: DISCONTINUED | OUTPATIENT
Start: 2020-01-09 | End: 2020-01-09 | Stop reason: HOSPADM

## 2020-01-09 RX ORDER — OXYCODONE HYDROCHLORIDE 5 MG/1
5-10 TABLET ORAL
Status: DISCONTINUED | OUTPATIENT
Start: 2020-01-09 | End: 2020-01-10 | Stop reason: HOSPADM

## 2020-01-09 RX ORDER — METOCLOPRAMIDE HYDROCHLORIDE 5 MG/ML
5 INJECTION INTRAMUSCULAR; INTRAVENOUS ONCE
Status: COMPLETED | OUTPATIENT
Start: 2020-01-09 | End: 2020-01-09

## 2020-01-09 RX ORDER — LIDOCAINE HYDROCHLORIDE 20 MG/ML
INJECTION, SOLUTION INFILTRATION; PERINEURAL PRN
Status: DISCONTINUED | OUTPATIENT
Start: 2020-01-09 | End: 2020-01-09

## 2020-01-09 RX ORDER — PROPOFOL 10 MG/ML
INJECTION, EMULSION INTRAVENOUS PRN
Status: DISCONTINUED | OUTPATIENT
Start: 2020-01-09 | End: 2020-01-09

## 2020-01-09 RX ADMIN — DEXAMETHASONE SODIUM PHOSPHATE 10 MG: 4 INJECTION, SOLUTION INTRA-ARTICULAR; INTRALESIONAL; INTRAMUSCULAR; INTRAVENOUS; SOFT TISSUE at 17:55

## 2020-01-09 RX ADMIN — CEFAZOLIN SODIUM 2 G: 2 INJECTION, SOLUTION INTRAVENOUS at 17:45

## 2020-01-09 RX ADMIN — MORPHINE SULFATE 4 MG: 4 INJECTION INTRAVENOUS at 11:41

## 2020-01-09 RX ADMIN — ROCURONIUM BROMIDE 50 MG: 10 INJECTION INTRAVENOUS at 17:43

## 2020-01-09 RX ADMIN — PHENYLEPHRINE HYDROCHLORIDE 100 MCG: 10 INJECTION INTRAVENOUS at 18:03

## 2020-01-09 RX ADMIN — PROPOFOL 100 MG: 10 INJECTION, EMULSION INTRAVENOUS at 17:43

## 2020-01-09 RX ADMIN — HYDROMORPHONE HYDROCHLORIDE 0.5 MG: 1 INJECTION, SOLUTION INTRAMUSCULAR; INTRAVENOUS; SUBCUTANEOUS at 20:47

## 2020-01-09 RX ADMIN — MIDAZOLAM 2 MG: 1 INJECTION INTRAMUSCULAR; INTRAVENOUS at 17:31

## 2020-01-09 RX ADMIN — SUGAMMADEX 200 MG: 100 INJECTION, SOLUTION INTRAVENOUS at 18:15

## 2020-01-09 RX ADMIN — METOCLOPRAMIDE 5 MG: 5 INJECTION, SOLUTION INTRAMUSCULAR; INTRAVENOUS at 13:50

## 2020-01-09 RX ADMIN — SODIUM CHLORIDE 1000 ML: 9 INJECTION, SOLUTION INTRAVENOUS at 13:38

## 2020-01-09 RX ADMIN — GABAPENTIN 300 MG: 300 CAPSULE ORAL at 16:31

## 2020-01-09 RX ADMIN — KETOROLAC TROMETHAMINE 15 MG: 15 INJECTION, SOLUTION INTRAMUSCULAR; INTRAVENOUS at 13:50

## 2020-01-09 RX ADMIN — SODIUM CHLORIDE, POTASSIUM CHLORIDE, SODIUM LACTATE AND CALCIUM CHLORIDE: 600; 310; 30; 20 INJECTION, SOLUTION INTRAVENOUS at 17:43

## 2020-01-09 RX ADMIN — ONDANSETRON 4 MG: 2 INJECTION INTRAMUSCULAR; INTRAVENOUS at 11:41

## 2020-01-09 RX ADMIN — PROMETHAZINE HYDROCHLORIDE 25 MG: 25 TABLET ORAL at 21:32

## 2020-01-09 RX ADMIN — MIRTAZAPINE 15 MG: 15 TABLET, FILM COATED ORAL at 21:32

## 2020-01-09 RX ADMIN — LIDOCAINE HYDROCHLORIDE 100 MG: 20 INJECTION, SOLUTION INFILTRATION; PERINEURAL at 17:43

## 2020-01-09 RX ADMIN — GABAPENTIN 600 MG: 300 CAPSULE ORAL at 21:33

## 2020-01-09 RX ADMIN — ACETAMINOPHEN 975 MG: 325 TABLET, FILM COATED ORAL at 16:31

## 2020-01-09 RX ADMIN — ONDANSETRON 4 MG: 2 INJECTION INTRAMUSCULAR; INTRAVENOUS at 17:59

## 2020-01-09 RX ADMIN — IOPAMIDOL 123 ML: 755 INJECTION, SOLUTION INTRAVENOUS at 12:57

## 2020-01-09 RX ADMIN — FENTANYL CITRATE 50 MCG: 50 INJECTION, SOLUTION INTRAMUSCULAR; INTRAVENOUS at 17:59

## 2020-01-09 RX ADMIN — FENTANYL CITRATE 50 MCG: 50 INJECTION, SOLUTION INTRAMUSCULAR; INTRAVENOUS at 17:43

## 2020-01-09 RX ADMIN — PHENYLEPHRINE HYDROCHLORIDE 100 MCG: 10 INJECTION INTRAVENOUS at 17:54

## 2020-01-09 RX ADMIN — SODIUM CHLORIDE 1000 ML: 9 INJECTION, SOLUTION INTRAVENOUS at 11:43

## 2020-01-09 RX ADMIN — SODIUM CHLORIDE 1000 ML: 9 INJECTION, SOLUTION INTRAVENOUS at 19:10

## 2020-01-09 RX ADMIN — PROPRANOLOL HYDROCHLORIDE 60 MG: 60 CAPSULE, EXTENDED RELEASE ORAL at 21:32

## 2020-01-09 RX ADMIN — MORPHINE SULFATE 4 MG: 4 INJECTION INTRAVENOUS at 12:32

## 2020-01-09 ASSESSMENT — ENCOUNTER SYMPTOMS
EYE REDNESS: 0
SHORTNESS OF BREATH: 0
DIFFICULTY URINATING: 0
ARTHRALGIAS: 0
COLOR CHANGE: 0
ABDOMINAL PAIN: 1
HEADACHES: 0
CONFUSION: 0
NECK STIFFNESS: 0
FEVER: 0

## 2020-01-09 NOTE — ANESTHESIA PREPROCEDURE EVALUATION
Anesthesia Pre-Procedure Evaluation    Patient: Ingris Trevino   MRN:     [id number] Gender:   female   Age:    24 year old :      1995        Preoperative Diagnosis: * No pre-op diagnosis entered *   Procedure(s):  CYSTOSCOPY, WITH RETROGRADE PYELOGRAM AND URETERAL STENT INSERTION     Past Medical History:   Diagnosis Date     Chronic pain      Degenerative disc disease at L5-S1 level       Past Surgical History:   Procedure Laterality Date     HC TOOTH EXTRACTION W/FORCEP      local anaesthesia        Meds:   flexeril, gabapentin, indomethacin, and percocet for breakthrough). topamax    Anesthesia Evaluation     . Pt has had prior anesthetic. Type: General    No history of anesthetic complications          ROS/MED HX    ENT/Pulmonary:  - neg pulmonary ROS     Neurologic:     (+)migraines,     Cardiovascular:  - neg cardiovascular ROS       METS/Exercise Tolerance:     Hematologic:  - neg hematologic  ROS       Musculoskeletal: Comment: Chronic LBP, neck pain        GI/Hepatic:  - neg GI/hepatic ROS       Renal/Genitourinary:         Endo:  - neg endo ROS       Psychiatric:     (+) psychiatric history anxiety      Infectious Disease:  - neg infectious disease ROS       Malignancy:      - no malignancy   Other: Comment: Oxycodone prn   (+) H/O Chronic Pain,                       PHYSICAL EXAM:   Mental Status/Neuro: A/A/O   Airway: Facies: Thick Neck  Mallampati: II  Mouth/Opening: Full  TM distance: > 6 cm  Neck ROM: Full   Respiratory: Auscultation: CTAB     Resp. Rate: Normal     Resp. Effort: Normal      CV: Rhythm: Regular  Rate: Age appropriate  Heart: Normal Sounds  Edema: None   Comments:      Dental: Normal Dentition                LABS:  CBC:   Lab Results   Component Value Date    WBC 12.9 (H) 2020    WBC 7.7 2019    HGB 13.9 2020    HGB 12.5 2019    HCT 42.8 2020    HCT 40.4 2019     2020     2019     BMP:   Lab Results  "  Component Value Date     01/09/2020     03/29/2019    POTASSIUM 4.0 01/09/2020    POTASSIUM 3.7 03/29/2019    CHLORIDE 112 (H) 01/09/2020    CHLORIDE 111 (H) 03/29/2019    CO2 21 01/09/2020    CO2 21 03/29/2019    BUN 13 01/09/2020    BUN 3 (L) 03/29/2019    CR 0.81 01/09/2020    CR 0.61 03/29/2019     (H) 01/09/2020    GLC 98 03/29/2019     COAGS: No results found for: PTT, INR, FIBR  POC:   Lab Results   Component Value Date    HCGS Negative 01/09/2020     OTHER:   Lab Results   Component Value Date    LACT 0.9 03/28/2019    MAURICIO 9.2 01/09/2020    MAG 2.1 03/28/2019    ALBUMIN 3.9 01/09/2020    PROTTOTAL 8.0 01/09/2020    ALT 33 01/09/2020    AST 16 01/09/2020    ALKPHOS 114 01/09/2020    BILITOTAL 0.3 01/09/2020    LIPASE 129 01/09/2020    TSH 1.86 03/28/2019        Preop Vitals    BP Readings from Last 3 Encounters:   01/09/20 124/80   12/28/19 119/75   07/16/19 (!) 141/91    Pulse Readings from Last 3 Encounters:   01/09/20 75   12/28/19 59   07/16/19 104      Resp Readings from Last 3 Encounters:   01/09/20 16   03/29/19 16   02/14/19 16    SpO2 Readings from Last 3 Encounters:   01/09/20 99%   12/28/19 98%   07/16/19 97%      Temp Readings from Last 1 Encounters:   01/09/20 36.9  C (98.4  F) (Oral)    Ht Readings from Last 1 Encounters:   12/28/19 1.638 m (5' 4.5\")      Wt Readings from Last 1 Encounters:   01/09/20 91.2 kg (201 lb)    Estimated body mass index is 33.97 kg/m  as calculated from the following:    Height as of 12/28/19: 1.638 m (5' 4.5\").    Weight as of this encounter: 91.2 kg (201 lb).     LDA:  Peripheral IV 01/09/20 Left Hand (Active)   Site Assessment WDL 1/9/2020 11:42 AM   Line Status Saline locked 1/9/2020 11:42 AM   Phlebitis Scale 0-->no symptoms 1/9/2020 11:42 AM   Infiltration Scale 0 1/9/2020 11:42 AM   Extravasation? No 1/9/2020 11:42 AM   Number of days: 0        Assessment:   ASA SCORE: 2    H&P: History and physical reviewed and following examination; no " interval change.   Smoking Status:  Non-Smoker/Unknown   NPO Status: NPO Appropriate     Plan:   Anes. Type:  General   Pre-Medication: Acetaminophen; Gabapentin   Induction:  IV (Standard)   Airway: ETT; Oral   Access/Monitoring: PIV   Maintenance: Balanced     Postop Plan:   Postop Pain: Opioids  Postop Sedation/Airway: Not planned     PONV Management:   Adult Risk Factors: Female, Non-Smoker, Postop Opioids   Prevention: Ondansetron, Dexamethasone     CONSENT: Direct conversation   Plan and risks discussed with: Patient   Blood Products: Consent Deferred (Minimal Blood Loss)                   Ban Amaral MD     I have seen and evaluated the patient myself and agree with the above assessment and plan.  I have explained the risks/benefits of anesthesia to the patient who understands and agrees to proceed.    Paige Sun MD  Staff Anesthesiologist  Pager [phone]

## 2020-01-09 NOTE — ED NOTES
[facility], [place]   ED Nurse to Floor Handoff     Ingris Trevino is a 24 year old female who speaks English and lives with a spouse,  in a home  They arrived in the ED by car from home    ED Chief Complaint: Abdominal Pain    ED Dx;   Final diagnoses:   Ureterolithiasis         Needed?: No    Allergies:   Allergies   Allergen Reactions     Seasonal Allergies    .  Past Medical Hx:   Past Medical History:   Diagnosis Date     Chronic pain      Degenerative disc disease at L5-S1 level       Baseline Mental status: WDL  Current Mental Status changes: at basesline    Infection present or suspected this encounter: yes urinary  Sepsis suspected: No  Isolation type: No active isolations     Activity level - Baseline/Home:  Independent  Activity Level - Current:   Independent    Bariatric equipment needed?: No    In the ED these meds were given:   Medications   0.9% sodium chloride BOLUS (0 mLs Intravenous Stopped 1/9/20 1254)     Followed by   sodium chloride 0.9% infusion (1,000 mLs Intravenous New Bag 1/9/20 1338)   ondansetron (ZOFRAN) injection 4 mg ( Intravenous Auto Hold 1/9/20 1612)   morphine (PF) injection 4 mg ( Intravenous Auto Hold 1/9/20 1612)   lidocaine 1 % 0.1-1 mL (has no administration in time range)   lidocaine (LMX4) cream (has no administration in time range)   sodium chloride (PF) 0.9% PF flush 3 mL (has no administration in time range)   sodium chloride (PF) 0.9% PF flush 3 mL (has no administration in time range)   lactated ringers infusion (has no administration in time range)   acetaminophen (TYLENOL) tablet 975 mg (has no administration in time range)   gabapentin (NEURONTIN) capsule 300 mg (has no administration in time range)   ceFAZolin (ANCEF) intermittent infusion 2 g in 100 mL dextrose PRE-MIX (has no administration in time range)   ceFAZolin (ANCEF) 1 g vial to attach to  ml bag for ADULT or 50 ml bag for PEDS (has no administration in  time range)   Provider ordered ALTERNATE pre op antibiotic. (has no administration in time range)   iopamidol (ISOVUE-370) solution 123 mL (123 mLs Intravenous Given 1/9/20 1257)   sodium chloride (PF) 0.9% PF flush 81 mL (81 mLs Intravenous Given 1/9/20 1258)   ketorolac (TORADOL) injection 15 mg (15 mg Intravenous Given 1/9/20 1350)   metoclopramide (REGLAN) injection 5 mg (5 mg Intravenous Given 1/9/20 1350)       Drips running?  No    Home pump  No    Current LDAs  Peripheral IV 01/09/20 Left Hand (Active)   Site Assessment WDL 1/9/2020 11:42 AM   Line Status Saline locked 1/9/2020 11:42 AM   Phlebitis Scale 0-->no symptoms 1/9/2020 11:42 AM   Infiltration Scale 0 1/9/2020 11:42 AM   Extravasation? No 1/9/2020 11:42 AM   Number of days: 0       Labs results:   Labs Ordered and Resulted from Time of ED Arrival Up to the Time of Departure from the ED   CBC WITH PLATELETS DIFFERENTIAL - Abnormal; Notable for the following components:       Result Value    WBC 12.9 (*)     Absolute Neutrophil 9.6 (*)     All other components within normal limits   COMPREHENSIVE METABOLIC PANEL - Abnormal; Notable for the following components:    Chloride 112 (*)     Glucose 110 (*)     All other components within normal limits   UA MACROSCOPIC WITH REFLEX TO MICRO AND CULTURE - Abnormal; Notable for the following components:    pH Urine 8.5 (*)     Protein Albumin Urine 10 (*)     Bacteria Urine Few (*)     Squamous Epithelial /HPF Urine 2 (*)     Mucous Urine Present (*)     All other components within normal limits   LIPASE   HCG QUALITATIVE   GLUCOSE MONITOR NURSING POCT   PERIPHERAL IV CATHETER   NOTIFY   GLUCOSE MONITOR NURSING POCT - GLYCEMIC MANAGEMENT PERIOP   PULSE OXIMETRY NURSING   PERIPHERAL IV CATHETER   SHAMPOO   SHOWER   PREP FOR PROCEDURE   VOID ON CALL TO OR   PATIENT CARE ORDER   NOTIFY PHYSICIAN   APPLY PNEUMATIC COMPRESSION DEVICE (PCD)   OBTAIN AFFIRMATION OF INFORMED CONSENT       Imaging Studies:   Recent  Results (from the past 24 hour(s))   CT Abdomen Pelvis w Contrast    Narrative    Examination:  CT Abdomen and Pelvis contrast.    Indication:  rlq pain, r/o appendicitis     Comparison: 3/28/2019.    Technique: CT of abdomen and pelvis was acquired from top of diaphragm  to pubic symphysis with IV and oral contrast. Images were  reconstructed in axial and coronal sections. Images were reviewed in  lung, soft tissue, liver, bone windows.    Contrast: iopamidol (ISOVUE-370) solution 123 mL    Findings:   The lung bases are clear.    The liver, gallbladder, pancreas, biliary tree, spleen are  unremarkable. Bilateral adrenal glands are normal in appearance.     On series 2 image 42 in the medial aspect of the left kidney inferior  pole, there is small area of hypoperfusion which is wedge-shaped.  There is no left hydronephrosis, stones or hydroureter.    The right kidney demonstrates a striated nephrogram.  There is a 2 mm  stone in the distal right ureter at the ureterovesical junction.  Proximal to this, there is hydroureter ureter, mild hydronephrosis and  perinephric fluid about the kidney suspicious for calyceal rupture.  There are 4 punctate nonobstructing stones remaining in the right  kidney.      The portal vein, celiac axis, SMA, bilateral renal arteries are  patent.  Intrauterine device. Nabothian cysts in the cervix. The colon  and small bowel are normal in caliber. There is no abdominal or pelvic  lymphadenopathy by size criteria. The appendix is normal in  appearance.    Soft tissues/bones: Soft tissues and bones are unremarkable.      Impression    Impression:   1) 2 mm distal right ureter stone at the ureterovesical junction.  Left-sided hydronephrosis and perinephric fluid suggestive of calyceal  rupture. 4 additional punctate nonobstructing stones in the right  kidney.   2. Small focal area of wedge-shaped hypoattenuation in the medial  aspect of the left kidney superior pole, new since 3/28/2018,  given  the patient's age this most likely represents infection.  3. Consider pyelonephritis, given the striated nephrogram on the right  and the cortical area of hypoperfusion on the left.    JAG MACE MD       Recent vital signs:   /80   Pulse 75   Temp 98.4  F (36.9  C) (Oral)   Resp 16   Wt 91.2 kg (201 lb)   SpO2 99%   BMI 33.97 kg/m      Timbo Coma Scale Score: 15 (01/09/20 1210)       Cardiac Rhythm: Normal Sinus  Pt needs tele? No  Skin/wound Issues: None    Code Status: Full Code    Pain control: fair    Nausea control: fair    Abnormal labs/tests/findings requiring intervention: see epic    Family present during ED course? Yes   Family Comments/Social Situation comments: two family members present during stay in ED    Tasks needing completion: None    Tabatha Fluhrer, RN    [phone] [facility]

## 2020-01-09 NOTE — ED TRIAGE NOTES
Pt arrived via car with c/i RLQ abdominal pain that radiates to the back. Pt reports she woke up with the pain this morning and that it is increasing. Denies fevers at home.

## 2020-01-09 NOTE — H&P
"Urology Consult History and Physical    Name: Ingris Trevino    MRN: [id number]   YOB: 1995       We were asked to see Ingris Trevino at the request of Dr. Baker for evaluation and treatment of the following chief complaint.          Chief Complaint:   - 2mm left obstructing UVJ stone with perinephric fluid (concern for calyceal rupture)    History is obtained from patient and review of records          History of Present Illness:   Ingris Trevino is a 24 year old female with chronic pain d/t DJD (on flexeril, gabapentin, indomethacin, and percocet for breakthrough).  She is also on topamax.      She woke this morning with severe right flank pain radiating to RLQ abdomen associated with nausea (no emesis) and \"feeling cold\" (no fevers).  She has had urinary frequency today and some dysuria.  No hematuria.  She has never before had a UTI.  She has never had stones previously.  She came to the ED where:  - Vitals are normal  - Labs show mild leukocytosis (12.9) with normal creatinine (0.81mg/dL) and unremarkable UA.  Her HCG was negative.   - She did undergo CT AP with contrast imaging that shows a 2mm right UVJ stone with mild hydronephrosis and perinephric fluid.  There is also wedge-shaped hypoattention in the medial kidney concerning for infection    She is very concerned that the IV pain medication she has received has not touched her severe pain.  Her mom has had kidney stones before and mentions that a ureteral stent might be helpful.  Margarita is also here with her wife.      She is NPO         Past Medical History:     Past Medical History:   Diagnosis Date     Chronic pain      Degenerative disc disease at L5-S1 level             Past Surgical History:     Past Surgical History:   Procedure Laterality Date     HC TOOTH EXTRACTION W/FORCEP      local anaesthesia            Social History:     Social History     Tobacco Use     Smoking status: Never Smoker     Smokeless tobacco: Never " Used   Substance Use Topics     Alcohol use: Yes     Frequency: 2-4 times a month   Works for [employer]          Family History:     Family History   Problem Relation Age of Onset     Bipolar Disorder Maternal Grandmother      Depression Mother      Depression Father    Mom - stones         Allergies:     Allergies   Allergen Reactions     Seasonal Allergies             Medications:     Current Facility-Administered Medications   Medication     morphine (PF) injection 4 mg     ondansetron (ZOFRAN) injection 4 mg     sodium chloride 0.9% infusion     Current Outpatient Medications   Medication Sig     cyclobenzaprine (FLEXERIL) 10 MG tablet Take 10 mg by mouth 3 times daily as needed for muscle spasms     gabapentin (NEURONTIN) 300 MG capsule Take 600 mg by mouth 3 times daily     indomethacin (INDOCIN) 50 MG capsule Take 1 capsule (50 mg) by mouth 3 times daily (with meals)     mirtazapine (REMERON) 15 MG tablet Take 1 tablet (15 mg) by mouth At Bedtime     oxyCODONE-acetaminophen (PERCOCET) 5-325 MG tablet Take one po tid prn severe pain. (Patient taking differently: Take 1 tablet by mouth 3 times daily Take one po tid prn severe pain.)     promethazine (PHENERGAN) 25 MG tablet Take 1 tablet (25 mg) by mouth every 8 hours as needed for nausea associated with migranes     propranolol (INDERAL) 10 MG tablet Take 2 tablets (20 mg) by mouth daily as needed (for anxiety)     propranolol ER (INDERAL LA) 60 MG 24 hr capsule Take 1 capsule (60 mg) by mouth At Bedtime     sertraline (ZOLOFT) 100 MG tablet Take 2 tablets (200 mg) by mouth daily     topiramate (TOPAMAX) 50 MG tablet Take 1 tablet (50 mg) by mouth 2 times daily             Review of Systems:    ROS: See HPI for pertinent details.  Remainder of 10-point ROS negative.          Physical Exam:   VS:  T: 98.4    HR: Data Unavailable    BP: 125/82    RR: 16   GEN:  AOx3.  NAD.  Pleasant.  NECK:  Supple.  No lymphadenopathy.  LUNGS: Non-labored breathing.  BACK: +  right sided CVAT  ABD:  Soft.  ND.  + RLQ tenderness. No rebound or guarding.  No surgical scars. No masses.      EXT:  Warm, well perfused.  no lower extremity edema bilaterally  SKIN:  Warm.  Dry.  No rashes.  NEURO:  CN grossly intact.            Data:   All laboratory data reviewed:    Recent Labs   Lab 01/09/20  1112   WBC 12.9*   HGB 13.9        Recent Labs   Lab 01/09/20  1112      POTASSIUM 4.0   CHLORIDE 112*   CO2 21   BUN 13   CR 0.81   *   MAURICIO 9.2     Recent Labs   Lab 01/09/20  1140   COLOR Yellow   APPEARANCE Clear   URINEGLC Negative   URINEBILI Negative   URINEKETONE Negative   SG 1.026   URINEPH 8.5*   PROTEIN 10*   NITRITE Negative   LEUKEST Negative   RBCU <1   WBCU <1     No results found for this or any previous visit.    All pertinent imaging reviewed:  Examination:  CT Abdomen and Pelvis contrast.     Indication:  rlq pain, r/o appendicitis      Comparison: 3/28/2019.     Technique: CT of abdomen and pelvis was acquired from top of diaphragm  to pubic symphysis with IV and oral contrast. Images were  reconstructed in axial and coronal sections. Images were reviewed in  lung, soft tissue, liver, bone windows.     Contrast: iopamidol (ISOVUE-370) solution 123 mL     Findings:   The lung bases are clear.     The liver, gallbladder, pancreas, biliary tree, spleen are  unremarkable. Bilateral adrenal glands are normal in appearance.      On series 2 image 42 in the medial aspect of the left kidney inferior  pole, there is small area of hypoperfusion which is wedge-shaped.  There is no left hydronephrosis, stones or hydroureter.     The right kidney demonstrates a striated nephrogram.  There is a 2 mm  stone in the distal right ureter at the ureterovesical junction.  Proximal to this, there is hydroureter ureter, mild hydronephrosis and  perinephric fluid about the kidney suspicious for calyceal rupture.  There are 4 punctate nonobstructing stones remaining in the right  kidney.        The portal vein, celiac axis, SMA, bilateral renal arteries are  patent.  Intrauterine device. Nabothian cysts in the cervix. The colon  and small bowel are normal in caliber. There is no abdominal or pelvic  lymphadenopathy by size criteria. The appendix is normal in  appearance.     Soft tissues/bones: Soft tissues and bones are unremarkable.                                                                      Impression:   1) 2 mm distal right ureter stone at the ureterovesical junction.  Left-sided hydronephrosis and perinephric fluid suggestive of calyceal  rupture. 4 additional punctate nonobstructing stones in the right  kidney.   2. Small focal area of wedge-shaped hypoattenuation in the medial  aspect of the left kidney superior pole, new since 3/28/2018, given  the patient's age this most likely represents infection.  3. Consider pyelonephritis, given the striated nephrogram on the right  and the cortical area of hypoperfusion on the left.            Impression and Plan:   Impression / Plan:   Ingris Trevino is a 24 year old female with a 2mm right UVJ stone that is causing hydronephrosis and intractable pain (despite IV narcotics).  Given perinephric fluid seen on CT there is concern for a calyceal rupture.  Hypoperfusion is also present, although there is less concern for UTI given normal UA, only slight elevation in WBC and the fact that the patient has never had a UTI in her life.     Other tiny nonobstructing stones are also present in right kidney.       - For intractable pain and concern for calyceal rupture will plan cystoscopy with right RPG and right ureteral stent placement today.  Will defer stone treatment (to avoid pressurizing the kidney with ureteroscopy irrigation)  - Discussed risks and benefits  - Can admit to Obs - Dr. Otto.  Will not plan for antibiotics at this time.     Discussed with Dr. Otto    Thank you for the opportunity to participate in the care of Ingris Trevino.      Tere Prado PA-C  Urology Physician Assistant  Personal Pager: [phone]    Please call Job Code:   [phone] to reach the Urology resident or PA on call - Weekdays  [phone] to reach the Urology resident or PA on call - Weeknights and weekends

## 2020-01-09 NOTE — ED PROVIDER NOTES
[place] EMERGENCY DEPARTMENT ([facility])  1/09/20  History     Chief Complaint   Patient presents with     Abdominal Pain     HPI  Ingris Trevino is a 24 year old female with a past medical of chronic pain and degenerative disc disease who presents to the Emergency Department for evaluation of abdominal pain. The patient states she woke up around 6 AM this morning not feeling well.  Patient states that she has been feeling increasingly nauseous, with no emesis.  Patient also endorses some centralized abdominal pain that radiated to her right side.  She states that the pain is beginning to worsen.  Patient also states that her back has become increasingly sore as well.  Patient has been fighting some URI symptoms prior to this abdominal pain, and nausea.  Patient denies any vomiting.  Patient states she has had some increased frequency, with some dysuria.  Patient also endorses some diarrhea, but no blood in her stool.  Denies any prior surgeries to her abdomen, no vaginal bleeding, or discharge.  Patient also reports that she has not been eating at baseline, and has not experienced this type of abdominal pain before.    Past Medical History:   Diagnosis Date     Chronic pain      Degenerative disc disease at L5-S1 level        Past Surgical History:   Procedure Laterality Date     HC TOOTH EXTRACTION W/FORCEP      local anaesthesia       Family History   Problem Relation Age of Onset     Bipolar Disorder Maternal Grandmother      Depression Mother      Depression Father        Social History     Tobacco Use     Smoking status: Never Smoker     Smokeless tobacco: Never Used   Substance Use Topics     Alcohol use: Yes     Frequency: 2-4 times a month       Current Facility-Administered Medications   Medication     lactated ringers infusion     lidocaine (LMX4) cream     lidocaine 1 % 0.1-1 mL     morphine (PF) injection 4 mg     ondansetron (ZOFRAN) injection 4 mg     sodium chloride (PF) 0.9% PF  flush 3 mL     sodium chloride (PF) 0.9% PF flush 3 mL     sodium chloride 0.9% infusion     Current Outpatient Medications   Medication     cyclobenzaprine (FLEXERIL) 10 MG tablet     gabapentin (NEURONTIN) 300 MG capsule     indomethacin (INDOCIN) 50 MG capsule     mirtazapine (REMERON) 15 MG tablet     oxyCODONE-acetaminophen (PERCOCET) 5-325 MG tablet     promethazine (PHENERGAN) 25 MG tablet     propranolol (INDERAL) 10 MG tablet     propranolol ER (INDERAL LA) 60 MG 24 hr capsule     sertraline (ZOLOFT) 100 MG tablet     topiramate (TOPAMAX) 50 MG tablet        Allergies   Allergen Reactions     Seasonal Allergies        I have reviewed the Medications, Allergies, Past Medical and Surgical History, and Social History in the Epic system.    Review of Systems   Constitutional: Negative for fever.   HENT: Negative for congestion.    Eyes: Negative for redness.   Respiratory: Negative for shortness of breath.    Cardiovascular: Negative for chest pain.   Gastrointestinal: Positive for abdominal pain.   Genitourinary: Negative for difficulty urinating.   Musculoskeletal: Negative for arthralgias and neck stiffness.   Skin: Negative for color change.   Neurological: Negative for headaches.   Psychiatric/Behavioral: Negative for confusion.       Physical Exam   BP: (!) 147/86  Pulse: 75  Heart Rate: 79  Temp: 98.4  F (36.9  C)  Resp: 16  Weight: 91.2 kg (201 lb)  SpO2: 98 %      Physical Exam  Constitutional:       General: She is not in acute distress.     Appearance: She is not diaphoretic.   HENT:      Head: Atraumatic.      Mouth/Throat:      Pharynx: No oropharyngeal exudate.   Eyes:      General: No scleral icterus.     Pupils: Pupils are equal, round, and reactive to light.   Neck:      Musculoskeletal: Normal range of motion.   Cardiovascular:      Rate and Rhythm: Normal rate and regular rhythm.      Heart sounds: Normal heart sounds.   Pulmonary:      Effort: No respiratory distress.      Breath sounds:  Normal breath sounds.   Abdominal:      General: Bowel sounds are normal.      Palpations: Abdomen is soft.      Tenderness: There is no abdominal tenderness.      Comments: Moderately tender to palpation in the right lower quadrant over McBurney's point.  Mild guarding.  There is also some right-sided CVA tenderness.  Abdomen is otherwise soft without rigidity.  Normal bowel sounds x4.  No overlying skin changes.   Musculoskeletal:         General: No tenderness.   Skin:     General: Skin is warm.      Capillary Refill: Capillary refill takes less than 2 seconds.      Findings: No rash.   Neurological:      General: No focal deficit present.      Mental Status: She is alert.   Psychiatric:         Mood and Affect: Mood normal.         ED Course   11:04 AM  The patient was seen and examined by Jewel Baker DO in Room [facility].    Medications   0.9% sodium chloride BOLUS (0 mLs Intravenous Stopped 1/9/20 1254)     Followed by   sodium chloride 0.9% infusion (1,000 mLs Intravenous New Bag 1/9/20 1338)   ondansetron (ZOFRAN) injection 4 mg (4 mg Intravenous Given 1/9/20 1141)   morphine (PF) injection 4 mg (4 mg Intravenous Given 1/9/20 1232)   lidocaine 1 % 0.1-1 mL (has no administration in time range)   lidocaine (LMX4) cream (has no administration in time range)   sodium chloride (PF) 0.9% PF flush 3 mL (has no administration in time range)   sodium chloride (PF) 0.9% PF flush 3 mL (has no administration in time range)   lactated ringers infusion (has no administration in time range)   iopamidol (ISOVUE-370) solution 123 mL (123 mLs Intravenous Given 1/9/20 1257)   sodium chloride (PF) 0.9% PF flush 81 mL (81 mLs Intravenous Given 1/9/20 1258)   ketorolac (TORADOL) injection 15 mg (15 mg Intravenous Given 1/9/20 1350)   metoclopramide (REGLAN) injection 5 mg (5 mg Intravenous Given 1/9/20 1350)        Procedures             Labs Ordered and Resulted from Time of ED Arrival Up to the Time of Departure from the ED   CBC  WITH PLATELETS DIFFERENTIAL - Abnormal; Notable for the following components:       Result Value    WBC 12.9 (*)     Absolute Neutrophil 9.6 (*)     All other components within normal limits   COMPREHENSIVE METABOLIC PANEL - Abnormal; Notable for the following components:    Chloride 112 (*)     Glucose 110 (*)     All other components within normal limits   UA MACROSCOPIC WITH REFLEX TO MICRO AND CULTURE - Abnormal; Notable for the following components:    pH Urine 8.5 (*)     Protein Albumin Urine 10 (*)     Bacteria Urine Few (*)     Squamous Epithelial /HPF Urine 2 (*)     Mucous Urine Present (*)     All other components within normal limits   LIPASE   HCG QUALITATIVE   PERIPHERAL IV CATHETER   NOTIFY   GLUCOSE MONITOR NURSING POCT - GLYCEMIC MANAGEMENT PERIOP   PULSE OXIMETRY NURSING   PERIPHERAL IV CATHETER        Results for orders placed or performed during the hospital encounter of 01/09/20 (from the past 24 hour(s))   CBC with platelets differential   Result Value Ref Range    WBC 12.9 (H) 4.0 - 11.0 10e9/L    RBC Count 4.75 3.8 - 5.2 10e12/L    Hemoglobin 13.9 11.7 - 15.7 g/dL    Hematocrit 42.8 35.0 - 47.0 %    MCV 90 78 - 100 fl    MCH 29.3 26.5 - 33.0 pg    MCHC 32.5 31.5 - 36.5 g/dL    RDW 12.9 10.0 - 15.0 %    Platelet Count 269 150 - 450 10e9/L    Diff Method Automated Method     % Neutrophils 74.4 %    % Lymphocytes 18.8 %    % Monocytes 4.8 %    % Eosinophils 1.2 %    % Basophils 0.4 %    % Immature Granulocytes 0.4 %    Nucleated RBCs 0 0 /100    Absolute Neutrophil 9.6 (H) 1.6 - 8.3 10e9/L    Absolute Lymphocytes 2.4 0.8 - 5.3 10e9/L    Absolute Monocytes 0.6 0.0 - 1.3 10e9/L    Absolute Eosinophils 0.2 0.0 - 0.7 10e9/L    Absolute Basophils 0.1 0.0 - 0.2 10e9/L    Abs Immature Granulocytes 0.1 0 - 0.4 10e9/L    Absolute Nucleated RBC 0.0    Comprehensive metabolic panel   Result Value Ref Range    Sodium 139 133 - 144 mmol/L    Potassium 4.0 3.4 - 5.3 mmol/L    Chloride 112 (H) 94 - 109 mmol/L     Carbon Dioxide 21 20 - 32 mmol/L    Anion Gap 5 3 - 14 mmol/L    Glucose 110 (H) 70 - 99 mg/dL    Urea Nitrogen 13 7 - 30 mg/dL    Creatinine 0.81 0.52 - 1.04 mg/dL    GFR Estimate >90 >60 mL/min/[1.73_m2]    GFR Estimate If Black >90 >60 mL/min/[1.73_m2]    Calcium 9.2 8.5 - 10.1 mg/dL    Bilirubin Total 0.3 0.2 - 1.3 mg/dL    Albumin 3.9 3.4 - 5.0 g/dL    Protein Total 8.0 6.8 - 8.8 g/dL    Alkaline Phosphatase 114 40 - 150 U/L    ALT 33 0 - 50 U/L    AST 16 0 - 45 U/L   Lipase   Result Value Ref Range    Lipase 129 73 - 393 U/L   HCG qualitative Blood   Result Value Ref Range    HCG Qualitative Serum Negative NEG^Negative   UA reflex to Microscopic and Culture   Result Value Ref Range    Color Urine Yellow     Appearance Urine Clear     Glucose Urine Negative NEG^Negative mg/dL    Bilirubin Urine Negative NEG^Negative    Ketones Urine Negative NEG^Negative mg/dL    Specific Gravity Urine 1.026 1.003 - 1.035    Blood Urine Negative NEG^Negative    pH Urine 8.5 (H) 5.0 - 7.0 pH    Protein Albumin Urine 10 (A) NEG^Negative mg/dL    Urobilinogen mg/dL Normal 0.0 - 2.0 mg/dL    Nitrite Urine Negative NEG^Negative    Leukocyte Esterase Urine Negative NEG^Negative    Source Midstream Urine     RBC Urine <1 0 - 2 /HPF    WBC Urine <1 0 - 5 /HPF    Bacteria Urine Few (A) NEG^Negative /HPF    Squamous Epithelial /HPF Urine 2 (H) 0 - 1 /HPF    Mucous Urine Present (A) NEG^Negative /LPF   CT Abdomen Pelvis w Contrast    Narrative    Examination:  CT Abdomen and Pelvis contrast.    Indication:  rlq pain, r/o appendicitis     Comparison: 3/28/2019.    Technique: CT of abdomen and pelvis was acquired from top of diaphragm  to pubic symphysis with IV and oral contrast. Images were  reconstructed in axial and coronal sections. Images were reviewed in  lung, soft tissue, liver, bone windows.    Contrast: iopamidol (ISOVUE-370) solution 123 mL    Findings:   The lung bases are clear.    The liver, gallbladder, pancreas, biliary  tree, spleen are  unremarkable. Bilateral adrenal glands are normal in appearance.     On series 2 image 42 in the medial aspect of the left kidney inferior  pole, there is small area of hypoperfusion which is wedge-shaped.  There is no left hydronephrosis, stones or hydroureter.    The right kidney demonstrates a striated nephrogram.  There is a 2 mm  stone in the distal right ureter at the ureterovesical junction.  Proximal to this, there is hydroureter ureter, mild hydronephrosis and  perinephric fluid about the kidney suspicious for calyceal rupture.  There are 4 punctate nonobstructing stones remaining in the right  kidney.      The portal vein, celiac axis, SMA, bilateral renal arteries are  patent.  Intrauterine device. Nabothian cysts in the cervix. The colon  and small bowel are normal in caliber. There is no abdominal or pelvic  lymphadenopathy by size criteria. The appendix is normal in  appearance.    Soft tissues/bones: Soft tissues and bones are unremarkable.      Impression    Impression:   1) 2 mm distal right ureter stone at the ureterovesical junction.  Left-sided hydronephrosis and perinephric fluid suggestive of calyceal  rupture. 4 additional punctate nonobstructing stones in the right  kidney.   2. Small focal area of wedge-shaped hypoattenuation in the medial  aspect of the left kidney superior pole, new since 3/28/2018, given  the patient's age this most likely represents infection.  3. Consider pyelonephritis, given the striated nephrogram on the right  and the cortical area of hypoperfusion on the left.    JAG MACE MD     Assessments & Plan (with Medical Decision Making)   Patient resents to the emergency department for evaluation of right lower quadrant.  Onset was this morning.  Also associated with some nausea and some increased frequency and dysuria.  Pain radiates into the right flank and back.  Differential includes appendicitis, UTI, pyelonephritis, kidney stone.  Plan for  CT scan abdomen pelvis with contrast to rule out appendicitis, CBC, CMP, lipase, urinalysis, pregnancy test.  Plan to administer IV fluids, pain medication and antiemetics.    The scan shows a 2 mm stone in the right UVJ.  There is also significant perinephric stranding and right-sided hydronephrosis, concerning for right calyceal rupture.  Kidney function normal.  Urinalysis without infection.  Urology was consulted and evaluated patient in the emergency department.  They plan to take the patient to the OR for stent placement.  Will admit to the urology service under observation.  Pain and nausea controlled in the emergency department.        I have reviewed the nursing notes.    I have reviewed the findings, diagnosis, plan and need for follow up with the patient.    New Prescriptions    No medications on file       Final diagnoses:   Ureterolithiasis   IJin, am serving as a trained medical scribe to document services personally performed by Jewel Baker DO, based on the provider's statements to me.      Jewel VÁSQUEZ DO, was physically present and have reviewed and verified the accuracy of this note documented by Jin Bryson.    1/9/2020   [facility], [place], EMERGENCY DEPARTMENT     Jewel Baker DO  01/09/20 [id number]

## 2020-01-09 NOTE — OR NURSING
PA called writer to report that she doesn't know how to enter orders or case request and she has been unable to contact any Urology staff members. Dr. Love text paged to enter pre-op orders and case request.

## 2020-01-10 ENCOUNTER — DOCUMENTATION ONLY (OUTPATIENT)
Dept: PHARMACY | Facility: CLINIC | Age: 25
End: 2020-01-10

## 2020-01-10 VITALS
DIASTOLIC BLOOD PRESSURE: 57 MMHG | BODY MASS INDEX: 33.97 KG/M2 | RESPIRATION RATE: 17 BRPM | OXYGEN SATURATION: 95 % | HEART RATE: 94 BPM | TEMPERATURE: 98.6 F | SYSTOLIC BLOOD PRESSURE: 99 MMHG | WEIGHT: 201 LBS

## 2020-01-10 LAB
ANION GAP SERPL CALCULATED.3IONS-SCNC: 6 MMOL/L (ref 3–14)
BUN SERPL-MCNC: 9 MG/DL (ref 7–30)
CALCIUM SERPL-MCNC: 8.2 MG/DL (ref 8.5–10.1)
CHLORIDE SERPL-SCNC: 115 MMOL/L (ref 94–109)
CO2 SERPL-SCNC: 23 MMOL/L (ref 20–32)
CREAT SERPL-MCNC: 0.73 MG/DL (ref 0.52–1.04)
ERYTHROCYTE [DISTWIDTH] IN BLOOD BY AUTOMATED COUNT: 13.2 % (ref 10–15)
GFR SERPL CREATININE-BSD FRML MDRD: >90 ML/MIN/{1.73_M2}
GLUCOSE SERPL-MCNC: 98 MG/DL (ref 70–99)
HCT VFR BLD AUTO: 38 % (ref 35–47)
HGB BLD-MCNC: 12.2 G/DL (ref 11.7–15.7)
MCH RBC QN AUTO: 29.6 PG (ref 26.5–33)
MCHC RBC AUTO-ENTMCNC: 32.1 G/DL (ref 31.5–36.5)
MCV RBC AUTO: 92 FL (ref 78–100)
PLATELET # BLD AUTO: 226 10E9/L (ref 150–450)
POTASSIUM SERPL-SCNC: 4.1 MMOL/L (ref 3.4–5.3)
RBC # BLD AUTO: 4.12 10E12/L (ref 3.8–5.2)
SODIUM SERPL-SCNC: 144 MMOL/L (ref 133–144)
WBC # BLD AUTO: 10.5 10E9/L (ref 4–11)

## 2020-01-10 PROCEDURE — 25000132 ZZH RX MED GY IP 250 OP 250 PS 637: Performed by: PHYSICIAN ASSISTANT

## 2020-01-10 PROCEDURE — 25800030 ZZH RX IP 258 OP 636: Performed by: PHYSICIAN ASSISTANT

## 2020-01-10 PROCEDURE — 85027 COMPLETE CBC AUTOMATED: CPT | Performed by: PHYSICIAN ASSISTANT

## 2020-01-10 PROCEDURE — G0378 HOSPITAL OBSERVATION PER HR: HCPCS

## 2020-01-10 PROCEDURE — 80048 BASIC METABOLIC PNL TOTAL CA: CPT | Performed by: PHYSICIAN ASSISTANT

## 2020-01-10 PROCEDURE — 36415 COLL VENOUS BLD VENIPUNCTURE: CPT | Performed by: PHYSICIAN ASSISTANT

## 2020-01-10 RX ORDER — ACETAMINOPHEN 325 MG/1
650 TABLET ORAL EVERY 6 HOURS PRN
Qty: 100 TABLET | Refills: 0 | Status: SHIPPED | OUTPATIENT
Start: 2020-01-10 | End: 2022-10-03

## 2020-01-10 RX ORDER — OXYBUTYNIN CHLORIDE 5 MG/1
5 TABLET ORAL 3 TIMES DAILY PRN
Qty: 30 TABLET | Refills: 0 | Status: SHIPPED | OUTPATIENT
Start: 2020-01-10 | End: 2022-10-03

## 2020-01-10 RX ORDER — ONDANSETRON 4 MG/1
4 TABLET, ORALLY DISINTEGRATING ORAL EVERY 6 HOURS PRN
Qty: 16 TABLET | Refills: 0 | Status: SHIPPED | OUTPATIENT
Start: 2020-01-10 | End: 2022-10-03

## 2020-01-10 RX ORDER — TAMSULOSIN HYDROCHLORIDE 0.4 MG/1
0.4 CAPSULE ORAL DAILY
Qty: 45 CAPSULE | Refills: 0 | Status: SHIPPED | OUTPATIENT
Start: 2020-01-10 | End: 2022-10-03

## 2020-01-10 RX ORDER — OXYCODONE HYDROCHLORIDE 5 MG/1
5-10 TABLET ORAL EVERY 6 HOURS PRN
Qty: 30 TABLET | Refills: 0 | Status: SHIPPED | OUTPATIENT
Start: 2020-01-10 | End: 2022-12-06

## 2020-01-10 RX ADMIN — OXYCODONE HYDROCHLORIDE 10 MG: 5 TABLET ORAL at 00:54

## 2020-01-10 RX ADMIN — OXYCODONE HYDROCHLORIDE 10 MG: 5 TABLET ORAL at 08:51

## 2020-01-10 RX ADMIN — SODIUM CHLORIDE: 9 INJECTION, SOLUTION INTRAVENOUS at 04:22

## 2020-01-10 RX ADMIN — INDOMETHACIN 50 MG: 50 CAPSULE ORAL at 07:36

## 2020-01-10 RX ADMIN — OXYCODONE HYDROCHLORIDE 10 MG: 5 TABLET ORAL at 04:22

## 2020-01-10 RX ADMIN — GABAPENTIN 600 MG: 300 CAPSULE ORAL at 07:36

## 2020-01-10 NOTE — PLAN OF CARE
Outpatient/Observation goals to be met before discharge home:   /76   Pulse 90   Temp 98.1  F (36.7  C) (Oral)   Resp 18   Wt 91.2 kg (201 lb)   SpO2 92%   BMI 33.97 kg/m      -diagnostic tests and consults completed and resulted: Not met  -vital signs normal or at patient baseline: Met  -tolerating oral intake to maintain hydration: Met  -adequate pain control on oral analgesics: On-going  -returns to baseline functional status: Pending  - Has had ureteral stent placed: Met

## 2020-01-10 NOTE — BRIEF OP NOTE
[facility] Brief Operative Note    Pre-operative diagnosis: Right ureteral stone, hydronephrosis with calyceal rupture   Post-operative diagnosis Same   Procedure: Procedure(s):  CYSTOSCOPY, WITH RETROGRADE PYELOGRAM AND RIGHT URETERAL STENT INSERTION   Surgeon(s): Surgeon(s) and Role:     * Irene Otto MD - Primary     * Rosalba Esqueda MD - Resident - Assisting   Estimated blood loss:  Tubes/Drains: 1 mL  6 Fr x 26 cm JJ stent, 16 Fr couch catheter   Specimens: * No specimens in log *   Findings: Bladder and urethra unremarkable, there was mild right hydronephrosis seen on retrograde pyelogram. Good proximal curl of the stent in the renal pelvis and distal curl in the bladder confirmed on fluoroscopy at the end of the case.    Complications:        None.  Dispo:          PACU then Urology obs.    Rosalba Esqueda MD  PGY-2 Urology  Pager [phone]

## 2020-01-10 NOTE — PLAN OF CARE
Outpatient/Observation goals to be met before discharge home:        -diagnostic tests and consults completed and resulted: Not met  -vital signs normal or at patient baseline: Met  -tolerating oral intake to maintain hydration: Met  -adequate pain control on oral analgesics: In progress  -returns to baseline functional status:In progress  - Has had ureteral stent placed: Met

## 2020-01-10 NOTE — PROVIDER NOTIFICATION
Urology text paged re: Pt is ready to discharge. There is an oxycodone script on the unit that needs to be signed in order for pharmacy to fill it. Thank you!

## 2020-01-10 NOTE — OP NOTE
OPERATIVE REPORT  January 9, 2020    PREOPERATIVE DIAGNOSIS: Right ureteral stone with hydronephrosis, question of calyceal rupture    POSTOPERATIVE DIAGNOSIS:  Same    PROCEDURES PERFORMED:   1. Cystourethroscopy with right retrograde pyelography  2. Placement of right ureteral stent.  3. Intraoperative interpretation of fluoroscopic imaging.     STAFF SURGEON: Irene Otto MD    RESIDENT: Roslaba Esqueda MD    ANESTHESIA: General    ESTIMATED BLOOD LOSS: 1 mL.     DRAINS: 6 Fr x 26 cm JJ stent, 16 Fr couch catheter    OPERATIVE INDICATIONS:   Ingris Trevino is a 24 year old female who presented with a 2mm RIGHT obstructing UVJ stone, mild hydronephrosis, and evidence of calyceal rupture on CT. The patient was counseled on the alternatives, risks, and benefits and elected to proceed with the above stated procedure.    DESCRIPTION OF PROCEDURE:    After informed consent was obtained, the patient was taken to the operating room, and moved to the operating table.  After adequate anesthesia was induced, the patient was repositioned in dorsal lithotomy position in supportive yellow fin stirrups with care in neural safety in positioning of all four extremities.  She was then prepped and draped in the usual sterile fashion. A timeout was taken to confirm correct patient, procedure and laterality.     A 22-[place] cystoscope was inserted into a well lubricated urethra. The urethra was unremarkable.  The bladder was free of tumors, stones or diverticuli.  The media was clear.  Bilateral ureteral orifices were orthotopic.  A Sensor guidewire was advanced into the right renal pelvis with the aid of a 5-[place] open ended ureteral catheter.  A retrograde pyelogram demonstrated mild hydronephrosis without filling defects.  The wire was replaced and a 6 Fr x 26 cm JJ stent was advanced over the guidewire under fluoroscopic guidance with a good curl in the renal pelvis and bladder.  The stent passed up easily with no appreciable  resistance.  The bladder was then drained. A 16 Fr couch catheter was placed at the end of the case for maximal drainage.     The patient tolerated the procedure well.  There were no complications.      PLAN:   - Admit overnight for monitoring and pain control  - Couch catheter will be removed in the AM  - Follow-up with Urology for definitive stone management in next 2-3 weeks    Rosalba Esqueda MD  PGY-2 Urology  Pager [phone]    Addendum:    I, Irene Otto, was present for the entire case.  I agree with the note as above with changes made as needed.  I spoke to the patient's mother and wife in the waiting area at the end of the case    Irene Otto MD MPH   of Urology

## 2020-01-10 NOTE — PLAN OF CARE
Outpatient/Observation goals to be met before discharge home:   /53   Pulse 99   Temp 98.5  F (36.9  C) (Oral)   Resp 18   Wt 91.2 kg (201 lb)   SpO2 92%   BMI 33.97 kg/m      -diagnostic tests and consults completed and resulted: Not met  -vital signs normal or at patient baseline: Met  -tolerating oral intake to maintain hydration: Met, Pt also in IVFs.  -adequate pain control on oral analgesics: On-going. PRN Oxycodone x2 given with relief.  -returns to baseline functional status: Pending  - Has had ureteral stent placed: Met

## 2020-01-10 NOTE — PROGRESS NOTES
VSS. A&O. Ambulated in room. Voided.Tolerating diet. Pain managed with PO pain medication. DC instructions given to pt and  verbalized understanding.  All belongings with pt, IV DC'd and documented. Pt discharged home with family member.

## 2020-01-10 NOTE — DISCHARGE SUMMARY
[facility] UroDischarge Summary    Patient: Ingris Trevino    MRN: [id number]   : 1995         Date of Admission:  2020   Date of Discharge::  1/10/2020  Admitting Physician:  Irene Otto MD  Discharge Physician:  Brittney Prado PA-C             Admission Diagnoses:   Right obstructing ureteral stone with concern for ruptured renal calyx    Past Medical History:   Diagnosis Date     Chronic pain      Degenerative disc disease at L5-S1 level              Discharge Diagnosis:   Right obstructing ureteral stone with concern for ruptured renal calyx    Past Medical History:   Diagnosis Date     Chronic pain      Degenerative disc disease at L5-S1 level             Procedures:   Procedure(s): 20 - Cystoscopy with right retrograde pyelogram and right ureteral stent placement.   - Dr. Irene Otot            Medications Prior to Admission:     Medications Prior to Admission   Medication Sig Dispense Refill Last Dose     cyclobenzaprine (FLEXERIL) 10 MG tablet Take 10 mg by mouth 3 times daily as needed for muscle spasms   Taking     gabapentin (NEURONTIN) 300 MG capsule Take 600 mg by mouth 3 times daily   Taking     indomethacin (INDOCIN) 50 MG capsule Take 1 capsule (50 mg) by mouth 3 times daily (with meals) 90 capsule 11 Taking     mirtazapine (REMERON) 15 MG tablet Take 1 tablet (15 mg) by mouth At Bedtime 30 tablet 0 Taking     oxyCODONE-acetaminophen (PERCOCET) 5-325 MG tablet Take one po tid prn severe pain. (Patient taking differently: Take 1 tablet by mouth 3 times daily Take one po tid prn severe pain.) 90 tablet 0 Taking     promethazine (PHENERGAN) 25 MG tablet Take 1 tablet (25 mg) by mouth every 8 hours as needed for nausea associated with migranes 30 tablet 11 Taking     propranolol (INDERAL) 10 MG tablet Take 2 tablets (20 mg) by mouth daily as needed (for anxiety) 30 tablet 0 Taking     propranolol ER (INDERAL LA) 60 MG 24 hr capsule Take 1 capsule (60 mg) by  mouth At Bedtime 30 capsule 0 Taking     sertraline (ZOLOFT) 100 MG tablet Take 2 tablets (200 mg) by mouth daily 60 tablet 0 Taking     topiramate (TOPAMAX) 50 MG tablet Take 1 tablet (50 mg) by mouth 2 times daily 180 tablet 3 Taking             Discharge Medications:     Current Discharge Medication List      START taking these medications    Details   acetaminophen (TYLENOL) 325 MG tablet Take 2 tablets (650 mg) by mouth every 6 hours as needed for mild pain (do not exceed 4000mg of acetaminophen in 24 hours  Qty: 100 tablet, Refills: 0    Associated Diagnoses: Right ureteral stone      ondansetron (ZOFRAN-ODT) 4 MG ODT tab Take 1 tablet (4 mg) by mouth every 6 hours as needed for nausea or vomiting  Qty: 16 tablet, Refills: 0    Associated Diagnoses: Right ureteral stone      oxybutynin (DITROPAN) 5 MG tablet Take 1 tablet (5 mg) by mouth 3 times daily as needed for bladder spasms  Qty: 30 tablet, Refills: 0    Associated Diagnoses: Right ureteral stone      oxyCODONE (ROXICODONE) 5 MG tablet Take 1-2 tablets (5-10 mg) by mouth every 6 hours as needed for moderate to severe pain  Qty: 30 tablet, Refills: 0    Associated Diagnoses: Right ureteral stone      phenazopyridine (AZO) 97.5 MG tablet Take 1 tablet (97.5 mg) by mouth 3 times daily as needed for urinary tract discomfort  Qty: 15 tablet, Refills: 0    Associated Diagnoses: Right ureteral stone      tamsulosin (FLOMAX) 0.4 MG capsule Take 1 capsule (0.4 mg) by mouth daily While you have the ureteral stent in place  Qty: 45 capsule, Refills: 0    Associated Diagnoses: Right ureteral stone         CONTINUE these medications which have NOT CHANGED    Details   cyclobenzaprine (FLEXERIL) 10 MG tablet Take 10 mg by mouth 3 times daily as needed for muscle spasms      gabapentin (NEURONTIN) 300 MG capsule Take 600 mg by mouth 3 times daily      indomethacin (INDOCIN) 50 MG capsule Take 1 capsule (50 mg) by mouth 3 times daily (with meals)  Qty: 90 capsule,  Refills: 11    Associated Diagnoses: Intractable chronic migraine without aura and without status migrainosus      mirtazapine (REMERON) 15 MG tablet Take 1 tablet (15 mg) by mouth At Bedtime  Qty: 30 tablet, Refills: 0    Associated Diagnoses: Severe episode of recurrent major depressive disorder, without psychotic features (H)      oxyCODONE-acetaminophen (PERCOCET) 5-325 MG tablet Take one po tid prn severe pain.  Qty: 90 tablet, Refills: 0    Comments: No further refills.  Associated Diagnoses: Chronic back pain greater than 3 months duration      promethazine (PHENERGAN) 25 MG tablet Take 1 tablet (25 mg) by mouth every 8 hours as needed for nausea associated with migranes  Qty: 30 tablet, Refills: 11    Associated Diagnoses: Intractable chronic migraine without aura and without status migrainosus      propranolol (INDERAL) 10 MG tablet Take 2 tablets (20 mg) by mouth daily as needed (for anxiety)  Qty: 30 tablet, Refills: 0    Associated Diagnoses: Anxiety      propranolol ER (INDERAL LA) 60 MG 24 hr capsule Take 1 capsule (60 mg) by mouth At Bedtime  Qty: 30 capsule, Refills: 0    Associated Diagnoses: Severe episode of recurrent major depressive disorder, without psychotic features (H)      sertraline (ZOLOFT) 100 MG tablet Take 2 tablets (200 mg) by mouth daily  Qty: 60 tablet, Refills: 0    Associated Diagnoses: Severe episode of recurrent major depressive disorder, without psychotic features (H)      topiramate (TOPAMAX) 50 MG tablet Take 1 tablet (50 mg) by mouth 2 times daily  Qty: 180 tablet, Refills: 3    Associated Diagnoses: Intractable chronic migraine without aura and without status migrainosus                   Consultations:   Consultation during this admission received:   None          Brief History of Illness:   Reason for admission requiring a surgical or invasive procedure:   * No pre-op diagnosis entered *   The patient underwent the following procedure(s):   See above   There were no  immediate complications during this procedure.    Please refer to the full operative summary for details.           Hospital Course:   The patient's hospital course was unremarkable.  Ingris Trevino recovered as anticipated and experienced no post-operative complications.      On POD #1 she was ambulating without assitance, tolerating the discharge diet, had pain controlled with PO medications to go home with, and was requiring no IV medications or fluids. Her Alvarado was removed and she voided without difficulty. She was discharged to home with appropriate contact information, follow-up and instructions as seen below in the discharge paperwork.         Discharge Labs:     No results found for: PSA  Recent Labs   Lab 01/10/20  0700 01/09/20  1112   WBC 10.5 12.9*   HGB 12.2 13.9    269     Recent Labs   Lab 01/10/20  0700 01/09/20  1112    139   POTASSIUM 4.1 4.0   CHLORIDE 115* 112*   CO2 23 21   BUN 9 13   CR 0.73 0.81   GLC 98 110*   MAURICIO 8.2* 9.2     Recent Labs   Lab 01/09/20  1140   COLOR Yellow   APPEARANCE Clear   URINEGLC Negative   URINEBILI Negative   URINEKETONE Negative   SG 1.026   URINEPH 8.5*   PROTEIN 10*   NITRITE Negative   LEUKEST Negative   RBCU <1   WBCU <1     No results found for this or any previous visit.    Results for orders placed or performed during the hospital encounter of 01/09/20   CBC with platelets differential     Status: Abnormal   Result Value Ref Range    WBC 12.9 (H) 4.0 - 11.0 10e9/L    RBC Count 4.75 3.8 - 5.2 10e12/L    Hemoglobin 13.9 11.7 - 15.7 g/dL    Hematocrit 42.8 35.0 - 47.0 %    MCV 90 78 - 100 fl    MCH 29.3 26.5 - 33.0 pg    MCHC 32.5 31.5 - 36.5 g/dL    RDW 12.9 10.0 - 15.0 %    Platelet Count 269 150 - 450 10e9/L    Diff Method Automated Method     % Neutrophils 74.4 %    % Lymphocytes 18.8 %    % Monocytes 4.8 %    % Eosinophils 1.2 %    % Basophils 0.4 %    % Immature Granulocytes 0.4 %    Nucleated RBCs 0 0 /100    Absolute Neutrophil 9.6 (H)  1.6 - 8.3 10e9/L    Absolute Lymphocytes 2.4 0.8 - 5.3 10e9/L    Absolute Monocytes 0.6 0.0 - 1.3 10e9/L    Absolute Eosinophils 0.2 0.0 - 0.7 10e9/L    Absolute Basophils 0.1 0.0 - 0.2 10e9/L    Abs Immature Granulocytes 0.1 0 - 0.4 10e9/L    Absolute Nucleated RBC 0.0    Comprehensive metabolic panel     Status: Abnormal   Result Value Ref Range    Sodium 139 133 - 144 mmol/L    Potassium 4.0 3.4 - 5.3 mmol/L    Chloride 112 (H) 94 - 109 mmol/L    Carbon Dioxide 21 20 - 32 mmol/L    Anion Gap 5 3 - 14 mmol/L    Glucose 110 (H) 70 - 99 mg/dL    Urea Nitrogen 13 7 - 30 mg/dL    Creatinine 0.81 0.52 - 1.04 mg/dL    GFR Estimate >90 >60 mL/min/[1.73_m2]    GFR Estimate If Black >90 >60 mL/min/[1.73_m2]    Calcium 9.2 8.5 - 10.1 mg/dL    Bilirubin Total 0.3 0.2 - 1.3 mg/dL    Albumin 3.9 3.4 - 5.0 g/dL    Protein Total 8.0 6.8 - 8.8 g/dL    Alkaline Phosphatase 114 40 - 150 U/L    ALT 33 0 - 50 U/L    AST 16 0 - 45 U/L   Lipase     Status: None   Result Value Ref Range    Lipase 129 73 - 393 U/L   UA reflex to Microscopic and Culture     Status: Abnormal   Result Value Ref Range    Color Urine Yellow     Appearance Urine Clear     Glucose Urine Negative NEG^Negative mg/dL    Bilirubin Urine Negative NEG^Negative    Ketones Urine Negative NEG^Negative mg/dL    Specific Gravity Urine 1.026 1.003 - 1.035    Blood Urine Negative NEG^Negative    pH Urine 8.5 (H) 5.0 - 7.0 pH    Protein Albumin Urine 10 (A) NEG^Negative mg/dL    Urobilinogen mg/dL Normal 0.0 - 2.0 mg/dL    Nitrite Urine Negative NEG^Negative    Leukocyte Esterase Urine Negative NEG^Negative    Source Midstream Urine     RBC Urine <1 0 - 2 /HPF    WBC Urine <1 0 - 5 /HPF    Bacteria Urine Few (A) NEG^Negative /HPF    Squamous Epithelial /HPF Urine 2 (H) 0 - 1 /HPF    Mucous Urine Present (A) NEG^Negative /LPF   HCG qualitative Blood     Status: None   Result Value Ref Range    HCG Qualitative Serum Negative NEG^Negative   Glucose by meter     Status: Abnormal    Result Value Ref Range    Glucose 104 (H) 70 - 99 mg/dL   Basic metabolic panel     Status: Abnormal   Result Value Ref Range    Sodium 144 133 - 144 mmol/L    Potassium 4.1 3.4 - 5.3 mmol/L    Chloride 115 (H) 94 - 109 mmol/L    Carbon Dioxide 23 20 - 32 mmol/L    Anion Gap 6 3 - 14 mmol/L    Glucose 98 70 - 99 mg/dL    Urea Nitrogen 9 7 - 30 mg/dL    Creatinine 0.73 0.52 - 1.04 mg/dL    GFR Estimate >90 >60 mL/min/[1.73_m2]    GFR Estimate If Black >90 >60 mL/min/[1.73_m2]    Calcium 8.2 (L) 8.5 - 10.1 mg/dL   CBC with platelets     Status: None   Result Value Ref Range    WBC 10.5 4.0 - 11.0 10e9/L    RBC Count 4.12 3.8 - 5.2 10e12/L    Hemoglobin 12.2 11.7 - 15.7 g/dL    Hematocrit 38.0 35.0 - 47.0 %    MCV 92 78 - 100 fl    MCH 29.6 26.5 - 33.0 pg    MCHC 32.1 31.5 - 36.5 g/dL    RDW 13.2 10.0 - 15.0 %    Platelet Count 226 150 - 450 10e9/L            Discharge Instructions and Follow-Up:   Diet:  - Regular/ home diet  - Drink 2-3L of water per day (to keep urine light pink / yellow)    Discharge Activity:  - Some hematuria (blood in the urine) is normal when you have a stent.  Return to normal activity only as limited by hematuria that gets worse with increased activity.    - Do not drive until you can press the brake pedal quickly and fully without pain.   - Do not operate a motor vehicle while taking narcotic pain medications.     Medications:  - PAIN: Oxycodone is a narcotic medication that has been prescribed for pain.  Narcotics will cause sleepiness and constipation and can become addictive, therefore it is best to stop or reduce them as soon as you can.  Any left over narcotics should be disposed of with an Authorized  for unneeded medications.  Contact your [facility] s or [facility] s household trash and recycling service to learn about medication disposal options and guidelines for your area.  If you decide to store this medication at home it should be kept in a locked cabinet to prevent  "access to children or visitors. To reduce your narcotic use, take Tylenol (acetaminophen 625mg) as directed.  This has been prescribed for you.  Be aware that percocet also contains acetaminophen (called \"APAP\"). Do not take more than 4,000mg of Tylenol (acetaminophen/ APAP) from all sources in any 24 hour period since this can cause liver damage.  Never drive, operate machinery or drink alcoholic beverages while you are taking narcotic pain medications.      - Take stool softeners such as Senna while you are using narcotics, but stop if you develop diarrhea.   - We have prescribed Flomax (tamsulosin) to help you with your stent symptoms. Rarely, Flomax can cause dizziness with standing.  Stop the medication and call the office if this occurs.   - Stents can sometimes cause bladder spasms (lower abdominal pain that feels like the need to urinate). We have prescribed oxybutynin to use if you have bladder spasms.    - Pyridium is a medication that can help with bladder pain or burning with urination.  Be aware that this will turn your urine dark red.     Stent Instructions:   - You have a  ureteral stent in place. This is not intended to be permanent and must be removed or replaced within 3-4 months. You can expect some flank discomfort on that side, possibly worse with urination or worse first thing in the morning when your bladder is full.  You may also experience the urge to void more frequently or burning in your urethra with urination.  This is normal when you have a stent.    - Continue to take Flomax (tamsulosin) as prescribed to reduce stent discomfort.     - Drink 2-3L of water a day to keep your urine clear to light pink in color. Some blood in your urine can be expected but should clear with increased water consumption as instructed.   - Call for thickening red urine (like tomato juice), large clots or inability to void.   - Strain your urine and collect & save all stones.  Bring the stone pieces to your " "Urology appointment for analysis.    Urethral Catheter Instructions:  - You are going home with a Alvarado catheter which will remain in place until your follow-up appointment.   - Your nurse or  will provide written catheter care instructions for you to take home.  - Protect the catheter and treat it like an extension of your body - keep it secured to your leg and do not let it get caught, snagged, or tugged.  - Apply bacitracin twice daily to the tip of the penis/catheter insertion point to keep the area lubricated and more comfortable.   - You may shower with your catheter in place.  You are encouraged to wash the catheter tubing to keep it clean, and the urine drainage bag also can be gotten wet.     Follow-Up:   - Schedule an appointment to be seen by your primary care provider within 7-10 days of discharge for a postoperative checkup.   - Follow up with Dr. Faust or Cookie.  The Urology Clinic should be contacting you within 3 business days to give you an appointment  - Call or return sooner than your regularly scheduled visit if you develop any of the following:  fever, uncontrolled pain, or uncontrolled nausea or vomiting.  It is normal to see blood in your urine, but seek medical attention if you are passing large clots, are unable to void, or if your urine will not lighten in color with increased fluid intake.      Here are some phone numbers where you can reach us:  - Monday through Friday, 8am to 4:30pm - as long as it is not a holiday, IT IS BEST to call the Urology Clinic Triage Line at: [phone] with any non-emergent urology concerns.    - If it is a night, weekend, or holiday call the [facility] number at [phone] and ask the  to page the \"urology resident on call\".  Typically, the on-call provider should return your call within 30 minutes.  Please page the on-call provider again if you haven't been contacted as expected.  Rarely, the on-call provider will be unable " "to promptly return a call due to a hospital emergency.  If you have paged twice and are still not contacted, ask the hospital  to page the \"urology CHIEF-RESIDENT on call\".  - FOR EMERGENCIES, always call 911 or go to the Emergency Department         Discharge Disposition:   Discharged to home      Attestation: I have reviewed today's vital signs, notes, medications, labs and imaging.    Tere Prado PA-C  Urology Physician Assistant  Personal Pager: [phone]    Please call Job Code:   [phone] to reach the Urology resident or PA on call - Weekdays  [phone] to reach the Urology resident or PA on call - Weeknights and weekends    January 10, 2020         "

## 2020-01-10 NOTE — PROVIDER NOTIFICATION
"Provider text paged re: \"Does pt need to remain NPO this evening. Tolerating ice chips, no n/v. Thanks,\"  "

## 2020-01-10 NOTE — PROGRESS NOTES
"Urology  Progress Note    -NAEON  -Pain controlled - \"much better\"  -Tolerating reg diet  -Couch draining w/o issue    Exam  /53   Pulse 99   Temp 98.5  F (36.9  C) (Oral)   Resp 18   Wt 91.2 kg (201 lb)   SpO2 92%   BMI 33.97 kg/m    No acute distress  Unlabored breathing  Abdomen soft, nontender, nondistended. No CVAT  Couch with clear yellow urine in tubing    /NR    Labs  AM labs pending    Assessment/Plan  24 year old y/o female POD#1 s/p R ureteral stent placement for obstructing stone    Neuro: PRN tylenol, oxycodone, dilaudid for pain control. PTA gabapentin, mirtazapine, propranolol, and topimax  CV: HDS  Pulm: incentive spirometry while awake, wean O2  FEN/GI: Reg diet, MIVF @ 100/hr, bowel reg  Endo: JJ  : Remove couch catheter pending labs. Will scheduled outpatient definitive stone management  Heme/ID: JJ  Activity: Up ad campos, encourage ambulation  PPx: SCDs  Dispo: 6D, likely discharge to home today    Seen and examined with the chief resident. Will discuss with Dr. Otto.    Declan Sierra MD  Urology, PGY-2     Contacting the Urology Team     Please use the following job codes to reach the Urology Team. Note that you must use an in house phone and that job codes cannot receive text pages.     On weekdays, dial [phone] (or star-star-star [phone] on the new [employer] telephones) then [phone] to reach the Adult Urology resident or PA on call    On weekdays, dial [phone] (or star-star-star [phone] on the new [employer] telephones) then [phone] to reach the Pediatric Urology resident    On weeknights and weekends, dial [phone] (or star-star-star [phone] on the new [employer] telephones) then [phone] to reach the Urology resident on call (for both Adult and Pediatrics)                "

## 2020-01-10 NOTE — ANESTHESIA POSTPROCEDURE EVALUATION
Anesthesia POST Procedure Evaluation    Patient: Ingris Trevino   MRN:     [id number] Gender:   female   Age:    24 year old :      1995        Preoperative Diagnosis: * No pre-op diagnosis entered *   Procedure(s):  CYSTOSCOPY, WITH RETROGRADE PYELOGRAM AND RIGHT URETERAL STENT INSERTION   Postop Comments: No value filed.       Anesthesia Type:  Not documented  General    Reportable Event: NO     PAIN: Uncomplicated   Sign Out status: Comfortable, Well controlled pain     PONV: No PONV   Sign Out status:  No Nausea or Vomiting     Neuro/Psych: Uneventful perioperative course   Sign Out Status: Preoperative baseline; Age appropriate mentation     Airway/Resp.: Uneventful perioperative course   Sign Out Status: Non labored breathing, age appropriate RR; Resp. Status within EXPECTED Parameters     CV: Uneventful perioperative course   Sign Out status: Appropriate BP and perfusion indices; Appropriate HR/Rhythm     Disposition:   Sign Out in:  PACU  Disposition:  Floor  Recovery Course: Uneventful  Follow-Up: Not required           Last Anesthesia Record Vitals:  CRNA VITALS  2020 1751 - 2020 1851      2020             Resp Rate (set):  10          Last PACU Vitals:  Vitals Value Taken Time   /76 2020  7:00 PM   Temp 37.2  C (98.9  F) 2020  6:26 PM   Pulse 91 2020  7:00 PM   Resp 16 2020  6:30 PM   SpO2 98 % 2020  7:02 PM   Temp src     NIBP     Pulse     SpO2     Resp     Temp     Ht Rate     Temp 2     Vitals shown include unvalidated device data.      Electronically Signed By: Paige Sun MD, 2020, 7:03 PM

## 2020-01-10 NOTE — PROGRESS NOTES
Orders placed to remove couch catheter. Provider removed couch. Pt able to void post couch removal

## 2020-01-13 ENCOUNTER — PATIENT OUTREACH (OUTPATIENT)
Dept: CARE COORDINATION | Facility: CLINIC | Age: 25
End: 2020-01-13

## 2020-01-13 ENCOUNTER — SURGERY - HEALTHEAST (OUTPATIENT)
Dept: UROLOGY | Facility: CLINIC | Age: 25
End: 2020-01-13

## 2020-01-13 DIAGNOSIS — N20.1 CALCULUS OF URETER: ICD-10-CM

## 2020-01-14 ENCOUNTER — AMBULATORY - HEALTHEAST (OUTPATIENT)
Dept: UROLOGY | Facility: CLINIC | Age: 25
End: 2020-01-14

## 2020-01-14 ENCOUNTER — OFFICE VISIT - HEALTHEAST (OUTPATIENT)
Dept: UROLOGY | Facility: CLINIC | Age: 25
End: 2020-01-14

## 2020-01-14 DIAGNOSIS — N20.1 CALCULUS OF URETER: ICD-10-CM

## 2020-01-14 DIAGNOSIS — N20.0 CALCULUS OF KIDNEY: ICD-10-CM

## 2020-01-14 DIAGNOSIS — N13.2 HYDRONEPHROSIS WITH URINARY OBSTRUCTION DUE TO URETERAL CALCULUS: ICD-10-CM

## 2020-01-14 LAB
BACTERIA #/AREA URNS HPF: ABNORMAL HPF
MUCOUS THREADS #/AREA URNS LPF: ABNORMAL LPF
RBC #/AREA URNS AUTO: >100 HPF
SQUAMOUS #/AREA URNS AUTO: ABNORMAL LPF
WBC #/AREA URNS AUTO: ABNORMAL HPF

## 2020-01-14 NOTE — PROGRESS NOTES
Prior Authorization Approval    oxycodone 5mg tabs  Date Initiated: 1/10/2020  Date Completed: 1/10/2020  Prior Auth Type: Quantity Limit                Status: Approved    Effective Date: 1/10/2020 - 1/24/2020  Copay: 3.04     Filling Pharmacy: [facility] - [place], [place] - [address] [place] [address]    Insurance: [employer] - Phone [phone] Fax [phone]  ID: [id number]  Case Number: [id number]   Submitted Via: Lori Manriquez  [facility] Liaison  Ph: [phone] Page: [phone]

## 2020-01-15 ENCOUNTER — SURGERY - HEALTHEAST (OUTPATIENT)
Dept: SURGERY | Facility: CLINIC | Age: 25
End: 2020-01-15

## 2020-01-15 ENCOUNTER — ANESTHESIA - HEALTHEAST (OUTPATIENT)
Dept: SURGERY | Facility: CLINIC | Age: 25
End: 2020-01-15

## 2020-01-15 ASSESSMENT — MIFFLIN-ST. JEOR: SCORE: 1688.65

## 2020-01-16 ENCOUNTER — OFFICE VISIT (OUTPATIENT)
Dept: PSYCHIATRY | Facility: CLINIC | Age: 25
End: 2020-01-16
Attending: PSYCHIATRY & NEUROLOGY
Payer: COMMERCIAL

## 2020-01-16 VITALS
BODY MASS INDEX: 35.83 KG/M2 | HEART RATE: 90 BPM | SYSTOLIC BLOOD PRESSURE: 110 MMHG | DIASTOLIC BLOOD PRESSURE: 68 MMHG | WEIGHT: 212 LBS

## 2020-01-16 DIAGNOSIS — F33.2 SEVERE EPISODE OF RECURRENT MAJOR DEPRESSIVE DISORDER, WITHOUT PSYCHOTIC FEATURES (H): ICD-10-CM

## 2020-01-16 DIAGNOSIS — F41.9 ANXIETY: ICD-10-CM

## 2020-01-16 PROCEDURE — G0463 HOSPITAL OUTPT CLINIC VISIT: HCPCS | Mod: ZF

## 2020-01-16 RX ORDER — MIRTAZAPINE 15 MG/1
15 TABLET, FILM COATED ORAL AT BEDTIME
Qty: 30 TABLET | Refills: 1 | Status: SHIPPED | OUTPATIENT
Start: 2020-01-16 | End: 2020-03-16

## 2020-01-16 RX ORDER — PROPRANOLOL HCL 60 MG
60 CAPSULE, EXTENDED RELEASE 24HR ORAL AT BEDTIME
Qty: 30 CAPSULE | Refills: 1 | Status: SHIPPED | OUTPATIENT
Start: 2020-01-16 | End: 2020-05-12

## 2020-01-16 RX ORDER — PROPRANOLOL HYDROCHLORIDE 10 MG/1
20 TABLET ORAL DAILY PRN
Qty: 30 TABLET | Refills: 1 | Status: SHIPPED | OUTPATIENT
Start: 2020-01-16 | End: 2020-03-02

## 2020-01-16 ASSESSMENT — PAIN SCALES - GENERAL: PAINLEVEL: EXTREME PAIN (8)

## 2020-01-16 NOTE — PROGRESS NOTES
"     [facility]  Psychiatry Clinic  PROGRESS NOTE     Date of initial Diagnostic Assessment (DA) is 1/10/2019 and most recent Transfer of Care DA is 08/23/19.    CARE TEAM:  PCP- Tia Leonardo    Specialties: Neuro, Ob-Gyn, Pain.              Ingris Trevino is a 25 year old female who prefers the name Margarita & pronouns she, her, hers, herself.      Pertinent Background:  Previous diagnoses include MDD, OCD, Panic Disorder, and chronic pain. She first began citalopram for depression at the age of 14 and then later switched to sertraline in college which she reports was helpful for both mood and OCD.  Has also done CBT for OCD to endorsed benefit.  No history of hospitalizations but patient allows at least one suicide attempt via CO poisoning in 2014 (her most recent SA) and chart review suggests as many as 6 other SA's prior to that.  Notably, patient's first contact with this clinic was a diagnostic assessment completed as a one time SACHIN eval on 1/10/19, which had been recommended in the context of establishing care with new providers upon recently returning to live in [place] given her prolonged use of opiates.  From that DA: \"Unclear that chronic opioids appropriate for her chronic non-malignant pain states. That said, with what information we have, no clear indication that she has used non-medically, escalated in use, or any other concerns.\" Chronic pain condition(s) experienced by the patient include endometriosis, herniated lumbar disk and migraines.  Social history highlights include raised in [place], received BA in Psychology from [employer] in [place], [place], where she met her present wife Jacqui, then lived in [place] from Dec 2015 up to recent return to [place] in Nov 2018.    Psych critical item history includes suicide attempt [multiple] and suicidal ideation.    INTERIM HISTORY      [4, 4]   The patient reports good treatment adherence.  History was provided by " "the patient who was a good historian.    The last visit ended with the following med change: Mirtazapine decreased to 15 mg..   Since the last visit:   Margarita reports that things have been hard recently due to pain. She was recently hospitalized (1 week ago) for a kidney stone and had to have emergency surgery on Thursday night, and had another surgery (cystoscopy and stent insertion) yesterday. They cleared out about 50 stones from her kidneys. She was on Topamax and has to go back to Neuro to have this changed because the Topamax likely had a hand in causing the kidney stones.. Over the holidays, which were stressful, she went to see her family, her grandmother is not doing well and that is hard for her. She went to see her father as well, and she and her wife went to see family in [place]. Her wife's mother is making very poor decisions informed by previous trauma and chronic pain, and they are worried about her. 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  Margarita does \"not know how her wife managed to be so well-adjusted\" growing up in the environment she did.    She reports her job is officially done February 10, and she is currently searching for another job that she could be at for longer than a year.  She describes her feelings as \"very angry and bitter about this, that we were hired for essentially a year-long project.\" The president of the [employer] wants to talk to her but she doesn't see this as something that might be helpful, as she is already laid off. Four people including her got laid off and 3 other are transitioning roles.     In terms of her mood, she reports that she has been feeling \"okay\" recently.  Meeting with a therapist has been very helpful in terms of working through her stress and anger related to her job, and also helping in her primary relationship  as she is not as angry now when she is at home with her wife.  Since increasing the dose of as needed " propranolol for anxiety, her anxiety has been not as bad.  She has an average of 3 panic attacks per month.  She has also found a decrease in the mirtazapine dose quite helpful for sleep, and is no longer having trouble falling asleep.    RECENT PSYCH ROS:   Depression:  depressed mood and low energy  Elevated:  none  Psychosis:  none  Anxiety:  excessive worry  Panic Attack:  SOB, chest tightness and derealization, frequency varies but recently up to 3/month.  Trauma Related:  none  Dysregulation:  irritable  Eating Disorder: no     Pertinent Negative Symptoms:  NO self-injurious behavior/urges, suicidal and violent ideation, aggression, psychosis, jackie and hypomania    RECENT SUBSTANCE USE:     ALCOHOL- none, reports she and wife had had a single bottle of vodka in their house for at least 2 years      TOBACCO- no  CAFFEINE- coffee  OPIOIDS- 1 Percocet tablet 3x daily for chronic pain (as prescribed)        NARCAN KIT- unknown, will discuss at next vist  CANNABIS- none  OTHER ILLICIT DRUGS- none    CURRENT SOCIAL HISTORY:  FINANCIAL SUPPORT- currently working 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    PAST MED TRIALS        See last Diagnostic Assessment  MEDICAL / SURGICAL HISTORY          Pregnant or breastfeeding- no      Contraception- Paragard IUD    Patient Active Problem List   Diagnosis     Intractable chronic migraine without aura and without status migrainosus     Nausea vomiting and diarrhea     Cervicalgia     Chronic bilateral low back pain with bilateral  sciatica     Right ureteral stone       Major Surgery-   Past Surgical History:   Procedure Laterality Date     CYSTOSCOPY, RETROGRADES, INSERT STENT URETER(S), COMBINED Right 1/9/2020    Procedure: CYSTOSCOPY, WITH RETROGRADE PYELOGRAM AND RIGHT URETERAL STENT INSERTION;  Surgeon: Irene Otto MD;  Location: [facility] OR     HC TOOTH EXTRACTION W/FORCEP      local anaesthesia       MEDICAL REVIEW OF SYSTEMS    [2, 10]     A comprehensive review of systems was performed and is negative other than noted in the HPI.    ALLERGY       Seasonal allergies  MEDICATIONS        Current Outpatient Medications   Medication Sig Dispense Refill     acetaminophen (TYLENOL) 325 MG tablet Take 2 tablets (650 mg) by mouth every 6 hours as needed for mild pain (do not exceed 4000mg of acetaminophen in 24 hours 100 tablet 0     cyclobenzaprine (FLEXERIL) 10 MG tablet Take 10 mg by mouth 3 times daily as needed for muscle spasms       gabapentin (NEURONTIN) 300 MG capsule Take 600 mg by mouth 3 times daily       indomethacin (INDOCIN) 50 MG capsule Take 1 capsule (50 mg) by mouth 3 times daily (with meals) 90 capsule 11     mirtazapine (REMERON) 15 MG tablet Take 1 tablet (15 mg) by mouth At Bedtime 30 tablet 1     ondansetron (ZOFRAN-ODT) 4 MG ODT tab Take 1 tablet (4 mg) by mouth every 6 hours as needed for nausea or vomiting 16 tablet 0     oxybutynin (DITROPAN) 5 MG tablet Take 1 tablet (5 mg) by mouth 3 times daily as needed for bladder spasms 30 tablet 0     oxyCODONE (ROXICODONE) 5 MG tablet Take 1-2 tablets (5-10 mg) by mouth every 6 hours as needed for moderate to severe pain 30 tablet 0     oxyCODONE-acetaminophen (PERCOCET) 5-325 MG tablet Take one po tid prn severe pain. (Patient taking differently: Take 1 tablet by mouth 3 times daily Take one po tid prn severe pain.) 90 tablet 0     promethazine (PHENERGAN) 25 MG tablet Take 1 tablet (25 mg) by mouth every 8 hours as needed for nausea associated with migranes 30 tablet  "11     propranolol (INDERAL) 10 MG tablet Take 2 tablets (20 mg) by mouth daily as needed (for anxiety) 30 tablet 1     propranolol ER (INDERAL LA) 60 MG 24 hr capsule Take 1 capsule (60 mg) by mouth At Bedtime 30 capsule 1     sertraline (ZOLOFT) 100 MG tablet Take 2 tablets (200 mg) by mouth daily 60 tablet 0     tamsulosin (FLOMAX) 0.4 MG capsule Take 1 capsule (0.4 mg) by mouth daily While you have the ureteral stent in place 45 capsule 0     phenazopyridine (AZO) 97.5 MG tablet Take 1 tablet (97.5 mg) by mouth 3 times daily as needed for urinary tract discomfort (Patient not taking: Reported on 1/16/2020) 15 tablet 0     topiramate (TOPAMAX) 50 MG tablet Take 1 tablet (50 mg) by mouth 2 times daily (Patient not taking: Reported on 1/16/2020) 180 tablet 3     VITALS      [3, 3]   /68   Pulse 90   Wt 96.2 kg (212 lb)   BMI 35.83 kg/m     MENTAL STATUS EXAM      [9, 14 cog gs]     Alertness: alert  and oriented  Appearance: well groomed and casually dressed  Behavior/Demeanor: cooperative and pleasant, with good  eye contact   Speech: moderate volume, often with long pauses to think  Language: intact  Psychomotor: normal or unremarkable  Mood: \"OK\"  Affect: restricted and appropriate, was congruent to mood; was congruent to content  Thought Process/Associations: unremarkable and though there are pauses in speech, no evidence of thought blocking  Thought Content:  Reports none;  Denies suicidal ideation, violent ideation and paranoid ideation  Perception:  Reports none;  Denies auditory hallucinations and visual hallucinations  Insight: good  Judgment: good and adequate for safety  Cognition: (6) does  appear grossly intact; formal cognitive testing was not done  Gait and Station: unremarkable    LABS and DATA     AIMS:  not needed    PHQ9 TODAY = 13  PHQ-9 SCORE 4/2/2019 8/23/2019 12/19/2019   PHQ-9 Total Score 5 8 14       Recent Labs   Lab Test 01/10/20  0700 01/09/20  1112 03/29/19  0647   CR 0.73 0.81 " 0.61   GFRESTIMATED >90 >90 >90     Recent Labs   Lab Test 01/09/20  1112 03/29/19  0647 03/28/19  0626   AST 16 25 24   ALT 33 41 42   ALKPHOS 114 91 108       PSYCHOTROPIC DRUG INTERACTIONS     Concurrent use of OXYCODONE and SERTRALINE may result in an increased risk of serotonin syndrome (tachycardia, hyperthermia, myoclonus, mental status changes).    Concurrent use of MIRTAZAPINE and SEROTONERGIC AGENTS may result in increased risk of serotonin syndrome.     Concurrent use of OXYCODONE and CNS DEPRESSANTS may result in increased risk of respiratory and CNS depression.     Concurrent use of MIRTAZAPINE and SEROTONERGIC AGENTS may result in increased risk of serotonin syndrome.     MANAGEMENT:  Monitoring for adverse effects, routine vitals and patient is aware of risks    RISK STATEMENT for SAFETY     Ingris Trevino did not appear to be an imminent safety risk to self or others.    PSYCHIATRIC DIAGNOSIS     Major Depressive Disorder, recurrent episode, partial remission to mild  R/o Persistent Depressive Disorder  Panic Disorder  Obsessive-Compulsive Disorder  Social Anxiety Disorder     ASSESSMENT      [m2, h3]     TODAY:  Margarita Trevino presents to [facility] Psychiatry for continuing medication management of MDD, Panic Disorder and OCD. She relates interim stability of mood, and improvement in sleep since decreasing mirtazapine dose at bedtime.  Her stress level around the holidays was quite high due to family concerns, and she remains very angry about being laid off at her job due to the [employer], but she feels she has been coping relatively well particularly with skills she has learned in therapy.  Also denies SI/SIB thoughts, violent/aggressive ideation, elevated mood states, OCD symptom flare or other hallmarks of psychiatric decompensation or dangerousness. Having infrequent panic attacks that she manages with propranolol and coping skills. Overall, endorses her  self-assessment of the efficacy/tolerability of her present regimen and we have agreed to continue same without adjustment.     PLAN      [m2, h3]     1) PSYCHOTROPIC MEDICATIONS:  Continue mirtazapine 15 mg PO at bedtime.  Continue sertraline 200 mg daily.  Continue propranolol LA 60 mg daily.  Continue propranolol IR 20 mg PRN daily for acute anxiety.    2) [place]  was checked today: indicates expected use in accordance with chart review/patient report.    3) THERAPY:    -Continue individual therapy    4) NEXT DUE:    Labs- N/A  EKG- PRN  Rating Scales- N/A    5) REFERRALS:    No Referrals needed  Therapy- see above     6) RTC: 2 months    7) CRISIS NUMBERS:   Provided routinely in AVS   CRISIS TEXT LINE: Text [id number] from anywhere in [place], anytime, any crisis 24/7;  OR SEE www.crisistextline.org  ONLY if a KIM PT: [facility] [place] [facility] [phone] (clinic), [phone] (after hours)     TREATMENT RISK STATEMENT:  The risks, benefits, alternatives and potential adverse effects have been discussed and are understood by the pt. The pt understands the risks of using street drugs or alcohol. There are no medical contraindications, the pt agrees to treatment with the ability to do so. The pt knows to call the clinic for any problems or to access emergency care if needed.  Medical and substance use concerns are documented above.  Psychotropic drug interaction check was done, including changes made today.    PROVIDER: Esthela Dye MD    Patient not staffed in clinic.  Note will be reviewed and signed by supervisor Dr. Miller.    Attestation:  I did not see Ingris E Uriel directly. I have reviewed the documentation and I agree with the resident's plan of care.   Mateo Miller MD

## 2020-01-17 LAB — BACTERIA SPEC CULT: ABNORMAL

## 2020-01-20 ENCOUNTER — COMMUNICATION - HEALTHEAST (OUTPATIENT)
Dept: UROLOGY | Facility: CLINIC | Age: 25
End: 2020-01-20

## 2020-01-21 DIAGNOSIS — F33.2 SEVERE EPISODE OF RECURRENT MAJOR DEPRESSIVE DISORDER, WITHOUT PSYCHOTIC FEATURES (H): ICD-10-CM

## 2020-01-21 RX ORDER — SERTRALINE HYDROCHLORIDE 100 MG/1
200 TABLET, FILM COATED ORAL DAILY
Qty: 60 TABLET | Refills: 0 | Status: SHIPPED | OUTPATIENT
Start: 2020-01-21 | End: 2020-02-16

## 2020-01-21 NOTE — TELEPHONE ENCOUNTER
Medication requested: sertraline (ZOLOFT) 100 MG tablet   Take 2 tablets (200 mg) by mouth daily   Last refilled: 12/19/19  Qty: 60/0      Last seen: 1/16/20  RTC: 2 months  Cancel: 0  No-show: 0  Next appt: 3/9/20    Refill decision:   Refilled for 30 days per protocol.  Overridden by Esthela Dye MD on Dec 19, 2019 9:19 AM   Drug-Drug   1. SELECTIVE SEROTONIN REUPTAKE INHIBITORS / NSAIDS [Level: Major]   Other Orders: indomethacin (INDOCIN) 50 MG capsule

## 2020-01-24 ENCOUNTER — AMBULATORY - HEALTHEAST (OUTPATIENT)
Dept: UROLOGY | Facility: CLINIC | Age: 25
End: 2020-01-24

## 2020-01-24 DIAGNOSIS — N20.1 CALCULUS OF URETER: ICD-10-CM

## 2020-01-24 DIAGNOSIS — N20.0 CALCULUS OF KIDNEY: ICD-10-CM

## 2020-01-24 LAB
ALBUMIN SERPL-MCNC: 3.7 G/DL (ref 3.5–5)
ALBUMIN UR-MCNC: ABNORMAL MG/DL
ANION GAP SERPL CALCULATED.3IONS-SCNC: 9 MMOL/L (ref 5–18)
APPEARANCE UR: ABNORMAL
BILIRUB UR QL STRIP: ABNORMAL
BUN SERPL-MCNC: 13 MG/DL (ref 8–22)
CALCIUM SERPL-MCNC: 9.2 MG/DL (ref 8.5–10.5)
CALCIUM SERPL-MCNC: 9.2 MG/DL (ref 8.5–10.5)
CALCIUM, IONIZED MEASURED: 1.2 MMOL/L (ref 1.11–1.3)
CHLORIDE BLD-SCNC: 106 MMOL/L (ref 98–107)
CO2 SERPL-SCNC: 26 MMOL/L (ref 22–31)
COLOR UR AUTO: YELLOW
CREAT SERPL-MCNC: 0.72 MG/DL (ref 0.6–1.1)
CREAT SERPL-MCNC: 0.72 MG/DL (ref 0.6–1.1)
GFR SERPL CREATININE-BSD FRML MDRD: >60 ML/MIN/1.73M2
GFR SERPL CREATININE-BSD FRML MDRD: >60 ML/MIN/1.73M2
GLUCOSE BLD-MCNC: 119 MG/DL (ref 70–125)
GLUCOSE UR STRIP-MCNC: NEGATIVE MG/DL
HGB UR QL STRIP: ABNORMAL
ION CA PH 7.4: 1.18 MMOL/L (ref 1.11–1.3)
KETONES UR STRIP-MCNC: ABNORMAL MG/DL
LEUKOCYTE ESTERASE UR QL STRIP: ABNORMAL
MAGNESIUM SERPL-MCNC: 1.8 MG/DL (ref 1.8–2.6)
NITRATE UR QL: NEGATIVE
PH UR STRIP: 6 [PH] (ref 5–8)
PH: 7.35 (ref 7.35–7.45)
PHOSPHATE SERPL-MCNC: 2.5 MG/DL (ref 2.5–4.5)
PHOSPHATE SERPL-MCNC: 2.6 MG/DL (ref 2.5–4.5)
POTASSIUM BLD-SCNC: 4.2 MMOL/L (ref 3.5–5)
PTH-INTACT SERPL-MCNC: 128 PG/ML (ref 10–86)
SODIUM SERPL-SCNC: 141 MMOL/L (ref 136–145)
SP GR UR STRIP: >=1.03 (ref 1–1.03)
URATE SERPL-MCNC: 5.2 MG/DL (ref 2–7.5)
UROBILINOGEN UR STRIP-ACNC: ABNORMAL

## 2020-01-25 LAB — BACTERIA SPEC CULT: NORMAL

## 2020-02-14 DIAGNOSIS — F33.2 SEVERE EPISODE OF RECURRENT MAJOR DEPRESSIVE DISORDER, WITHOUT PSYCHOTIC FEATURES (H): ICD-10-CM

## 2020-02-16 RX ORDER — SERTRALINE HYDROCHLORIDE 100 MG/1
200 TABLET, FILM COATED ORAL DAILY
Qty: 60 TABLET | Refills: 0 | Status: SHIPPED | OUTPATIENT
Start: 2020-02-16 | End: 2020-03-16

## 2020-02-17 NOTE — TELEPHONE ENCOUNTER
Medication requested: SERTRALINE  MG TABLET  Last refilled: 1/21/2020  Qty: 60      Last seen: 1/16/2020  RTC: 2 months  Cancel: 0  No-show: 0  Next appt: 3/9/2020    Refill decision:   Refilled for 30 days per protocol.  Overridden by Esthela Dye MD on Dec 19, 2019 9:19 AM   Drug-Drug   1. SELECTIVE SEROTONIN REUPTAKE INHIBITORS / NSAIDS [Level: Major]   Other Orders: indomethacin (INDOCIN) 50 MG capsule

## 2020-02-26 ENCOUNTER — HOSPITAL ENCOUNTER (OUTPATIENT)
Dept: ULTRASOUND IMAGING | Facility: CLINIC | Age: 25
Discharge: HOME | End: 2020-02-26
Attending: UROLOGY

## 2020-02-26 ENCOUNTER — OFFICE VISIT - HEALTHEAST (OUTPATIENT)
Dept: UROLOGY | Facility: CLINIC | Age: 25
End: 2020-02-26

## 2020-02-26 DIAGNOSIS — Z87.442 HISTORY OF KIDNEY STONES: ICD-10-CM

## 2020-03-02 DIAGNOSIS — F41.9 ANXIETY: ICD-10-CM

## 2020-03-02 RX ORDER — PROPRANOLOL HYDROCHLORIDE 10 MG/1
20 TABLET ORAL DAILY PRN
Qty: 60 TABLET | Refills: 0 | Status: SHIPPED | OUTPATIENT
Start: 2020-03-02 | End: 2020-03-20

## 2020-03-13 DIAGNOSIS — F33.2 SEVERE EPISODE OF RECURRENT MAJOR DEPRESSIVE DISORDER, WITHOUT PSYCHOTIC FEATURES (H): ICD-10-CM

## 2020-03-16 DIAGNOSIS — F33.2 SEVERE EPISODE OF RECURRENT MAJOR DEPRESSIVE DISORDER, WITHOUT PSYCHOTIC FEATURES (H): ICD-10-CM

## 2020-03-16 RX ORDER — SERTRALINE HYDROCHLORIDE 100 MG/1
TABLET, FILM COATED ORAL
Qty: 180 TABLET | Refills: 0 | Status: SHIPPED | OUTPATIENT
Start: 2020-03-16 | End: 2020-05-22

## 2020-03-16 RX ORDER — MIRTAZAPINE 15 MG/1
15 TABLET, FILM COATED ORAL AT BEDTIME
Qty: 30 TABLET | Refills: 0 | Status: SHIPPED | OUTPATIENT
Start: 2020-03-16 | End: 2020-03-20

## 2020-03-16 NOTE — TELEPHONE ENCOUNTER
Last seen: 1/16/20  RTC: 2 months   Cancel: 3/9/20  No-show: none   Next appt: 3/20/20    Incoming refill from pharmacy via Rx Auth     sertraline (ZOLOFT) 100 MG tablet  60 tablet  0  2/16/2020   No    Sig - Route: Take 2 tablets (200 mg) by mouth daily - Oral    Sent to pharmacy as: sertraline (ZOLOFT) 100 MG tablet    Class: E-Prescribe    Order: [id number]    E-Prescribing Status: Receipt confirmed by pharmacy (2/16/2020  9:34 PM CST)      Will route to provider for approval

## 2020-03-16 NOTE — TELEPHONE ENCOUNTER
Last seen: 1/16/20    RTC: 2 months    Cancel: 0    No-show: 0    Next appt: 3/20/20     Incoming refill from [facility] via fax     Medication requested: mirtazapine (REMERON) 15 MG tablet       Directions: Take 1 tablet (15 mg) by mouth At Bedtime     Qty: 30    Last refilled: 2/14/20     Medication refilled per refill protocol

## 2020-03-20 ENCOUNTER — VIRTUAL VISIT (OUTPATIENT)
Dept: PSYCHIATRY | Facility: CLINIC | Age: 25
End: 2020-03-20
Attending: PSYCHIATRY & NEUROLOGY
Payer: COMMERCIAL

## 2020-03-20 DIAGNOSIS — F41.9 ANXIETY: ICD-10-CM

## 2020-03-20 DIAGNOSIS — F33.2 SEVERE EPISODE OF RECURRENT MAJOR DEPRESSIVE DISORDER, WITHOUT PSYCHOTIC FEATURES (H): ICD-10-CM

## 2020-03-20 RX ORDER — PROPRANOLOL HYDROCHLORIDE 20 MG/1
20 TABLET ORAL DAILY PRN
Qty: 90 TABLET | Refills: 0 | Status: SHIPPED | OUTPATIENT
Start: 2020-03-20 | End: 2020-05-22

## 2020-03-20 RX ORDER — MIRTAZAPINE 15 MG/1
15 TABLET, FILM COATED ORAL AT BEDTIME
Qty: 90 TABLET | Refills: 0 | Status: SHIPPED | OUTPATIENT
Start: 2020-03-30 | End: 2020-05-22

## 2020-03-20 NOTE — PROGRESS NOTES
"     [facility]  Psychiatry Clinic  PROGRESS NOTE     Date of initial Diagnostic Assessment (DA) is 1/10/2019 and most recent Transfer of Care DA is 08/23/19.    CARE TEAM:  PCP- Tia Leonardo    Specialties: Neuro, Ob-Gyn, Pain.              Ingris Trevino is a 25 year old female who prefers the name Margarita & pronouns she, her, hers, herself.      Pertinent Background:  Previous diagnoses include MDD, OCD, Panic Disorder, and chronic pain. She first began citalopram for depression at the age of 14 and then later switched to sertraline in college which she reports was helpful for both mood and OCD.  Has also done CBT for OCD to endorsed benefit.  No history of hospitalizations but patient allows at least one suicide attempt via CO poisoning in 2014 (her most recent SA) and chart review suggests as many as 6 other SA's prior to that.  Notably, patient's first contact with this clinic was a diagnostic assessment completed as a one time SACHIN eval on 1/10/19, which had been recommended in the context of establishing care with new providers upon recently returning to live in [place] given her prolonged use of opiates.  From that DA: \"Unclear that chronic opioids appropriate for her chronic non-malignant pain states. That said, with what information we have, no clear indication that she has used non-medically, escalated in use, or any other concerns.\" Chronic pain condition(s) experienced by the patient include endometriosis, herniated lumbar disk and migraines.  Social history highlights include raised in [place], received BA in Psychology from [employer] in [place], [place], where she met her present wife Jacqui, then lived in [place] from Dec 2015 up to recent return to [place] in Nov 2018.    Psych critical item history includes suicide attempt [multiple] and suicidal ideation.    INTERIM HISTORY      [4, 4]   The patient reports good treatment adherence.  History was provided by " "the patient who was a good historian.    The last visit ended with no change to the med regimen.   Since the last visit:   Margarita reports she has been having worsening anxiety over the past few weeks related to the coronavirus outbreak, but she feels everyone is anxious about the same things. Concerned about her own health, health of her loved ones, finding a job, etc. States \"otherwise things are about the same,\" denies low mood or SI, stating \"I don't think anything is going on with depression or anything.\" Found decrease in mirtazapine dose helpful with sleep, not interested in increasing. Has had family come into town because of the outbreak, was a little worried about additional stress, \"but it's just the current reality.\" Therapist is working on setting up a [employer] capability. She is continuing to job hunt, which keeps her busy. Her wife is also at home, which is helpful because she is available to talk with Margarita when she gets anxious. Having panic attacks about 1x/day for the past few weeks,most recently last night after she read an updated description of coronavirus symptoms and read that certain blood types are more susceptible. Discussed using coping skills. Having her partner here to talk through, using TIPP skills, having the dogs there. Has been taking the dogs out, which has been a nice break. Dogs are happy as ever, because their moms are home.     RECENT PSYCH ROS:   Depression:  low energy and poor concentration /memory  Elevated:  none  Psychosis:  none  Anxiety:  excessive worry, feeling fearful and social anxiety  Panic Attack:  SOB, chest tightness and derealization, daily, managing with coping skills, working with therapist (triggered by anxiety).  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    PAST MED TRIALS        See last Diagnostic Assessment  MEDICAL / SURGICAL HISTORY          Pregnant or breastfeeding- no      Contraception- Paragard IUD    Patient Active Problem List   Diagnosis     Intractable chronic migraine without aura and without status migrainosus     Nausea vomiting and diarrhea     Cervicalgia     Chronic bilateral low back pain with bilateral sciatica     Right ureteral stone       Major Surgery-   Past Surgical History:   Procedure Laterality Date     CYSTOSCOPY, RETROGRADES, INSERT STENT URETER(S), COMBINED Right 1/9/2020    Procedure: CYSTOSCOPY, WITH RETROGRADE PYELOGRAM AND RIGHT URETERAL STENT INSERTION;  Surgeon: Irene Otto MD;  Location: [facility] OR      TOOTH EXTRACTION W/FORCEP      local anaesthesia       MEDICAL REVIEW OF SYSTEMS    [2, 10]     A comprehensive review of systems was performed and is negative other than noted in the HPI.    ALLERGY       Seasonal allergies  MEDICATIONS        Current Outpatient Medications   Medication Sig Dispense Refill     [START ON 3/30/2020] mirtazapine (REMERON) 15 MG tablet Take 1 tablet (15 mg) by mouth At Bedtime 90 tablet 0     propranolol (INDERAL) 20 MG  tablet Take 1 tablet (20 mg) by mouth daily as needed (for anxiety) 90 tablet 0     acetaminophen (TYLENOL) 325 MG tablet Take 2 tablets (650 mg) by mouth every 6 hours as needed for mild pain (do not exceed 4000mg of acetaminophen in 24 hours 100 tablet 0     cyclobenzaprine (FLEXERIL) 10 MG tablet Take 10 mg by mouth 3 times daily as needed for muscle spasms       gabapentin (NEURONTIN) 300 MG capsule Take 600 mg by mouth 3 times daily       indomethacin (INDOCIN) 50 MG capsule Take 1 capsule (50 mg) by mouth 3 times daily (with meals) 90 capsule 11     ondansetron (ZOFRAN-ODT) 4 MG ODT tab Take 1 tablet (4 mg) by mouth every 6 hours as needed for nausea or vomiting 16 tablet 0     oxybutynin (DITROPAN) 5 MG tablet Take 1 tablet (5 mg) by mouth 3 times daily as needed for bladder spasms 30 tablet 0     oxyCODONE (ROXICODONE) 5 MG tablet Take 1-2 tablets (5-10 mg) by mouth every 6 hours as needed for moderate to severe pain 30 tablet 0     oxyCODONE-acetaminophen (PERCOCET) 5-325 MG tablet Take one po tid prn severe pain. (Patient taking differently: Take 1 tablet by mouth 3 times daily Take one po tid prn severe pain.) 90 tablet 0     phenazopyridine (AZO) 97.5 MG tablet Take 1 tablet (97.5 mg) by mouth 3 times daily as needed for urinary tract discomfort (Patient not taking: Reported on 1/16/2020) 15 tablet 0     promethazine (PHENERGAN) 25 MG tablet Take 1 tablet (25 mg) by mouth every 8 hours as needed for nausea associated with migranes 30 tablet 11     propranolol ER (INDERAL LA) 60 MG 24 hr capsule Take 1 capsule (60 mg) by mouth At Bedtime 30 capsule 1     sertraline (ZOLOFT) 100 MG tablet Take 2 tabs (200 mg) by mouth daily 180 tablet 0     tamsulosin (FLOMAX) 0.4 MG capsule Take 1 capsule (0.4 mg) by mouth daily While you have the ureteral stent in place 45 capsule 0     topiramate (TOPAMAX) 50 MG tablet Take 1 tablet (50 mg) by mouth 2 times daily (Patient not taking: Reported on 1/16/2020) 180 tablet 3  "    VITALS      [3, 3]   There were no vitals taken for this visit.   MENTAL STATUS EXAM      [9, 14 cog gs]     Alertness: alert  and oriented, interactive  Appearance: well groomed and casually dressed  Behavior/Demeanor: cooperative and pleasant, with good  eye contact   Speech: moderate volume, often with long pauses to think  Language: intact  Psychomotor: normal or unremarkable  Mood: \"OK\"  Affect: restricted and appropriate, was congruent to mood; was congruent to content  Thought Process/Associations: unremarkable and though there are pauses in speech, no evidence of thought blocking  Thought Content:  Reports none;  Denies suicidal ideation, violent ideation and paranoid ideation  Perception:  Reports none;  Denies auditory hallucinations and visual hallucinations  Insight: good  Judgment: good and adequate for safety  Cognition: (6) does  appear grossly intact; formal cognitive testing was not done  Gait and Station: not assessed over telemedicine    LABS and DATA     AIMS:  not needed    PHQ9 TODAY = 13  PHQ-9 SCORE 4/2/2019 8/23/2019 12/19/2019   PHQ-9 Total Score 5 8 14       Recent Labs   Lab Test 01/10/20  0700 01/09/20  1112 03/29/19  0647   CR 0.73 0.81 0.61   GFRESTIMATED >90 >90 >90     Recent Labs   Lab Test 01/09/20  1112 03/29/19  0647 03/28/19  0626   AST 16 25 24   ALT 33 41 42   ALKPHOS 114 91 108       PSYCHOTROPIC DRUG INTERACTIONS     Concurrent use of OXYCODONE and SERTRALINE may result in an increased risk of serotonin syndrome (tachycardia, hyperthermia, myoclonus, mental status changes).    Concurrent use of MIRTAZAPINE and SEROTONERGIC AGENTS may result in increased risk of serotonin syndrome.     Concurrent use of OXYCODONE and CNS DEPRESSANTS may result in increased risk of respiratory and CNS depression.     Concurrent use of MIRTAZAPINE and SEROTONERGIC AGENTS may result in increased risk of serotonin syndrome.     MANAGEMENT:  Monitoring for adverse effects, routine vitals and " patient is aware of risks    RISK STATEMENT for SAFETY     Ingris Trevino did not appear to be an imminent safety risk to self or others.    PSYCHIATRIC DIAGNOSIS     Major Depressive Disorder, recurrent episode, partial remission to mild  R/o Persistent Depressive Disorder  Generalized Anxiety Disorder with Panic  Obsessive-Compulsive Disorder  Social Anxiety Disorder     ASSESSMENT      [m2, h3]     TODAY:  Margarita Trevino presents to [facility]ry for continuing medication management of MDD and OCD. She has historical diagnosis of panic disorder but currently meets criteria for MADIHA with panic, as panic attacks are triggered by anxious thoughts, particularly related to Covid-19 outbreak. She relates interim stability of mood, and improvement in sleep since decreasing mirtazapine dose at bedtime. Also denies SI/SIB thoughts, violent/aggressive ideation, elevated mood states, OCD symptom flare or other hallmarks of psychiatric decompensation or dangerousness. Having more frequent panic attacks, triggered by situational stressors, including COVID-19 pandemic, that she manages with propranolol and coping skills. Discussed options for managing anxiety with mindfulness and provided resources of [employer] mindfulness [facility]heva and [employer] [facility] for a more in-depth dive on mindfulness. Overall, endorses her self-assessment of the efficacy/tolerability of her present regimen and we have agreed to continue same without adjustment.     PLAN      [m2, h3]     1) PSYCHOTROPIC MEDICATIONS:  Continue mirtazapine 15 mg PO at bedtime.  Continue sertraline 200 mg daily.  Continue propranolol LA 60 mg daily.  Continue propranolol IR 20 mg PRN daily for acute anxiety.    2) [place]  was checked today: indicates expected use in accordance with chart review/patient report.    3) THERAPY:    -Continue individual therapy    4) NEXT DUE:    Labs- N/A  EKG- PRN  Rating Scales- N/A    5) REFERRALS:    No Referrals  needed  Therapy- see above     6) RTC: 10:30 on 5/22, telehealth per pt    7) CRISIS NUMBERS:   Provided routinely in AVS   CRISIS TEXT LINE: Text [id number] from anywhere in [place], anytime, any crisis 24/7;  OR SEE www.crisistextline.org  ONLY if a [facility] PT: [facility] [place] [facility] [phone] (clinic), [phone] (after hours)     TREATMENT RISK STATEMENT:  The risks, benefits, alternatives and potential adverse effects have been discussed and are understood by the pt. The pt understands the risks of using street drugs or alcohol. There are no medical contraindications, the pt agrees to treatment with the ability to do so. The pt knows to call the clinic for any problems or to access emergency care if needed.  Medical and substance use concerns are documented above.  Psychotropic drug interaction check was done, including changes made today.    PROVIDER: Esthela Dye MD    Patient not staffed in clinic.  Note will be reviewed and signed by supervisor Dr. Miller.    Type of service: Telemedicine Office Visit for medication management  Time of service:     Date: 03/20/20     Time Service Began: 1020    Time Service Ended: 1035  Reason that telemedicine is appropriate: Patient unable to travel due to coronavirus outbreak  Mode of transmission: Secure real time interactive audio and visual telecommunication system Doxy  Location of originating and distant sites:    Originating site (patient location): home    Distant site (provider site): [facility]    Patient has agreed to receiving services via telemedicine technology.      Attestation:  I did not see Ingris Trevino directly. I have reviewed the documentation and I agree with the resident's plan of care.   Mateo Miller MD

## 2020-05-11 DIAGNOSIS — F33.2 SEVERE EPISODE OF RECURRENT MAJOR DEPRESSIVE DISORDER, WITHOUT PSYCHOTIC FEATURES (H): ICD-10-CM

## 2020-05-12 RX ORDER — PROPRANOLOL HCL 60 MG
60 CAPSULE, EXTENDED RELEASE 24HR ORAL AT BEDTIME
Qty: 30 CAPSULE | Refills: 0 | Status: SHIPPED | OUTPATIENT
Start: 2020-05-12 | End: 2020-06-03

## 2020-05-12 NOTE — TELEPHONE ENCOUNTER
Medication requested: propranolol ER (INDERAL LA) 60 MG 24 hr capsule    Last refilled: 1/16/20  Qty: 30/1      Last seen: 3/20/20  RTC: 8 weeks  Cancel: 0  No-show: 0  Next appt: 5/22/20    Refill decision:   Refilled for 30 days per protocol.

## 2020-05-22 ENCOUNTER — VIRTUAL VISIT (OUTPATIENT)
Dept: PSYCHIATRY | Facility: CLINIC | Age: 25
End: 2020-05-22
Attending: PSYCHIATRY & NEUROLOGY
Payer: COMMERCIAL

## 2020-05-22 ENCOUNTER — TELEPHONE (OUTPATIENT)
Dept: PSYCHIATRY | Facility: CLINIC | Age: 25
End: 2020-05-22

## 2020-05-22 DIAGNOSIS — F41.9 ANXIETY: ICD-10-CM

## 2020-05-22 DIAGNOSIS — F33.2 SEVERE EPISODE OF RECURRENT MAJOR DEPRESSIVE DISORDER, WITHOUT PSYCHOTIC FEATURES (H): ICD-10-CM

## 2020-05-22 RX ORDER — SERTRALINE HYDROCHLORIDE 100 MG/1
TABLET, FILM COATED ORAL
Qty: 180 TABLET | Refills: 0 | Status: SHIPPED | OUTPATIENT
Start: 2020-05-22 | End: 2020-08-03

## 2020-05-22 RX ORDER — PROPRANOLOL HYDROCHLORIDE 20 MG/1
20 TABLET ORAL DAILY PRN
Qty: 90 TABLET | Refills: 0 | Status: SHIPPED | OUTPATIENT
Start: 2020-05-22 | End: 2020-08-03

## 2020-05-22 RX ORDER — MIRTAZAPINE 15 MG/1
15 TABLET, FILM COATED ORAL AT BEDTIME
Qty: 90 TABLET | Refills: 0 | Status: SHIPPED | OUTPATIENT
Start: 2020-05-22 | End: 2020-08-03

## 2020-05-22 NOTE — PROGRESS NOTES
"VIDEO VISIT  Ingris Trevino is a 25 year old patient who is being evaluated via a billable video visit.      The patient has been notified of following:   \"We have found that certain health care needs can be provided without the need for an in-person physical exam. This service lets us provide the care you need with a video conversation. If a prescription is necessary we can send it directly to your pharmacy. If lab work is needed we can place an order for that and you can then stop by our lab to have the test done at a later time. Insurers are generally covering virtual visits as they would in-office visits so billing should not be different than normal.  If for some reason you do get billed incorrectly, you should contact the billing office to correct it and that number is in the AVS .    Patient has given verbal consent for video visit?: Yes   How would you like to obtain your AVS?: [employer]  AVS SmartPhrase [PsychAVS] has been placed in 'Patient Instructions': Yes      Video- Visit Details  Type of service:  video visit for medication management  Time of service:    Date: 5/22/20    Video Start Time: 10:30 AM      Video End Time:  11:00 AM    Reason for video visit:  Patient unable to travel due to Covid-19  Originating Site (patient location):  [facility]   Location- Patient's home  Distant Site (provider location):  Remote location  Mode of Communication:  Video Conference via Doxy.me  Consent:  Patient has given verbal consent for video visit?: Yes          [facility]  Psychiatry Clinic  PROGRESS NOTE     Date of initial Diagnostic Assessment (DA) is 1/10/2019 and most recent Transfer of Care DA is 08/23/19.    CARE TEAM:  PCP- Tia Leonardo    Specialties: Neuro, Ob-Gyn, Pain.              Ingris Trevino is a 25 year old female who prefers the name Margarita & pronouns she, her, hers, herself.      Pertinent Background:  Previous diagnoses include MDD, OCD, " "Panic Disorder, and chronic pain. She first began citalopram for depression at the age of 14 and then later switched to sertraline in college which she reports was helpful for both mood and OCD.  Has also done CBT for OCD to endorsed benefit.  No history of hospitalizations but patient allows at least one suicide attempt via CO poisoning in 2014 (her most recent SA) and chart review suggests as many as 6 other SA's prior to that.  Notably, patient's first contact with this clinic was a diagnostic assessment completed as a one time SACHIN eval on 1/10/19, which had been recommended in the context of establishing care with new providers upon recently returning to live in [place] given her prolonged use of opiates.  From that DA: \"Unclear that chronic opioids appropriate for her chronic non-malignant pain states. That said, with what information we have, no clear indication that she has used non-medically, escalated in use, or any other concerns.\" Chronic pain condition(s) experienced by the patient include endometriosis, herniated lumbar disk and migraines.  Social history highlights include raised in [place], received BA in Psychology from [employer] in [place], [place], where she met her present wife Jacqui, then lived in [place] from Dec 2015 up to recent return to [place] in Nov 2018.    Psych critical item history includes suicide attempt [multiple] and suicidal ideation.    INTERIM HISTORY      [4, 4]   The patient reports good treatment adherence.  History was provided by the patient who was a good historian.    The last visit ended with no change to the med regimen.   Since the last visit:   Margarita reports things are going well, \"just the usual coronavirus stuff.\" She has been looking for jobs, spending time with her wife working on container gardening--that had some trouble last year but \"learned from their mistakes,\" will probably have some vegetables this year. There are specific varieties of vegetables that grow better " in containers vs the ground and they are working on those. They have a small patio area and grill, have been eating dinner outside at lot. Still doing virtual therapy sessions, which has been helpful, as has working on mindfulness based stress reduction. Panic attacks are still more frequent than before the pandemic but better controlled than last time. No depression, obsessions, or SI.     RECENT PSYCH ROS:   Depression:  low energy and poor concentration /memory  Elevated:  none  Psychosis:  none  Anxiety:  excessive worry, feeling fearful and social anxiety  Panic Attack:  SOB, chest tightness and derealization, multiples times per week, managing with coping skills, working with therapist (triggered by anxiety).  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    PAST MED TRIALS        See last Diagnostic Assessment  MEDICAL / SURGICAL HISTORY          Pregnant or breastfeeding- no      Contraception- Paragard IUD    Patient Active Problem List   Diagnosis      Intractable chronic migraine without aura and without status migrainosus     Nausea vomiting and diarrhea     Cervicalgia     Chronic bilateral low back pain with bilateral sciatica     Right ureteral stone       Major Surgery-   Past Surgical History:   Procedure Laterality Date     CYSTOSCOPY, RETROGRADES, INSERT STENT URETER(S), COMBINED Right 1/9/2020    Procedure: CYSTOSCOPY, WITH RETROGRADE PYELOGRAM AND RIGHT URETERAL STENT INSERTION;  Surgeon: Irene Otto MD;  Location: [facility] OR     HC TOOTH EXTRACTION W/FORCEP      local anaesthesia       MEDICAL REVIEW OF SYSTEMS    [2, 10]     A comprehensive review of systems was performed and is negative other than noted in the HPI.    ALLERGY       Seasonal allergies  MEDICATIONS        Current Outpatient Medications   Medication Sig Dispense Refill     acetaminophen (TYLENOL) 325 MG tablet Take 2 tablets (650 mg) by mouth every 6 hours as needed for mild pain (do not exceed 4000mg of acetaminophen in 24 hours 100 tablet 0     cyclobenzaprine (FLEXERIL) 10 MG tablet Take 10 mg by mouth 3 times daily as needed for muscle spasms       gabapentin (NEURONTIN) 300 MG capsule Take 600 mg by mouth 3 times daily       indomethacin (INDOCIN) 50 MG capsule Take 1 capsule (50 mg) by mouth 3 times daily (with meals) 90 capsule 11     mirtazapine (REMERON) 15 MG tablet Take 1 tablet (15 mg) by mouth At Bedtime 90 tablet 0     ondansetron (ZOFRAN-ODT) 4 MG ODT tab Take 1 tablet (4 mg) by mouth every 6 hours as needed for nausea or vomiting 16 tablet 0     oxybutynin (DITROPAN) 5 MG tablet Take 1 tablet (5 mg) by mouth 3 times daily as needed for bladder spasms 30 tablet 0     oxyCODONE (ROXICODONE) 5 MG tablet Take 1-2 tablets (5-10 mg) by mouth every 6 hours as needed for moderate to severe pain 30 tablet 0     oxyCODONE-acetaminophen (PERCOCET) 5-325 MG tablet Take one po tid prn severe pain. (Patient taking differently: Take 1 tablet by mouth 3 times daily Take one po tid  "prn severe pain.) 90 tablet 0     phenazopyridine (AZO) 97.5 MG tablet Take 1 tablet (97.5 mg) by mouth 3 times daily as needed for urinary tract discomfort (Patient not taking: Reported on 1/16/2020) 15 tablet 0     promethazine (PHENERGAN) 25 MG tablet Take 1 tablet (25 mg) by mouth every 8 hours as needed for nausea associated with migranes 30 tablet 11     propranolol (INDERAL) 20 MG tablet Take 1 tablet (20 mg) by mouth daily as needed (for anxiety) 90 tablet 0     propranolol ER (INDERAL LA) 60 MG 24 hr capsule Take 1 capsule (60 mg) by mouth At Bedtime 30 capsule 0     sertraline (ZOLOFT) 100 MG tablet Take 2 tabs (200 mg) by mouth daily 180 tablet 0     tamsulosin (FLOMAX) 0.4 MG capsule Take 1 capsule (0.4 mg) by mouth daily While you have the ureteral stent in place 45 capsule 0     topiramate (TOPAMAX) 50 MG tablet Take 1 tablet (50 mg) by mouth 2 times daily (Patient not taking: Reported on 1/16/2020) 180 tablet 3     VITALS      [3, 3]   There were no vitals taken for this visit.   MENTAL STATUS EXAM      [9, 14 cog gs]     Alertness: alert  and oriented, interactive  Appearance: well groomed and casually dressed  Behavior/Demeanor: cooperative and pleasant, with good  eye contact   Speech: moderate volume, often with long pauses to think  Language: intact  Psychomotor: normal or unremarkable  Mood: \"OK\"  Affect: restricted and appropriate, was congruent to mood; was congruent to content  Thought Process/Associations: unremarkable and though there are pauses in speech, no evidence of thought blocking  Thought Content:  Reports none;  Denies suicidal ideation, violent ideation and paranoid ideation  Perception:  Reports none;  Denies auditory hallucinations and visual hallucinations  Insight: good  Judgment: good and adequate for safety  Cognition: (6) does  appear grossly intact; formal cognitive testing was not done  Gait and Station: not assessed over telemedicine    LABS and DATA     AIMS:  not " needed    PHQ9 TODAY = 13  PHQ-9 SCORE 4/2/2019 8/23/2019 12/19/2019   PHQ-9 Total Score 5 8 14       Recent Labs   Lab Test 01/10/20  0700 01/09/20  1112 03/29/19  0647   CR 0.73 0.81 0.61   GFRESTIMATED >90 >90 >90     Recent Labs   Lab Test 01/09/20  1112 03/29/19  0647 03/28/19  0626   AST 16 25 24   ALT 33 41 42   ALKPHOS 114 91 108       PSYCHOTROPIC DRUG INTERACTIONS     Concurrent use of OXYCODONE and SERTRALINE may result in an increased risk of serotonin syndrome (tachycardia, hyperthermia, myoclonus, mental status changes).    Concurrent use of MIRTAZAPINE and SEROTONERGIC AGENTS may result in increased risk of serotonin syndrome.     Concurrent use of OXYCODONE and CNS DEPRESSANTS may result in increased risk of respiratory and CNS depression.     Concurrent use of MIRTAZAPINE and SEROTONERGIC AGENTS may result in increased risk of serotonin syndrome.     MANAGEMENT:  Monitoring for adverse effects, routine vitals and patient is aware of risks    RISK STATEMENT for SAFETY     Ingris Trevino did not appear to be an imminent safety risk to self or others.    PSYCHIATRIC DIAGNOSIS     Major Depressive Disorder, recurrent episode, partial remission to mild  R/o Persistent Depressive Disorder  Generalized Anxiety Disorder with Panic  Obsessive-Compulsive Disorder  Social Anxiety Disorder     ASSESSMENT      [m2, h3]     TODAY:  Margarita Trevino presents to [facility] Psychiatry for continuing medication management of MDD and OCD. She has historical diagnosis of panic disorder but currently meets criteria for MADIHA with panic, as panic attacks are triggered by anxious thoughts, particularly related to Covid-19 outbreak. She relates interim stability of mood, and improvement in sleep since decreasing mirtazapine dose at bedtime; discussed trying to increase to target anxiety/panic but she is not interested, feels working on coping skills has helped her work through anxiety. Also denies SI/SIB thoughts,  violent/aggressive ideation, elevated mood states, OCD symptom flare or other hallmarks of psychiatric decompensation or dangerousness. Overall, endorses her self-assessment of the efficacy/tolerability of her present regimen and we have agreed to continue same without adjustment. Discussed transition process to next resident.     PLAN      [m2, h3]     1) PSYCHOTROPIC MEDICATIONS:  Continue mirtazapine 15 mg PO at bedtime.  Continue sertraline 200 mg daily.  Continue propranolol LA 60 mg daily.  Continue propranolol IR 20 mg PRN daily for acute anxiety.    2) [place]  was checked today: indicates expected use in accordance with chart review/patient report.    3) THERAPY:    -Continue individual therapy    4) NEXT DUE:    Labs- N/A  EKG- PRN  Rating Scales- N/A    5) REFERRALS:    No Referrals needed  Therapy- see above     6) RTC: 2 months with next resident    7) CRISIS NUMBERS:   Provided routinely in AVS   CRISIS TEXT LINE: Text [id number] from anywhere in [place], anytime, any crisis 24/7;  OR SEE www.crisistextline.org  ONLY if a LAURA PT: [facility] Laura [phone] (clinic), [phone] (after hours)     TREATMENT RISK STATEMENT:  The risks, benefits, alternatives and potential adverse effects have been discussed and are understood by the pt. The pt understands the risks of using street drugs or alcohol. There are no medical contraindications, the pt agrees to treatment with the ability to do so. The pt knows to call the clinic for any problems or to access emergency care if needed.  Medical and substance use concerns are documented above.  Psychotropic drug interaction check was done, including changes made today.    PROVIDER: Esthela Dye MD    Patient not staffed in clinic.  Note will be reviewed and signed by supervisor Dr. Miller.    Attestation:  I did not see Ingris Trevino directly. I have reviewed the documentation and I agree with the resident's plan of care.   Mateo Miller MD

## 2020-05-22 NOTE — TELEPHONE ENCOUNTER
On 5/22/2020, at 0957, writer called patient at mobile to confirm Virtual Visit. Writer unable to make contact with patient. Writer left detailed voice message for callback. [phone], left as call back number. TIM Guillen, EMT

## 2020-06-03 DIAGNOSIS — F33.2 SEVERE EPISODE OF RECURRENT MAJOR DEPRESSIVE DISORDER, WITHOUT PSYCHOTIC FEATURES (H): ICD-10-CM

## 2020-06-03 RX ORDER — PROPRANOLOL HCL 60 MG
60 CAPSULE, EXTENDED RELEASE 24HR ORAL AT BEDTIME
Qty: 30 CAPSULE | Refills: 0 | Status: SHIPPED | OUTPATIENT
Start: 2020-06-03 | End: 2020-07-06

## 2020-06-03 NOTE — TELEPHONE ENCOUNTER
Medication requested: PROPRANOLOL ER 60 MG CAPSULE    Last refilled: 5/12/20  Qty: 30/0      Last seen: 5/22/20 unsigned  RTC: unknown  Cancel: 0  No-show: 0  Next appt: None    Refill decision:   Refill pended and routed to the provider for review/determination due to unsigned note 5/22/20

## 2020-07-02 DIAGNOSIS — F33.2 SEVERE EPISODE OF RECURRENT MAJOR DEPRESSIVE DISORDER, WITHOUT PSYCHOTIC FEATURES (H): ICD-10-CM

## 2020-07-06 RX ORDER — PROPRANOLOL HCL 60 MG
60 CAPSULE, EXTENDED RELEASE 24HR ORAL AT BEDTIME
Qty: 30 CAPSULE | Refills: 0 | Status: SHIPPED | OUTPATIENT
Start: 2020-07-06 | End: 2020-08-03

## 2020-07-06 NOTE — TELEPHONE ENCOUNTER
Medication requested: propranolol ER (INDERAL LA) 60 MG 24 hr capsule   Last refilled: 6/4/20  Qty: 30      Last seen: 5/22/20  RTC: 2 months  Cancel: 0  No-show: 0  Next appt: 0    Refill decision:   30 day zaida refill sent to the pharmacy - including instructions for patient to call the clinic and schedule an appointment.  Scheduling has been notified to contact the pt for appointment.

## 2020-07-06 NOTE — PATIENT INSTRUCTIONS
Thank you for coming to the PSYCHIATRY CLINIC.    Lab Testing:  If you had lab testing today and your results are reassuring or normal they will be mailed to you or sent through Klatcher within 7 days. If the lab tests need quick action we will call you with the results. The phone number we will call with results is # [phone] (home) [phone] (work). If this is not the best number please call our clinic and change the number.    Medication Refills:  If you need any refills please call your pharmacy and they will contact us. Our fax number for refills is [phone]. Please allow three business for refill processing. If you need to  your refill at a new pharmacy, please contact the new pharmacy directly. The new pharmacy will help you get your medications transferred.     Scheduling:  If you have any concerns about today's visit or wish to schedule another appointment please call our office during normal business hours [phone] (8-5:00 M-F)    Contact Us:  Please call [phone] during business hours (8-5:00 M-F).  If after clinic hours, or on the weekend, please call  [phone].    Financial Assistance [phone]  [facility] Billing [phone]  [place] Billing Office, [facility]: [phone]  [place] Billing [phone]  Medical Records [phone]      MENTAL HEALTH CRISIS NUMBERS:  For a medical emergency please call  911 or go to the nearest ER.     [facility]:   [facility] -[phone]   Crisis Residence [facility] -[phone]   Walk-In Counseling [facility] -[phone]   COPE 24/7 [place] Mobile Team -[phone] (adults)/[phone] (child)  CHILD: [facility] needs assessment team - [phone]      [facility]:   [facility] - [phone]   Walk-in counseling [facility] - [phone]   Walk-in counseling [facility] - [phone]   Crisis Residence [facility] -  [phone]  Urgent Care Adult Mental Dheg[facility]-[phone] mobile unit/ 24/7 crisis line    National Crisis Numbers:   National Suicide Prevention Lifeline: 2-458-181-TALK (189-399-6403)  Poison Control Center - 6-735-050-5630  [employer]/resources for a list of additional resources (SOS)  Trans Lifeline a hotline for transgender people 0-263-525-6457  The Stef Project a hotline for LGBT youth 1-114.899.3273  Crisis Text Line: For any crisis 24/7   To: 615563  see www.crisistextline.org  - IF MAKING A CALL FEELS TOO HARD, send a text!         Again thank you for choosing PSYCHIATRY CLINIC and please let us know how we can best partner with you to improve you and your family's health.    You may be receiving a survey regarding this appointment. We would love to have your feedback, both positive and negative. The survey is done by an external company, so your answers are anonymous.

## 2020-08-03 ENCOUNTER — VIRTUAL VISIT (OUTPATIENT)
Dept: PSYCHIATRY | Facility: CLINIC | Age: 25
End: 2020-08-03
Attending: PSYCHIATRY & NEUROLOGY
Payer: COMMERCIAL

## 2020-08-03 DIAGNOSIS — F51.5 NIGHTMARES: ICD-10-CM

## 2020-08-03 DIAGNOSIS — F41.9 ANXIETY: ICD-10-CM

## 2020-08-03 DIAGNOSIS — F33.2 SEVERE EPISODE OF RECURRENT MAJOR DEPRESSIVE DISORDER, WITHOUT PSYCHOTIC FEATURES (H): Primary | ICD-10-CM

## 2020-08-03 RX ORDER — PRAZOSIN HYDROCHLORIDE 1 MG/1
1 CAPSULE ORAL AT BEDTIME
Qty: 30 CAPSULE | Refills: 3 | Status: SHIPPED | OUTPATIENT
Start: 2020-08-03 | End: 2020-10-05

## 2020-08-03 RX ORDER — PROPRANOLOL HYDROCHLORIDE 20 MG/1
20 TABLET ORAL DAILY PRN
Qty: 90 TABLET | Refills: 3 | Status: SHIPPED | OUTPATIENT
Start: 2020-08-03 | End: 2020-10-05

## 2020-08-03 RX ORDER — SERTRALINE HYDROCHLORIDE 100 MG/1
200 TABLET, FILM COATED ORAL DAILY
Qty: 60 TABLET | Refills: 3 | Status: SHIPPED | OUTPATIENT
Start: 2020-08-03 | End: 2020-10-05

## 2020-08-03 RX ORDER — PROPRANOLOL HCL 60 MG
60 CAPSULE, EXTENDED RELEASE 24HR ORAL AT BEDTIME
Qty: 30 CAPSULE | Refills: 3 | Status: SHIPPED | OUTPATIENT
Start: 2020-08-03 | End: 2020-10-05

## 2020-08-03 RX ORDER — MIRTAZAPINE 7.5 MG/1
7.5 TABLET, FILM COATED ORAL AT BEDTIME
Qty: 30 TABLET | Refills: 3 | Status: SHIPPED | OUTPATIENT
Start: 2020-08-03 | End: 2020-10-05

## 2020-08-03 ASSESSMENT — PAIN SCALES - GENERAL: PAINLEVEL: EXTREME PAIN (8)

## 2020-08-03 NOTE — PATIENT INSTRUCTIONS
Thank you for coming to the PSYCHIATRY CLINIC.    Tawanda Avila,     It was a pleasure to meet you this morning.   - We decreased Mirtazapine to 7.5mg at bedtime to target sleep  - We started Prazosin 1mg at bedtime to target nightmares    If you have any questions or concerns about your care, please call the clinic at [phone].    Thank you,   Dr. Yousif    Lab Testing:  If you had lab testing today and your results are reassuring or normal they will be mailed to you or sent through 9flats within 7 days. If the lab tests need quick action we will call you with the results. The phone number we will call with results is # [phone] (home) [phone] (work). If this is not the best number please call our clinic and change the number.    Medication Refills:  If you need any refills please call your pharmacy and they will contact us. Our fax number for refills is [phone]. Please allow three business for refill processing. If you need to  your refill at a new pharmacy, please contact the new pharmacy directly. The new pharmacy will help you get your medications transferred.     Scheduling:  If you have any concerns about today's visit or wish to schedule another appointment please call our office during normal business hours [phone] (8-5:00 M-F)    Contact Us:  Please call [phone] during business hours (8-5:00 M-F).  If after clinic hours, or on the weekend, please call  [phone].    Financial Assistance [phone]  [facility] Billing [phone]  [facility] Billing Office, [facility]: [phone]  [place] Billing [phone]  Medical Records [phone]      MENTAL HEALTH CRISIS NUMBERS:  For a medical emergency please call  911 or go to the nearest ER.     [facility]:   [facility] -[phone]   Crisis Residence [facility] Residence -[phone]   Walk-In Counseling Center [facility] -[phone]   COPE 24/7 [place] Mobile Team -[phone] (adults)/[phone]  (child)  CHILD: [facility] needs assessment team - [phone]      [facility]:   [facility] - [phone]   Walk-in counseling [facility] - [phone]   Walk-in counseling [facility] - [phone]   Crisis Residence [facility] - [phone]  Urgent Care Adult Mental [facility]-[phone] mobile unit/ 24/7 crisis line    National Crisis Numbers:   National Suicide Prevention Lifeline: 1-045-259-TALK (419-524-9335)  Poison Control Center - 5-338-489-6395  [employer]/resources for a list of additional resources (SOS)  Trans Lifeline a hotline for transgender people 9-858-076-7344  The Stef Project a hotline for LGBT youth 5-019-804-1031  Crisis Text Line: For any crisis 24/7   To: 189481  see www.crisistextline.org  - IF MAKING A CALL FEELS TOO HARD, send a text!         Again thank you for choosing PSYCHIATRY CLINIC and please let us know how we can best partner with you to improve you and your family's health.    You may be receiving a survey regarding this appointment. We would love to have your feedback, both positive and negative. The survey is done by an external company, so your answers are anonymous.

## 2020-08-03 NOTE — PROGRESS NOTES
"VIDEO VISIT  Ingris Trevino is a 25 year old patient who is being evaluated via a billable video visit.      The patient has been notified of following:   \"This video visit will be conducted via a call between you and your physician/provider. We have found that certain health care needs can be provided without the need for an in-person physical exam. This service lets us provide the care you need with a video conversation. If a prescription is necessary we can send it directly to your pharmacy. If lab work is needed we can place an order for that and you can then stop by our lab to have the test done at a later time. Insurers are generally covering virtual visits as they would in-office visits so billing should not be different than normal.  If for some reason you do get billed incorrectly, you should contact the billing office to correct it and that number is in the AVS .    Video Conference to be completed via:  Xoch[employer]l    Patient has given verbal consent for video visit?:  Yes    Patient would prefer that any video invitations be sent by: Send to e-mail at: [email or web address]      How would patient like to obtain AVS?:  [place]    AVS SmartPhrase [PsychAVS] has been placed in 'Patient Instructions':  Yes    "

## 2020-10-05 ENCOUNTER — VIRTUAL VISIT (OUTPATIENT)
Dept: PSYCHIATRY | Facility: CLINIC | Age: 25
End: 2020-10-05
Attending: PSYCHIATRY & NEUROLOGY
Payer: COMMERCIAL

## 2020-10-05 DIAGNOSIS — Z79.899 ENCOUNTER FOR LONG-TERM (CURRENT) USE OF MEDICATIONS: ICD-10-CM

## 2020-10-05 DIAGNOSIS — F51.5 NIGHTMARES: ICD-10-CM

## 2020-10-05 DIAGNOSIS — F41.9 ANXIETY: ICD-10-CM

## 2020-10-05 DIAGNOSIS — F33.2 SEVERE EPISODE OF RECURRENT MAJOR DEPRESSIVE DISORDER, WITHOUT PSYCHOTIC FEATURES (H): Primary | ICD-10-CM

## 2020-10-05 PROCEDURE — 99214 OFFICE O/P EST MOD 30 MIN: CPT | Mod: HN | Performed by: STUDENT IN AN ORGANIZED HEALTH CARE EDUCATION/TRAINING PROGRAM

## 2020-10-05 RX ORDER — PRAZOSIN HYDROCHLORIDE 1 MG/1
CAPSULE ORAL
Qty: 90 CAPSULE | Refills: 3 | Status: SHIPPED | OUTPATIENT
Start: 2020-10-05 | End: 2020-12-08

## 2020-10-05 RX ORDER — MIRTAZAPINE 7.5 MG/1
7.5 TABLET, FILM COATED ORAL AT BEDTIME
Qty: 30 TABLET | Refills: 3 | Status: SHIPPED | OUTPATIENT
Start: 2020-10-05 | End: 2020-12-08

## 2020-10-05 RX ORDER — SERTRALINE HYDROCHLORIDE 100 MG/1
200 TABLET, FILM COATED ORAL DAILY
Qty: 60 TABLET | Refills: 3 | Status: SHIPPED | OUTPATIENT
Start: 2020-10-05 | End: 2020-12-08

## 2020-10-05 RX ORDER — PROPRANOLOL HCL 60 MG
60 CAPSULE, EXTENDED RELEASE 24HR ORAL AT BEDTIME
Qty: 30 CAPSULE | Refills: 3 | Status: SHIPPED | OUTPATIENT
Start: 2020-10-05 | End: 2020-12-08

## 2020-10-05 RX ORDER — PROPRANOLOL HYDROCHLORIDE 20 MG/1
20 TABLET ORAL DAILY PRN
Qty: 90 TABLET | Refills: 3 | Status: SHIPPED | OUTPATIENT
Start: 2020-10-05 | End: 2020-12-08

## 2020-10-05 ASSESSMENT — PAIN SCALES - GENERAL: PAINLEVEL: SEVERE PAIN (7)

## 2020-10-05 NOTE — PATIENT INSTRUCTIONS
Thank you for coming to the [facility] HEALTH & ADDI[facility] [place] [facility].    Lab Testing:  If you had lab testing today and your results are reassuring or normal they will be mailed to you or sent through [employer] within 7 days. If the lab tests need quick action we will call you with the results. The phone number we will call with results is # [phone] (home) [phone] (work). If this is not the best number please call our clinic and change the number.    Medication Refills:  If you need any refills please call your pharmacy and they will contact us. Our fax number for refills is [phone]. Please allow three business for refill processing. If you need to  your refill at a new pharmacy, please contact the new pharmacy directly. The new pharmacy will help you get your medications transferred.     Scheduling:  If you have any concerns about today's visit or wish to schedule another appointment please call our office during normal business hours [phone] (8-5:00 M-F)    Contact Us:  Please call [phone] during business hours (8-5:00 M-F).  If after clinic hours, or on the weekend, please call  [phone].    Financial Assistance [phone]  [facility] Billing [phone]  Central Billing Office, [facility]: [phone]  [facility] Billing [phone]  Medical Records [phone]      MENTAL HEALTH CRISIS NUMBERS:  For a medical emergency please call  911 or go to the nearest ER.     [facility]:   [facility] -[phone]   Crisis Residence [facility] -[phone]   Walk-In Counseling Center [facility] -[phone]   COPE 24/7 [place] Mobile Team -[phone] (adults)/[phone] (child)  CHILD: [facility] needs assessment team - [phone]      [facility]:   [facility] - [phone]   Walk-in counseling [facility] - [phone]   Walk-in counseling [facility] - [phone]   Crisis  Residence  [facility] - [phone]  Urgent Care Adult Mental Pbbnsr-[phone] mobile unit/ 24/7 crisis line    National Crisis Numbers:   National Suicide Prevention Lifeline: 9-464-477-TALK (819-076-0771)  Poison Control Center - 8-605-489-0871  Mark media/resources for a list of additional resources (SOS)  Trans Lifeline a hotline for transgender people 2-360-676-5130  The Forseva Project a hotline for LGBT youth 7-444-455-3661  Crisis Text Line: For any crisis 24/7   To: 027514  see www.crisistextline.org  - IF MAKING A CALL FEELS TOO HARD, send a text!         Again thank you for choosing [facility] [place] [facility] and please let us know how we can best partner with you to improve you and your family's health.    You may be receiving a survey regarding this appointment. We would love to have your feedback, both positive and negative. The survey is done by an external company, so your answers are anonymous.
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
## 2020-10-05 NOTE — PROGRESS NOTES
"VIDEO VISIT  Ingris Trevino is a 25 year old patient who is being evaluated via a billable video visit.      The patient has been notified of following:   \"This video visit will be conducted via a call between you and your physician/provider. We have found that certain health care needs can be provided without the need for an in-person physical exam. This service lets us provide the care you need with a video conversation. If a prescription is necessary we can send it directly to your pharmacy. If lab work is needed we can place an order for that and you can then stop by our lab to have the test done at a later time. Insurers are generally covering virtual visits as they would in-office visits so billing should not be different than normal.  If for some reason you do get billed incorrectly, you should contact the billing office to correct it and that number is in the AVS .    Video Conference to be completed via:  Xochitl    Patient has given verbal consent for video visit?:  Yes    Patient would prefer that any video invitations be sent by: Send to e-mail at: [email or web address]      How would patient like to obtain AVS?:  Ansley    AVS SmartPhrase [PsychAVS] has been placed in 'Patient Instructions':  Yes    "

## 2020-10-19 ENCOUNTER — TELEPHONE (OUTPATIENT)
Dept: ENDOCRINOLOGY | Facility: CLINIC | Age: 25
End: 2020-10-19

## 2020-10-27 ENCOUNTER — VIRTUAL VISIT (OUTPATIENT)
Dept: ENDOCRINOLOGY | Facility: CLINIC | Age: 25
End: 2020-10-27
Payer: COMMERCIAL

## 2020-10-27 VITALS — WEIGHT: 220 LBS | HEIGHT: 64 IN | BODY MASS INDEX: 37.56 KG/M2

## 2020-10-27 DIAGNOSIS — M54.2 CERVICALGIA: ICD-10-CM

## 2020-10-27 DIAGNOSIS — Z71.3 NUTRITIONAL COUNSELING: Primary | ICD-10-CM

## 2020-10-27 DIAGNOSIS — E66.01 CLASS 2 SEVERE OBESITY WITH SERIOUS COMORBIDITY IN ADULT, UNSPECIFIED BMI, UNSPECIFIED OBESITY TYPE (H): ICD-10-CM

## 2020-10-27 DIAGNOSIS — M54.42 CHRONIC BILATERAL LOW BACK PAIN WITH BILATERAL SCIATICA: ICD-10-CM

## 2020-10-27 DIAGNOSIS — M54.41 CHRONIC BILATERAL LOW BACK PAIN WITH BILATERAL SCIATICA: ICD-10-CM

## 2020-10-27 DIAGNOSIS — G89.29 CHRONIC BILATERAL LOW BACK PAIN WITH BILATERAL SCIATICA: ICD-10-CM

## 2020-10-27 DIAGNOSIS — E66.812 CLASS 2 SEVERE OBESITY WITH SERIOUS COMORBIDITY IN ADULT, UNSPECIFIED BMI, UNSPECIFIED OBESITY TYPE (H): ICD-10-CM

## 2020-10-27 DIAGNOSIS — R63.8 WEIGHT DISORDER: Primary | ICD-10-CM

## 2020-10-27 DIAGNOSIS — E66.9 OBESITY: ICD-10-CM

## 2020-10-27 PROCEDURE — 99204 OFFICE O/P NEW MOD 45 MIN: CPT | Mod: 95 | Performed by: INTERNAL MEDICINE

## 2020-10-27 PROCEDURE — 97802 MEDICAL NUTRITION INDIV IN: CPT | Mod: GT | Performed by: DIETITIAN, REGISTERED

## 2020-10-27 ASSESSMENT — PAIN SCALES - GENERAL: PAINLEVEL: SEVERE PAIN (7)

## 2020-10-27 ASSESSMENT — MIFFLIN-ST. JEOR: SCORE: 1717.91

## 2020-10-27 NOTE — NURSING NOTE
"Chief Complaint   Patient presents with     New Patient     new MW consult       Vitals:    10/27/20 1310   Weight: 99.8 kg (220 lb)   Height: 1.626 m (5' 4\")       Body mass index is 37.76 kg/m .                         "

## 2020-10-27 NOTE — PATIENT INSTRUCTIONS
"Nice to talk with you today in virtual clinic.    Some of your medications are associated with weight gain, cognitive impairment, food cravings, increased food intake and reduced energy expenditure or slowed metabolism. Please work with your prescribing physician team and pharmacist to limit your use of these medications and wean them down if possible or use them sparingly.    1,200 calorie meal plan to lose 1 lbs weekly without exercise (based on REE calc of 1,784)  Ht 1.626 m (5' 4\")   Wt 99.8 kg (220 lb)   BMI 37.76 kg/m      Use meal replacements such as Dalia's meals, Lean Cuisines, Healthy Choice, Smart Ones, Weight Watchers Meals, and Slim Fast and Glucerna shakes and supplement with fresh fruits and vegetables   Please drink a lot of water daily. Most people typically need about 2 liters of water daily and more if they are exercising, have a large weight, or have a fever or illness. Add Crystal Light for flavoring if desired. But no pop with calories in it.  Please keep a food journal of what you eat, calories in what you eat  Consider using applications for smart phones such as MOWGLI, [employer], CarbonFlow, LoseIt, Tap&Track, and RelaxM.  Focus on wet volumetrics, meaning, eat more foods that are high in water and fiber such as fruits and vegetables in order to get that full feeling. These are also good for your overall health as well.  Check out Dr. Carlita Morfin from [facility] - she has cookbooks with low calorie volumetric recipes  Check out Hello Fresh at [email or web address]/food-boxes/classic-box/  Try Cooking Light recipes for low calorie meal preparation and planning  Other food plan options you can search for on the internet and check out include: Mario MEDRANO, Nadia Salcedo    For scheduling the [place] Exercise program, please call our clinic nurse at [phone].    Please continue to see health psychologist to discuss how mood, depression and/or anxiety impact your " eating.      RTC: quarterly    Best Wishes,  Dr. Tammy Daniels MD, MPH  Endocrinologist

## 2020-10-27 NOTE — PROGRESS NOTES
"Ingris Trevino is a 25 year old female who is being evaluated via a billable video visit.      The patient has been notified of following:     \"This video visit will be conducted via a call between you and your physician/provider. We have found that certain health care needs can be provided without the need for an in-person physical exam.  This service lets us provide the care you need with a video conversation.  If a prescription is necessary we can send it directly to your pharmacy.  If lab work is needed we can place an order for that and you can then stop by our lab to have the test done at a later time.    Video visits are billed at different rates depending on your insurance coverage.  Please reach out to your insurance provider with any questions.    If during the course of the call the physician/provider feels a video visit is not appropriate, you will not be charged for this service.\"    Patient has given verbal consent for Video visit? Yes  How would you like to obtain your AVS? MyChart  If you are dropped from the video visit, the video invite should be resent to: Send to e-mail at: [email or web address]  Will anyone else be joining your video visit? No      During this virtual visit the patient is located in [place], patient verifies this as the location during the entirety of this visit.   Video-Visit Details    Type of service:  Video Visit    Video Start Time: Start: 10/27/2020 01:27 pm   Stop: 10/27/2020 01:47 pm    Originating Location (pt. Location): Home    Distant Location (provider location):  [facility] WEIGHT MANAGEMENT [facility]INIC [place]     Platform used for Video Visit: John"

## 2020-10-27 NOTE — PROGRESS NOTES
"Ingris Trevino is a 25 year old female who is being evaluated via a billable video visit.      The patient has been notified of following:     \"This video visit will be conducted via a call between you and your physician/provider. We have found that certain health care needs can be provided without the need for an in-person physical exam.  This service lets us provide the care you need with a video conversation.  If a prescription is necessary we can send it directly to your pharmacy.  If lab work is needed we can place an order for that and you can then stop by our lab to have the test done at a later time.    Video visits are billed at different rates depending on your insurance coverage.  Please reach out to your insurance provider with any questions.    If during the course of the call the physician/provider feels a video visit is not appropriate, you will not be charged for this service.\"    Patient has given verbal consent for Video visit? Yes  How would you like to obtain your AVS? MyChart  Will anyone else be joining your video visit? No        Video-Visit Details    Type of service:  Video Visit    Video Start Time: 1:57 PM  Video End Time: 2:35 PM  Time spent with patient: 38 minutes    Originating Location (pt. Location): Home    Distant Location (provider location):  [facility] [place]     Platform used for Video Visit: Ardian    During this virtual visit the patient is located in [place], patient verifies this as the location during the entirety of this visit.     New Weight Management Nutrition Consultation    Ingris Trevino is a 25 year old female presents today for new weight management nutrition consultation.  Patient referred by Dr. Daniels on October 27, 2020.    Patient with Co-morbidities of obesity including:  Type II DM no  Renal Failure no  Sleep apnea no  Hypertension no   Dyslipidemia no  Joint pain yes  Back pain yes  GERD no " "    Anthropometrics:  Estimated body mass index is 35.83 kg/m  as calculated from the following:    Height as of 12/28/19: 1.638 m (5' 4.5\").    Weight as of 1/16/20: 96.2 kg (212 lb).    Medications for Weight Loss:  none    NUTRITION HISTORY  See MD note for details.  Allergy to laffy taffy. Mild intolerance to red meats, too much upsets her stomach. Avoiding red meat.   Vitamin/mineral intake: none   Worked with an RD in [employer], was not eating well did not prioritize her health/nutrition at that time. Has also taken a couple nutrition courses.   Previous weight loss attempts: restricting calories, WW, Whole30, increased exercise - nothing appeared to work well, notes possible she did not stick to changes long enough.    Enjoys cooking. Cooking for her and spouse. Cooking skills passed down by family. Enjoys cooking traditional family meals -  chicken wild rice hotdish. Likes to try new recipes.   Wakes frequently (3-5 times) at night d/t dry mouth and pain. Also feels hungry when she wakes - will have bowl of cereal, pc of fruit or crackers.   Frequent nausea from chronic pain.      Recent food recall:  Do you typically eat breakfast? Yes has to eat with medications at 8-9 am   If you do eat breakfast, what types of food do you eat? cold cereal, oatmeal, Activia yogurt, 1 tbsp PB toast (ww), plain Greek yogurt with fruit and granola    Do you typically eat lunch? Yes 12-1 pm    If you do eat lunch, what types of food do you typically eat?  usually leftovers; Sub[employer], Chipotle; [place];       PM snack: fruits (apple, peaches, pears, banana); yogurt    Do you typically eat supper? Yes at 5:30-6pm    If you do eat supper, what types of food do you typically eat? Bake/grilling 6-8 oz salmon, bass/walleye, chicken, wild/brown rice (same portion size as meat), veggies (takes up most of plate) like zucchini, asparagus,  broccoli, green beans, etc.   Do you typically eat snacks? Yes   If you do snack, what types of " "food do you typically eat? fruit, yogurt, toast, veggies     Beverages: Water (26 oz bottle x 2-3 times), 1-2 cans gingerale per week for nausea  Alcohol: rarely, <1 glass wine if having   Dining out: 1-2 times per week    Physical Activity:  Has 3 dogs. Moving so packing more. Has a treadmill at home, trying to walk on it for 30 mins, 2 times per week.      Nutrition Prescription  Recommended energy/nutrient modification.  1200 calories/day (per MD)    Nutrition Diagnosis  Obesity r/t long history of self-monitoring deficit and excessive energy intake aeb BMI >30.    Nutrition Intervention  Materials/education provided on 1200 calorie/day diet and Volumetric eating to help satiety level on fewer calories; portion control and healthy food choices (Plate Method and Volumetrics handouts), 100 calorie snack choices, and meal and snack planning and websites. Discussed apps that may help track calories and provide guidance on portion control. Sent list of goals to pt via [employer].     Patient demonstrates understanding.    Expected Engagement: good  Follow-Up Plans: Meal planning     Nutrition Goals  1) Follow 1200 calorie/day plan. Consider downloading [employer] WorldPal, Lose It!, Noom, Cronometer  2) Aim for 10 mins on treadmill for 3 days per week.   3) Use the Plate Method to structure meals.   - Reduce protein portion to 3-5 oz/meal  - Reduce starch portion to 1/2 cup/meal  - Fill remainder of plate with non-starchy vegetables/fruit     The Plate Method  [email or web address]    Protein Sources for Weight Loss  [email or web address]     Carbohydrates  [email or web address]     Mindful Eating  [email or web address]     Summary of Volumetrics Eating Plan  [email or web address]     Healthy Recipe Resources:  \"The Volumetrics Eating Plan\" by Lisandra Morfin, Ph.D.  https://www.Mobile2Me.DocbookMD/  www.EzFlop - A First of Its Kind Flip Flop  www."[employer].GliAffidabili.it  Dash Diet Recipes [place] " "Clinic  www.extension.[facility] - the recipe box  \"Cooking that Counts\" by editors of CookingLight  https://www.[facility]/communityculinary/[facility]_Cookbook_KT_Final_11-6_NoCrops-compressed.pdf  Https://www.choosemyplate.gov/myplatekitchen  https://snaped.fns.[place]da.gov/recipes-menus - SNAP recipes      Follow-Up:  1 month, or PRN      Hanny Valadez RD, LD              "
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
## 2020-12-07 ENCOUNTER — TELEPHONE (OUTPATIENT)
Dept: PSYCHIATRY | Facility: CLINIC | Age: 25
End: 2020-12-07

## 2020-12-07 ENCOUNTER — VIRTUAL VISIT (OUTPATIENT)
Dept: PSYCHIATRY | Facility: CLINIC | Age: 25
End: 2020-12-07
Attending: PSYCHIATRY & NEUROLOGY
Payer: COMMERCIAL

## 2020-12-07 DIAGNOSIS — F41.9 ANXIETY: ICD-10-CM

## 2020-12-07 DIAGNOSIS — F51.5 NIGHTMARES: ICD-10-CM

## 2020-12-07 DIAGNOSIS — F33.2 SEVERE EPISODE OF RECURRENT MAJOR DEPRESSIVE DISORDER, WITHOUT PSYCHOTIC FEATURES (H): ICD-10-CM

## 2020-12-07 PROCEDURE — 99214 OFFICE O/P EST MOD 30 MIN: CPT | Mod: HN | Performed by: STUDENT IN AN ORGANIZED HEALTH CARE EDUCATION/TRAINING PROGRAM

## 2020-12-07 NOTE — TELEPHONE ENCOUNTER
On December 7, 2020, at 12:45 PM, writer called patient at [phone] to confirm Virtual Visit. Writer unable to make contact with patient. Writer left detailed voice message for call back. [phone] left as call back number. Lesly Garcia, [facility]

## 2020-12-08 RX ORDER — MIRTAZAPINE 7.5 MG/1
7.5 TABLET, FILM COATED ORAL AT BEDTIME
Qty: 30 TABLET | Refills: 3 | Status: SHIPPED | OUTPATIENT
Start: 2020-12-08 | End: 2021-04-12

## 2020-12-08 RX ORDER — PROPRANOLOL HCL 60 MG
60 CAPSULE, EXTENDED RELEASE 24HR ORAL AT BEDTIME
Qty: 30 CAPSULE | Refills: 3 | Status: SHIPPED | OUTPATIENT
Start: 2020-12-08 | End: 2021-03-05

## 2020-12-08 RX ORDER — PRAZOSIN HYDROCHLORIDE 1 MG/1
CAPSULE ORAL
Qty: 90 CAPSULE | Refills: 3 | Status: SHIPPED | OUTPATIENT
Start: 2020-12-08 | End: 2021-03-30

## 2020-12-08 RX ORDER — SERTRALINE HYDROCHLORIDE 100 MG/1
200 TABLET, FILM COATED ORAL DAILY
Qty: 60 TABLET | Refills: 3 | Status: SHIPPED | OUTPATIENT
Start: 2020-12-08 | End: 2021-04-12

## 2020-12-08 RX ORDER — PROPRANOLOL HYDROCHLORIDE 20 MG/1
20 TABLET ORAL DAILY PRN
Qty: 90 TABLET | Refills: 3 | Status: SHIPPED | OUTPATIENT
Start: 2020-12-08 | End: 2021-04-12
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
## 2021-01-15 ENCOUNTER — HEALTH MAINTENANCE LETTER (OUTPATIENT)
Age: 26
End: 2021-01-15

## 2021-03-04 DIAGNOSIS — F33.2 SEVERE EPISODE OF RECURRENT MAJOR DEPRESSIVE DISORDER, WITHOUT PSYCHOTIC FEATURES (H): ICD-10-CM

## 2021-03-04 RX ORDER — SERTRALINE HYDROCHLORIDE 100 MG/1
200 TABLET, FILM COATED ORAL DAILY
Qty: 60 TABLET | Refills: 3 | OUTPATIENT
Start: 2021-03-04

## 2021-03-04 NOTE — TELEPHONE ENCOUNTER
Refill denied  Too soon  script sent 12/8/20 #60-3 refills  Was to follow-up 2/2021  -Scheduling has been notified to contact the pt for appointment.

## 2021-03-05 DIAGNOSIS — F33.2 SEVERE EPISODE OF RECURRENT MAJOR DEPRESSIVE DISORDER, WITHOUT PSYCHOTIC FEATURES (H): ICD-10-CM

## 2021-03-05 NOTE — TELEPHONE ENCOUNTER
Medication requested: propranolol ER (INDERAL LA) 60 MG 24 hr capsule  Last refilled: 12/8/20  Qty: 30      Last seen: 12/7/20  RTC: 2 months  Cancel: x 1  No-show: 0  Next appt: 4/12/21    Refill decision:   Refill pended and routed to the provider for review/determination due to   Cancel x 1  Pt outside of RTC timeframe.

## 2021-03-08 RX ORDER — PROPRANOLOL HCL 60 MG
60 CAPSULE, EXTENDED RELEASE 24HR ORAL AT BEDTIME
Qty: 30 CAPSULE | Refills: 0 | Status: SHIPPED | OUTPATIENT
Start: 2021-03-08 | End: 2021-04-12

## 2021-03-29 DIAGNOSIS — F51.5 NIGHTMARES: ICD-10-CM

## 2021-03-30 RX ORDER — PRAZOSIN HYDROCHLORIDE 1 MG/1
CAPSULE ORAL
Qty: 90 CAPSULE | Refills: 0 | Status: SHIPPED | OUTPATIENT
Start: 2021-03-30 | End: 2021-04-12

## 2021-03-30 NOTE — TELEPHONE ENCOUNTER
Medication requested: prazosin (MINIPRESS) 1 MG capsule Take 2mg at bedtime.  Can increase to 3mg at bedtime after one week at 2mg.  Last refilled: 12/8/20  Qty: 90/3       Last seen: 12/7/20  RTC: 2 months  Cancel: x 1  No-show: 0  Next appt: 4/12/21     Refill pended and routed to the provider for review/determination due to   Cancel x 1  Pt outside of RTC timeframe.

## 2021-04-06 NOTE — PLAN OF CARE
Observation goals PRIOR TO DISCHARGE    Comments: -diagnostic tests and consults completed and resulted- not met, AM labs   -vital signs normal or at patient baseline- met  /76   Pulse 90   Temp 98.1  F (36.7  C) (Oral)   Resp 18   Wt 91.2 kg (201 lb)   SpO2 93%   BMI 33.97 kg/m    -tolerating oral intake to maintain hydration- not met, tolerating PO intake, IVF running at 100 ml/hr  -adequate pain control on oral analgesics - not met, IV dilaudid given x 1.    -returns to baseline functional status- pending.   - Has had ureteral stent placed- met, stent placed today.     Nurse to notify provider when observation goals have been met and patient is ready for discharge.      Patient alert and oriented x 4. Pain/tenderness to abdomen. PRN IV pain med given x 1. Pt on Capno, IPI 8-10. no SOB or chest pain. Tolerating fluids and crackers. No nausea on shift, prn po phenergan given as prophylactic per pt request. Alvarado in place, adequate light pink output. VSS. Will continue to monitor.        No

## 2021-04-12 ENCOUNTER — VIRTUAL VISIT (OUTPATIENT)
Dept: PSYCHIATRY | Facility: CLINIC | Age: 26
End: 2021-04-12
Attending: PSYCHIATRY & NEUROLOGY
Payer: COMMERCIAL

## 2021-04-12 DIAGNOSIS — F41.9 ANXIETY: ICD-10-CM

## 2021-04-12 DIAGNOSIS — F51.5 NIGHTMARES: ICD-10-CM

## 2021-04-12 DIAGNOSIS — F33.2 SEVERE EPISODE OF RECURRENT MAJOR DEPRESSIVE DISORDER, WITHOUT PSYCHOTIC FEATURES (H): ICD-10-CM

## 2021-04-12 PROCEDURE — 99214 OFFICE O/P EST MOD 30 MIN: CPT | Mod: HN | Performed by: STUDENT IN AN ORGANIZED HEALTH CARE EDUCATION/TRAINING PROGRAM

## 2021-04-12 RX ORDER — SERTRALINE HYDROCHLORIDE 100 MG/1
200 TABLET, FILM COATED ORAL DAILY
Qty: 60 TABLET | Refills: 3 | Status: SHIPPED | OUTPATIENT
Start: 2021-04-12 | End: 2021-08-26

## 2021-04-12 RX ORDER — PROPRANOLOL HYDROCHLORIDE 20 MG/1
20 TABLET ORAL DAILY PRN
Qty: 90 TABLET | Refills: 3 | Status: SHIPPED | OUTPATIENT
Start: 2021-04-12 | End: 2021-09-20

## 2021-04-12 RX ORDER — DOXEPIN HYDROCHLORIDE 10 MG/1
10 CAPSULE ORAL AT BEDTIME
Qty: 30 CAPSULE | Refills: 3 | Status: SHIPPED | OUTPATIENT
Start: 2021-04-12 | End: 2021-08-06

## 2021-04-12 RX ORDER — PRAZOSIN HYDROCHLORIDE 1 MG/1
CAPSULE ORAL
Qty: 90 CAPSULE | Refills: 4 | Status: SHIPPED | OUTPATIENT
Start: 2021-04-12 | End: 2021-09-20

## 2021-04-12 RX ORDER — PROPRANOLOL HCL 60 MG
60 CAPSULE, EXTENDED RELEASE 24HR ORAL AT BEDTIME
Qty: 30 CAPSULE | Refills: 3 | Status: SHIPPED | OUTPATIENT
Start: 2021-04-12 | End: 2021-08-25

## 2021-04-12 ASSESSMENT — PAIN SCALES - GENERAL: PAINLEVEL: NO PAIN (0)
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
## 2021-04-12 NOTE — PATIENT INSTRUCTIONS
**For crisis resources, please see the information at the end of this document**     Patient Education      Thank you for coming to the [facility] [place] [facility].    Lab Testing:  If you had lab testing today and your results are reassuring or normal they will be mailed to you or sent through Vettro within 7 days. If the lab tests need quick action we will call you with the results. The phone number we will call with results is # [phone] (home) [phone] (work). If this is not the best number please call our clinic and change the number.    Medication Refills:  If you need any refills please call your pharmacy and they will contact us. Our fax number for refills is [phone]. Please allow three business for refill processing. If you need to  your refill at a new pharmacy, please contact the new pharmacy directly. The new pharmacy will help you get your medications transferred.     Scheduling:  If you have any concerns about today's visit or wish to schedule another appointment please call our office during normal business hours [phone] (8-5:00 M-F)    Contact Us:  Please call [phone] during business hours (8-5:00 M-F).  If after clinic hours, or on the weekend, please call  [phone].    Financial Assistance [phone]  [facility] Billing [phone]  Central Billing Office, [facility]: [phone]  [facility] Billing [phone]  Medical Records [phone]  [facility] Patient Bill of Rights [email or web address]       MENTAL HEALTH CRISIS NUMBERS:  For a medical emergency please call  911 or go to the nearest ER.     [facility]:   [facility] -[phone]   Crisis Residence [facility] -[phone]   Walk-In Counseling Center [facility] -[phone]   COPE 24/7 [facility] Mobile Team -[phone] (adults)/[phone] (child)  CHILD: [facility]  needs assessment team - [phone]      [facility]:   [facility] - [phone]   Walk-in counseling [facility] - [phone]   Walk-in counseling [facility] - [phone]   Crisis Residence [facility] Residence - [phone]  Urgent Care Adult Mental [facility]-[phone] mobile unit/ 24/7 crisis line    National Crisis Numbers:   National Suicide Prevention Lifeline: 4-557-023-TALK (395-931-7488)  Poison Control Center - 8-647-466-5796  Join [employer]/resources for a list of additional resources (SOS)  Trans Lifeline a hotline for transgender people 7-599-790-9990  The Stef Project a hotline for LGBT youth 8-403-302-8871  Crisis Text Line: For any crisis 24/7   To: 432486  see www.crisistextline.org  - IF MAKING A CALL FEELS TOO HARD, send a text!         Again thank you for choosing [facility] & [facility] and please let us know how we can best partner with you to improve you and your family's health.    You may be receiving a survey regarding this appointment. We would love to have your feedback, both positive and negative. The survey is done by an external company, so your answers are anonymous.

## 2021-04-12 NOTE — PROGRESS NOTES
"VIDEO VISIT  Ingris Trevino is a 26 year old patient who is being evaluated via a billable video visit.      The patient has been notified of following:   \"This video visit will be conducted via a call between you and your physician/provider. We have found that certain health care needs can be provided without the need for an in-person physical exam. This service lets us provide the care you need with a video conversation. If a prescription is necessary we can send it directly to your pharmacy. If lab work is needed we can place an order for that and you can then stop by our lab to have the test done at a later time. Insurers are generally covering virtual visits as they would in-office visits so billing should not be different than normal.  If for some reason you do get billed incorrectly, you should contact the billing office to correct it and that number is in the AVS .    Video Conference to be completed via:  Xochitl    Patient has given verbal consent for video visit?:  Yes    Patient would prefer that any video invitations be sent by: Send to e-mail at: [email or web address]      How would patient like to obtain AVS?:  Ansley    AVS SmartPhrase [PsychAVS] has been placed in 'Patient Instructions':  Yes    "

## 2021-04-14 ENCOUNTER — VIRTUAL VISIT (OUTPATIENT)
Dept: NEUROLOGY | Facility: CLINIC | Age: 26
End: 2021-04-14
Payer: COMMERCIAL

## 2021-04-14 DIAGNOSIS — N20.1 RIGHT URETERAL STONE: ICD-10-CM

## 2021-04-14 DIAGNOSIS — G43.719 INTRACTABLE CHRONIC MIGRAINE WITHOUT AURA AND WITHOUT STATUS MIGRAINOSUS: ICD-10-CM

## 2021-04-14 PROCEDURE — 99213 OFFICE O/P EST LOW 20 MIN: CPT | Mod: 95 | Performed by: PSYCHIATRY & NEUROLOGY

## 2021-04-14 RX ORDER — INDOMETHACIN 50 MG/1
50 CAPSULE ORAL 2 TIMES DAILY WITH MEALS
Qty: 60 CAPSULE | Refills: 11 | Status: SHIPPED | OUTPATIENT
Start: 2021-04-14 | End: 2022-06-28

## 2021-04-14 RX ORDER — PROMETHAZINE HYDROCHLORIDE 25 MG/1
25 TABLET ORAL EVERY 8 HOURS PRN
Qty: 10 TABLET | Refills: 11 | Status: SHIPPED | OUTPATIENT
Start: 2021-04-14 | End: 2022-06-30

## 2021-04-14 NOTE — PROGRESS NOTES
"Margarita is a 26 year old who is being evaluated via a billable video visit.      How would you like to obtain your AVS? MyChart  If the video visit is dropped, the invitation should be resent by: Send to e-mail at: [email or web address]  Will anyone else be joining your video visit? No      Video-Visit Details    Type of service:  Video Visit    Originating Location (pt. Location): Home    Distant Location (provider location):  [facility] [place]     Platform used for Video Visit: [employer]     [facility] [place]  Neurology Progress Note    Subjective:    Margarita Trevino is a 24yr old female anxiety, depression, and back pain who returns for follow up of chronic headaches suspected to be chronic migraine without aura; there is also been consideration for paroxysmal hemicrania.  She has responded well to indomethacin.     I last saw her on 7/16/2019, at which time she was taking topiramate and indomethacin for headache prevention, and using promethazine for nausea associate with headache.    Since then, she reports that she stopped topiramate in January or February 2020, after she developed kidney stones.      She was doing \"ok\" headache wise until recently, when she developed daily headaches.  This improved 4-5 days ago. Otherwise, she had migraine attacks about 2-3 times per month, she reports. They continue to be intense for about an hour, then linger for several hours before resolving.  No significant change in headache quality.    Per most recent worsening of headaches, she found sense to be particularly triggering.  She stopped using her sunscreen and eye cream, with improvement.    Currently, she takes indomethacin and gabapentin (also prescribed for other reasons) for headache. She also uses an anti-nausea medication as needed. She uses an ice pack and lays in a dark quiet room.    She receives steroid injections in the past year, " scribing for injections on each side of the back of her head, which were very effective for headache as well.    Objective:    Vitals: There were no vitals taken for this visit.  General: Cooperative, NAD  Neurologic:  Mental Status: Fully alert, attentive and oriented. Speech clear and fluent.   Cranial Nerves: Facial movements symmetric.   Motor: No abnormal movements.      Assessment/Plan:   Ingris Trevino is a 26-year-old woman who returns for follow-up of chronic headaches, with a chronic migraine without aura phenotype.  There is also a consideration of paroxysmal hemicrania, and has responded well to indomethacin.  She has approximately 2-3 headache attacks per month, as well as recent worsening to become daily, now resolved.    She is off of topiramate after experiencing kidney stones.  I agree with this and encouraged her to stay off of topiramate in the future.  -She is also currently taking propranolol, doxepin, and gabapentin.  -If additional headache preventatives are needed in the future, a CGRP monoclonal antibody may be a good choice.  Alternatively, depending on the frequency of her headaches, botulinum toxin injections could be considered.  -Given her good response to steroid injections in the past to the pain clinic, I think would also be reasonable to repeat these injections in the future, she has a flare of symptoms again.    -For treatment of headache, she will continue indomethacin 50 mg twice a day as needed.  This is a reduction from 3 times a day previously.  -For nausea related to headache, she will continue promethazine 25 mg as needed.    I spent 23 minutes on patient care documentation.  I will plan to see her back in 1 year, or sooner if needed.    Margarita Corbin MD  Neurology

## 2021-04-14 NOTE — LETTER
"4/14/2021       RE: Ingris Trevino  [address] [place] [address]     Dear Colleague,    Thank you for referring your patient, Ingris Trevino, to the [facility] [place] at [facility]. Please see a copy of my visit note below.    Margarita is a 26 year old who is being evaluated via a billable video visit.      How would you like to obtain your AVS? MyChart  If the video visit is dropped, the invitation should be resent by: Send to e-mail at: [email or web address]  Will anyone else be joining your video visit? No      Video-Visit Details    Type of service:  Video Visit    Originating Location (pt. Location): Home    Distant Location (provider location):  [facility] [place]     Platform used for Video Visit: Well     [facility]  Neurology Progress Note    Subjective:    Margarita Trevino is a 24yr old female anxiety, depression, and back pain who returns for follow up of chronic headaches suspected to be chronic migraine without aura; there is also been consideration for paroxysmal hemicrania.  She has responded well to indomethacin.     I last saw her on 7/16/2019, at which time she was taking topiramate and indomethacin for headache prevention, and using promethazine for nausea associate with headache.    Since then, she reports that she stopped topiramate in January or February 2020, after she developed kidney stones.      She was doing \"ok\" headache wise until recently, when she developed daily headaches.  This improved 4-5 days ago. Otherwise, she had migraine attacks about 2-3 times per month, she reports. They continue to be intense for about an hour, then linger for several hours before resolving.  No significant change in headache quality.    Per most recent worsening of headaches, she found sense to be particularly triggering.  She stopped " using her sunscreen and eye cream, with improvement.    Currently, she takes indomethacin and gabapentin (also prescribed for other reasons) for headache. She also uses an anti-nausea medication as needed. She uses an ice pack and lays in a dark quiet room.    She receives steroid injections in the past year, scribing for injections on each side of the back of her head, which were very effective for headache as well.    Objective:    Vitals: There were no vitals taken for this visit.  General: Cooperative, NAD  Neurologic:  Mental Status: Fully alert, attentive and oriented. Speech clear and fluent.   Cranial Nerves: Facial movements symmetric.   Motor: No abnormal movements.      Assessment/Plan:   Ingris Trevino is a 26-year-old woman who returns for follow-up of chronic headaches, with a chronic migraine without aura phenotype.  There is also a consideration of paroxysmal hemicrania, and has responded well to indomethacin.  She has approximately 2-3 headache attacks per month, as well as recent worsening to become daily, now resolved.    She is off of topiramate after experiencing kidney stones.  I agree with this and encouraged her to stay off of topiramate in the future.  -She is also currently taking propranolol, doxepin, and gabapentin.  -If additional headache preventatives are needed in the future, a CGRP monoclonal antibody may be a good choice.  Alternatively, depending on the frequency of her headaches, botulinum toxin injections could be considered.  -Given her good response to steroid injections in the past to the pain clinic, I think would also be reasonable to repeat these injections in the future, she has a flare of symptoms again.    -For treatment of headache, she will continue indomethacin 50 mg twice a day as needed.  This is a reduction from 3 times a day previously.  -For nausea related to headache, she will continue promethazine 25 mg as needed.    I spent 23 minutes on patient care  documentation.  I will plan to see her back in 1 year, or sooner if needed.    Margarita Corbin MD  Neurology

## 2021-05-26 VITALS — HEART RATE: 87 BPM | SYSTOLIC BLOOD PRESSURE: 123 MMHG | DIASTOLIC BLOOD PRESSURE: 72 MMHG | TEMPERATURE: 98 F

## 2021-05-27 VITALS — HEART RATE: 103 BPM | DIASTOLIC BLOOD PRESSURE: 89 MMHG | TEMPERATURE: 98 F | SYSTOLIC BLOOD PRESSURE: 137 MMHG

## 2021-06-04 VITALS — BODY MASS INDEX: 33.97 KG/M2 | HEIGHT: 65 IN

## 2021-06-04 VITALS
TEMPERATURE: 99 F | SYSTOLIC BLOOD PRESSURE: 121 MMHG | BODY MASS INDEX: 36.84 KG/M2 | DIASTOLIC BLOOD PRESSURE: 84 MMHG | WEIGHT: 218 LBS | HEART RATE: 77 BPM

## 2021-06-04 VITALS — HEIGHT: 65 IN | WEIGHT: 212.9 LBS | BODY MASS INDEX: 35.47 KG/M2

## 2021-06-05 NOTE — PROGRESS NOTES
Assessment/Plan:        Diagnoses and all orders for this visit:    Calculus of ureter  -     Urinalysis Macroscopic  -     Culture, Urine- Future; Future; Expected date: 02/23/2020  -     Culture, Urine- Future  -     Cystoscopy with Stent Removal Education  -     Patient Stated Goal: Know what to expect after surgery  -     Patient Stated Goal: Prevent further stones  -     Renal Function Profile; Future; Expected date: 01/24/2020  -     Magnesium; Future; Expected date: 01/24/2020  -     Uric Acid; Future; Expected date: 01/24/2020  -     Calcium, Ionized, Measured; Future; Expected date: 01/24/2020  -     Parathyroid Hormone Intact with Minerals; Future; Expected date: 01/24/2020  -     24 Hour Urine Collection Steps Education  -     CT Abdomen Pelvis Without Oral Without IV Contrast; Future; Expected date: 02/24/2020  -     Renal Function Profile  -     Magnesium  -     Uric Acid  -     Calcium, Ionized, Measured  -     Parathyroid Hormone Intact with Minerals              Litholink studies to be done.    Calculus of kidney  -     Cystoscopy with Stent Removal Education  -     Patient Stated Goal: Know what to expect after surgery  -     Patient Stated Goal: Prevent further stones  -     Renal Function Profile; Future; Expected date: 01/24/2020  -     Magnesium; Future; Expected date: 01/24/2020  -     Uric Acid; Future; Expected date: 01/24/2020  -     Calcium, Ionized, Measured; Future; Expected date: 01/24/2020  -     Parathyroid Hormone Intact with Minerals; Future; Expected date: 01/24/2020  -     24 Hour Urine Collection Steps Education  -     CT Abdomen Pelvis Without Oral Without IV Contrast; Future; Expected date: 02/24/2020  -     Renal Function Profile  -     Magnesium  -     Uric Acid  -     Calcium, Ionized, Measured  -     Parathyroid Hormone Intact with Minerals             Litholink studies to be done.    Stone Management Plan22  [facility] Stone Management 1/15/2020 1/24/2020   Urinary Tract Infection  No suspicion of infection No suspicion of infection   Renal Colic Asymptomatic at this time Asymptomatic at this time   Renal Failure No suspicion of renal failure No suspicion of renal failure   Current CT date 1/9/2020 -   Right sided stones? Yes -   R Number of ureteral stones 1 -   R GSD of ureteral stones 2 -   R Location of ureteral stone Distal -   R Number of kidney stones  5 -   R GSD of kidney stones 2 - 4 -   R Hydronephrosis Mild -   R Stone Event New event Resolved event   Diagnosis date 1/9/2020 -   Initial location of primary symptomatic stone Distal -   Initial GSD of primary symptomatic stone 2 -   R Post-op status Post-Drainage Visit Stent Removal   R Current Plan Clear -   Clear rationale Other -   Left sided stones? No -   L Stone Event No current event No current event             Subjective:      HPI  Ms. Ingris Trevino is a 25 y.o.  female returning to the [facility] [place] for stent removal postop right ureteroscopy stone extraction multiple stones per Dr. Faust 1/15/2020.  Stone analysis 90% calcium oxalate 10% calcium appetite.  Patient had 5 stones in the right kidney measuring 2 to 4 mm along with a distal ureteral stone.  She has no stones on the left.    Patient has had some mild urgency and frequency accompanying stent.  No significant abdominal or flank pain.    UA today specific gravity 1.030 pH 6 moderate blood small leukocyte negative nitrate.    Flexible cystourethroscopy accomplished with stent removed without difficulty.    Impression postop right ureteroscopy extraction multiple stones    Plan stone risk studies with [employer]ol[employer], serum studies done today, follow-up with CT scan when studies done for review with Dr. Faust.     ROS   Review of systems is negative except for HPI.    Past Medical History:   Diagnosis Date     Anxiety      Asthma     mild     Chronic back pain      Depression      Kidney stone      Migraine        Past Surgical  History:   Procedure Laterality Date     CYSTOSCOPY W/ URETERAL STENT PLACEMENT Left        Current Outpatient Medications   Medication Sig Dispense Refill     acetaminophen (TYLENOL) 325 MG tablet Take 650 mg by mouth.       amoxicillin (AMOXIL) 500 MG capsule Take 1 capsule (500 mg total) by mouth 3 (three) times a day for 10 days. 30 capsule 0     copper (PARAGARD) 380 square mm IUD IUD 1 each by Intrauterine route once.       cyclobenzaprine (FLEXERIL) 10 MG tablet        gabapentin (NEURONTIN) 300 MG capsule Take 600 mg by mouth.       indomethacin (INDOCIN) 50 MG capsule Take 50 mg by mouth.       mirtazapine (REMERON) 15 MG tablet Take 15 mg by mouth.       oxyCODONE (ROXICODONE) 5 MG immediate release tablet Take 1 tablet (5 mg total) by mouth every 6 (six) hours as needed for pain. 12 tablet 0     phenazopyridine 97.5 mg Tab Take 97.5 mg by mouth.       promethazine (PHENERGAN) 25 MG tablet        propranolol (INDERAL LA) 60 mg 24 hr capsule Take 60 mg by mouth.       propranolol (INDERAL) 10 MG tablet Take 20 mg by mouth.       sertraline (ZOLOFT) 100 MG tablet Take 200 mg by mouth.       tamsulosin (FLOMAX) 0.4 mg cap Take 0.4 mg by mouth.       topiramate (TOPAMAX) 50 MG tablet        ondansetron (ZOFRAN-ODT) 4 MG disintegrating tablet Take 4 mg by mouth.       No current facility-administered medications for this visit.        Allergies   Allergen Reactions     Other Environmental Allergy        Social History     Socioeconomic History     Marital status:      Spouse name: Not on file     Number of children: Not on file     Years of education: Not on file     Highest education level: Not on file   Occupational History     Occupation: Research    Social Needs     Financial resource strain: Not on file     Food insecurity:     Worry: Not on file     Inability: Not on file     Transportation needs:     Medical: Not on file     Non-medical: Not on file   Tobacco Use     Smoking status: Never Smoker      Smokeless tobacco: Never Used   Substance and Sexual Activity     Alcohol use: Yes     Comment: rare     Drug use: Not on file     Sexual activity: Not on file   Lifestyle     Physical activity:     Days per week: Not on file     Minutes per session: Not on file     Stress: Not on file   Relationships     Social connections:     Talks on phone: Not on file     Gets together: Not on file     Attends Rastafarian service: Not on file     Active member of club or organization: Not on file     Attends meetings of clubs or organizations: Not on file     Relationship status: Not on file     Intimate partner violence:     Fear of current or ex partner: Not on file     Emotionally abused: Not on file     Physically abused: Not on file     Forced sexual activity: Not on file   Other Topics Concern     Not on file   Social History Narrative     Not on file       Family History   Problem Relation Age of Onset     Urolithiasis Mother      Diabetes Maternal Aunt      Cancer Maternal Aunt      Gout Maternal Grandmother      Cancer Maternal Grandmother      Heart disease Maternal Grandmother         a-fib, valve replacement     Cancer Maternal Grandfather      Clotting disorder Neg Hx      Objective:      Physical Exam  Vitals:    01/24/20 1002   BP: 123/72   Pulse: 87   Temp: 98  F (36.7  C)     General - well developed, well nourished, appropriate for age. Appears no distress at this time  Abdomen - mildly obese soft, non-tender, no hepatosplenomegaly, no masses.   - no flank tenderness, no suprapubic tenderness, kidney and bladder non-palpable  MSK - normal spinal curvature. no spinal tenderness. normal gait. muscular strength intact.  Psych - oriented to time, place, and person, normal mood and affect.      Labs   Urinalysis POC (Office):  Nitrite, UA   Date Value Ref Range Status   01/24/2020 Negative Negative Final       Lab Urinalysis:  Blood, UA   Date Value Ref Range Status   01/24/2020 Moderate (!) Negative Final      Nitrite, UA   Date Value Ref Range Status   01/24/2020 Negative Negative Final     Leukocytes, UA   Date Value Ref Range Status   01/24/2020 Small (!) Negative Final     pH, UA   Date Value Ref Range Status   01/24/2020 6.0 5.0 - 8.0 Final

## 2021-06-05 NOTE — ANESTHESIA POSTPROCEDURE EVALUATION
Patient: Ingris Trevino  CYSTOURETEROSCOPY, WITH LITHOTRIPSY USING HOLMIUM LASER AND URETERAL STENT INSERTION, RIGHT  Anesthesia type: general    Patient location: PACU  Last vitals:   Vitals Value Taken Time   /57 1/15/2020  1:45 PM   Temp 36.7  C (98  F) 1/15/2020 11:00 AM   Pulse 86 1/15/2020  1:57 PM   Resp 18 1/15/2020  1:45 PM   SpO2 95 % 1/15/2020  1:57 PM   Vitals shown include unvalidated device data.  Post vital signs: stable  Level of consciousness: awake and responds to simple questions  Post-anesthesia pain: pain controlled  Post-anesthesia nausea and vomiting: no  Pulmonary: unassisted, return to baseline  Cardiovascular: stable and blood pressure at baseline  Hydration: adequate  Anesthetic events: no    QCDR Measures:  ASA# 11 - [facility]a-op Cardiac Arrest: ASA11B - Patient did NOT experience unanticipated cardiac arrest  ASA# 12 - [facility]a-op Mortality Rate: ASA12B - Patient did NOT die  ASA# 13 - PACU Re-Intubation Rate: ASA13B - Patient did NOT require a new airway mgmt  ASA# 10 - Composite Anes Safety: ASA10A - No serious adverse event    Additional Notes:

## 2021-06-05 NOTE — PATIENT INSTRUCTIONS - HE
Ureteroscopy    Ureteroscopy is a procedure which is done for clearance of stones from the ureter, kidney or both. There are no incisions involved. The procedure involves your surgeon placing a small scope into your urethra. This is the opening where urine leaves your body.  The surgeon watches as they carefully guide the scope to the stone(s).  Modern flexible ureteroscopes can be used to reach virtually any location within the urinary tract.     The size, shape and location of the stone determines how best to treat the stone(s).  Whenever possible, stones are removed in one piece.  Larger stones need to be broken using a laser before removing in smaller pieces.  The goal is to remove all stones and stone fragments from that side of the body in a single treatment.  Complete stone clearance is an important step to prevent future kidney stone episodes.    Surgery:    Same day outpatient procedure    30-60 minutes    Procedure done in hospital surgical suite    General anesthesia (you will be asleep during the procedure)     Antibiotic prior to surgery to prevent infection    Physician will visit with you and respond to any questions or concerns and consent will be signed prior to going to the operating room    Risks:    Infection - Preoperative antibiotics should prevent new infections but it is possible that unanticipated bacteria may be introduced at time of surgery or that the stones were actually chronically infected before surgery      Injury - The ureter may be injured during this procedure.  This is most likely to happen if the ureter was very inflamed before surgery or if a stone is very tightly impacted.  The surgeon will not aggressively treat a stone if this creates a risk of injury.        Inaccessible Stones -A single procedure is effective in 95% of cases, but if your ureter is very narrow or your kidney stone is very impacted, a stent will be placed and the procedure stopped.  In 1-2 weeks after the  ureter has relaxed, the patient will be brought back to surgery and the procedure can be safely performed.      Incomplete stone clearance -Occasionally stone or stone fragments may not be completely cleared.  These may pass on their own, which may cause discomfort.  Our goal is to remove all possible stones and fragments.    Stent:      An internal soft tube will be placed between the kidney and the bladder while in surgery (after the stone is cleared). The stent will keep the kidney draining.    What should I expect?     It is common for a stent to cause some irritation and discomfort.   You may have:      The need to urinate suddenly     The need to urinate often     Pain during urination     A dull backache, which may get worse during urination     Blood stained urine (like fruit punch) and occasional small clots    It s important to remember the stent is necessary and only temporary. To feel more comfortable:      Drink more than you normally would but you do not have to constantly  flush your kidneys     Limiting your activity may decrease irritation or bleeding    Ibuprofen - 2 tablets every 6-8 hours     Use pain medications as directed.    When is the stent removed?    Most stents are removed within 5 days to 2 weeks after a procedure.     How is the stent removed?     Your stent will be removed in the [facility] with a small telescope and a grasping tool.  It usually takes less than 1 minute to remove the stent.    What should I expect after the stent is removed?     You should feel normal by the next day    Some patients find:    An increase in back pain about an hour after the stent is removed as the kidney fills up with urine before it starts to empty.  It can be as uncomfortable as your initial stone episode.  Taking pain medications before stent removal may be helpful, but you would need someone else to drive you to and from your appointment.    Bladder symptoms usually disappear by the next  morning.    Small amounts of blood in the urine may be seen occasionally for up to a week.    Diet:      After surgery, there are no dietary restrictions - Drink to thirst, there is no need to increase intake of fluids, as this may increase nausea symptoms. Try to eat smaller, more frequent snacks, instead of large meals.    Activity:    Many people return to work within 1-2 days. Fatigue is normal for a couple of weeks following surgery. With increased activity you may experience more discomfort and you may notice more blood in your urine.      Post-Operative Symptom Control    While you recover from your procedure, you can take steps to ease your recovery.    Medications that prevent further episodes of severe pain and help stones pass: Take these as prescribed on a regular basis even if you are NOT in pain      Ibuprofen (Advil or Motrin) - Is available over the counter Take 2 (200mg) tablets every 6 hours until the stone passes.  o prevents spasm of the ureter.    o Decreases pain      Dramamine - (drowsy version, non-generic formulation) Is available over the counter and decreases spasm of the ureter.  Take 50mg at bedtime every night until the stone passes. In addition, take every 6 hours as needed.  Dramamine:  o Decreases nausea  o Decreases acute pain  o Decreases recurrence of pain for next 24 hours  o Will help you sleep        *This medication will cause increased drowsiness, do not drive or operate machinery for 6 hours      Flomax- Studies show that Flomax decreases irritation from stents.   o Take every day with food until stone passes even if you do not have pain  o Flomax does not relieve pain.        *This medication may cause nasal congestion or light-headedness      Detrol ( Tolterodine) - After surgery Detrol may decrease stent irritation and pelvic pain  o Take as prescribed     *This medication may cause dry mouth, constipation or blurry vision. Stop medication if unable to  urinate.    Medication that are taken as needed to manage break through symptoms: Take these ONLY as required and hopefully not at all      Narcotics (Percocet, Vicodin, Dilaudid)- take as prescribed for severe pain unrelieved by ibuprofen and dramamine  o Take as prescribed for severe pain  o Narcotics have significant side effects and only  cover-up  pain. They have no effect on cause of pain.  o Common side effects:  - Confusion, disorientation and sedation - DO NOT DRIVE OR OPERATE MACHINERY WITHIN 24 HOURS  - Nausea - take Dramamine or Zofran  or Haldol to help control  - Constipation  - Sleep disturbances      Ondansetron (Zofran)-  o Take as prescribed  o Reserve for severe nausea  o May cause constipation, start over the counter Miralax if needed to treat this    Haldol-  o Take as prescribed  o Reserve for severe nausea    Warning Signs/Symptoms - Please call the [facility] [place] 24 hours a day at [phone] IMMEDIATELY if you experience any of these:    Fever greater than 100.1     Chills    Pain NOT CONTROLLED by pain medications    Heavy bleeding or large clots in urine (small clots can be normal)    Persistent nausea and/or vomiting    Post-Operative Follow up:    The stone(s) will be sent from surgery to a lab for composition analysis.  These results are usually available before a one month post-operative visit.  If you had laser treatment to break up your stone, you will usually be scheduled for a low dose CT scan prior to your one month appointment.  This scan allows your surgeon to confirm that all stone fragments were cleared at time of surgery and that there have been no complications.  These results along with possible labs and urine studies will help us develop an individualized plan to prevent new stones from forming and keep existing stones from enlarging.  This visit is usually scheduled about 1 month after your original surgery.    The [facility] [place] can respond to your  questions or concerns 24 hours a day at [phone].

## 2021-06-05 NOTE — ANESTHESIA CARE TRANSFER NOTE
Last vitals:   Vitals:    01/15/20 1021   BP: 111/61   Pulse: 78   Resp: 16   Temp: 36.2  C (97.1  F)   SpO2: 100%     Patient's level of consciousness is drowsy  Spontaneous respirations: yes  Maintains airway independently: yes  Dentition unchanged: yes  Oropharynx: oropharynx clear of all foreign objects    QCDR Measures:  ASA# 20 - Surgical Safety Checklist: WHO surgical safety checklist completed prior to induction    PQRS# 430 - Adult PONV Prevention: 4558F - Pt received => 2 anti-emetic agents (different classes) preop & intraop  ASA# 8 - Peds PONV Prevention: NA - Not pediatric patient, not GA or 2 or more risk factors NOT present  PQRS# 424 - Michaela-op Temp Management: 4559F - At least one body temp DOCUMENTED => 35.5C or 95.9F within required timeframe  PQRS# 426 - PACU Transfer Protocol: - Transfer of care checklist used  ASA# 14 - Acute Post-op Pain: ASA14B - Patient did NOT experience pain >= 7 out of 10

## 2021-06-05 NOTE — PROGRESS NOTES
Assessment/Plan:        Diagnoses and all orders for this visit:    Calculus of ureter  -     Urinalysis Macroscopic  -     Urine,Microscopic- Today; Future; Expected date: 01/14/2020  -     Culture, Urine- Future; Future; Expected date: 02/13/2020  -     Culture, Urine- Future  -     Urine,Microscopic- Today    Calculus of kidney    Hydronephrosis with urinary obstruction due to ureteral calculus    Other orders  -     acetaminophen (TYLENOL) 325 MG tablet; Take 650 mg by mouth.  -     cyclobenzaprine (FLEXERIL) 10 MG tablet  -     gabapentin (NEURONTIN) 300 MG capsule; Take 600 mg by mouth.  -     indomethacin (INDOCIN) 50 MG capsule; Take 50 mg by mouth.  -     mirtazapine (REMERON) 15 MG tablet; Take 15 mg by mouth.  -     ondansetron (ZOFRAN-ODT) 4 MG disintegrating tablet; Take 4 mg by mouth.  -     oxybutynin (DITROPAN) 5 MG tablet; Take 5 mg by mouth.  -     oxyCODONE (ROXICODONE) 5 MG immediate release tablet; Take 5-10 mg by mouth.  -     phenazopyridine 97.5 mg Tab; Take 97.5 mg by mouth.  -     promethazine (PHENERGAN) 25 MG tablet  -     propranolol (INDERAL) 10 MG tablet; Take 20 mg by mouth.  -     propranolol (INDERAL LA) 60 mg 24 hr capsule; Take 60 mg by mouth.  -     sertraline (ZOLOFT) 100 MG tablet; Take 200 mg by mouth.  -     tamsulosin (FLOMAX) 0.4 mg cap; Take 0.4 mg by mouth.  -     topiramate (TOPAMAX) 50 MG tablet  -     copper (PARAGARD) 380 square mm IUD IUD; 1 each by Intrauterine route once.      Stone Management Plan  [facility] Stone Management 1/15/2020   Urinary Tract Infection No suspicion of infection   Renal Colic Asymptomatic at this time   Renal Failure No suspicion of renal failure   Current CT date 1/9/2020   Right sided stones? Yes   R Number of ureteral stones 1   R GSD of ureteral stones 2   R Location of ureteral stone Distal   R Number of kidney stones  5   R GSD of kidney stones 2 - 4   R Hydronephrosis Mild   R Stone Event New event   Diagnosis date 1/9/2020   Initial  location of primary symptomatic stone Distal   Initial GSD of primary symptomatic stone 2   R Post-op status Post-Drainage Visit   R Current Plan Clear   Clear rationale Other   Left sided stones? No   L Stone Event No current event           Subjective:      HPI  Ms. Ingris Trevino is a 25 y.o.  female presenting to the [facility] [place] following hospitalization for obstructive urolithiasis s/p right stent insertion     She is a first time unidentified composition stone former who has not required stone clearance procedures. She has not previously participated in stone risk evaluation. She has no identified modifiable stone risk factors. She has identified non-modifiable stone risks including:  early age at first stone and multiple stones at presentation.    She presented to ER 1/9/20 for acute onset, severe right flank pain x few hours. The pain radiated to the right lower abdomen. She had associated nausea, chills, urinary frequency and hematuria. She has no hx of UTIs or kidney stones. Workup in ER was notable for labs demonstrating mild luekocytosis with intact renal function and unremarkable UA. CT scan reported an obstructing 2 mm right UVJ stone with peripheric fluid and hypoattention in medial kidney. Given intractable pain and concern for calyceal rupture, she had a right ureteral stent placed as temporizing step to definitive stone clearance. She is scheduled for ureteroscopy tomorrow with Dr. Faust and is here to discuss surgery.    She has had mild right flank pain and nausea but is currently without symptoms. She has been take pyridium and flomax for stent related symptoms of urinary urgency and frequency. She denies symptoms of fever, chills, flank pain, nausea, vomiting, and dysuria.     CT scan from 1/9/20 is personally reviewed and demonstrates a 2 mm right UVJ stone with mild hydronephrosis. Additional right renal stones x 5 largest 4 mm. No left sided stones  appreciated. As noted by Radiologist, there is a wedge-shaped hypoattenuation in medial aspect of left kidney, new since 2018 imaging.    Significant labs from presentation include no hematuria, no pyuria, negative nitrite, few bacteria, normal WBC, normal creatinine and normal potassium.    PLAN    26 yo F first time stone former initially managed with right ureteral stent for obstructing right UVJ stone. Additional right renal stones.    Will obtain updated UA with micro and plan to proceed with ureterscopic stone clearance tomorrow. Risks and benefits were detailed of ureteroscopic stone clearance including potential issues of urinary or systemic infection, ureteral injury, inaccessible stone, incomplete stone clearance, multiple surgeries, and stent related symptoms of urgency, frequency and hematuria Patient verbalized understanding. Patient agrees with plan as discussed. Preoperative evaluation with primary care is not requested as the referring documentation is adequate.    For symptom control, she was prescribed oxycodone and Flomax. Over the counter symptom control medications of ibuprofen, Dramamine and Tylenol were recommended.        ROS   Review of Systems  A 12 point comprehensive review of systems is negative except for HPI    Past Medical History:   Diagnosis Date     Chronic back pain      Kidney stone      Migraine        Past Surgical History:   Procedure Laterality Date     CYSTOSCOPY W/ URETERAL STENT PLACEMENT Left        No current outpatient medications on file.     No current facility-administered medications for this visit.        Allergies   Allergen Reactions     Other Environmental Allergy        Social History     Socioeconomic History     Marital status:      Spouse name: Not on file     Number of children: Not on file     Years of education: Not on file     Highest education level: Not on file   Occupational History     Not on file   Social Needs     Financial resource strain:  Not on file     Food insecurity:     Worry: Not on file     Inability: Not on file     Transportation needs:     Medical: Not on file     Non-medical: Not on file   Tobacco Use     Smoking status: Never Smoker     Smokeless tobacco: Never Used   Substance and Sexual Activity     Alcohol use: Yes     Comment: occas     Drug use: Not on file     Sexual activity: Not on file   Lifestyle     Physical activity:     Days per week: Not on file     Minutes per session: Not on file     Stress: Not on file   Relationships     Social connections:     Talks on phone: Not on file     Gets together: Not on file     Attends [employer] service: Not on file     Active member of club or organization: Not on file     Attends meetings of clubs or organizations: Not on file     Relationship status: Not on file     Intimate partner violence:     Fear of current or ex partner: Not on file     Emotionally abused: Not on file     Physically abused: Not on file     Forced sexual activity: Not on file   Other Topics Concern     Not on file   Social History Narrative     Not on file       No family history on file.    Objective:      Physical Exam  Vitals:    01/14/20 0941   BP: 121/84   Pulse: 77   Temp: 99  F (37.2  C)     General - well developed, well nourished, appropriate for age. Appears no distress at this time   Heart - regular rate and rhythm, no murmur  Respiratory - normal effort, clear to auscultation, good air entry without adventitious noises  Abdomen - mildly obese soft, non-tender, no hepatosplenomegaly, no masses.   - no flank tenderness, no suprapubic tenderness, kidney and bladder non-palpable  MSK - normal spinal curvature. no spinal tenderness. normal gait. muscular strength intact.  Neurology - cranial nerves II-XII grossly intact, normal sensation, no unsteadiness  Skin - intact, no bruising, no gouty tophi  Psych - oriented to time, place, and person, normal mood and affect.    Labs  Urinalysis POC (Office):  No  results found for: BLOODUA, NITRITE, LEUKOCYTESUA, PHUA    Lab Urinalysis:  No results found for: HGBUA, NITRITE, LEUKOCYTESUR, PHUR and Acute Labs CBC No results found for: WBC, HGB, PLT, C Reactive Protein  No results found for: CRP, Renal Panel  KSINo results found for: CREATININE, CRCLEARANCE, K, CALCIUM and Urine Culture  No results found for: CULTURE

## 2021-06-05 NOTE — PATIENT INSTRUCTIONS - HE
Patient Stated Goal: Prevent further stones  Cystoscopy with Stent Removal    Cystoscopy is used to help diagnose urinary problems, or to remove a ureteral stent.    During a cystoscopy, your doctor examines the inside of your bladder with an instrument called a cystoscope. A cystoscope is a long, thin flexible tube with a camera at the end.    Your doctor will insert the scope into your urethra allowing him to visualize and evaluate the inside of the bladder for possible abnormalities. The urethra is the tube that carries urine to the outside of your body.    How is the stent removed?    Your stent will be removed in the [facility] with a small telescope and a grasping tool.  It usually takes less than 1 minute to remove the stent.    What should I expect after the stent is removed?     You should feel normal by the next day.    Some patients find:      An increase in back pain about an hour after the stent is removed as the kidney fills up with urine before it starts to empty.  It can be as uncomfortable as your initial stone episode.  Taking pain medications before stent removal may be helpful, but you would need someone else to drive you to and from your appointment.    Bladder symptoms usually disappear by the next morning.    Small amounts of blood in the urine may be seen occasionally for up to a week.    At Home:      It is important to drink plenty of fluids after your procedure    You may continue to use your pain medications as prescribed    What symptoms should I watch for?    Fever     Chills    Increasing back pain that is not relieved with pain medications    Large amounts of blood in the urine or large clots    Leakage of urine (incontinence)     Are not able to urinate for 8 hours    These symptoms may mean you have a blockage or infection. Call the [facility] 24 hours a day at [phone] immediately.      Steps for collecting a 24 hour urine specimen    Please follow the directions  carefully. All urine voided for a 24-hour period needs to be collected into the jug.  DO NOT change any of your  normal  daily habits when doing this test. Continue to follow your regular diet, intake of fluids, and usual activity level. Pick the most convenient day with your schedule, perhaps on a weekend or a day off.    Start your Diet Log the day before collection and continue on the day of urine collection.  You MUST bring Diet Log with you on follow up visit to discuss results.    One 24hr Urine Collection     Two 24hr Urine Collections  (do not collect on consecutive days)    PLEASE COMPLETE THE 2nd JUG WITHIN 1-2 WEEKS FROM THE 1st JUG    STEP 1  Empty your bladder completely into the toilet. This will be your start time. Write your full legal name, start date and time on the jug label.  Collection start and stop times need to match exactly!  For example:  6 am to 6 am.    STEP 2  The next time you urinate, empty your bladder directly into the jug or collection hat and pour urine into the jug.  Screw the lid back onto the jug.  Do not spill!    STEP 3  Place the jug in the refrigerator or a cooler with ice during the collection period.  Failure to keep it cool could cause inaccurate test results. DO NOT Freeze.    STEP 4  Continue collecting all urine into the jug for the rest of the day, for the full 24 hours.  DO NOT stop early or go over 24 hours!    STEP 5  Exactly 24 hours from start of collection, write your full legal name, stop date and time on the jug label.   Collection start and stop times need to match exactly!  For example:  6 am to 6 am.  Failure to label correctly will result in recollection of urine specimen.    STEP 6  Return each jug within 24 hours after final urination.     STEP 7  Drop off jug locations:   [facility]: Mon-Fri 7am-7pm - Closed on weekends  [facility]: Mon-Fri 7am-5pm - Closed on Sunday  [facility]: Mon-Fri 7am-6:30pm - Closed on weekends    STEP 8  Please  call [facility] after return of your final jug to schedule your follow-up visit. [phone]

## 2021-06-06 NOTE — PROGRESS NOTES
Assessment/Plan:        Diagnoses and all orders for this visit:    History of kidney stones  -     Urinalysis Macroscopic  -     US Kidney Bilateral; Future; Expected date: 02/26/2020  -     Cancel: US Kidney Bilateral; Future; Expected date: 02/26/2020  -     US Kidney Bilateral; Future; Expected date: 02/24/2021  -     XR Abdomen AP; Future; Expected date: 02/24/2021  -     US Kidney Bilateral      Stone Management Plan  [facility] 1/15/2020 1/24/2020 2/26/2020   Urinary Tract Infection No suspicion of infection No suspicion of infection No suspicion of infection   Renal Colic Asymptomatic at this time Asymptomatic at this time Asymptomatic at this time   Renal Failure No suspicion of renal failure No suspicion of renal failure No suspicion of renal failure   Current CT date 1/9/2020 - -   Right sided stones? Yes - -   R Number of ureteral stones 1 - -   R GSD of ureteral stones 2 - -   R Location of ureteral stone Distal - -   R Number of kidney stones  5 - -   R GSD of kidney stones 2 - 4 - -   R Hydronephrosis Mild - -   R Stone Event New event Resolved event No current event   Diagnosis date 1/9/2020 - -   Initial location of primary symptomatic stone Distal - -   Initial GSD of primary symptomatic stone 2 - -   R Post-op status Post-Drainage Visit Stent Removal -   R Current Plan Clear - -   Clear rationale Other - -   Left sided stones? No - -   L Stone Event No current event No current event No current event             Subjective:      HPI  Ms. Ingris Trevino is a 25 y.o.  female returning to the [facility] [place] for late postoperative follow-up.     She returns status post Right ureteroscopic laser lithotripsy for renal and ureteral stone. She has had no unanticipated events.     She is asymptomatic at present. She denies symptoms of fever, chills, flank pain, nausea, vomiting, urinary frequency and dysuria.    Renal US was performed after the face to face portion  of this visit.  There is no hydronephrosis.  There is an ovarian cyst..     Stone composition was 90 % calcium oxalate and 10 % calcium phosphate (apatite).     Prior stone risk factors include topamax therapy which she is no logner taking.    A 24 hour urine study was obtained through [facility] the results of which were notable for:  Low urine volume (1.24 and 0.85L)  High SS CaOx (6.83, 7.28)  Normal urine calcium, oxalate, and pH  Borderline low urine citrate (464/547_  Normal sodium    Overall interpretation is that very low urine volume is contributing to stones.  Recommend initiation of high fluid diet.  Discussed low sodium, low oxalate, low animal protein diet as well.    Will plan to see back in one year with updated imaging     ROS   A 12 point comprehensive review of systems is negative except for HPI.    Past Medical History:   Diagnosis Date     Anxiety      Asthma     mild     Chronic back pain      Depression      Kidney stone      Migraine        Past Surgical History:   Procedure Laterality Date     CYSTOSCOPY W/ URETERAL STENT PLACEMENT Left        Current Outpatient Medications   Medication Sig Dispense Refill     acetaminophen (TYLENOL) 325 MG tablet Take 650 mg by mouth.       copper (PARAGARD) 380 square mm IUD IUD 1 each by Intrauterine route once.       cyclobenzaprine (FLEXERIL) 10 MG tablet        gabapentin (NEURONTIN) 300 MG capsule Take 600 mg by mouth.       indomethacin (INDOCIN) 50 MG capsule Take 50 mg by mouth.       mirtazapine (REMERON) 15 MG tablet Take 15 mg by mouth.       phenazopyridine 97.5 mg Tab Take 97.5 mg by mouth.       promethazine (PHENERGAN) 25 MG tablet        propranolol (INDERAL LA) 60 mg 24 hr capsule Take 60 mg by mouth.       propranolol (INDERAL) 10 MG tablet Take 20 mg by mouth.       sertraline (ZOLOFT) 100 MG tablet Take 200 mg by mouth.       topiramate (TOPAMAX) 50 MG tablet        No current facility-administered medications for this visit.         Allergies   Allergen Reactions     Other Environmental Allergy        Social History     Socioeconomic History     Marital status:      Spouse name: Not on file     Number of children: Not on file     Years of education: Not on file     Highest education level: Not on file   Occupational History     Occupation: Research    Social Needs     Financial resource strain: Not on file     Food insecurity:     Worry: Not on file     Inability: Not on file     Transportation needs:     Medical: Not on file     Non-medical: Not on file   Tobacco Use     Smoking status: Never Smoker     Smokeless tobacco: Never Used   Substance and Sexual Activity     Alcohol use: Yes     Comment: rare     Drug use: Not on file     Sexual activity: Not on file   Lifestyle     Physical activity:     Days per week: Not on file     Minutes per session: Not on file     Stress: Not on file   Relationships     Social connections:     Talks on phone: Not on file     Gets together: Not on file     Attends Congregational service: Not on file     Active member of club or organization: Not on file     Attends meetings of clubs or organizations: Not on file     Relationship status: Not on file     Intimate partner violence:     Fear of current or ex partner: Not on file     Emotionally abused: Not on file     Physically abused: Not on file     Forced sexual activity: Not on file   Other Topics Concern     Not on file   Social History Narrative     Not on file       Family History   Problem Relation Age of Onset     Urolithiasis Mother      Diabetes Maternal Aunt      Cancer Maternal Aunt      Gout Maternal Grandmother      Cancer Maternal Grandmother      Heart disease Maternal Grandmother         a-fib, valve replacement     Cancer Maternal Grandfather      Clotting disorder Neg Hx      Objective:      Physical Exam  Vitals:    02/26/20 1445   BP: 137/89   Pulse: (!) 103   Temp: 98  F (36.7  C)     General - well developed, well nourished,  appropriate for age. Appears no distress at this time  Abdomen - mildly obese soft, non-tender, no hepatosplenomegaly, no masses.   - no flank tenderness, no suprapubic tenderness, kidney and bladder non-palpable  MSK - normal spinal curvature. no spinal tenderness. normal gait. muscular strength intact.  Psych - oriented to time, place, and person, normal mood and affect.      Labs   Urinalysis POC (Office):  Nitrite, UA   Date Value Ref Range Status   01/24/2020 Negative Negative Final       Lab Urinalysis:  Blood, UA   Date Value Ref Range Status   01/24/2020 Moderate (!) Negative Final     Nitrite, UA   Date Value Ref Range Status   01/24/2020 Negative Negative Final     Leukocytes, UA   Date Value Ref Range Status   01/24/2020 Small (!) Negative Final     pH, UA   Date Value Ref Range Status   01/24/2020 6.0 5.0 - 8.0 Final

## 2021-07-05 DIAGNOSIS — F51.5 NIGHTMARES: ICD-10-CM

## 2021-07-07 RX ORDER — PRAZOSIN HYDROCHLORIDE 1 MG/1
CAPSULE ORAL
Qty: 90 CAPSULE | Refills: 4 | OUTPATIENT
Start: 2021-07-07

## 2021-07-07 NOTE — TELEPHONE ENCOUNTER
Medication requested:  prazosin (MINIPRESS) 1 MG capsule  Last refilled: 4/12/21  Qty: 90/4  Sent to: [facility]Y #[id number] - [place] - [address]       Refill decision:   1 month supply with 4 refills provided 4/12/21/ too soon for refill. Pharmacy notified

## 2021-07-19 DIAGNOSIS — F33.2 SEVERE EPISODE OF RECURRENT MAJOR DEPRESSIVE DISORDER, WITHOUT PSYCHOTIC FEATURES (H): ICD-10-CM

## 2021-07-21 RX ORDER — SERTRALINE HYDROCHLORIDE 100 MG/1
200 TABLET, FILM COATED ORAL DAILY
Qty: 60 TABLET | Refills: 3 | OUTPATIENT
Start: 2021-07-21

## 2021-07-21 RX ORDER — PROPRANOLOL HCL 60 MG
60 CAPSULE, EXTENDED RELEASE 24HR ORAL AT BEDTIME
Qty: 30 CAPSULE | Refills: 3 | OUTPATIENT
Start: 2021-07-21

## 2021-07-21 NOTE — TELEPHONE ENCOUNTER
Refills denied  Too soon  script sent 4/12/21 -month supply with 3 refills  to fu 7/2021  Scheduling has been notified to contact the pt for appointment.

## 2021-08-04 DIAGNOSIS — F41.9 ANXIETY: ICD-10-CM

## 2021-08-04 DIAGNOSIS — F33.2 SEVERE EPISODE OF RECURRENT MAJOR DEPRESSIVE DISORDER, WITHOUT PSYCHOTIC FEATURES (H): ICD-10-CM

## 2021-08-06 NOTE — TELEPHONE ENCOUNTER
Medication requested: doxepin (SINEQUAN) 10 MG capsule Take 1 capsule (10 mg) by mouth At Bedtime   Last refilled: 4/12/21  Qty: 30/3      Last seen: 4/14/21  RTC: 3 months   Cancel: 0  No-show: 0  Next appt: 0> Dr Long    Refill decision:   Refill pended and routed to the provider for review/determination due to new provider/ outside of timeframe/ no appt.  Scheduling has been notified to contact the pt for appointment.

## 2021-08-08 RX ORDER — DOXEPIN HYDROCHLORIDE 10 MG/1
10 CAPSULE ORAL AT BEDTIME
Qty: 30 CAPSULE | Refills: 0 | Status: SHIPPED | OUTPATIENT
Start: 2021-08-08 | End: 2021-09-20

## 2021-08-24 DIAGNOSIS — F33.2 SEVERE EPISODE OF RECURRENT MAJOR DEPRESSIVE DISORDER, WITHOUT PSYCHOTIC FEATURES (H): ICD-10-CM

## 2021-08-24 RX ORDER — SERTRALINE HYDROCHLORIDE 100 MG/1
200 TABLET, FILM COATED ORAL DAILY
Qty: 60 TABLET | Refills: 0 | Status: CANCELLED | OUTPATIENT
Start: 2021-08-24

## 2021-08-24 NOTE — TELEPHONE ENCOUNTER
sertraline (ZOLOFT) 100 MG   Last refilled: 4/12/21  Qty: 60 : 3    Last seen: 4/12/21  RTC: 3 MOS   Cancel: 0  No-show: 0  Next appt: NONE  Scheduling has been notified to contact the pt for appointment.  Refill decision: 30 day zaida refill sent to the pharmacy - including instructions for patient to call the clinic and schedule an appointment.

## 2021-08-25 RX ORDER — PROPRANOLOL HCL 60 MG
60 CAPSULE, EXTENDED RELEASE 24HR ORAL AT BEDTIME
Qty: 30 CAPSULE | Refills: 0 | Status: SHIPPED | OUTPATIENT
Start: 2021-08-25 | End: 2021-09-20

## 2021-08-25 NOTE — TELEPHONE ENCOUNTER
Medication requested  propranolol ER (INDERAL LA) 60 MG 24 hr capsule  Last refilled: 4/12/21  Qty: 30/3       Last seen: 4/12/21 ( Benja)  RTC: 3 MOS   Cancel: 0  No-show: 0  Next appt: NONE  Scheduling has been notified to contact the pt for appointment.( Ethan)  Refill decision: 30 day zaida refill sent to the pharmacy - including instructions for patient to call the clinic and schedule an appointment.

## 2021-08-26 DIAGNOSIS — F33.2 SEVERE EPISODE OF RECURRENT MAJOR DEPRESSIVE DISORDER, WITHOUT PSYCHOTIC FEATURES (H): ICD-10-CM

## 2021-08-26 RX ORDER — SERTRALINE HYDROCHLORIDE 100 MG/1
200 TABLET, FILM COATED ORAL DAILY
Qty: 60 TABLET | Refills: 0 | Status: SHIPPED | OUTPATIENT
Start: 2021-08-26 | End: 2021-09-20

## 2021-08-26 NOTE — TELEPHONE ENCOUNTER
Medication requested: sertraline (ZOLOFT) 100 MG tablet   Last refilled: 4/12/21  Qty: 60/3      Last seen: 4/12/21 denilson  RTC: 3 months  Cancel: 0  No-show: 0  Next appt: 9/20/21 [place]    Refill decision:    30 day zaida refill sent to the pharmacy
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
## 2021-09-20 ENCOUNTER — VIRTUAL VISIT (OUTPATIENT)
Dept: PSYCHIATRY | Facility: CLINIC | Age: 26
End: 2021-09-20
Attending: PSYCHIATRY & NEUROLOGY
Payer: COMMERCIAL

## 2021-09-20 DIAGNOSIS — F41.9 ANXIETY: ICD-10-CM

## 2021-09-20 DIAGNOSIS — F33.2 SEVERE EPISODE OF RECURRENT MAJOR DEPRESSIVE DISORDER, WITHOUT PSYCHOTIC FEATURES (H): ICD-10-CM

## 2021-09-20 DIAGNOSIS — F51.5 NIGHTMARES: ICD-10-CM

## 2021-09-20 PROCEDURE — 90792 PSYCH DIAG EVAL W/MED SRVCS: CPT | Mod: GT | Performed by: STUDENT IN AN ORGANIZED HEALTH CARE EDUCATION/TRAINING PROGRAM

## 2021-09-20 RX ORDER — PROPRANOLOL HYDROCHLORIDE 20 MG/1
20 TABLET ORAL DAILY PRN
Qty: 30 TABLET | Refills: 1 | Status: SHIPPED | OUTPATIENT
Start: 2021-09-20 | End: 2021-10-18

## 2021-09-20 RX ORDER — DOXEPIN HYDROCHLORIDE 10 MG/1
10 CAPSULE ORAL AT BEDTIME
Qty: 30 CAPSULE | Refills: 0 | Status: CANCELLED | OUTPATIENT
Start: 2021-09-20

## 2021-09-20 RX ORDER — PROPRANOLOL HCL 60 MG
60 CAPSULE, EXTENDED RELEASE 24HR ORAL AT BEDTIME
Qty: 30 CAPSULE | Refills: 1 | Status: SHIPPED | OUTPATIENT
Start: 2021-09-20 | End: 2021-10-18

## 2021-09-20 RX ORDER — SERTRALINE HYDROCHLORIDE 100 MG/1
200 TABLET, FILM COATED ORAL DAILY
Qty: 60 TABLET | Refills: 1 | Status: SHIPPED | OUTPATIENT
Start: 2021-09-20 | End: 2021-10-18

## 2021-09-20 RX ORDER — PRAZOSIN HYDROCHLORIDE 1 MG/1
CAPSULE ORAL
Qty: 90 CAPSULE | Refills: 1 | Status: SHIPPED | OUTPATIENT
Start: 2021-09-20 | End: 2021-10-18

## 2021-09-20 ASSESSMENT — PAIN SCALES - GENERAL: PAINLEVEL: SEVERE PAIN (7)

## 2021-09-20 NOTE — PATIENT INSTRUCTIONS
Treatment Plan Today:     1) Medications-  Continue Prazosin 3mg at bedtime for nightmares  Continue sertraline 200 mg daily.  Continue propranolol LA 60 mg daily.  Continue propranolol IR 20 mg PRN daily for acute anxiety.  Commence Melatonin as needed- advised to take it about 2 hours before bedtime        2) Follow-up appt with Dr Adams on 10/18/21    3) Crisis numbers are below and clinic after hours number is [phone]          **For crisis resources, please see the information at the end of this document**     Patient Education      Thank you for coming to the [facility] [place] [facility].    Lab Testing:  If you had lab testing today and your results are reassuring or normal they will be mailed to you or sent through Mones[employer] within 7 days. If the lab tests need quick action we will call you with the results. The phone number we will call with results is # [phone] (home) [phone] (work). If this is not the best number please call our clinic and change the number.    Medication Refills:  If you need any refills please call your pharmacy and they will contact us. Our fax number for refills is [phone]. Please allow three business for refill processing. If you need to  your refill at a new pharmacy, please contact the new pharmacy directly. The new pharmacy will help you get your medications transferred.     Scheduling:  If you have any concerns about today's visit or wish to schedule another appointment please call our office during normal business hours [phone] (8-5:00 M-F)    Contact Us:  Please call [phone] during business hours (8-5:00 M-F).  If after clinic hours, or on the weekend, please call  [phone].    Financial Assistance [phone]  [facility] Billing [phone]  Central Billing Office, [facility]: [phone]  [facility] Billing [phone]  Medical Records [phone]  [facility] Patient Bill of Rights  [email or web address]       MENTAL HEALTH CRISIS NUMBERS:  For a medical emergency please call  911 or go to the nearest ER.     [facility]:   [facility] -[phone]   Crisis Residence [facility] -[phone]   Walk-In Counseling Center [facility] -[phone]   COPE 24/7 [facility] Mobile Team -[phone] (adults)/[phone] (child)  CHILD: [facility] needs assessment team - [phone]      [facility]:   [facility] - [phone]   Walk-in counseling [facility] - [phone]   Walk-in counseling [facility] - [phone]   Crisis Residence [facility] - [phone]  Urgent Care Adult Mental Qmnegp-[phone] mobile unit/ 24/7 crisis line    National Crisis Numbers:   National Suicide Prevention Lifeline: 4-058-117-TALK (859-660-1979)  Poison Control Center - 1-712.103.1043  [employer]/resources for a list of additional resources (SOS)  Trans Lifeline a hotline for transgender people 5-781-580-4169  The Stef Project a hotline for LGBT youth 4-411-268-5833  Crisis Text Line: For any crisis 24/7   To: 998085  see www.crisistextline.org  - IF MAKING A CALL FEELS TOO HARD, send a text!         Again thank you for choosing [facility] M[facility] ADDI[facility] and please let us know how we can best partner with you to improve you and your family's health.    You may be receiving a survey regarding this appointment. We would love to have your feedback, both positive and negative. The survey is done by an external company, so your answers are anonymous.

## 2021-09-20 NOTE — PROGRESS NOTES
"VIDEO VISIT  Ingris Trevino is a 26 year old patient who is being evaluated via a billable video visit.      The patient has been notified of following:   \"This video visit will be conducted via a call between you and your physician/provider. We have found that certain health care needs can be provided without the need for an in-person physical exam. This service lets us provide the care you need with a video conversation. If a prescription is necessary we can send it directly to your pharmacy. If lab work is needed we can place an order for that and you can then stop by our lab to have the test done at a later time. Insurers are generally covering virtual visits as they would in-office visits so billing should not be different than normal.  If for some reason you do get billed incorrectly, you should contact the billing office to correct it and that number is in the AVS .    Video Conference to be completed via:  [employer]    Patient has given verbal consent for video visit?:  Yes    Patient would prefer that any video invitations be sent by: Patient is using [employer]Cell to log on and declined a link for this visit.        How would patient like to obtain AVS?:  The[employer]Cell    AVS SmartPhrase [PsychAVS] has been placed in 'Patient Instructions':  Yes    "
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
## 2021-10-18 ENCOUNTER — TELEPHONE (OUTPATIENT)
Dept: PSYCHIATRY | Facility: CLINIC | Age: 26
End: 2021-10-18

## 2021-10-18 ENCOUNTER — VIRTUAL VISIT (OUTPATIENT)
Dept: PSYCHIATRY | Facility: CLINIC | Age: 26
End: 2021-10-18
Attending: PSYCHIATRY & NEUROLOGY
Payer: COMMERCIAL

## 2021-10-18 DIAGNOSIS — F33.2 SEVERE EPISODE OF RECURRENT MAJOR DEPRESSIVE DISORDER, WITHOUT PSYCHOTIC FEATURES (H): ICD-10-CM

## 2021-10-18 DIAGNOSIS — F51.5 NIGHTMARES: ICD-10-CM

## 2021-10-18 DIAGNOSIS — F41.9 ANXIETY: ICD-10-CM

## 2021-10-18 PROCEDURE — 99214 OFFICE O/P EST MOD 30 MIN: CPT | Mod: GT | Performed by: STUDENT IN AN ORGANIZED HEALTH CARE EDUCATION/TRAINING PROGRAM

## 2021-10-18 RX ORDER — PROPRANOLOL HCL 60 MG
60 CAPSULE, EXTENDED RELEASE 24HR ORAL AT BEDTIME
Qty: 30 CAPSULE | Refills: 1 | Status: SHIPPED | OUTPATIENT
Start: 2021-10-18 | End: 2021-11-29

## 2021-10-18 RX ORDER — SERTRALINE HYDROCHLORIDE 100 MG/1
200 TABLET, FILM COATED ORAL DAILY
Qty: 60 TABLET | Refills: 1 | Status: SHIPPED | OUTPATIENT
Start: 2021-10-18 | End: 2021-11-29

## 2021-10-18 RX ORDER — PROPRANOLOL HYDROCHLORIDE 20 MG/1
20 TABLET ORAL DAILY PRN
Qty: 30 TABLET | Refills: 1 | Status: SHIPPED | OUTPATIENT
Start: 2021-10-18 | End: 2021-11-29

## 2021-10-18 RX ORDER — PRAZOSIN HYDROCHLORIDE 1 MG/1
CAPSULE ORAL
Qty: 90 CAPSULE | Refills: 1 | Status: SHIPPED | OUTPATIENT
Start: 2021-10-18 | End: 2021-11-29

## 2021-10-18 NOTE — PATIENT INSTRUCTIONS
Treatment Plan Today:     1) Medications- Continue current medications    2) Follow-up appt with Dr Adams 11/29/21 @ 8:30 am    3) Crisis numbers are below and clinic after hours number is [phone]              **For crisis resources, please see the information at the end of this document**     Patient Education      Thank you for coming to the [facility] MENTAL HEALTH & ADDICTION [place] CLINIC.    Lab Testing:  If you had lab testing today and your results are reassuring or normal they will be mailed to you or sent through [employer] within 7 days. If the lab tests need quick action we will call you with the results. The phone number we will call with results is # [phone] (home) [phone] (work). If this is not the best number please call our clinic and change the number.    Medication Refills:  If you need any refills please call your pharmacy and they will contact us. Our fax number for refills is [phone]. Please allow three business for refill processing. If you need to  your refill at a new pharmacy, please contact the new pharmacy directly. The new pharmacy will help you get your medications transferred.     Scheduling:  If you have any concerns about today's visit or wish to schedule another appointment please call our office during normal business hours [phone] (8-5:00 M-F)    Contact Us:  Please call [phone] during business hours (8-5:00 M-F).  If after clinic hours, or on the weekend, please call  [phone].    Financial Assistance [phone]  [facility] Billing [phone]  Central Billing Office, [facility]: [phone]  [facility] Billing [phone]  Medical Records [phone]  [facility] Patient Bill of Rights [email or web address]       MENTAL HEALTH CRISIS NUMBERS:  For a medical emergency please call  911 or go to the nearest ER.     [facility]:   [facility]  -[phone]   Crisis Residence [facility] -[phone]   Walk-In Counseling Center [facility] -[phone]   COPE 24/7 [facility] Mobile Team -[phone] (adults)/[phone] (child)  CHILD: [facility] needs assessment team - [phone]      [facility]:   [facility] - [phone]   Walk-in counseling [facility] - [phone]   Walk-in counseling [facility] - [phone]   Crisis Residence [facility] Residence - [phone]  Urgent Care Adult Mental [facility]-[phone] mobile unit/ 24/7 crisis line    National Crisis Numbers:   National Suicide Prevention Lifeline: 9-162-009-TALK (150-357-1814)  Poison Control Center - 1-157.274.9007  [employer]/resources for a list of additional resources (SOS)  Trans Lifeline a hotline for transgender people 1-986.685.8273  The Stef Project a hotline for LGBT youth 1-479.572.8476  Crisis Text Line: For any crisis 24/7   To: 560291  see www.crisistextline.org  - IF MAKING A CALL FEELS TOO HARD, send a text!         Again thank you for choosing [facility] MENTAL HEALTH & ADDICTION [place] CLINIC and please let us know how we can best partner with you to improve you and your family's health.    You may be receiving a survey regarding this appointment. We would love to have your feedback, both positive and negative. The survey is done by an external company, so your answers are anonymous.

## 2021-10-18 NOTE — TELEPHONE ENCOUNTER
On October 18, 2021, at 8:46 AM, writer called patient at mobile to confirm Virtual Visit. Writer unable to make contact with patient. Writer left detailed voice message for call back, with [phone] left as callback number. Link for [employer] was sent via email to [email or web address] per previous virtual rooming note. Ayah Bragg, JAMIL    Writer called patient back at 8:57 AM at mobile to confirm virtual visit. Writer was still unable to reach patient. Ayah Bragg, EMT

## 2021-10-24 ENCOUNTER — HEALTH MAINTENANCE LETTER (OUTPATIENT)
Age: 26
End: 2021-10-24

## 2021-11-29 ENCOUNTER — VIRTUAL VISIT (OUTPATIENT)
Dept: PSYCHIATRY | Facility: CLINIC | Age: 26
End: 2021-11-29
Attending: PSYCHIATRY & NEUROLOGY
Payer: COMMERCIAL

## 2021-11-29 DIAGNOSIS — F33.2 SEVERE EPISODE OF RECURRENT MAJOR DEPRESSIVE DISORDER, WITHOUT PSYCHOTIC FEATURES (H): ICD-10-CM

## 2021-11-29 DIAGNOSIS — F51.5 NIGHTMARES: ICD-10-CM

## 2021-11-29 DIAGNOSIS — Z79.899 ENCOUNTER FOR LONG-TERM (CURRENT) USE OF MEDICATIONS: Primary | ICD-10-CM

## 2021-11-29 DIAGNOSIS — F41.9 ANXIETY: ICD-10-CM

## 2021-11-29 PROCEDURE — 99214 OFFICE O/P EST MOD 30 MIN: CPT | Mod: GT | Performed by: STUDENT IN AN ORGANIZED HEALTH CARE EDUCATION/TRAINING PROGRAM

## 2021-11-29 RX ORDER — PROPRANOLOL HCL 60 MG
60 CAPSULE, EXTENDED RELEASE 24HR ORAL DAILY
Qty: 30 CAPSULE | Refills: 1 | Status: SHIPPED | OUTPATIENT
Start: 2021-11-29 | End: 2022-01-06

## 2021-11-29 RX ORDER — PRAZOSIN HYDROCHLORIDE 1 MG/1
CAPSULE ORAL
Qty: 90 CAPSULE | Refills: 1 | Status: SHIPPED | OUTPATIENT
Start: 2021-11-29 | End: 2022-01-06

## 2021-11-29 RX ORDER — PROPRANOLOL HYDROCHLORIDE 20 MG/1
20 TABLET ORAL DAILY PRN
Qty: 30 TABLET | Refills: 1 | Status: SHIPPED | OUTPATIENT
Start: 2021-11-29 | End: 2022-01-06

## 2021-11-29 RX ORDER — SERTRALINE HYDROCHLORIDE 100 MG/1
200 TABLET, FILM COATED ORAL DAILY
Qty: 60 TABLET | Refills: 1 | Status: SHIPPED | OUTPATIENT
Start: 2021-11-29 | End: 2022-01-06

## 2021-11-29 ASSESSMENT — PAIN SCALES - GENERAL: PAINLEVEL: EXTREME PAIN (8)

## 2021-11-29 NOTE — PATIENT INSTRUCTIONS
It was nice seeing you today    Treatment Plan Today:     1) Medications-  Continue Prazosin 3mg at bedtime for nightmares  Continue sertraline 200 mg daily.  Continue propranolol LA 60 mg daily.  Continue propranolol IR 20 mg PRN daily for acute anxiety.  Continue Melatonin as needed- advised to take it about 2 hours before bedtime    Consider Increase in Gabapentin - 600 mg am and pm   And 900 mg at bedtime (Prescribed by pain doctor)        2) Follow-up appt with Dr Adams 1/6/22 @ 8:30 am    3) Crisis numbers are below and clinic after hours number is [phone]            **For crisis resources, please see the information at the end of this document**     Patient Education      Thank you for coming to the [facility] MENTAL HEALTH & ADDICTION [place] CLINIC.    Lab Testing:  If you had lab testing today and your results are reassuring or normal they will be mailed to you or sent through [employer] within 7 days. If the lab tests need quick action we will call you with the results. The phone number we will call with results is # [phone] (home) [phone] (work). If this is not the best number please call our clinic and change the number.    Medication Refills:  If you need any refills please call your pharmacy and they will contact us. Our fax number for refills is [phone]. Please allow three business for refill processing. If you need to  your refill at a new pharmacy, please contact the new pharmacy directly. The new pharmacy will help you get your medications transferred.     Scheduling:  If you have any concerns about today's visit or wish to schedule another appointment please call our office during normal business hours [phone] (8-5:00 M-F)    Contact Us:  Please call [phone] during business hours (8-5:00 M-F).  If after clinic hours, or on the weekend, please call  [phone].    Financial Assistance [phone]  [facility] Billing [phone]  Central Billing Office,  [facility]: [phone]  [facility] Billing [phone]  Medical Records [phone]  [facility] Patient Bill of Rights [email or web address]       MENTAL HEALTH CRISIS NUMBERS:  For a medical emergency please call  911 or go to the nearest ER.     [facility]:   [facility] -[phone]   Crisis Residence [facility] Residence -[phone]   Walk-In Counseling Center [facility] -[phone]   COPE 24/7 [place] Mobile Team -[phone] (adults)/[phone] (child)  CHILD: [facility] needs assessment team - [phone]      [facility]:   [facility] - [phone]   Walk-in counseling [facility] - [phone]   Walk-in counseling [facility] - [phone]   Crisis Residence [facility] Residence - [phone]  Urgent Care Adult Mental [facility]-[phone] mobile unit/ 24/7 crisis line    National Crisis Numbers:   National Suicide Prevention Lifeline: 4-669-646-TALK (547-417-8963)  Poison Control Center - 2-293-649-0531  [employer]/resources for a list of additional resources (SOS)  Trans Lifeline a hotline for transgender people 8-339-006-6467  The Stef Project a hotline for LGBT youth 1-340.112.3905  Crisis Text Line: For any crisis 24/7   To: 235694  see www.crisistextline.org  - IF MAKING A CALL FEELS TOO HARD, send a text!         Again thank you for choosing [facility] MENTAL HEALTH & ADDI[facility] and please let us know how we can best partner with you to improve you and your family's health.    You may be receiving a survey regarding this appointment. We would love to have your feedback, both positive and negative. The survey is done by an external company, so your answers are anonymous.
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
## 2021-11-29 NOTE — PROGRESS NOTES
"VIDEO VISIT  Ingris Trevino is a 26 year old patient that has consented to receive services via billable video visit.      The patient has been notified of following:   \"This video visit will be conducted via a call between you and your physician/provider. We have found that certain health care needs can be provided without the need for an in-person physical exam. This service lets us provide the care you need with a video conversation. If a prescription is necessary we can send it directly to your pharmacy. If lab work is needed we can place an order for that and you can then stop by our lab to have the test done at a later time. Insurers are generally covering virtual visits as they would in-office visits so billing should not be different than normal.  If for some reason you do get billed incorrectly, you should contact the billing office to correct it and that number is in the AVS .    Patient will join video visit via:  e-contratost (Patient / guardian confirmed to join via [employer])    If patient attempts to join the video via [employer] at appointment start time, but is unable to, they would prefer that the provider send them a video invitation via:   Send to preferred e-mail: [email or web address]      How would patient like to obtain AVS?:  MyChart    "

## 2022-01-06 ENCOUNTER — VIRTUAL VISIT (OUTPATIENT)
Dept: PSYCHIATRY | Facility: CLINIC | Age: 27
End: 2022-01-06
Attending: PSYCHIATRY & NEUROLOGY
Payer: COMMERCIAL

## 2022-01-06 DIAGNOSIS — F51.5 NIGHTMARES: ICD-10-CM

## 2022-01-06 DIAGNOSIS — F41.9 ANXIETY: ICD-10-CM

## 2022-01-06 DIAGNOSIS — F33.2 SEVERE EPISODE OF RECURRENT MAJOR DEPRESSIVE DISORDER, WITHOUT PSYCHOTIC FEATURES (H): ICD-10-CM

## 2022-01-06 PROCEDURE — 99214 OFFICE O/P EST MOD 30 MIN: CPT | Mod: GT | Performed by: STUDENT IN AN ORGANIZED HEALTH CARE EDUCATION/TRAINING PROGRAM

## 2022-01-06 RX ORDER — PROPRANOLOL HCL 60 MG
60 CAPSULE, EXTENDED RELEASE 24HR ORAL DAILY
Qty: 30 CAPSULE | Refills: 1 | Status: SHIPPED | OUTPATIENT
Start: 2022-01-06 | End: 2022-02-10

## 2022-01-06 RX ORDER — PROPRANOLOL HYDROCHLORIDE 20 MG/1
20 TABLET ORAL DAILY PRN
Qty: 30 TABLET | Refills: 1 | Status: SHIPPED | OUTPATIENT
Start: 2022-01-06 | End: 2022-02-10

## 2022-01-06 RX ORDER — PRAZOSIN HYDROCHLORIDE 1 MG/1
CAPSULE ORAL
Qty: 90 CAPSULE | Refills: 1 | Status: SHIPPED | OUTPATIENT
Start: 2022-01-06 | End: 2022-02-10

## 2022-01-06 RX ORDER — SERTRALINE HYDROCHLORIDE 100 MG/1
200 TABLET, FILM COATED ORAL DAILY
Qty: 60 TABLET | Refills: 1 | Status: SHIPPED | OUTPATIENT
Start: 2022-01-06 | End: 2022-02-10

## 2022-01-06 ASSESSMENT — PAIN SCALES - GENERAL: PAINLEVEL: SEVERE PAIN (7)

## 2022-01-06 NOTE — PATIENT INSTRUCTIONS
It was nice seeing you today    Treatment Plan Today:     1) Medications-  Continue current medications    2) Follow-up appt with Dr Adams in 1 month    3) Crisis numbers are below and clinic after hours number is [phone]        **For crisis resources, please see the information at the end of this document**   Patient Education    Thank you for coming to the [facility] [place] [facility].    Lab Testing:  If you had lab testing today and your results are reassuring or normal they will be mailed to you or sent through [employer] within 7 days. If the lab tests need quick action we will call you with the results. The phone number we will call with results is # [phone] (home) [phone] (work). If this is not the best number please call our clinic and change the number.    Medication Refills:  If you need any refills please call your pharmacy and they will contact us. Our fax number for refills is [phone]. Please allow three business for refill processing. If you need to  your refill at a new pharmacy, please contact the new pharmacy directly. The new pharmacy will help you get your medications transferred.     Scheduling:  If you have any concerns about today's visit or wish to schedule another appointment please call our office during normal business hours [phone] (8-5:00 M-F)    Contact Us:  Please call [phone] during business hours (8-5:00 M-F).  If after clinic hours, or on the weekend, please call  [phone].    Financial Assistance [phone]  [facility] Billing [phone]  Central Billing Office, [facility]: [phone]  [facility] Billing [phone]  Medical Records [phone]  [facility] Patient Bill of Rights [email or web address]       MENTAL HEALTH CRISIS NUMBERS:  For a medical emergency please call  911 or go to the nearest ER.     [facility]:   [facility]  Center -[phone]   Crisis Residence [facility] -[phone]   Walk-In Counseling Center [facility] -[phone]   COPE 24/7 [facility] Mobile Team -[phone] (adults)/[phone] (child)  CHILD: [facility] needs assessment team - [phone]      [facility]:   [facility] - [phone]   Walk-in counseling [facility] - [phone]   Walk-in counseling [facility] - [phone]   Crisis Residence [facility] - [phone]  Urgent Care Adult Mental Jdzkcr-[phone] mobile unit/ 24/7 crisis line    National Crisis Numbers:   National Suicide Prevention Lifeline: 7-836-542-TALK (068-533-7729)  Poison Control Center - 1-852.493.6657  [employer]/resources for a list of additional resources (SOS)  Trans Lifeline a hotline for transgender people 5-061-578-0855  The [employer]f Project a hotline for LGBT youth 9-738-350-0507  Crisis Text Line: For any crisis 24/7   To: 742464  see www.crisistextline.org  - IF MAKING A CALL FEELS TOO HARD, send a text!         Again thank you for choosing [facility] [place] [facility] and please let us know how we can best partner with you to improve you and your family's health.    You may be receiving a survey regarding this appointment. We would love to have your feedback, both positive and negative. The survey is done by an external company, so your answers are anonymous.
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
## 2022-02-08 DIAGNOSIS — F33.2 SEVERE EPISODE OF RECURRENT MAJOR DEPRESSIVE DISORDER, WITHOUT PSYCHOTIC FEATURES (H): ICD-10-CM

## 2022-02-08 DIAGNOSIS — F51.5 NIGHTMARES: ICD-10-CM

## 2022-02-08 RX ORDER — PROPRANOLOL HCL 60 MG
60 CAPSULE, EXTENDED RELEASE 24HR ORAL DAILY
Qty: 30 CAPSULE | Refills: 1 | OUTPATIENT
Start: 2022-02-08

## 2022-02-08 RX ORDER — SERTRALINE HYDROCHLORIDE 100 MG/1
200 TABLET, FILM COATED ORAL DAILY
Qty: 60 TABLET | Refills: 1 | OUTPATIENT
Start: 2022-02-08

## 2022-02-08 RX ORDER — PRAZOSIN HYDROCHLORIDE 1 MG/1
CAPSULE ORAL
Qty: 90 CAPSULE | Refills: 1 | OUTPATIENT
Start: 2022-02-08

## 2022-02-10 ENCOUNTER — VIRTUAL VISIT (OUTPATIENT)
Dept: PSYCHIATRY | Facility: CLINIC | Age: 27
End: 2022-02-10
Attending: PSYCHIATRY & NEUROLOGY
Payer: COMMERCIAL

## 2022-02-10 DIAGNOSIS — F51.5 NIGHTMARES: ICD-10-CM

## 2022-02-10 DIAGNOSIS — F33.2 SEVERE EPISODE OF RECURRENT MAJOR DEPRESSIVE DISORDER, WITHOUT PSYCHOTIC FEATURES (H): ICD-10-CM

## 2022-02-10 DIAGNOSIS — F41.9 ANXIETY: ICD-10-CM

## 2022-02-10 PROCEDURE — 99215 OFFICE O/P EST HI 40 MIN: CPT | Mod: GT | Performed by: STUDENT IN AN ORGANIZED HEALTH CARE EDUCATION/TRAINING PROGRAM

## 2022-02-10 RX ORDER — PRAZOSIN HYDROCHLORIDE 1 MG/1
CAPSULE ORAL
Qty: 90 CAPSULE | Refills: 1 | Status: SHIPPED | OUTPATIENT
Start: 2022-02-10 | End: 2022-08-01

## 2022-02-10 RX ORDER — SERTRALINE HYDROCHLORIDE 100 MG/1
200 TABLET, FILM COATED ORAL DAILY
Qty: 60 TABLET | Refills: 1 | Status: SHIPPED | OUTPATIENT
Start: 2022-02-10 | End: 2022-08-01

## 2022-02-10 RX ORDER — TRAZODONE HYDROCHLORIDE 50 MG/1
25-50 TABLET, FILM COATED ORAL AT BEDTIME
Qty: 30 TABLET | Refills: 0 | Status: SHIPPED | OUTPATIENT
Start: 2022-02-10 | End: 2022-08-01

## 2022-02-10 RX ORDER — PROPRANOLOL HCL 60 MG
60 CAPSULE, EXTENDED RELEASE 24HR ORAL DAILY
Qty: 30 CAPSULE | Refills: 1 | Status: SHIPPED | OUTPATIENT
Start: 2022-02-10 | End: 2022-08-01

## 2022-02-10 RX ORDER — PROPRANOLOL HYDROCHLORIDE 20 MG/1
20 TABLET ORAL DAILY PRN
Qty: 30 TABLET | Refills: 1 | Status: SHIPPED | OUTPATIENT
Start: 2022-02-10 | End: 2022-08-01

## 2022-02-10 ASSESSMENT — PAIN SCALES - GENERAL: PAINLEVEL: SEVERE PAIN (6)

## 2022-02-10 NOTE — PATIENT INSTRUCTIONS
It was nice seeing you today    Treatment Plan Today:     1) Medications-  Commence Trazodone 25-50 mg at bedtime  Continue Prazosin 3mg at bedtime for nightmares  Continue sertraline 200 mg daily.  Continue propranolol LA 60 mg daily.  Continue propranolol IR 20 mg PRN daily for acute anxiety.  Continue Melatonin as needed- advised to take it about 2 hours before bedtime    Prescribed by her pain specialist  Continue Gabapentin 600 am, and 1200 mg qhs    2) Follow-up appt with Dr Adams 2/22/2022    3) Crisis numbers are below and clinic after hours number is [phone]        **For crisis resources, please see the information at the end of this document**   Patient Education    Thank you for coming to the [facility] [place] [facility].    Lab Testing:  If you had lab testing today and your results are reassuring or normal they will be mailed to you or sent through [facility] within 7 days. If the lab tests need quick action we will call you with the results. The phone number we will call with results is # [phone] (home) [phone] (work). If this is not the best number please call our clinic and change the number.    Medication Refills:  If you need any refills please call your pharmacy and they will contact us. Our fax number for refills is [phone]. Please allow three business for refill processing. If you need to  your refill at a new pharmacy, please contact the new pharmacy directly. The new pharmacy will help you get your medications transferred.     Scheduling:  If you have any concerns about today's visit or wish to schedule another appointment please call our office during normal business hours [phone] (8-5:00 M-F)    Contact Us:  Please call [phone] during business hours (8-5:00 M-F).  If after clinic hours, or on the weekend, please call  [phone].    Financial Assistance [phone]  [place] Billing [phone]  Central Billing Office,  [facility]: [phone]  [place] Billing [phone]  Medical Records [phone]  [place] Patient Bill of Rights [email or web address]       MENTAL HEALTH CRISIS NUMBERS:  For a medical emergency please call  911 or go to the nearest ER.     [facility]:   [facility] -[phone]   Crisis Residence [facility] -[phone]   Walk-In Counseling Center [facility] -[phone]   COPE 24/7 [place] Mobile Team -[phone] (adults)/[phone] (child)  CHILD: [facility] needs assessment team - [phone]      [facility]:   [facility] - [phone]   Walk-in counseling [facility] - [phone]   Walk-in counseling [facility] - [phone]   Crisis Residence [facility] - [phone]  Urgent Care Adult Mental [facility]-[phone] mobile unit/ 24/7 crisis line    National Crisis Numbers:   National Suicide Prevention Lifeline: 6-459-128-TALK (681-610-6214)  Poison Control Center - 0-495-886-0912  [employer] Research/resources for a list of additional resources (SOS)  Trans Lifeline a hotline for transgender people 2-705-795-9669  The [employer] Project a hotline for LGBT youth 1-182.275.9203  Crisis Text Line: For any crisis 24/7   To: 574955  see www.crisistextline.org  - IF MAKING A CALL FEELS TOO HARD, send a text!         Again thank you for choosing [facility] MENTAL HEALTH & ADDICTION [facility] and please let us know how we can best partner with you to improve you and your family's health.    You may be receiving a survey regarding this appointment. We would love to have your feedback, both positive and negative. The survey is done by an external company, so your answers are anonymous.
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
## 2022-02-13 ENCOUNTER — HEALTH MAINTENANCE LETTER (OUTPATIENT)
Age: 27
End: 2022-02-13

## 2022-02-22 ENCOUNTER — VIRTUAL VISIT (OUTPATIENT)
Dept: PSYCHIATRY | Facility: CLINIC | Age: 27
End: 2022-02-22
Attending: PSYCHIATRY & NEUROLOGY
Payer: COMMERCIAL

## 2022-02-22 DIAGNOSIS — F41.9 ANXIETY: Primary | ICD-10-CM

## 2022-02-22 PROCEDURE — 99215 OFFICE O/P EST HI 40 MIN: CPT | Mod: GT | Performed by: STUDENT IN AN ORGANIZED HEALTH CARE EDUCATION/TRAINING PROGRAM

## 2022-02-22 ASSESSMENT — ANXIETY QUESTIONNAIRES
5. BEING SO RESTLESS THAT IT IS HARD TO SIT STILL: NOT AT ALL
3. WORRYING TOO MUCH ABOUT DIFFERENT THINGS: NEARLY EVERY DAY
GAD7 TOTAL SCORE: 13
7. FEELING AFRAID AS IF SOMETHING AWFUL MIGHT HAPPEN: SEVERAL DAYS
1. FEELING NERVOUS, ANXIOUS, OR ON EDGE: NEARLY EVERY DAY
6. BECOMING EASILY ANNOYED OR IRRITABLE: SEVERAL DAYS
7. FEELING AFRAID AS IF SOMETHING AWFUL MIGHT HAPPEN: SEVERAL DAYS
4. TROUBLE RELAXING: NEARLY EVERY DAY
2. NOT BEING ABLE TO STOP OR CONTROL WORRYING: MORE THAN HALF THE DAYS
GAD7 TOTAL SCORE: 13
GAD7 TOTAL SCORE: 13
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
## 2022-02-22 NOTE — PATIENT INSTRUCTIONS
It was nice seeing you today    Treatment Plan Today:     1) Medications-  No changes made    [employer] message sent with some options.    2) Follow-up appt with Dr Bryan MILLER     3) Crisis numbers are below and clinic after hours number is [phone]      **For crisis resources, please see the information at the end of this document**   Patient Education    Thank you for coming to the [facility].    Lab Testing:  If you had lab testing today and your results are reassuring or normal they will be mailed to you or sent through [employer] within 7 days. If the lab tests need quick action we will call you with the results. The phone number we will call with results is # [phone]. If this is not the best number please call our clinic and change the number.     Medication Refills:  If you need any refills please call your pharmacy and they will contact us. Our fax number for refills is [phone]. Please allow three business days for refill processing.   If you need to change to a different pharmacy, please contact the new pharmacy directly. The new pharmacy will help you get your medications transferred.     Contact Us:  Please call [phone] during business hours (8-5:00 M-F).  If you have medication related questions after clinic hours, or on the weekend, please call [phone].    Financial Assistance [phone]  [facility] [phone]  Central Billing Office, [facility]: [phone]  [facility] Billing [phone]  Medical Records [phone]       MENTAL HEALTH CRISIS RESOURCES:  For a emergency help, please call 911 or go to the nearest Emergency Department.     Emergency Walk-In Options:   EmPATH Unit @ [facility] ([place]): [phone] - Specialized mental health emergency area designed to be calming  formerly [facility] ([place]): [phone]  [facility] ([place]): [phone]  [facility] ([place]): [phone]    [facility] Crisis Information:   [place]: [phone]  [place]: [phone]  [place] ([place]) - Adult: [phone]     Child: [phone]  [place] - Adult: [phone]     Child: [phone]  [place]: [phone]  List of all [facility] resources:   https://[place].gov/dhs/people-we-serve/adults/health-care/mental-health/resources/crisis-contacts.jsp    National Crisis Information:   Crisis Text Line: Text  [place]  to 455471  National Suicide Prevention Lifeline: 4-564-388-VBZG (1-168.548.1477)       For online chat options, visit https://suicidepreventionlifeline.org/chat/  Poison Control Center: 1-437.102.6326  Trans Lifeline: 1-424.309.3598 - Hotline for transgender people of all ages  The Stef Project: 1-399.348.9232 - Hotline for LGBT youth     For Non-Emergency Support:   Fast Tracker: Mental Health & Substance Use Disorder Resources -   https://www.EyeSee360trackImonomy Interactiven.org/

## 2022-02-23 ASSESSMENT — ANXIETY QUESTIONNAIRES: GAD7 TOTAL SCORE: 13

## 2022-06-02 NOTE — TELEPHONE ENCOUNTER
Medication requested: propranolol (INDERAL) 10 MG tablet  Last refilled: 2/14/2020  Qty: 30/1      Last seen: 1/16/2020  RTC: 2 months  Cancel: 0  No-show: 0  Next appt: 3/9/2020    Refill decision:   Refilled for 30 days per protocol.      
Alert and oriented, no focal deficits, no motor or sensory deficits.

## 2022-06-28 DIAGNOSIS — G43.719 INTRACTABLE CHRONIC MIGRAINE WITHOUT AURA AND WITHOUT STATUS MIGRAINOSUS: ICD-10-CM

## 2022-06-28 RX ORDER — INDOMETHACIN 50 MG/1
50 CAPSULE ORAL 2 TIMES DAILY WITH MEALS
Qty: 60 CAPSULE | Refills: 11 | Status: SHIPPED | OUTPATIENT
Start: 2022-06-28 | End: 2022-11-07

## 2022-06-28 NOTE — TELEPHONE ENCOUNTER
Rx Authorization:    Requested Medication/ Dose indomethacin (INDOCIN) 50 MG capsule    Date last refill ordered: 4/14//21    Quantity ordered: 60    # refills: 11    Date of last clinic visit with ordering provider: 4/14/21    Date of next clinic visit with ordering provider: 11/7/22    All pertinent protocol data (lab date/result):     Include pertinent information from patients message:

## 2022-06-30 DIAGNOSIS — G43.719 INTRACTABLE CHRONIC MIGRAINE WITHOUT AURA AND WITHOUT STATUS MIGRAINOSUS: ICD-10-CM

## 2022-06-30 RX ORDER — PROMETHAZINE HYDROCHLORIDE 25 MG/1
25 TABLET ORAL EVERY 8 HOURS PRN
Qty: 10 TABLET | Refills: 11 | Status: SHIPPED | OUTPATIENT
Start: 2022-06-30 | End: 2022-11-07

## 2022-06-30 NOTE — TELEPHONE ENCOUNTER
Rx Authorization:    Requested Medication/ Dosepromethazine (PHENERGAN) 25 MG tablet    Date last refill ordered: 4/14/21    Quantity ordered: 10 tablets    # refills: 11    Date of last clinic visit with ordering provider: 4/14/21    Date of next clinic visit with ordering provider: 11/7/22    All pertinent protocol data (lab date/result):     Include pertinent information from patients message:

## 2022-08-01 ENCOUNTER — VIRTUAL VISIT (OUTPATIENT)
Dept: PSYCHIATRY | Facility: CLINIC | Age: 27
End: 2022-08-01
Attending: PSYCHIATRY & NEUROLOGY
Payer: COMMERCIAL

## 2022-08-01 DIAGNOSIS — F33.2 SEVERE EPISODE OF RECURRENT MAJOR DEPRESSIVE DISORDER, WITHOUT PSYCHOTIC FEATURES (H): ICD-10-CM

## 2022-08-01 DIAGNOSIS — F41.9 ANXIETY: ICD-10-CM

## 2022-08-01 PROCEDURE — 90792 PSYCH DIAG EVAL W/MED SRVCS: CPT | Mod: GT | Performed by: STUDENT IN AN ORGANIZED HEALTH CARE EDUCATION/TRAINING PROGRAM

## 2022-08-01 RX ORDER — PROPRANOLOL HYDROCHLORIDE 20 MG/1
20 TABLET ORAL DAILY PRN
Qty: 30 TABLET | Refills: 3 | Status: SHIPPED | OUTPATIENT
Start: 2022-08-01 | End: 2022-10-03

## 2022-08-01 RX ORDER — PROPRANOLOL HCL 60 MG
60 CAPSULE, EXTENDED RELEASE 24HR ORAL DAILY
Qty: 30 CAPSULE | Refills: 3 | Status: SHIPPED | OUTPATIENT
Start: 2022-08-01 | End: 2022-10-03

## 2022-08-01 RX ORDER — TRAZODONE HYDROCHLORIDE 50 MG/1
25-50 TABLET, FILM COATED ORAL AT BEDTIME
Qty: 30 TABLET | Refills: 1 | Status: CANCELLED | OUTPATIENT
Start: 2022-08-01

## 2022-08-01 RX ORDER — SERTRALINE HYDROCHLORIDE 100 MG/1
200 TABLET, FILM COATED ORAL DAILY
Qty: 60 TABLET | Refills: 3 | Status: SHIPPED | OUTPATIENT
Start: 2022-08-01 | End: 2022-10-03

## 2022-08-01 ASSESSMENT — ANXIETY QUESTIONNAIRES
GAD7 TOTAL SCORE: 17
7. FEELING AFRAID AS IF SOMETHING AWFUL MIGHT HAPPEN: SEVERAL DAYS
8. IF YOU CHECKED OFF ANY PROBLEMS, HOW DIFFICULT HAVE THESE MADE IT FOR YOU TO DO YOUR WORK, TAKE CARE OF THINGS AT HOME, OR GET ALONG WITH OTHER PEOPLE?: VERY DIFFICULT
6. BECOMING EASILY ANNOYED OR IRRITABLE: NEARLY EVERY DAY
1. FEELING NERVOUS, ANXIOUS, OR ON EDGE: NEARLY EVERY DAY
GAD7 TOTAL SCORE: 17
GAD7 TOTAL SCORE: 17
IF YOU CHECKED OFF ANY PROBLEMS ON THIS QUESTIONNAIRE, HOW DIFFICULT HAVE THESE PROBLEMS MADE IT FOR YOU TO DO YOUR WORK, TAKE CARE OF THINGS AT HOME, OR GET ALONG WITH OTHER PEOPLE: VERY DIFFICULT
4. TROUBLE RELAXING: MORE THAN HALF THE DAYS
5. BEING SO RESTLESS THAT IT IS HARD TO SIT STILL: MORE THAN HALF THE DAYS
3. WORRYING TOO MUCH ABOUT DIFFERENT THINGS: NEARLY EVERY DAY
7. FEELING AFRAID AS IF SOMETHING AWFUL MIGHT HAPPEN: SEVERAL DAYS
2. NOT BEING ABLE TO STOP OR CONTROL WORRYING: NEARLY EVERY DAY

## 2022-08-01 NOTE — Clinical Note
Nice job with this note (and in general I find your notes well-organized and comprehensive).  One thing to know about is the interaction between zoloft and Inderal (via cyp 450 2D6 effects, zoloft increasing the level of the Beta blocker which can lead to hypothension).  The patient is tolerating this so no need to change anything but this is something to think about and list in the potential drug interactions section.  LZ

## 2022-08-01 NOTE — NURSING NOTE
Patient declined individual allergy and medication review by support staff because pt verified during echeckin.    Pt was not agreeable to have VF check in pt as pt did echeckin.    Pt stated they are always in chronic pain.    Leonila Gallego VF
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
## 2022-08-01 NOTE — PATIENT INSTRUCTIONS
**For crisis resources, please see the information at the end of this document**   Patient Education    Thank you for coming to the [facility] HEALTH & [facility] [place] [facility].     Lab Testing:  If you had lab testing today and your results are reassuring or normal they will be mailed to you or sent through [employer] within 7 days. If the lab tests need quick action we will call you with the results. The phone number we will call with results is # [phone]. If this is not the best number please call our clinic and change the number.     Medication Refills:  If you need any refills please call your pharmacy and they will contact us. Our fax number for refills is [phone].   Three business days of notice are needed for general medication refill requests.   Five business days of notice are needed for controlled substance refill requests.   If you need to change to a different pharmacy, please contact the new pharmacy directly. The new pharmacy will help you get your medications transferred.     Contact Us:  Please call [phone] during business hours (8-5:00 M-F).   If you have medication related questions after clinic hours, or on the weekend, please call [phone].     Financial Assistance [phone]   Medical Records [phone]       MENTAL HEALTH CRISIS RESOURCES:  For a emergency help, please call 911 or go to the nearest Emergency Department.     Emergency Walk-In Options:   EmPATH Unit @ [facility] ([place]): [phone] - Specialized mental health emergency area designed to be calming  [facility] ([place]): [phone]  [facility] ([place]): [phone]  [facility]): [phone]    [facility] Crisis Information:   [place]: [phone]  [place]: [phone]  [place] ([place]) - Adult: [phone]     Child: [phone]  [place] - Adult: [phone]     Child: [phone]  [place]:  [phone]  List of all [employer] resources:   https://[place].gov/dhs/people-we-serve/adults/health-care/mental-health/resources/crisis-contacts.jsp    National Crisis Information:   Crisis Text Line: Text  [place]  to 379349  Suicide & Crisis Lifeline: 988  National Suicide Prevention Lifeline: 4-335-186-TALK (1-229.247.6764)       For online chat options, visit https://suicidepreventionlifeline.org/chat/  Poison Control Center: 2-063-265-3796  Trans Lifeline: 4-816-068-1526 - Hotline for transgender people of all ages  The Stef Project: 7-471-637-2151 - Hotline for LGBT youth     For Non-Emergency Support:   Fast Tracker: Mental Health & Substance Use Disorder Resources -   https://www.[facility].run.org/

## 2022-10-03 ENCOUNTER — VIRTUAL VISIT (OUTPATIENT)
Dept: PSYCHIATRY | Facility: CLINIC | Age: 27
End: 2022-10-03
Attending: PSYCHIATRY & NEUROLOGY
Payer: COMMERCIAL

## 2022-10-03 DIAGNOSIS — F33.41 RECURRENT MAJOR DEPRESSIVE DISORDER, IN PARTIAL REMISSION (H): Primary | ICD-10-CM

## 2022-10-03 DIAGNOSIS — F41.9 ANXIETY: ICD-10-CM

## 2022-10-03 PROCEDURE — 99214 OFFICE O/P EST MOD 30 MIN: CPT | Mod: HN | Performed by: STUDENT IN AN ORGANIZED HEALTH CARE EDUCATION/TRAINING PROGRAM

## 2022-10-03 RX ORDER — SERTRALINE HYDROCHLORIDE 100 MG/1
200 TABLET, FILM COATED ORAL DAILY
Qty: 60 TABLET | Refills: 3 | Status: SHIPPED | OUTPATIENT
Start: 2022-10-03 | End: 2022-12-05

## 2022-10-03 RX ORDER — PROPRANOLOL HYDROCHLORIDE 20 MG/1
20 TABLET ORAL DAILY PRN
Qty: 30 TABLET | Refills: 3 | Status: SHIPPED | OUTPATIENT
Start: 2022-10-03 | End: 2022-12-05

## 2022-10-03 RX ORDER — PROPRANOLOL HCL 60 MG
60 CAPSULE, EXTENDED RELEASE 24HR ORAL DAILY
Qty: 30 CAPSULE | Refills: 3 | Status: SHIPPED | OUTPATIENT
Start: 2022-10-03 | End: 2022-12-05

## 2022-10-03 NOTE — PATIENT INSTRUCTIONS
**For crisis resources, please see the information at the end of this document**   Patient Education    Thank you for coming to the [facility] HEALTH [facility] [place] [facility].     Lab Testing:  If you had lab testing today and your results are reassuring or normal they will be mailed to you or sent through [employer] within 7 days. If the lab tests need quick action we will call you with the results. The phone number we will call with results is # [phone]. If this is not the best number please call our clinic and change the number.     Medication Refills:  If you need any refills please call your pharmacy and they will contact us. Our fax number for refills is [phone].   Three business days of notice are needed for general medication refill requests.   Five business days of notice are needed for controlled substance refill requests.   If you need to change to a different pharmacy, please contact the new pharmacy directly. The new pharmacy will help you get your medications transferred.     Contact Us:  Please call [phone] during business hours (8-5:00 M-F).   If you have medication related questions after clinic hours, or on the weekend, please call [phone].     Financial Assistance [phone]   Medical Records [phone]       MENTAL HEALTH CRISIS RESOURCES:  For a emergency help, please call 911 or go to the nearest Emergency Department.     Emergency Walk-In Options:   EmPATH Unit @ [facility] ([place]): [phone] - Specialized mental health emergency area designed to be calming  [facility] ([place]): [phone]  [facility] ([place]): [phone]  [facility]): [phone]    [facility] Crisis Information:   [place]: [phone]  [place]: [phone]  [place] ([place]) - Adult: [phone]     Child: [phone]  [place] - Adult: [phone]     Child: [phone]  [place]:  [phone]  List of all [facility] resources:   https://[place].gov/dhs/people-we-serve/adults/health-care/mental-health/resources/crisis-contacts.jsp    National Crisis Information:   Crisis Text Line: Text  [place]  to 728556  Suicide & Crisis Lifeline: 988  National Suicide Prevention Lifeline: 6-759-385-TALK (1-755.956.8646)       For online chat options, visit https://suicidepreventionlifeline.org/chat/  Poison Control Center: 7-990-133-0994  Trans Lifeline: 4-850-952-5927 - Hotline for transgender people of all ages  The Stef Project: 9-415-478-1606 - Hotline for LGBT youth     For Non-Emergency Support:   Fast Tracker: Mental Health & Substance Use Disorder Resources -   https://www.[employer].comn.org/

## 2022-10-03 NOTE — PROGRESS NOTES
Video- Visit Details  Type of service:  video visit for medication management  Time of service:    Video Start Time:  9:33 AM        Video End Time:  10: 12 AM  Reason for video visit:  Patient unable to travel due to Covid-19  Originating Site (patient location):  [facility]- [place]   Location- Patient's home  Distant Site (provider location):  Remote location  Mode of Communication:  Video Conference via [employer]       [facility]  Psychiatry Clinic  MEDICAL PROGRESS NOTE     CARE TEAM:  PCP- Tia Leonardo    Psychotherapist- Julissa, once a week appointments   , Neuro, Ob- Gyn, Pain   This person is a 27 year old who uses the name Margarita and pronouns she, her.      DIAGNOSIS   Major Depressive Disorder, recurrent episode, partial remission to mild  Generalized Anxiety Disorder  Panic Attack Disorder  Obsessive-Compulsive Disorder     ASSESSMENT   Margarita presents today for a follow up appointment and states she is doing okay overall. She is dealing with constant stressors of work, chronic health conditions and family. 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 We discussed coping strategies and she has upcoming vacation that she is looking forward to. We discussed working on boundaries and improving work life balance to improve her overall being.     She sees a therapist weekly, which continues to be helpful. Her therapist has been on vacation for two weeks and she has an upcoming appointment tomorrow. She is also in the beginning states of starting EMDR and hopes to continue working through her past trauma. Her goal is to process and overcome her past trauma and before adopting/fostering kids in the future.     Her medications are going well overall. She has been on sertraline for a long time and this works extremely well for her. She states she has been taking her PRN propranolol a little more than in the  past, not daily, however a few times a week, due to ongoing stress at work. The propranolol works well for her. We discussed the risk of sertraline and propranolol together of lowering her blood pressure. She denied feeling dizzy or lightheaded after taking the propranolol and states that in the past she has been told she has high blood pressure. She sees her pain doctor monthly and states that she gets her BP checked there.     [place] was [place] today:  Indicates taking controlled medication as prescribed.     PLAN                                                                                                                1) Meds-   Continue sertraline 200 mg daily  Continue propranolol LA 60 mg daily  Continue propranolol IR 20 mg PRN daily for acute anxiety     Prescribed by her pain specialist:  Gabapentin 600 mg TID  Hydrocodone-Acetamin 7.5-325 mg TID PRN for pain     2) Psychotherapy- Continue weekly individual therapy    3) Next due-  Labs- PRN  EKG- PRN  Rating scales- PHQ9    4) Referrals-  none    5) Dispo- RTC 2 months      PERTINENT BACKGROUND                                                    [most recent eval 08/01/22]   Previous diagnoses include MDD, OCD, Panic Disorder, and chronic pain. She first began citalopram for depression at the age of 14 and then later switched to sertraline in college which she reports was helpful for both mood and OCD.  Has also done CBT for OCD to endorsed benefit.  No history of hospitalizations but patient allows at least one suicide attempt via CO poisoning in 2014 (her most recent SA) and chart review suggests as many as 6 other SA's prior to that.  Notably, patient's first contact with this clinic was a diagnostic assessment completed as a one time SACHIN eval on 1/10/19, tried to make it to the end of the day had been recommended in the context of establishing care with new providers upon recently returning to live in [place] given her prolonged use of opiates.  " From that DA: \"Unclear that chronic opioids appropriate for her chronic non-malignant pain states. That said, with what information we have, no clear indication that she has used non-medically, escalated in use, or any other concerns.\" Chronic pain condition(s) experienced by the patient include endometriosis, herniated lumbar disk and migraines.  Social history highlights include raised in [place], received BA in Psychology from [employer] in [place], [place], where she met her present wife Jacqui, then lived in [place] from Dec 2015 up to recent return to [place] in Nov 2018.        Psych pertinent item history includes suicide attempt [multiple] and suicidal ideation.     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, doing 5 or 6 different jobs, sometimes feels like she's not doing anything well and that's hard for her, has been understaffed, been there 15 months   - May need to work on boundaries, hasn't really had any time off, not great work life balance, supervisor not supportive  - Has worked in 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 Still working on paperwork, states she knows goal is to get off opioids which would be amazing  - Concerned about physical process of weaning opioids, knows its been almost 12 hours since pill because will start getting " sweaty/stomach changes, not addicted, fear of withdrawal process  (prev had to get off of them quick and had terrible experience)  - Talked with doctor to help with plan when come down off opioids, seeing doctor on 17th, plan is for cannabis to help with this, concerned about job and missing work, does not like the high feeling or lack of control feeling, wants pain relief but no high feeling   - Therapy: yes weekly, appointment tomorrow, therapist Julissa was on a two week vacation and that has been challenging, beginning stages of EMDR process, still has trauma to work through     - Medications: pretty well, doesn't think needs any changes, has been on sertraline a long time and works extremely well   - Propranolol PRN? Yes, not daily but more than a couple times a week, work place has been stressful constantly, still working and effective   - Monitor BP?: not recently, seeing pain doc once a month and getting BP checked, previously told BP was high       Recent Psych Symptoms:   Elevated:  none  Psychosis:  none  Anxiety:  excessive worry and nervous/overwhelmed  Trauma Related:  none  Sleep: Better overall    Recent Substance Use:     -alcohol: Very rare , does not mix well with drugs   -cannabis: No   -tobacco: No  -caffeine:  No   -opioids: Yes prescribed for pain   Narcan Kit currrent: No     Pertinent Negatives: No suicidal ideation, violent ideation, self-injury, psychosis, hallucinations, jackie and harmful substance use  Adverse Effects: None    PSYCH and SUBSTANCE USE Critical Summary Points since July 2022 8/1/22: Transfer visit, no changes   10/3/22: No changes     FAMILY and SOCIAL HISTORY                            Per chart review; reviewed and confirmed with pt     FAMILY HX:  Maternal Great-Grandmother had BPAD; Depression in family, both mom and dad, maternal aunt.     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 Patient reports they have a great supportive relationship.   Children- None  Living situation- lives in [place]    Social/ Spiritual Support- spouse, family, three dogs (Kerline a border collie aged 5-6 years, Serene a bichon frise aged 3 years, and Sarah lieberman constantin-poo aged 2 years)  Legal- no  FEELS SAFE AT HOME- yes      MEDICAL HISTORY and ALLERGY     ALLERGIES: Seasonal allergies    Patient Active Problem List   Diagnosis     Intractable chronic migraine without aura and without status migrainosus     Nausea vomiting and diarrhea     Cervicalgia     Chronic bilateral low back pain with bilateral sciatica     Right ureteral stone        MEDICAL REVIEW OF SYSTEMS     none in addition to that documented above     MEDICATIONS     Current Outpatient Medications   Medication Sig Dispense Refill     propranolol (INDERAL) 20 MG tablet Take 1 tablet (20 mg) by mouth daily as needed (for anxiety) 30 tablet 3     propranolol ER (INDERAL LA) 60 MG 24 hr capsule Take 1 capsule (60 mg) by mouth daily 30 capsule 3     sertraline (ZOLOFT) 100 MG tablet Take 2 tablets (200 mg) by mouth daily 60 tablet 3     cyclobenzaprine (FLEXERIL) 10 MG tablet Take 10 mg by mouth 3 times daily as needed for muscle spasms       gabapentin (NEURONTIN) 300 MG capsule Take 600 mg by mouth 3 times daily       indomethacin (INDOCIN) 50 MG capsule Take 1 capsule (50 mg) by mouth 2 times daily (with meals) 60 capsule 11     oxyCODONE (ROXICODONE) 5 MG tablet Take 1-2 tablets (5-10 mg) by mouth every 6 hours as needed for moderate to severe pain 30 tablet 0     oxyCODONE-acetaminophen (PERCOCET) 5-325 MG tablet Take one po tid prn severe pain. (Patient taking differently: Take 1 tablet by mouth 3 times daily Take one po tid prn severe pain.) 90 tablet 0     promethazine (PHENERGAN) 25 MG tablet Take 1 tablet (25 mg) by mouth every 8 hours as needed for nausea associated with  "migranes 10 tablet 11      VITALS   There were no vitals taken for this visit.    MENTAL STATUS EXAM     Alertness: alert  and oriented  Appearance: casually groomed  Behavior/Demeanor: cooperative, pleasant and calm, with good  eye contact   Speech: normal and regular rate and rhythm  Language: intact and no problems  Psychomotor: normal or unremarkable  Mood: \"I'm okay\"  Affect: full range; congruent to: mood- yes, content- yes  Thought Process/Associations: unremarkable  Thought Content:  Reports none;  Denies suicidal & violent ideation and delusions  Perception:  Reports none;  Denies hallucinations  Insight: good  Judgment: good  Cognition: does  appear grossly intact; formal cognitive testing was not done  Gait and Station: N/A (telehealth)     LABS and DATA     PHQ 4/2/2019 8/23/2019 12/19/2019   PHQ-9 Total Score 5 8 14   Q9: Thoughts of better off dead/self-harm past 2 weeks Not at all Not at all Not at all     Answers for HPI/ROS submitted by the patient on 8/1/2022  MADIHA 7 TOTAL SCORE: 17      Recent Labs   Lab Test 01/24/20  1054 01/10/20  0700   CR 0.72  0.72 0.73   GFRESTIMATED >60  >60 >90     Recent Labs   Lab Test 01/09/20  1112 03/29/19  0647   AST 16 25   ALT 33 41   ALKPHOS 114 91        PSYCHOTROPIC DRUG INTERACTIONS                                                       PSYCHCLINICDDI   Concurrent use of OXYCODONE and SERTRALINE may result in an increased risk of serotonin syndrome (tachycardia, hyperthermia, myoclonus, mental status changes).     Concurrent use of OXYCODONE and CNS DEPRESSANTS may result in increased risk of respiratory and CNS depression.     Concurrent use of PRAZOSIN and PROPRANOLOL may may enhance the orthostatic hypotensive effect of Alpha1-Blockers     Sertraline and Propranolol: Concurrent use of PROPRANOLOL and CYP2D6 INHIBITORS may result in increased propranolol exposure.    MANAGEMENT:  Monitoring for adverse effects, routine vitals and patient is aware of " risks     RISK STATEMENT for SAFETY     Margarita did not appear to be an imminent safety risk to self or others.    TREATMENT RISK STATEMENT: The risks, benefits, alternatives and potential adverse effects have been discussed and are understood by the pt. The pt understands the risks of using street drugs or alcohol. There are no medical contraindications, the pt agrees to treatment with the ability to do so. The pt knows to call the clinic for any problems or to access emergency care if needed.  Medical and substance use concerns are documented above.  Psychotropic drug interaction check was done, including changes made today.     PSYCHIATRIST: Beverly Barros MD    Patient not staffed in clinic.  Note will be reviewed and signed by supervisor Dr. Martinez.

## 2022-10-03 NOTE — PROGRESS NOTES
Ingris Trevino is a 27 year old who has consented to receive services via billable video visit.      Pt will join video visit via: [email or web address]  If there are problems joining the visit, send backup video invite via: Send to preferred e-mail: [email or web address]      Originating Location (patient location): Patient's home  Distant Location (provider location): [facility] [place] [facility]    Will anyone else be joining the video visit? No

## 2022-10-15 ENCOUNTER — HEALTH MAINTENANCE LETTER (OUTPATIENT)
Age: 27
End: 2022-10-15

## 2022-11-07 ENCOUNTER — OFFICE VISIT (OUTPATIENT)
Dept: NEUROLOGY | Facility: CLINIC | Age: 27
End: 2022-11-07
Payer: COMMERCIAL

## 2022-11-07 VITALS — OXYGEN SATURATION: 95 % | DIASTOLIC BLOOD PRESSURE: 82 MMHG | HEART RATE: 72 BPM | SYSTOLIC BLOOD PRESSURE: 132 MMHG

## 2022-11-07 DIAGNOSIS — G43.719 INTRACTABLE CHRONIC MIGRAINE WITHOUT AURA AND WITHOUT STATUS MIGRAINOSUS: ICD-10-CM

## 2022-11-07 PROCEDURE — 99214 OFFICE O/P EST MOD 30 MIN: CPT | Performed by: PSYCHIATRY & NEUROLOGY

## 2022-11-07 RX ORDER — PROMETHAZINE HYDROCHLORIDE 25 MG/1
25 TABLET ORAL EVERY 8 HOURS PRN
Qty: 10 TABLET | Refills: 11 | Status: SHIPPED | OUTPATIENT
Start: 2022-11-07 | End: 2023-11-06

## 2022-11-07 RX ORDER — INDOMETHACIN 50 MG/1
50 CAPSULE ORAL 2 TIMES DAILY PRN
Qty: 30 CAPSULE | Refills: 11 | Status: SHIPPED | OUTPATIENT
Start: 2022-11-07 | End: 2023-11-06

## 2022-11-07 RX ORDER — SUMATRIPTAN 100 MG/1
100 TABLET, FILM COATED ORAL
Qty: 18 TABLET | Refills: 11 | Status: SHIPPED | OUTPATIENT
Start: 2022-11-07 | End: 2023-11-06

## 2022-11-07 RX ORDER — HYDROCODONE BITARTRATE AND ACETAMINOPHEN 7.5; 325 MG/1; MG/1
1 TABLET ORAL 3 TIMES DAILY PRN
COMMUNITY
Start: 2022-10-19

## 2022-11-07 NOTE — LETTER
11/7/2022       RE: Ingris Trevino  [address] [place] [address]     Dear Colleague,    Thank you for referring your patient, Ingris Trevino, to the [facility] [place] at [facility]. Please see a copy of my visit note below.    [facility]    Headache Neurology Progress Note  November 7, 2022    Subjective:    Margarita Trevino is a 27yr old female anxiety, depression, and back pain who returns for follow up of chronic headaches suspected to be chronic migraine without aura.    She was last seen on 4/14/21, at which time she was taking gabapentin and indomethacin for headache prevention, cyclobenzaprine for headache rescue, and using promethazine for nausea associate with headache. She was on topiramate in the past, but developed kidney stones and has since stopped.    Today she describes doing about the same as her previous visit, still slightly worse since stopping topiramate. She has two types of headaches. She has her severe type about 5 days/month. These are around 9/10 pain and are associated with photophobia, phonophobia, visual disturbances, nausea, and prodromal headache. She is nonfunctional during these headaches and has to leave work to lay in bed in total darkness. Her second type of headache is more moderate, occurring around 10 days/month. This is described as 6/10 pain at its worst and is associated with a squeezing sensation like she is wearing a tight headband. She does not get nauseous, photophobic, or phonophobic during these, and she is able to continue working.    She has received steroid injections in the past year on each side of the back of her head, which she felt were helpful for her moderate headaches.    Objective:    Vitals: /82   Pulse 72   SpO2 95%   General: Cooperative, NAD  Neurologic:  Mental Status: Fully alert, attentive and oriented. Speech clear and  fluent.   Cranial Nerves: Facial movements symmetric.   Motor: No abnormal movements.      Pertinent Investigations:    None.    Assessment/Plan:   Ingris Trevino is a 27 year old woman who returns for follow-up of chronic headaches, with a chronic migraine without aura phenotype.  There was also a consideration of paroxysmal hemicrania, and has been on preventative indomethacin.  She has approximately 5 severe headache attacks per month, as well as 10 moderate headache days per month.     She is amenable to adding a rescue medication as she has not been on a triptan in the past. She is also agreeable to taking indomethacin as a rescue treatment rather than preventative.    -She will begin taking indomethacin 50 mg twice a day as needed rather than daily  -Start sumatriptan 100 mg as needed for severe headaches  -For nausea related to headache, she will continue promethazine 25 mg as needed.  -She is also currently taking propranolol, doxepin, and gabapentin.  -If additional headache preventatives are needed in the future, a CGRP monoclonal antibody may be a good choice.  Alternatively, depending on the frequency of her headaches, botulinum toxin injections could be considered.  -Given her good response to steroid injections in the past to the pain clinic, I think would also be reasonable to repeat these injections in the future, if she has a flare of symptoms again.    Samir Lane, MS4    Physician Attestation   I, Margarita Corbin MD, saw this patient and agree with the findings and plan of care as documented in the note.      She continues to have frequent severe headaches.  I recommended starting sumatriptan 100 mg the onset of headache, with a repeat dose in 2 hours if needed.  This should not exceed more than 9 days/month to avoid medication overuse.  She may continue indomethacin 50 mg twice daily, on an as-needed basis.  She will continue promethazine for nausea or as a rescue medicine for  headache.    In the future, more aggressive preventative treatment could be considered.  She was not interested in this today.          Again, thank you for allowing me to participate in the care of your patient.      Sincerely,    Margarita Corbin MD

## 2022-11-07 NOTE — PROGRESS NOTES
[facility]    Headache Neurology Progress Note  November 7, 2022    Subjective:    Margarita Trevino is a 27yr old female anxiety, depression, and back pain who returns for follow up of chronic headaches suspected to be chronic migraine without aura.    She was last seen on 4/14/21, at which time she was taking gabapentin and indomethacin for headache prevention, cyclobenzaprine for headache rescue, and using promethazine for nausea associate with headache. She was on topiramate in the past, but developed kidney stones and has since stopped.    Today she describes doing about the same as her previous visit, still slightly worse since stopping topiramate. She has two types of headaches. She has her severe type about 5 days/month. These are around 9/10 pain and are associated with photophobia, phonophobia, visual disturbances, nausea, and prodromal headache. She is nonfunctional during these headaches and has to leave work to lay in bed in total darkness. Her second type of headache is more moderate, occurring around 10 days/month. This is described as 6/10 pain at its worst and is associated with a squeezing sensation like she is wearing a tight headband. She does not get nauseous, photophobic, or phonophobic during these, and she is able to continue working.    She has received steroid injections in the past year on each side of the back of her head, which she felt were helpful for her moderate headaches.    Objective:    Vitals: /82   Pulse 72   SpO2 95%   General: Cooperative, NAD  Neurologic:  Mental Status: Fully alert, attentive and oriented. Speech clear and fluent.   Cranial Nerves: Facial movements symmetric.   Motor: No abnormal movements.      Pertinent Investigations:    None.    Assessment/Plan:   Ingris Trevino is a 27 year old woman who returns for follow-up of chronic headaches, with a chronic migraine without aura phenotype.  There was also a consideration of  paroxysmal hemicrania, and has been on preventative indomethacin.  She has approximately 5 severe headache attacks per month, as well as 10 moderate headache days per month.     She is amenable to adding a rescue medication as she has not been on a triptan in the past. She is also agreeable to taking indomethacin as a rescue treatment rather than preventative.    -She will begin taking indomethacin 50 mg twice a day as needed rather than daily  -Start sumatriptan 100 mg as needed for severe headaches  -For nausea related to headache, she will continue promethazine 25 mg as needed.  -She is also currently taking propranolol, doxepin, and gabapentin.  -If additional headache preventatives are needed in the future, a CGRP monoclonal antibody may be a good choice.  Alternatively, depending on the frequency of her headaches, botulinum toxin injections could be considered.  -Given her good response to steroid injections in the past to the pain clinic, I think would also be reasonable to repeat these injections in the future, if she has a flare of symptoms again.    Samir Lane, MS4    Physician Attestation   I, Margarita Corbin MD, saw this patient and agree with the findings and plan of care as documented in the note.      She continues to have frequent severe headaches.  I recommended starting sumatriptan 100 mg the onset of headache, with a repeat dose in 2 hours if needed.  This should not exceed more than 9 days/month to avoid medication overuse.  She may continue indomethacin 50 mg twice daily, on an as-needed basis.  She will continue promethazine for nausea or as a rescue medicine for headache.    In the future, more aggressive preventative treatment could be considered.  She was not interested in this today.    Margarita Corbin MD  Neurology

## 2022-12-02 NOTE — PROGRESS NOTES
Ingris Trevino is a 27 year old who is being evaluated via a billable video visit.      Pt will join video visit via: [employer]  If there are problems joining the visit, send backup video invite via: Send to preferred e-mail: [email or web address]    Reason for telehealth visit: Patient has requested telehealth visit    Originating location (patient location): Patient's home    Will anyone else be joining the visit? No      Video- Visit Details  Type of service:  video visit for medication management  Time of service:    Video Start Time:  9:31 AM        Video End Time:  9: 59 AM  Reason for video visit:  Patient unable to travel due to Covid-19  Originating Site (patient location):  [facility]   Location- Patient's home  Distant Site (provider location):  Remote location  Mode of Communication:  Video Conference via [employer]       [facility]  Psychiatry Clinic  MEDICAL PROGRESS NOTE     CARE TEAM:  PCP- Tia Leonardo    Psychotherapist- Julissa, once a week appointments   , Neuro, Ob- Gyn, Pain   This person is a 27 year old who uses the name Margarita and pronouns she, her.      DIAGNOSIS   Major Depressive Disorder, recurrent episode, partial remission to mild  Generalized Anxiety Disorder  Panic Attack Disorder  Obsessive-Compulsive Disorder     ASSESSMENT   Margarita is doing okay overall. She is dealing with increased stress related to work and the seemingly unsupportive environment with her boss. She continues to work on enforcing boundaries. She is also looking for new employment. She has noticed that it takes longer for her to get to sleep and she has been waking up with nightmares. She restarted her previous Prazosin medication two weeks ago and this has helped with the nightmares. We discussed the importance of taking this every night, as stopping and starting can cause rebound hypertension.      She sees a therapist weekly, which continues to be helpful. Her medications  "are going well overall without concerns for side effects.  She denied feeling dizzy or lightheaded after taking the propranolol. She sees her pain doctor monthly and she gets her BP checked there. There were no safety concerns.     [place]:  Indicates taking controlled medication as prescribed.     PLAN                                                                                                                1) Meds-   Restart Prazosin 1 mg at bedtime   Continue sertraline 200 mg daily  Continue propranolol LA 60 mg daily  Continue propranolol IR 20 mg PRN daily for acute anxiety     Prescribed by her pain specialist:  Gabapentin 600 mg TID  Hydrocodone-Acetamin 7.5-325 mg TID PRN for pain     2) Psychotherapy- Continue weekly individual therapy    3) Next due-  Labs- PRN  EKG- PRN  Rating scales- PHQ9    4) Referrals-  none    5) Dispo- RTC 2 months      PERTINENT BACKGROUND                                                    [most recent eval 08/01/22]   Previous diagnoses include MDD, OCD, Panic Disorder, and chronic pain. She first began citalopram for depression at the age of 14 and then later switched to sertraline in college which she reports was helpful for both mood and OCD.  Has also done CBT for OCD to endorsed benefit.  No history of hospitalizations but patient allows at least one suicide attempt via CO poisoning in 2014 (her most recent SA) and chart review suggests as many as 6 other SA's prior to that.  Notably, patient's first contact with this clinic was a diagnostic assessment completed as a one time SACHIN eval on 1/10/19, tried to make it to the end of the day had been recommended in the context of establishing care with new providers upon recently returning to live in [place] given her prolonged use of opiates.  From that DA: \"Unclear that chronic opioids appropriate for her chronic non-malignant pain states. That said, with what information we have, no clear indication that " "she has used non-medically, escalated in use, or any other concerns.\" Chronic pain condition(s) experienced by the patient include endometriosis, herniated lumbar disk and migraines.  Social history highlights include raised in [place], received BA in Psychology from [employer] in [place], [place], where she met her present wife Jacqui, then lived in [place] from Dec 2015 up to recent return to [place] in Nov 2018.        Psych pertinent item history includes suicide attempt [multiple] and suicidal ideation.     SUBJECTIVE     - Mood: \"Im okay\"  - Thanksgiving was okay, visited husbands family and better than usual   - Sleep: struggling a little bit more with sleep, mainly because of the anxiety, at night thinking about all the things she didn't do or still needs to do, revolving thougths about work  - When sleeps she will have nightmares, not specific, thinks its because of the anxiety she is having, prev tried prazosin, has been using the prazosin at night for two weeks now, 1 mg and helping - not having nightmares in week or 2     - Work: stressing her out, having to do a lot of different tasks, not doing anything of them super well    - Working on boundaries? Yes  - Boss is not taking the boundaries great, has been a little push back, exhausting dealing with her   - Started applying for other jobs  - Anxiety is elevated, does not want to live like this, no support and no resources   - Therapy: every week, trying to set firming boundaries, took time off in Nov, did not answer work email     - Medications: going well   - Propranolol PRN? Yes twice a day (stress for work), no concerns for dizziness or light headed   - Update with weaning off opioids? Have started using a little bit of cannabis under guidance of pain doctor, first seeing if cannabis has pain effect, if so will start slowly tapering hydrocodone    - SI/HI: None     Recent Psych Symptoms:   Elevated:  none  Psychosis:  none  Anxiety:  excessive worry and " nervous/overwhelmed  Trauma Related:  none  Sleep: Nightmares (Prazosin helping) and difficulty getting to sleep    Recent Substance Use:     -alcohol: Very rare , does not mix well with drugs   -cannabis: No   -tobacco: No  -caffeine:  No   -opioids: Yes prescribed for pain   Narcan Kit currrent: No     Pertinent Negatives: No suicidal ideation, violent ideation, self-injury, psychosis, hallucinations, jackie and harmful substance use  Adverse Effects: None    PSYCH and SUBSTANCE USE Critical Summary Points since July 2022 8/1/22: Transfer visit, no changes   10/3/22: No changes  12/5/22: Added Prazosin 1 mg at bedtime for nightmares back to list (restarted between appointments)     FAMILY and SOCIAL HISTORY                            Per chart review; reviewed and confirmed with pt     FAMILY HX:  Maternal Great-Grandmother had BPAD; Depression in family, both mom and dad, maternal aunt.     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 Patient reports they have a great supportive relationship.   Children- None  Living situation- lives in [place]    Social/ Spiritual Support- spouse, family, three dogs (Kerline a border collie aged 5-6 years, Serene a bichon frise aged 3 years, and Sarah lieberman constantin-poo aged 2 years)  Legal- no  FEELS SAFE AT HOME- yes      MEDICAL HISTORY and ALLERGY     ALLERGIES: Other environmental allergy and Seasonal allergies    Patient Active Problem List   Diagnosis     Intractable chronic migraine without aura and without status migrainosus     Nausea vomiting and diarrhea     Cervicalgia     Chronic bilateral low back pain with bilateral sciatica     Right ureteral stone        MEDICAL REVIEW OF SYSTEMS     none in addition to that documented above     MEDICATIONS     Current Outpatient Medications   Medication Sig Dispense Refill      "cyclobenzaprine (FLEXERIL) 10 MG tablet Take 10 mg by mouth 3 times daily as needed for muscle spasms       gabapentin (NEURONTIN) 300 MG capsule Take 600 mg by mouth 3 times daily       HYDROcodone-acetaminophen (NORCO) 7.5-325 MG per tablet Take 1 tablet by mouth 3 times daily as needed       indomethacin (INDOCIN) 50 MG capsule Take 1 capsule (50 mg) by mouth 2 times daily as needed for moderate pain 30 capsule 11     oxyCODONE (ROXICODONE) 5 MG tablet Take 1-2 tablets (5-10 mg) by mouth every 6 hours as needed for moderate to severe pain 30 tablet 0     oxyCODONE-acetaminophen (PERCOCET) 5-325 MG tablet Take one po tid prn severe pain. (Patient taking differently: Take 1 tablet by mouth 3 times daily Take one po tid prn severe pain.) 90 tablet 0     promethazine (PHENERGAN) 25 MG tablet Take 1 tablet (25 mg) by mouth every 8 hours as needed for nausea associated with migranes 10 tablet 11     propranolol (INDERAL) 20 MG tablet Take 1 tablet (20 mg) by mouth daily as needed (for anxiety) 30 tablet 3     propranolol ER (INDERAL LA) 60 MG 24 hr capsule Take 1 capsule (60 mg) by mouth daily 30 capsule 3     sertraline (ZOLOFT) 100 MG tablet Take 2 tablets (200 mg) by mouth daily 60 tablet 3     SUMAtriptan (IMITREX) 100 MG tablet Take 1 tablet (100 mg) by mouth at onset of headache for migraine (repeat in 2 hours if needed) 18 tablet 11      VITALS   There were no vitals taken for this visit.    MENTAL STATUS EXAM     Alertness: alert  and oriented  Appearance: casually groomed  Behavior/Demeanor: cooperative, pleasant and calm, with good  eye contact   Speech: normal and regular rate and rhythm  Language: intact and no problems  Psychomotor: normal or unremarkable  Mood: \"I'm okay\"  Affect: full range; congruent to: mood- yes, content- yes  Thought Process/Associations: unremarkable  Thought Content:  Reports none;  Denies suicidal & violent ideation and delusions  Perception:  Reports none;  Denies " hallucinations  Insight: good  Judgment: good  Cognition: does  appear grossly intact; formal cognitive testing was not done  Gait and Station: N/A (telehealth)     LABS and DATA     PHQ 4/2/2019 8/23/2019 12/19/2019   PHQ-9 Total Score 5 8 14   Q9: Thoughts of better off dead/self-harm past 2 weeks Not at all Not at all Not at all     Answers for HPI/ROS submitted by the patient on 12/5/2022  MADIHA 7 TOTAL SCORE: 9    Recent Labs   Lab Test 01/24/20  1054 01/10/20  0700   CR 0.72  0.72 0.73   GFRESTIMATED >60  >60 >90     Recent Labs   Lab Test 01/09/20  1112 03/29/19  0647   AST 16 25   ALT 33 41   ALKPHOS 114 91        PSYCHOTROPIC DRUG INTERACTIONS                                                       PSYCHCLINICDDI   Concurrent use of OXYCODONE and SERTRALINE may result in an increased risk of serotonin syndrome (tachycardia, hyperthermia, myoclonus, mental status changes).     Concurrent use of OXYCODONE and CNS DEPRESSANTS may result in increased risk of respiratory and CNS depression.     Concurrent use of PRAZOSIN and PROPRANOLOL may may enhance the orthostatic hypotensive effect of Alpha1-Blockers     Sertraline and Propranolol: Concurrent use of PROPRANOLOL and CYP2D6 INHIBITORS may result in increased propranolol exposure.    MANAGEMENT:  Monitoring for adverse effects, routine vitals and patient is aware of risks     RISK STATEMENT for SAFETY     Margarita did not appear to be an imminent safety risk to self or others.    TREATMENT RISK STATEMENT: The risks, benefits, alternatives and potential adverse effects have been discussed and are understood by the pt. The pt understands the risks of using street drugs or alcohol. There are no medical contraindications, the pt agrees to treatment with the ability to do so. The pt knows to call the clinic for any problems or to access emergency care if needed.  Medical and substance use concerns are documented above.  Psychotropic drug interaction check was done,  including changes made today.     PSYCHIATRIST: Beverly Barros MD    Patient not staffed in clinic.  Note will be reviewed and signed by supervisor Dr. Martinez.

## 2022-12-05 ENCOUNTER — VIRTUAL VISIT (OUTPATIENT)
Dept: PSYCHIATRY | Facility: CLINIC | Age: 27
End: 2022-12-05
Attending: PSYCHIATRY & NEUROLOGY
Payer: COMMERCIAL

## 2022-12-05 DIAGNOSIS — F41.9 ANXIETY: ICD-10-CM

## 2022-12-05 DIAGNOSIS — F33.41 RECURRENT MAJOR DEPRESSIVE DISORDER, IN PARTIAL REMISSION (H): ICD-10-CM

## 2022-12-05 PROCEDURE — 99214 OFFICE O/P EST MOD 30 MIN: CPT | Mod: GT | Performed by: STUDENT IN AN ORGANIZED HEALTH CARE EDUCATION/TRAINING PROGRAM

## 2022-12-05 RX ORDER — SERTRALINE HYDROCHLORIDE 100 MG/1
200 TABLET, FILM COATED ORAL DAILY
Qty: 60 TABLET | Refills: 3 | Status: SHIPPED | OUTPATIENT
Start: 2022-12-05 | End: 2023-05-05

## 2022-12-05 RX ORDER — PROPRANOLOL HYDROCHLORIDE 20 MG/1
20 TABLET ORAL DAILY PRN
Qty: 30 TABLET | Refills: 3 | Status: SHIPPED | OUTPATIENT
Start: 2022-12-05 | End: 2023-05-05

## 2022-12-05 RX ORDER — PRAZOSIN HYDROCHLORIDE 1 MG/1
1 CAPSULE ORAL AT BEDTIME
Qty: 30 CAPSULE | Refills: 3 | Status: SHIPPED | OUTPATIENT
Start: 2022-12-05 | End: 2023-04-25

## 2022-12-05 RX ORDER — PROPRANOLOL HCL 60 MG
60 CAPSULE, EXTENDED RELEASE 24HR ORAL DAILY
Qty: 30 CAPSULE | Refills: 3 | Status: SHIPPED | OUTPATIENT
Start: 2022-12-05 | End: 2023-05-05

## 2022-12-05 ASSESSMENT — ANXIETY QUESTIONNAIRES
2. NOT BEING ABLE TO STOP OR CONTROL WORRYING: MORE THAN HALF THE DAYS
5. BEING SO RESTLESS THAT IT IS HARD TO SIT STILL: NOT AT ALL
1. FEELING NERVOUS, ANXIOUS, OR ON EDGE: MORE THAN HALF THE DAYS
7. FEELING AFRAID AS IF SOMETHING AWFUL MIGHT HAPPEN: NOT AT ALL
GAD7 TOTAL SCORE: 9
3. WORRYING TOO MUCH ABOUT DIFFERENT THINGS: MORE THAN HALF THE DAYS
6. BECOMING EASILY ANNOYED OR IRRITABLE: SEVERAL DAYS
4. TROUBLE RELAXING: MORE THAN HALF THE DAYS
7. FEELING AFRAID AS IF SOMETHING AWFUL MIGHT HAPPEN: NOT AT ALL
8. IF YOU CHECKED OFF ANY PROBLEMS, HOW DIFFICULT HAVE THESE MADE IT FOR YOU TO DO YOUR WORK, TAKE CARE OF THINGS AT HOME, OR GET ALONG WITH OTHER PEOPLE?: VERY DIFFICULT
IF YOU CHECKED OFF ANY PROBLEMS ON THIS QUESTIONNAIRE, HOW DIFFICULT HAVE THESE PROBLEMS MADE IT FOR YOU TO DO YOUR WORK, TAKE CARE OF THINGS AT HOME, OR GET ALONG WITH OTHER PEOPLE: VERY DIFFICULT
GAD7 TOTAL SCORE: 9
GAD7 TOTAL SCORE: 9

## 2022-12-05 NOTE — PATIENT INSTRUCTIONS
**For crisis resources, please see the information at the end of this document**   Patient Education    Thank you for coming to the [facility] [place] [facility].     Lab Testing:  If you had lab testing today and your results are reassuring or normal they will be mailed to you or sent through [employer] within 7 days. If the lab tests need quick action we will call you with the results. The phone number we will call with results is # [phone]. If this is not the best number please call our clinic and change the number.     Medication Refills:  If you need any refills please call your pharmacy and they will contact us. Our fax number for refills is [phone].   Three business days of notice are needed for general medication refill requests.   Five business days of notice are needed for controlled substance refill requests.   If you need to change to a different pharmacy, please contact the new pharmacy directly. The new pharmacy will help you get your medications transferred.     Contact Us:  Please call [phone] during business hours (8-5:00 M-F).   If you have medication related questions after clinic hours, or on the weekend, please call [phone].     Financial Assistance [phone]   Medical Records [phone]       MENTAL HEALTH CRISIS RESOURCES:  For a emergency help, please call 911 or go to the nearest Emergency Department.     Emergency Walk-In Options:   EmPATH Unit @ [facility] ([place]): [phone] - Specialized mental health emergency area designed to be calming  [facility] ([place]): [phone]  [facility] ([place]): [phone]  [facility]): [phone]    [facility] Crisis Information:   [place]: [phone]  [place]: [phone]  [place] ([place]) - Adult: [phone]     Child: [phone]  [place] - Adult: [phone]     Child: [phone]  [place]:  [phone]  List of all [facility] resources:   https://[place].gov/dhs/people-we-serve/adults/health-care/mental-health/resources/crisis-contacts.jsp    National Crisis Information:   Crisis Text Line: Text  [place]  to 921642  Suicide & Crisis Lifeline: 988  National Suicide Prevention Lifeline: 8-788-258-TALK (1-278.301.3436)       For online chat options, visit https://suicidepreventionlifeline.org/chat/  Poison Control Center: 2-181-494-3937  Trans Lifeline: 3-833-731-3231 - Hotline for transgender people of all ages  The Stef Project: 1-162-907-9877 - Hotline for LGBT youth     For Non-Emergency Support:   Fast Tracker: Mental Health & Substance Use Disorder Resources -   https://www.[employer].org/

## 2023-01-22 NOTE — ANESTHESIA CARE TRANSFER NOTE
"Patient: Ingris Trevino    Procedure(s):  CYSTOSCOPY, WITH RETROGRADE PYELOGRAM AND RIGHT URETERAL STENT INSERTION    Diagnosis: * No pre-op diagnosis entered *  Diagnosis Additional Information: No value filed.    Anesthesia Type:   General     Note:  Airway :Face Mask  Patient transferred to:PACU  Comments: VSS. Breathing spontaneously at a regular rate with adequate tidal volumes and maintaining O2 sats on 6L via facemask. No immediate post-op nausea or pain. No apparent complications from anesthesia.     Abhilash \"Rock\" Gaby BUTLER MD  CA-1 Handoff Report: Identifed the Patient, Identified the Reponsible Provider, Reviewed the pertinent medical history, Discussed the surgical course, Reviewed Intra-OP anesthesia mangement and issues during anesthesia, Set expectations for post-procedure period and Allowed opportunity for questions and acknowledgement of understanding      Vitals: (Last set prior to Anesthesia Care Transfer)    CRNA VITALS  1/9/2020 1751 - 1/9/2020 1831      1/9/2020             Resp Rate (set):  10                Electronically Signed By: Abhilash Griffin III, MD  January 9, 2020  6:31 PM  " No

## 2023-03-26 ENCOUNTER — HEALTH MAINTENANCE LETTER (OUTPATIENT)
Age: 28
End: 2023-03-26

## 2023-04-10 NOTE — TELEPHONE ENCOUNTER
Last Seen ***  RTC ***  Cancel ***  No-Show ***    Next Appt ***    Incoming Refill From *** via ***    Medication Requested   ***    Directions   ***    Qty ***    Last Refill ***    Medication Refill Pended Per Refill Protocol    Medication Requested   ***    Directions   ***    Qty ***    Last Refill ***    Medication Refill Pended Per Refill Protocol    Medication Requested   ***    Directions   ***    Qty ***    Last Refill ***    Medication Refill Pended Per Refill Protocol

## 2023-04-12 ENCOUNTER — TELEPHONE (OUTPATIENT)
Dept: PSYCHIATRY | Facility: CLINIC | Age: 28
End: 2023-04-12
Payer: COMMERCIAL

## 2023-04-12 DIAGNOSIS — F33.41 RECURRENT MAJOR DEPRESSIVE DISORDER, IN PARTIAL REMISSION (H): ICD-10-CM

## 2023-04-12 DIAGNOSIS — F41.9 ANXIETY: ICD-10-CM

## 2023-04-12 RX ORDER — PROPRANOLOL HCL 60 MG
60 CAPSULE, EXTENDED RELEASE 24HR ORAL DAILY
Qty: 30 CAPSULE | Refills: 3 | Status: CANCELLED | OUTPATIENT
Start: 2023-04-12

## 2023-04-12 RX ORDER — SERTRALINE HYDROCHLORIDE 100 MG/1
200 TABLET, FILM COATED ORAL DAILY
Qty: 60 TABLET | Refills: 3 | Status: CANCELLED | OUTPATIENT
Start: 2023-04-12

## 2023-04-12 RX ORDER — PROPRANOLOL HYDROCHLORIDE 20 MG/1
20 TABLET ORAL DAILY PRN
Qty: 30 TABLET | Refills: 3 | Status: CANCELLED | OUTPATIENT
Start: 2023-04-12

## 2023-04-12 RX ORDER — SERTRALINE HYDROCHLORIDE 100 MG/1
200 TABLET, FILM COATED ORAL DAILY
Qty: 60 TABLET | Refills: 3 | OUTPATIENT
Start: 2023-04-12

## 2023-04-12 RX ORDER — PROPRANOLOL HCL 60 MG
60 CAPSULE, EXTENDED RELEASE 24HR ORAL DAILY
Qty: 30 CAPSULE | Refills: 3 | OUTPATIENT
Start: 2023-04-12

## 2023-04-12 RX ORDER — PROPRANOLOL HYDROCHLORIDE 20 MG/1
20 TABLET ORAL DAILY PRN
Qty: 30 TABLET | Refills: 3 | OUTPATIENT
Start: 2023-04-12

## 2023-04-12 NOTE — TELEPHONE ENCOUNTER
Last seen: 12/5/22  RTC: 2 months  Cancel: none  No-show: none  Next appt: none scheduled     Incoming refill from Pharmacy via Fax    Medication requested:   Pending Prescriptions:                       Disp   Refills    propranolol (INDERAL) 20 MG tablet        30 tab*3            Sig: Take 1 tablet (20 mg) by mouth daily as needed           (for anxiety)    propranolol ER (INDERAL LA) 60 MG 24 hr c*30 cap*3            Sig: Take 1 capsule (60 mg) by mouth daily    sertraline (ZOLOFT) 100 MG tablet         60 tab*3            Sig: Take 2 tablets (200 mg) by mouth daily        From chart note:   Continue sertraline 200 mg daily  Continue propranolol LA 60 mg daily  Continue propranolol IR 20 mg PRN daily for acute anxiety     Medication sent to provider for review; out of RTC time frame.   Writer spoke with Tracy at [facility] who stated that Margarita has a refill available of all these medications, but Margarita is specifically requesting 90 day supply.

## 2023-04-12 NOTE — TELEPHONE ENCOUNTER
Refills declined for 90 day refill request per Dr Barros. Pt is overdue for return visit; scheduling has left VM regarding an appointment.  Per [facility], Margarita has a 30 day refill available for each of these medications.

## 2023-04-23 DIAGNOSIS — F41.9 ANXIETY: ICD-10-CM

## 2023-04-25 RX ORDER — PRAZOSIN HYDROCHLORIDE 1 MG/1
1 CAPSULE ORAL AT BEDTIME
Qty: 30 CAPSULE | Refills: 0 | Status: SHIPPED | OUTPATIENT
Start: 2023-04-25 | End: 2023-07-05

## 2023-04-25 NOTE — TELEPHONE ENCOUNTER
prazosin (MINIPRESS) 1 MG  Last refilled: 12/5/22  Qty: 30 : 3    Last seen: 12/5/22  RTC: 2 MOS  Cancel: 0  No-show: 0  Next appt: NONE  30 day zaida refill sent to the pharmacy - including instructions for patient to call the clinic and schedule an appointment.  Scheduling has been notified to contact the pt for appointment.

## 2023-05-05 DIAGNOSIS — F41.9 ANXIETY: ICD-10-CM

## 2023-05-05 DIAGNOSIS — F33.41 RECURRENT MAJOR DEPRESSIVE DISORDER, IN PARTIAL REMISSION (H): ICD-10-CM

## 2023-05-05 RX ORDER — PROPRANOLOL HCL 60 MG
60 CAPSULE, EXTENDED RELEASE 24HR ORAL DAILY
Qty: 30 CAPSULE | Refills: 0 | Status: SHIPPED | OUTPATIENT
Start: 2023-05-05 | End: 2023-06-09

## 2023-05-05 RX ORDER — PROPRANOLOL HYDROCHLORIDE 20 MG/1
20 TABLET ORAL DAILY PRN
Qty: 30 TABLET | Refills: 0 | Status: SHIPPED | OUTPATIENT
Start: 2023-05-05 | End: 2023-06-09

## 2023-05-05 RX ORDER — SERTRALINE HYDROCHLORIDE 100 MG/1
200 TABLET, FILM COATED ORAL DAILY
Qty: 60 TABLET | Refills: 0 | Status: SHIPPED | OUTPATIENT
Start: 2023-05-05 | End: 2023-06-09

## 2023-05-05 NOTE — TELEPHONE ENCOUNTER
Last Seen 12/5/23  RTC 2 months  Cancel 0  No-Show 0    Next Appt none    Incoming Refill From pt request via [employer]    Medication Requested   #1  -  propranolol ER (INDERAL LA) 60 MG 24 hr capsule    Directions   Route: Take 1 capsule (60 mg) by mouth daily - Oral    Qty 30    Last Refill 1/23/23    Medication Refill Pended Per Refill Protocol    Medication Requested   #2 - propranolol (INDERAL) 20 MG tablet    Directions   Route: Take 1 tablet (20 mg) by mouth daily as needed (for anxiety) - Oral    Qty 30    Last Refill 1/23/23    Medication Refill Pended Per Refill Protocol    Medication Requested   #3 - sertraline (ZOLOFT) 100 MG tablet    Directions   Route: Take 2 tablets (200 mg) by mouth daily - Oral    Qty 60    Last Refill 1/23/23    Medication Refill Pended Per Refill Protocol

## 2023-05-05 NOTE — TELEPHONE ENCOUNTER
Health Call Center    Phone Message    May a detailed message be left on voicemail: yes     Reason for Call: Medication Refill Request    Has the patient contacted the pharmacy for the refill? Yes   Name of medication being requested: propranolol 20mg, propranolol 60mg 24hr capsule, Zoloft 100mg  Provider who prescribed the medication: Dr. Barros  Pharmacy:    [facility]/PHARMACY #[id number] [address]  Date medication is needed: Patient has 5 days left of each medication. Patient called to schedule a follow-up appointment but writer was unable to find any openings. Writer put patient on the call back list. Writer let patient know we will call as soon as there is an opening.    Action Taken: Message routed to:  Other: [facility] Nurse pool    Travel Screening: Not Applicable

## 2023-05-23 NOTE — ANESTHESIA PREPROCEDURE EVALUATION
Anesthesia Evaluation      Patient summary reviewed   No history of anesthetic complications     Airway    Pulmonary - negative ROS                          Cardiovascular - negative ROS   Neuro/Psych    (+) depression, anxiety/panic attacks,     Comments: migraine    Endo/Other    (+) obesity,   (-) no diabetes, hypothyroidism, not pregnant     GI/Hepatic/Renal    (+)   chronic renal disease,     Comments: mephrolithiasis     Other findings:   Labs 1/10/2020  Hbg 12.2  Plt 226  K 4.1  BUN/Cr 9/0.73      Dental                         Anesthesia Plan  Planned anesthetic: general LMA and total IV anesthesia  Propofol gtt  1 g acetaminophen (pre-op), 25 mg ketamine on induction, 15 mg ketorolac if ok w/ surgeon  Dexamethasone 10 mg, ondansetron 4 mg   ASA 2   Induction: intravenous   Anesthetic plan and risks discussed with: patient  Anesthesia plan special considerations: antiemetics,   Post-op plan: routine recovery           Spoke with patients spouse, Hetal, and let her know that Rob does not need his testosterone levels checked again prior to his 6/23 appt - 5/8 labs were normal. See telephone encounter from 5/11 with Lashaun's recommendations.

## 2023-06-09 DIAGNOSIS — F41.9 ANXIETY: ICD-10-CM

## 2023-06-09 DIAGNOSIS — F33.41 RECURRENT MAJOR DEPRESSIVE DISORDER, IN PARTIAL REMISSION (H): ICD-10-CM

## 2023-06-09 RX ORDER — PROPRANOLOL HYDROCHLORIDE 20 MG/1
20 TABLET ORAL DAILY PRN
Qty: 14 TABLET | Refills: 0 | Status: SHIPPED | OUTPATIENT
Start: 2023-06-09 | End: 2023-06-19

## 2023-06-09 RX ORDER — SERTRALINE HYDROCHLORIDE 100 MG/1
200 TABLET, FILM COATED ORAL DAILY
Qty: 28 TABLET | Refills: 0 | Status: SHIPPED | OUTPATIENT
Start: 2023-06-09 | End: 2023-06-19

## 2023-06-09 RX ORDER — PROPRANOLOL HCL 60 MG
60 CAPSULE, EXTENDED RELEASE 24HR ORAL DAILY
Qty: 14 CAPSULE | Refills: 0 | Status: SHIPPED | OUTPATIENT
Start: 2023-06-09 | End: 2023-06-19

## 2023-06-09 NOTE — TELEPHONE ENCOUNTER
Medication requested: propranolol (INDERAL) 20 MG tablet  Last refilled: 5/5/23  Qty: 30    Medication requested: propranolol ER (INDERAL LA) 60 MG 24 hr capsule  Last refilled: 5/5/23  Qty: 30    Medication requested: sertraline (ZOLOFT) 100 MG tablet  Last refilled: 5/5/23  Qty: 60    Last seen: 12/5/22  RTC: 2 months  Cancel: 0  No-show: 0  Next appt: 0    Refill decision:   14 day zaida refill sent to the pharmacy - including instructions for patient to call the clinic and schedule an appointment.  Scheduling has been notified to contact the pt for appointment.    Warnings Override History for sertraline (ZOLOFT) 100 MG tablet [[id number]]    Overridden by Beverly Barros MD on May 5, 2023 1:36 PM   Drug-Drug   1. SELECTIVE SEROTONIN REUPTAKE INHIBITORS / NSAIDS [Level: Major] [Reason: Tolerated medication/side effects in past]   Other Orders: indomethacin (INDOCIN) 50 MG capsule

## 2023-06-18 DIAGNOSIS — F41.9 ANXIETY: ICD-10-CM

## 2023-06-18 DIAGNOSIS — F33.41 RECURRENT MAJOR DEPRESSIVE DISORDER, IN PARTIAL REMISSION (H): ICD-10-CM

## 2023-06-19 RX ORDER — SERTRALINE HYDROCHLORIDE 100 MG/1
200 TABLET, FILM COATED ORAL DAILY
Qty: 14 TABLET | Refills: 0 | Status: SHIPPED | OUTPATIENT
Start: 2023-06-19 | End: 2023-07-05

## 2023-06-19 RX ORDER — PROPRANOLOL HCL 60 MG
60 CAPSULE, EXTENDED RELEASE 24HR ORAL DAILY
Qty: 7 CAPSULE | Refills: 0 | Status: SHIPPED | OUTPATIENT
Start: 2023-06-19 | End: 2023-07-05

## 2023-06-19 RX ORDER — PROPRANOLOL HYDROCHLORIDE 20 MG/1
20 TABLET ORAL DAILY PRN
Qty: 7 TABLET | Refills: 0 | Status: SHIPPED | OUTPATIENT
Start: 2023-06-19 | End: 2023-07-05

## 2023-06-19 NOTE — TELEPHONE ENCOUNTER
propranolol (INDERAL) 20 MG   & 60 MG  Last refilled: 6/9/23  Qty: 14    sertraline (ZOLOFT) 100 MG   Last refilled: 6/9/23  Qty: 28    Last seen: 12/5/22  RTC: 2 MOS  Cancel: 0  No-show: 0  Next appt: NONE  7 day zaida refill sent to the pharmacy - including instructions for patient to call the clinic and schedule an appointment.  Scheduling has been notified to contact the pt for appointment.

## 2023-07-05 ENCOUNTER — VIRTUAL VISIT (OUTPATIENT)
Dept: PSYCHIATRY | Facility: CLINIC | Age: 28
End: 2023-07-05
Attending: PSYCHIATRY & NEUROLOGY
Payer: COMMERCIAL

## 2023-07-05 DIAGNOSIS — F41.9 ANXIETY: ICD-10-CM

## 2023-07-05 DIAGNOSIS — F33.41 RECURRENT MAJOR DEPRESSIVE DISORDER, IN PARTIAL REMISSION (H): ICD-10-CM

## 2023-07-05 PROCEDURE — 99214 OFFICE O/P EST MOD 30 MIN: CPT | Mod: HN | Performed by: STUDENT IN AN ORGANIZED HEALTH CARE EDUCATION/TRAINING PROGRAM

## 2023-07-05 RX ORDER — SERTRALINE HYDROCHLORIDE 100 MG/1
200 TABLET, FILM COATED ORAL DAILY
Qty: 60 TABLET | Refills: 2 | Status: SHIPPED | OUTPATIENT
Start: 2023-07-05 | End: 2023-09-19

## 2023-07-05 RX ORDER — PROPRANOLOL HYDROCHLORIDE 20 MG/1
20 TABLET ORAL DAILY PRN
Qty: 30 TABLET | Refills: 2 | Status: SHIPPED | OUTPATIENT
Start: 2023-07-05 | End: 2023-09-19

## 2023-07-05 RX ORDER — PROPRANOLOL HCL 60 MG
60 CAPSULE, EXTENDED RELEASE 24HR ORAL DAILY
Qty: 30 CAPSULE | Refills: 2 | Status: SHIPPED | OUTPATIENT
Start: 2023-07-05 | End: 2023-09-19

## 2023-07-05 RX ORDER — PRAZOSIN HYDROCHLORIDE 1 MG/1
3 CAPSULE ORAL AT BEDTIME
Qty: 90 CAPSULE | Refills: 2 | Status: SHIPPED | OUTPATIENT
Start: 2023-07-05 | End: 2023-09-19

## 2023-07-05 ASSESSMENT — ANXIETY QUESTIONNAIRES
6. BECOMING EASILY ANNOYED OR IRRITABLE: MORE THAN HALF THE DAYS
5. BEING SO RESTLESS THAT IT IS HARD TO SIT STILL: SEVERAL DAYS
GAD7 TOTAL SCORE: 16
1. FEELING NERVOUS, ANXIOUS, OR ON EDGE: NEARLY EVERY DAY
2. NOT BEING ABLE TO STOP OR CONTROL WORRYING: NEARLY EVERY DAY
IF YOU CHECKED OFF ANY PROBLEMS ON THIS QUESTIONNAIRE, HOW DIFFICULT HAVE THESE PROBLEMS MADE IT FOR YOU TO DO YOUR WORK, TAKE CARE OF THINGS AT HOME, OR GET ALONG WITH OTHER PEOPLE: VERY DIFFICULT
7. FEELING AFRAID AS IF SOMETHING AWFUL MIGHT HAPPEN: SEVERAL DAYS
3. WORRYING TOO MUCH ABOUT DIFFERENT THINGS: NEARLY EVERY DAY
4. TROUBLE RELAXING: NEARLY EVERY DAY
GAD7 TOTAL SCORE: 16

## 2023-07-05 NOTE — PATIENT INSTRUCTIONS
**For crisis resources, please see the information at the end of this document**   Patient Education    Thank you for coming to the [facility] [place] [facility].     Lab Testing:  If you had lab testing today and your results are reassuring or normal they will be mailed to you or sent through [employer] within 7 days. If the lab tests need quick action we will call you with the results. The phone number we will call with results is # [phone]. If this is not the best number please call our clinic and change the number.     Medication Refills:  If you need any refills please call your pharmacy and they will contact us. Our fax number for refills is [phone].   Three business days of notice are needed for general medication refill requests.   Five business days of notice are needed for controlled substance refill requests.   If you need to change to a different pharmacy, please contact the new pharmacy directly. The new pharmacy will help you get your medications transferred.     Contact Us:  Please call [phone] during business hours (8-5:00 M-F).   If you have medication related questions after clinic hours, or on the weekend, please call [phone].     Financial Assistance [phone]   Medical Records [phone]       MENTAL HEALTH CRISIS RESOURCES:  For a emergency help, please call 911 or go to the nearest Emergency Department.     Emergency Walk-In Options:   EmPATH Unit @ [facility] ([place]): [phone] - Specialized mental health emergency area designed to be calming  [facility] ([place]): [phone]  [facility] ([place]): [phone]  [facility]): [phone]    [facility] Crisis Information:   [place]: [phone]  [place]: [phone]  [place] ([place]ARM[place]) - Adult: [phone]     Child: [phone]  [place] - Adult: [phone]     Child: [phone]  [place]:  [phone]  List of all [facility] resources:   https://[place].gov/dhs/people-we-serve/adults/health-care/mental-health/resources/crisis-contacts.jsp    National Crisis Information:   Crisis Text Line: Text  [place]  to 677992  Suicide & Crisis Lifeline: 988  National Suicide Prevention Lifeline: 2-769-624-TALK (1-460.101.8523)       For online chat options, visit https://suicidepreventionlifeline.org/chat/  Poison Control Center: 8-345-001-6056  Trans Lifeline: 3-841-974-0164 - Hotline for transgender people of all ages  The [employer] Project: 3-233-595-3384 - Hotline for LGBT youth     For Non-Emergency Support:   Fast Tracker: Mental Health & Substance Use Disorder Resources -   https://www.[employer].org/

## 2023-07-05 NOTE — Clinical Note
Please schedule this patient on 9/19 at 9 am for virtual [facility] (I'm not sure if Dr Barros will be back from her maternity leave at that time??? If not you can schedule this patient with me on that date)

## 2023-07-05 NOTE — NURSING NOTE
Is the patient currently in the state of [place]? YES    Visit mode:VIDEO    If the visit is dropped, the patient can be reconnected by: VIDEO VISIT: Send to e-mail at: [email or web address]    Will anyone else be joining the visit? NO      How would you like to obtain your AVS? MyChart    Are changes needed to the allergy or medication list? NO    Reason for visit: RECHECK

## 2023-07-05 NOTE — PROGRESS NOTES
Virtual Visit Details    Type of service:  Video Visit     Originating Location (pt. Location): Home  Distant Location (provider location):  Off-site  Platform used for Video Visit: [facility] [place]  [facility]  MEDICAL PROGRESS NOTE     CARE TEAM:  PCP- Tia Leonardo    Psychotherapist- Julissa, once a week appointments   , Neuro, Ob- Gyn, Pain   This person is a 28 year old who uses the name Margarita and pronouns she, her.      DIAGNOSIS   Major Depressive Disorder, recurrent episode, partial remission to mild  Generalized Anxiety Disorder  Panic Attack Disorder  Obsessive-Compulsive Disorder     ASSESSMENT     Margarita is doing well overall. She had a depressive episode with SA 4 months ago and since then she has started meeting more frequently with psychotherapist and they reviewed her safety plan and changed it to say that Margarita will let her know if she is feeling SI and her mom and grandma is more involved in checking in with her now. She has been euthymic since that episode and reports MH is stable.  She continues to work on enforcing boundaries with her boss at work which is a significant stressor for her. She is also looking for new employment and has an interview tomorrow that she is optimistic and hopeful about. Since re-starting Prazosin, she noticed a significant improvement in nightmares and sleep and she denied adverse affects and says she is taking it more consistently.     [place]:  Indicates taking controlled medication as prescribed.     PLAN                                                                                                                1) Meds-   - Continue Prazosin 3 mg at bedtime   - Continue sertraline 200 mg daily  - Continue propranolol LA 60 mg daily  - Continue propranolol IR 20 mg PRN daily for acute anxiety     Prescribed by her pain specialist:  Gabapentin 600 mg TID  Hydrocodone-Acetamin 7.5-325 mg TID  "PRN for pain     2) Psychotherapy- Continue weekly individual therapy    3) Next due-  Labs- PRN  EKG- PRN  Rating scales- PHQ9    4) Referrals-  none    5) Dispo- RTC 2 months      PERTINENT BACKGROUND                                                    [most recent eval 08/01/22]   Previous diagnoses include MDD, OCD, Panic Disorder, and chronic pain. She first began citalopram for depression at the age of 14 and then later switched to sertraline in college which she reports was helpful for both mood and OCD.  Has also done CBT for OCD to endorsed benefit.  No history of hospitalizations but patient allows at least one suicide attempt via CO poisoning in 2014 (her most recent SA) and chart review suggests as many as 6 other SA's prior to that.  Notably, patient's first contact with this clinic was a diagnostic assessment completed as a one time SACHIN eval on 1/10/19, tried to make it to the end of the day had been recommended in the context of establishing care with new providers upon recently returning to live in [place] given her prolonged use of opiates.  From that DA: \"Unclear that chronic opioids appropriate for her chronic non-malignant pain states. That said, with what information we have, no clear indication that she has used non-medically, escalated in use, or any other concerns.\" Chronic pain condition(s) experienced by the patient include endometriosis, herniated lumbar disk and migraines.  Social history highlights include raised in [place], received BA in Psychology from [employer] in [place], [place], where she met her present wife Jacqui, then lived in [place] from Dec 2015 up to recent return to [place] in Nov 2018.     Psych pertinent item history includes suicide attempt [multiple] and suicidal ideation.     SUBJECTIVE     SINCE LAST VISIT:   - doing well on prazosin, works well for reducing nightmares \"when I do take it\" denies any adverse effects   - applying for jobs because she \"hates\" her current job "   - doing therapy, going well twice weekly   - SI: none now, did make suicide attempt in March and was not hospitalized -  left her for 6 weeks afterward but they are doing better now, reviewed safety plan   - has a job interview tomorrow that she is optimistic and hopeful about     Recent Psych Symptoms:   Elevated:  none  Psychosis:  none  Anxiety:  excessive worry and nervous/overwhelmed  Trauma Related:  none  Sleep: Nightmares much improved (Prazosin helps)     Recent Substance Use:     -alcohol: Very rare , does not mix well with drugs   -cannabis: No   -tobacco: No  -caffeine:  No   -opioids: Yes prescribed for pain   Narcan Kit currrent: No     Pertinent Negatives: No suicidal ideation, violent ideation, self-injury, psychosis, hallucinations, jackie and harmful substance use  Adverse Effects: None    PSYCH and SUBSTANCE USE Critical Summary Points since July 2022 8/1/22: Transfer visit, no changes   10/3/22: No changes  12/5/22: Added Prazosin 1 mg at bedtime for nightmares back to list (restarted between appointments)     FAMILY and SOCIAL HISTORY                            Per chart review; reviewed and confirmed with pt     FAMILY HX:  Maternal Great-Grandmother had BPAD; Depression in family, both mom and dad, maternal aunt.     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 Patient reports they have a great supportive relationship.   Children- None  Living situation- lives in [place]    Social/ Spiritual Support- spouse, family, three dogs (Kerline a border collie aged 5-6 years, SheilaMala a bichon frise aged 3 years, and Asrah a constantin-poo aged 2 years)  Legal- no  FEELS SAFE AT HOME- yes      MEDICAL HISTORY and ALLERGY     ALLERGIES: Other environmental allergy and Seasonal allergies    Patient Active Problem List   Diagnosis     Intractable chronic  migraine without aura and without status migrainosus     Nausea vomiting and diarrhea     Cervicalgia     Chronic bilateral low back pain with bilateral sciatica     Right ureteral stone        MEDICAL REVIEW OF SYSTEMS     none in addition to that documented above     MEDICATIONS     Current Outpatient Medications   Medication Sig Dispense Refill     gabapentin (NEURONTIN) 300 MG capsule Take 600 mg by mouth 3 times daily       HYDROcodone-acetaminophen (NORCO) 7.5-325 MG per tablet Take 1 tablet by mouth 3 times daily as needed       indomethacin (INDOCIN) 50 MG capsule Take 1 capsule (50 mg) by mouth 2 times daily as needed for moderate pain 30 capsule 11     prazosin (MINIPRESS) 1 MG capsule Take 1 capsule (1 mg) by mouth At Bedtime *PLEASE SCHEDULE APPT. FOR REFILLS 30 capsule 0     promethazine (PHENERGAN) 25 MG tablet Take 1 tablet (25 mg) by mouth every 8 hours as needed for nausea associated with migranes 10 tablet 11     propranolol (INDERAL) 20 MG tablet TAKE 1 TABLET (20 MG) BY MOUTH DAILY AS NEEDED (FOR ANXIETY) FOR MORE REFILLS,SCHEDULE AN APPOINTMENT -[phone] 7 tablet 0     propranolol ER (INDERAL LA) 60 MG 24 hr capsule TAKE 1 CAPSULE (60 MG) BY MOUTH DAILY FOR MORE REFILLS,SCHEDULE AN APPOINTMENT -[phone] 7 capsule 0     sertraline (ZOLOFT) 100 MG tablet TAKE 2 TABLETS (200 MG) BY MOUTH DAILY FOR MORE REFILLS,SCHEDULE AN APPOINTMENT -[phone] 14 tablet 0     SUMAtriptan (IMITREX) 100 MG tablet Take 1 tablet (100 mg) by mouth at onset of headache for migraine (repeat in 2 hours if needed) 18 tablet 11     cyclobenzaprine (FLEXERIL) 10 MG tablet Take 10 mg by mouth 3 times daily as needed for muscle spasms        VITALS   There were no vitals taken for this visit.    MENTAL STATUS EXAM     Alertness: alert  and oriented  Appearance: casually groomed  Behavior/Demeanor: cooperative, pleasant and calm, with good  eye contact   Speech: normal and regular rate and rhythm  Language:  "intact and no problems  Psychomotor: normal or unremarkable  Mood: \"I'm okay\"  Affect: full range; congruent to: mood- yes, content- yes  Thought Process/Associations: unremarkable  Thought Content:  Reports none;  Denies suicidal & violent ideation and delusions  Perception:  Reports none;  Denies hallucinations  Insight: good  Judgment: good  Cognition: does  appear grossly intact; formal cognitive testing was not done  Gait and Station: N/A (telehealth)     LABS and DATA         4/2/2019     8:00 AM 8/23/2019     3:15 PM 12/19/2019     8:30 AM   PHQ   PHQ-9 Total Score 5 8 14   Q9: Thoughts of better off dead/self-harm past 2 weeks Not at all Not at all Not at all     Answers for HPI/ROS submitted by the patient on 12/5/2022  MADIHA 7 TOTAL SCORE: 9    Recent Labs   Lab Test 01/24/20  1054 01/10/20  0700   CR 0.72  0.72 0.73   GFRESTIMATED >60  >60 >90     Recent Labs   Lab Test 01/09/20  1112 03/29/19  0647   AST 16 25   ALT 33 41   ALKPHOS 114 91        PSYCHOTROPIC DRUG INTERACTIONS                                                       PSYCHCLINICDDI   Concurrent use of OXYCODONE and SERTRALINE may result in an increased risk of serotonin syndrome (tachycardia, hyperthermia, myoclonus, mental status changes).     Concurrent use of OXYCODONE and CNS DEPRESSANTS may result in increased risk of respiratory and CNS depression.     Concurrent use of PRAZOSIN and PROPRANOLOL may may enhance the orthostatic hypotensive effect of Alpha1-Blockers     Sertraline and Propranolol: Concurrent use of PROPRANOLOL and CYP2D6 INHIBITORS may result in increased propranolol exposure.    MANAGEMENT:  Monitoring for adverse effects, routine vitals and patient is aware of risks     RISK STATEMENT for SAFETY     Margarita did not appear to be an imminent safety risk to self or others.    TREATMENT RISK STATEMENT: The risks, benefits, alternatives and potential adverse effects have been discussed and are understood by the pt. The pt " understands the risks of using street drugs or alcohol. There are no medical contraindications, the pt agrees to treatment with the ability to do so. The pt knows to call the clinic for any problems or to access emergency care if needed.  Medical and substance use concerns are documented above.  Psychotropic drug interaction check was done, including changes made today.     PSYCHIATRIST: Kian Stahl, DO    Patient not staffed in clinic.  Note will be reviewed and signed by supervisor Dr. Martinez.

## 2023-07-27 DIAGNOSIS — F41.9 ANXIETY: ICD-10-CM

## 2023-07-28 RX ORDER — PRAZOSIN HYDROCHLORIDE 1 MG/1
3 CAPSULE ORAL AT BEDTIME
Qty: 90 CAPSULE | Refills: 2 | OUTPATIENT
Start: 2023-07-28

## 2023-09-02 DIAGNOSIS — F41.9 ANXIETY: ICD-10-CM

## 2023-09-06 RX ORDER — PRAZOSIN HYDROCHLORIDE 1 MG/1
3 CAPSULE ORAL AT BEDTIME
Qty: 90 CAPSULE | Refills: 2 | OUTPATIENT
Start: 2023-09-06

## 2023-09-08 DIAGNOSIS — F41.9 ANXIETY: ICD-10-CM

## 2023-09-08 DIAGNOSIS — F33.41 RECURRENT MAJOR DEPRESSIVE DISORDER, IN PARTIAL REMISSION (H): ICD-10-CM

## 2023-09-11 RX ORDER — SERTRALINE HYDROCHLORIDE 100 MG/1
200 TABLET, FILM COATED ORAL DAILY
Qty: 180 TABLET | OUTPATIENT
Start: 2023-09-11

## 2023-09-11 RX ORDER — PROPRANOLOL HCL 60 MG
60 CAPSULE, EXTENDED RELEASE 24HR ORAL DAILY
Qty: 90 CAPSULE | OUTPATIENT
Start: 2023-09-11

## 2023-09-11 RX ORDER — PROPRANOLOL HYDROCHLORIDE 20 MG/1
20 TABLET ORAL DAILY PRN
Qty: 90 TABLET | OUTPATIENT
Start: 2023-09-11

## 2023-09-19 ENCOUNTER — VIRTUAL VISIT (OUTPATIENT)
Dept: PSYCHIATRY | Facility: CLINIC | Age: 28
End: 2023-09-19
Attending: PSYCHIATRY & NEUROLOGY
Payer: COMMERCIAL

## 2023-09-19 DIAGNOSIS — F33.41 RECURRENT MAJOR DEPRESSIVE DISORDER, IN PARTIAL REMISSION (H): ICD-10-CM

## 2023-09-19 DIAGNOSIS — F41.9 ANXIETY: ICD-10-CM

## 2023-09-19 PROCEDURE — 99214 OFFICE O/P EST MOD 30 MIN: CPT | Mod: VID | Performed by: STUDENT IN AN ORGANIZED HEALTH CARE EDUCATION/TRAINING PROGRAM

## 2023-09-19 RX ORDER — PROPRANOLOL HCL 60 MG
60 CAPSULE, EXTENDED RELEASE 24HR ORAL DAILY
Qty: 30 CAPSULE | Refills: 2 | Status: SHIPPED | OUTPATIENT
Start: 2023-09-19 | End: 2023-12-12

## 2023-09-19 RX ORDER — PROPRANOLOL HYDROCHLORIDE 20 MG/1
20 TABLET ORAL DAILY PRN
Qty: 30 TABLET | Refills: 2 | Status: SHIPPED | OUTPATIENT
Start: 2023-09-19 | End: 2023-12-12

## 2023-09-19 RX ORDER — SERTRALINE HYDROCHLORIDE 100 MG/1
200 TABLET, FILM COATED ORAL DAILY
Qty: 60 TABLET | Refills: 2 | Status: SHIPPED | OUTPATIENT
Start: 2023-09-19 | End: 2023-12-12

## 2023-09-19 RX ORDER — PRAZOSIN HYDROCHLORIDE 1 MG/1
3 CAPSULE ORAL AT BEDTIME
Qty: 90 CAPSULE | Refills: 2 | Status: SHIPPED | OUTPATIENT
Start: 2023-09-19 | End: 2023-12-12

## 2023-09-19 ASSESSMENT — PAIN SCALES - GENERAL: PAINLEVEL: SEVERE PAIN (6)

## 2023-09-19 NOTE — NURSING NOTE
Is the patient currently in the state of [place]? YES    Visit mode:VIDEO    If the visit is dropped, the patient can be reconnected by: VIDEO VISIT: Text to cell phone:   Telephone Information:   Mobile [phone]       Will anyone else be joining the visit? NO  (If patient encounters technical issues they should call [phone] :[phone])    How would you like to obtain your AVS? MyChart    Are changes needed to the allergy or medication list? Yes Pt no longer takes cyclobenzaprine 10 mg tablets. Please remove from med list.    Reason for visit: RECHECK    PHQ not complete per department protocol.    Jamilah CROFT

## 2023-09-19 NOTE — PROGRESS NOTES
Virtual Visit Details    Type of service:  Video Visit   Originating Location (pt. Location): Home  Distant Location (provider location):  Off-site  Platform used for Video Visit: [facility]  Psychiatry Clinic  MEDICAL PROGRESS NOTE     CARE TEAM:  PCP- Tia Leonardo    Psychotherapist- Julissa, once a week appointments, Neuro, Ob- Gyn, Pain     This person is a 28 year old who uses the name Margarita and pronouns she, her.      DIAGNOSIS   Major Depressive Disorder, recurrent episode, partial remission to mild  Generalized Anxiety Disorder  Panic Attack Disorder  Obsessive-Compulsive Disorder     ASSESSMENT     Margarita is doing well overall. She had a depressive episode with SA 6 months ago and since then she has started meeting more frequently with psychotherapist and they reviewed her safety plan and changed it to say that Margarita will let her know if she is feeling SI and her mom and grandma is more involved in checking in with her now.  She continues to work on enforcing boundaries with her boss at work which is a significant stressor for her but has noticed more anxiety and difficulty with work stressors since her dog and grandpa were diagnosed with cancer. She is agreeable to taking PRN propranolol more frequently during this tough time and asked to meet sooner to check in. No safety concerns today or side effects of meds.     [place]:  Indicates taking controlled medication as prescribed.     PLAN                                                                                                                1) Meds-   - Continue Prazosin 3 mg at bedtime   - Continue sertraline 200 mg daily   - Continue propranolol LA 60 mg daily   - Continue propranolol IR 20 mg PRN daily for acute anxiety      Prescribed by her pain specialist:  Gabapentin 600 mg TID  Hydrocodone-Acetamin 7.5-325 mg TID PRN for pain     2) Psychotherapy- Continue weekly  "individual therapy    3) Next due-  Labs- PRN  EKG- PRN  Rating scales- PHQ9    4) Referrals-  none    5) Dispo- RTC 1 months      PERTINENT BACKGROUND                                                    [most recent eval 08/01/22]   Previous diagnoses include MDD, OCD, Panic Disorder, and chronic pain. She first began citalopram for depression at the age of 14 and then later switched to sertraline in college which she reports was helpful for both mood and OCD.  Has also done CBT for OCD to endorsed benefit. No history of hospitalizations but patient allows at least one suicide attempt via CO poisoning in 2014 (her most recent SA) and chart review suggests as many as 6 other SA's prior to that.  Notably, patient's first contact with this clinic was a diagnostic assessment completed as a one time SACHIN eval on 1/10/19, tried to make it to the end of the day had been recommended in the context of establishing care with new providers upon recently returning to live in [place] given her prolonged use of opiates.  From that DA: \"Unclear that chronic opioids appropriate for her chronic non-malignant pain states. That said, with what information we have, no clear indication that she has used non-medically, escalated in use, or any other concerns.\" Chronic pain condition(s) experienced by the patient include endometriosis, herniated lumbar disk and migraines.    Social history highlights include raised in [place], received BA in Psychology from [employer] in [place], [place], where she met her present wife Jacqui, then lived in [place] from Dec 2015 up to recent return to [place] in Nov 2018. Now  to ?      Psych pertinent item history includes suicide attempt [multiple] and suicidal ideation.     SUBJECTIVE     SINCE LAST VISIT:   - a lot of anxiety, had COVID and missed 10 days of work  - dog has cancer   - other dog has cushing's   - moved into building and found out grandpa has cancer too, age 90   - missed last " "couple weeks with therapist   - overwhelmed, worsening PTSD \"waiting for the next phone call saying another thing is going wrong\"   - no SI   - discussed benzo's previous provider took her off of them and she felt that they were telling her she was going to become a drug addict, is agreeable to utilize prn propranolol in AM and more frequently since anxiety seems to be worse during the day     Recent Psych Symptoms:   Elevated:  none  Psychosis:  none  Anxiety:  excessive worry and nervous/overwhelmed  Trauma Related:  none  Sleep:  Nightmares much improved (Prazosin helps)     Recent Substance Use:     -alcohol: Very rare , does not mix well with drugs   -cannabis: No   -tobacco: No  -caffeine:  one cup coffee per day   -opioids: Yes prescribed for pain   Narcan Kit currrent: No     Pertinent Negatives: No suicidal ideation, violent ideation, self-injury, psychosis, hallucinations, jackie and harmful substance use  Adverse Effects: None    PSYCH and SUBSTANCE USE Critical Summary Points since July 2022 8/1/22: Transfer visit, no changes   10/3/22: No changes  12/5/22: Added Prazosin 1 mg at bedtime for nightmares back to list (restarted between appointments)     FAMILY and SOCIAL HISTORY                            Per chart review; reviewed and confirmed with pt     FAMILY HX:  Maternal Great-Grandmother had BPAD; Depression in family, both mom and dad, maternal aunt.     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 Patient reports they have a great supportive relationship.   Children- None  Living situation- lives in [place]    Social/ Spiritual Support- spouse, family, three dogs (Kerline a border collie aged 5-6 years, Sheila-Sheila a bichon frise aged 3 years, and Sarah a constantin-poo aged 2 years)  Legal- no  FEELS SAFE AT HOME- yes      MEDICAL HISTORY and ALLERGY " "    ALLERGIES: Other environmental allergy and Seasonal allergies    Patient Active Problem List   Diagnosis    Intractable chronic migraine without aura and without status migrainosus    Nausea vomiting and diarrhea    Cervicalgia    Chronic bilateral low back pain with bilateral sciatica    Right ureteral stone        MEDICAL REVIEW OF SYSTEMS     none in addition to that documented above     MEDICATIONS     Current Outpatient Medications   Medication Sig Dispense Refill    gabapentin (NEURONTIN) 300 MG capsule Take 600 mg by mouth 3 times daily      HYDROcodone-acetaminophen (NORCO) 7.5-325 MG per tablet Take 1 tablet by mouth 3 times daily as needed      indomethacin (INDOCIN) 50 MG capsule Take 1 capsule (50 mg) by mouth 2 times daily as needed for moderate pain 30 capsule 11    prazosin (MINIPRESS) 1 MG capsule Take 3 capsules (3 mg) by mouth At Bedtime 90 capsule 2    promethazine (PHENERGAN) 25 MG tablet Take 1 tablet (25 mg) by mouth every 8 hours as needed for nausea associated with migranes 10 tablet 11    propranolol (INDERAL) 20 MG tablet Take 1 tablet (20 mg) by mouth daily as needed (for anxiety) 30 tablet 2    propranolol ER (INDERAL LA) 60 MG 24 hr capsule Take 1 capsule (60 mg) by mouth daily 30 capsule 2    sertraline (ZOLOFT) 100 MG tablet Take 2 tablets (200 mg) by mouth daily 60 tablet 2    SUMAtriptan (IMITREX) 100 MG tablet Take 1 tablet (100 mg) by mouth at onset of headache for migraine (repeat in 2 hours if needed) 18 tablet 11    cyclobenzaprine (FLEXERIL) 10 MG tablet Take 10 mg by mouth 3 times daily as needed for muscle spasms        VITALS   There were no vitals taken for this visit.    MENTAL STATUS EXAM     Alertness: alert  and oriented  Appearance: casually groomed  Behavior/Demeanor: cooperative, pleasant and calm, with good  eye contact   Speech: normal and regular rate and rhythm  Language: intact and no problems  Psychomotor: normal or unremarkable  Mood:  \"I'm okay\"  Affect: " full range; congruent to: mood- yes, content- yes  Thought Process/Associations: unremarkable  Thought Content:  Reports none;  Denies suicidal & violent ideation and delusions  Perception:  Reports none;  Denies hallucinations  Insight: good  Judgment: good  Cognition: does  appear grossly intact; formal cognitive testing was not done  Gait and Station: N/A (telehealth)     LABS and DATA         4/2/2019     8:00 AM 8/23/2019     3:15 PM 12/19/2019     8:30 AM   PHQ   PHQ-9 Total Score 5 8 14   Q9: Thoughts of better off dead/self-harm past 2 weeks Not at all Not at all Not at all     Answers for HPI/ROS submitted by the patient on 12/5/2022  MADIHA 7 TOTAL SCORE: 9    Recent Labs   Lab Test 01/24/20  1054 01/10/20  0700   CR 0.72  0.72 0.73   GFRESTIMATED >60  >60 >90     Recent Labs   Lab Test 01/09/20  1112 03/29/19  0647   AST 16 25   ALT 33 41   ALKPHOS 114 91        PSYCHOTROPIC DRUG INTERACTIONS                                                       PSYCHCLINICDDI   Concurrent use of OXYCODONE and SERTRALINE may result in an increased risk of serotonin syndrome (tachycardia, hyperthermia, myoclonus, mental status changes).     Concurrent use of OXYCODONE and CNS DEPRESSANTS may result in increased risk of respiratory and CNS depression.     Concurrent use of PRAZOSIN and PROPRANOLOL may may enhance the orthostatic hypotensive effect of Alpha1-Blockers     Sertraline and Propranolol: Concurrent use of PROPRANOLOL and CYP2D6 INHIBITORS may result in increased propranolol exposure.    MANAGEMENT:  Monitoring for adverse effects, routine vitals and patient is aware of risks     RISK STATEMENT for SAFETY     Margarita did not appear to be an imminent safety risk to self or others.    TREATMENT RISK STATEMENT: The risks, benefits, alternatives and potential adverse effects have been discussed and are understood by the pt. The pt understands the risks of using street drugs or alcohol. There are no medical  contraindications, the pt agrees to treatment with the ability to do so. The pt knows to call the clinic for any problems or to access emergency care if needed.  Medical and substance use concerns are documented above.  Psychotropic drug interaction check was done, including changes made today.     PSYCHIATRIST: Kian Stahl DO    Patient not staffed in clinic.  Note will be reviewed and signed by supervisor Dr. Martinez.

## 2023-09-19 NOTE — PATIENT INSTRUCTIONS
**For crisis resources, please see the information at the end of this document**   Patient Education    Thank you for coming to the [facility] [place] [facility].     Lab Testing:  If you had lab testing today and your results are reassuring or normal they will be mailed to you or sent through [facility] within 7 days. If the lab tests need quick action we will call you with the results. The phone number we will call with results is # [phone]. If this is not the best number please call our clinic and change the number.     Medication Refills:  If you need any refills please call your pharmacy and they will contact us. Our fax number for refills is [phone].   Three business days of notice are needed for general medication refill requests.   Five business days of notice are needed for controlled substance refill requests.   If you need to change to a different pharmacy, please contact the new pharmacy directly. The new pharmacy will help you get your medications transferred.     Contact Us:  Please call [phone] during business hours (8-5:00 M-F).   If you have medication related questions after clinic hours, or on the weekend, please call [phone].     Financial Assistance [phone]   Medical Records [phone]       MENTAL HEALTH CRISIS RESOURCES:  For a emergency help, please call 911 or go to the nearest Emergency Department.     Emergency Walk-In Options:   EmPATH Unit @ [facility] ([place]): [phone] - Specialized mental health emergency area designed to be calming  [facility] ([place]): [phone]  [facility] ([place]): [phone]  [facility]): [phone]    [facility] Crisis Information:   [place]: [phone]  [place]: [phone]  [place] ([place]) - Adult: [phone]     Child: [phone]  [place] - Adult: [phone]     Child: [phone]  [place]:  [phone]  List of all [facility] resources:   https://[place].gov/dhs/people-we-serve/adults/health-care/mental-health/resources/crisis-contacts.jsp    National Crisis Information:   Crisis Text Line: Text  [place]  to 765289  Suicide & Crisis Lifeline: 988  National Suicide Prevention Lifeline: 4-092-181-TALK (1-518.724.8156)       For online chat options, visit https://suicidepreventionlifeline.org/chat/  Poison Control Center: 9-393-668-9780  Trans Lifeline: 4-385-533-3181 - Hotline for transgender people of all ages  The [employer] Project: 6-271-861-8062 - Hotline for LGBT youth     For Non-Emergency Support:   Fast Tracker: Mental Health & Substance Use Disorder Resources -   https://www.[facility].org/
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
## 2023-10-17 ENCOUNTER — VIRTUAL VISIT (OUTPATIENT)
Dept: PSYCHIATRY | Facility: CLINIC | Age: 28
End: 2023-10-17
Attending: PSYCHIATRY & NEUROLOGY
Payer: COMMERCIAL

## 2023-10-17 DIAGNOSIS — F41.9 ANXIETY: ICD-10-CM

## 2023-10-17 DIAGNOSIS — F42.9 OBSESSIVE-COMPULSIVE DISORDER, UNSPECIFIED TYPE: ICD-10-CM

## 2023-10-17 DIAGNOSIS — F33.41 RECURRENT MAJOR DEPRESSIVE DISORDER, IN PARTIAL REMISSION (H): Primary | ICD-10-CM

## 2023-10-17 PROCEDURE — 99214 OFFICE O/P EST MOD 30 MIN: CPT | Mod: HN | Performed by: STUDENT IN AN ORGANIZED HEALTH CARE EDUCATION/TRAINING PROGRAM

## 2023-10-17 ASSESSMENT — PAIN SCALES - GENERAL: PAINLEVEL: SEVERE PAIN (6)

## 2023-10-17 NOTE — NURSING NOTE
Is the patient currently in the state of [place]? YES    Visit mode:VIDEO    If the visit is dropped, the patient can be reconnected by: VIDEO VISIT: Send to e-mail at: [email or web address]    Will anyone else be joining the visit? NO  (If patient encounters technical issues they should call [phone]    How would you like to obtain your AVS? MyChart    Are changes needed to the allergy or medication list? No    QNRS not done together due to time of check in, Margarita might try to do on own through mychart    Reason for visit: JULIA JIMENEZF      
Seizure disorder

## 2023-10-17 NOTE — PROGRESS NOTES
Virtual Visit Details    Type of service:  Video Visit   Originating Location (pt. Location): Home  Distant Location (provider location):  Off-site  Platform used for Video Visit: [facility]  Psychiatry Clinic  MEDICAL PROGRESS NOTE     CARE TEAM:  PCP- Tia Leonardo    Psychotherapist- Julissa, once a week appointments, Neuro, Ob- Gyn, Pain     This person is a 28 year old who uses the name Margarita and pronouns she, her.      DIAGNOSIS   Major Depressive Disorder, recurrent episode, partial remission to mild  Generalized Anxiety Disorder  Obsessive-Compulsive Disorder     ASSESSMENT     Margarita is doing well overall. She had a depressive episode with SA summer 2023 and since then she has started meeting more frequently with psychotherapist and they reviewed her safety plan. She continues to work on enforcing boundaries with her boss at work which is a significant stressor for her - overall anxiety, depression, PTSD are stable now and she is tolerating meds without AE. OCD symptoms well managed on sertraline. She is meeting with therapist regularly, which has been very effective in times of high anxiety in the past. No safety concerns today or side effects of meds.     [place]:  Indicates taking controlled medication as prescribed.     PLAN                                                                                                                1) Meds-   - Continue Prazosin 3 mg at bedtime   - Continue sertraline 200 mg daily   - Continue propranolol LA 60 mg daily   - Continue propranolol IR 20 mg PRN daily for acute anxiety      Prescribed by her pain specialist:  Gabapentin 600 mg TID  Hydrocodone-Acetamin 7.5-325 mg TID PRN for pain     2) Psychotherapy- Continue weekly individual therapy    3) Next due-  Labs- PRN  EKG- PRN  Rating scales- PHQ9    4) Referrals-  none    5) Dispo- RTC 2 months w/ Dr Barros     PERTINENT BACKGROUND      "                                               [most recent eval 08/01/22]   Previous diagnoses include MDD, OCD, Panic Disorder, and chronic pain. She first began citalopram for depression at the age of 14 and then later switched to sertraline in college which she reports was helpful for both mood and OCD.  Has also done CBT for OCD to endorsed benefit. No history of hospitalizations but patient allows at least one suicide attempt via CO poisoning in 2014 (her most recent SA) and chart review suggests as many as 6 other SA's prior to that.  Notably, patient's first contact with this clinic was a diagnostic assessment completed as a one time SACHIN eval on 1/10/19, tried to make it to the end of the day had been recommended in the context of establishing care with new providers upon recently returning to live in [place] given her prolonged use of opiates.  From that DA: \"Unclear that chronic opioids appropriate for her chronic non-malignant pain states. That said, with what information we have, no clear indication that she has used non-medically, escalated in use, or any other concerns.\" Chronic pain condition(s) experienced by the patient include endometriosis, herniated lumbar disk and migraines.    Social history highlights include raised in [place], received BA in Psychology from [employer] in [place], [place], where she met her present wife Jacqui, then lived in [place] from Dec 2015 up to recent return to [place] in Nov 2018. Now  to ?      Psych pertinent item history includes suicide attempt [multiple, most recent 2023] and suicidal ideation.     SUBJECTIVE     SINCE LAST VISIT:   - feeling better than last visit - dog doing much better with cancer  - big event at work was stressing her out, that has passed   - is having more responsibility at work while lots of people   - a few extra sessions with therapist since last visit - meeting w/ her today   - yesterday there was a murder outside her office space, " watched a person die on camera; met w therapist after this to have sooner appts and support her   - no SI   - no other questions/concerns     Recent Psych Symptoms:   Elevated:  none  Psychosis:  none  Anxiety:  excessive worry and nervous/overwhelmed  Trauma Related:  none  Sleep:  Nightmares but improved (Prazosin helps)     Recent Substance Use:     -alcohol: Very rare , does not mix well with drugs   -cannabis: No   -tobacco: No  -caffeine:  one cup coffee per day   -opioids: Yes prescribed for pain   Narcan Kit currrent: No     Pertinent Negatives: No suicidal ideation, violent ideation, self-injury, psychosis, hallucinations, jackie and harmful substance use  Adverse Effects: None    PSYCH and SUBSTANCE USE Critical Summary Points since July 2022 8/1/22: Transfer visit, no changes   10/3/22: No changes  12/5/22: Added Prazosin 1 mg at bedtime for nightmares back to list (restarted between appointments)     FAMILY and SOCIAL HISTORY                            Per chart review; reviewed and confirmed with pt     FAMILY HX:  Maternal Great-Grandmother had BPAD; Depression in family, both mom and dad, maternal aunt.     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 Patient reports they have a great supportive relationship.   Children- None  Living situation- lives in [place]    Social/ Spiritual Support- spouse, family, three dogs (Kerline a border collie aged 5-6 years, Sheila-Sheila a bichon frise aged 3 years, and Sarah a constantin-poo aged 2 years)  Legal- no  FEELS SAFE AT HOME- yes      MEDICAL HISTORY and ALLERGY     ALLERGIES: Other environmental allergy and Seasonal allergies    Patient Active Problem List   Diagnosis    Intractable chronic migraine without aura and without status migrainosus    Nausea vomiting and diarrhea    Cervicalgia    Chronic bilateral low back  "pain with bilateral sciatica    Right ureteral stone        MEDICAL REVIEW OF SYSTEMS     none in addition to that documented above     MEDICATIONS     Current Outpatient Medications   Medication Sig Dispense Refill    gabapentin (NEURONTIN) 300 MG capsule Take 600 mg by mouth 3 times daily      HYDROcodone-acetaminophen (NORCO) 7.5-325 MG per tablet Take 1 tablet by mouth 3 times daily as needed      indomethacin (INDOCIN) 50 MG capsule Take 1 capsule (50 mg) by mouth 2 times daily as needed for moderate pain 30 capsule 11    prazosin (MINIPRESS) 1 MG capsule Take 3 capsules (3 mg) by mouth At Bedtime 90 capsule 2    promethazine (PHENERGAN) 25 MG tablet Take 1 tablet (25 mg) by mouth every 8 hours as needed for nausea associated with migranes 10 tablet 11    propranolol (INDERAL) 20 MG tablet Take 1 tablet (20 mg) by mouth daily as needed (for anxiety) 30 tablet 2    propranolol ER (INDERAL LA) 60 MG 24 hr capsule Take 1 capsule (60 mg) by mouth daily 30 capsule 2    sertraline (ZOLOFT) 100 MG tablet Take 2 tablets (200 mg) by mouth daily 60 tablet 2    SUMAtriptan (IMITREX) 100 MG tablet Take 1 tablet (100 mg) by mouth at onset of headache for migraine (repeat in 2 hours if needed) 18 tablet 11    cyclobenzaprine (FLEXERIL) 10 MG tablet Take 10 mg by mouth 3 times daily as needed for muscle spasms        VITALS   There were no vitals taken for this visit.    MENTAL STATUS EXAM     Alertness: alert  and oriented  Appearance: casually groomed  Behavior/Demeanor: cooperative, pleasant and calm, with good  eye contact   Speech: normal and regular rate and rhythm  Language: intact and no problems  Psychomotor: normal or unremarkable  Mood:  \"I'm better\"  Affect: full range; congruent to: mood- yes, content- yes  Thought Process/Associations: unremarkable  Thought Content:  Reports none;  Denies suicidal & violent ideation and delusions  Perception:  Reports none;  Denies hallucinations  Insight: good  Judgment: " good  Cognition: does  appear grossly intact; formal cognitive testing was not done  Gait and Station: N/A (telehealth)     LABS and DATA         4/2/2019     8:00 AM 8/23/2019     3:15 PM 12/19/2019     8:30 AM   PHQ   PHQ-9 Total Score 5 8 14   Q9: Thoughts of better off dead/self-harm past 2 weeks Not at all Not at all Not at all     Answers for HPI/ROS submitted by the patient on 12/5/2022  MADIHA 7 TOTAL SCORE: 9    Recent Labs   Lab Test 01/24/20  1054 01/10/20  0700   CR 0.72  0.72 0.73   GFRESTIMATED >60  >60 >90     Recent Labs   Lab Test 01/09/20  1112 03/29/19  0647   AST 16 25   ALT 33 41   ALKPHOS 114 91        PSYCHOTROPIC DRUG INTERACTIONS                                                       PSYCHCLINICDDI   Concurrent use of OXYCODONE and SERTRALINE may result in an increased risk of serotonin syndrome (tachycardia, hyperthermia, myoclonus, mental status changes).     Concurrent use of OXYCODONE and CNS DEPRESSANTS may result in increased risk of respiratory and CNS depression.     Concurrent use of PRAZOSIN and PROPRANOLOL may may enhance the orthostatic hypotensive effect of Alpha1-Blockers     Sertraline and Propranolol: Concurrent use of PROPRANOLOL and CYP2D6 INHIBITORS may result in increased propranolol exposure.    MANAGEMENT:  Monitoring for adverse effects, routine vitals and patient is aware of risks     RISK STATEMENT for SAFETY     Margarita did not appear to be an imminent safety risk to self or others.    TREATMENT RISK STATEMENT: The risks, benefits, alternatives and potential adverse effects have been discussed and are understood by the pt. The pt understands the risks of using street drugs or alcohol. There are no medical contraindications, the pt agrees to treatment with the ability to do so. The pt knows to call the clinic for any problems or to access emergency care if needed.  Medical and substance use concerns are documented above.  Psychotropic drug interaction check was done,  including changes made today.     PSYCHIATRIST: Kian Stahl, DO    Patient not staffed in clinic.  Note will be reviewed and signed by supervisor Dr. Martinez.

## 2023-10-17 NOTE — Clinical Note
Please schedule this patient with Dr Barros for mfu on 12/13 at 4 pm (virtual appt)  This is one of her longitudinal patients that I was covering. Thanks!

## 2023-10-17 NOTE — PATIENT INSTRUCTIONS
**For crisis resources, please see the information at the end of this document**   Patient Education    Thank you for coming to the [facility] [place] [facility].     Lab Testing:  If you had lab testing today and your results are reassuring or normal they will be mailed to you or sent through [employer] within 7 days. If the lab tests need quick action we will call you with the results. The phone number we will call with results is # [phone]. If this is not the best number please call our clinic and change the number.     Medication Refills:  If you need any refills please call your pharmacy and they will contact us. Our fax number for refills is [phone].   Three business days of notice are needed for general medication refill requests.   Five business days of notice are needed for controlled substance refill requests.   If you need to change to a different pharmacy, please contact the new pharmacy directly. The new pharmacy will help you get your medications transferred.     Contact Us:  Please call [phone] during business hours (8-5:00 M-F).   If you have medication related questions after clinic hours, or on the weekend, please call [phone].     Financial Assistance [phone]   Medical Records [phone]       MENTAL HEALTH CRISIS RESOURCES:  For a emergency help, please call 911 or go to the nearest Emergency Department.     Emergency Walk-In Options:   EmPATH Unit @ [facility] ([place]): [phone] - Specialized mental health emergency area designed to be calming  [facility] ([place]): [phone]  [facility] ([place]): [phone]  [facility]): [phone]    [facility] Crisis Information:   [place]: [phone]  [place]: [phone]  [place] ([place]) - Adult: [phone]     Child: [phone]  [place] - Adult: [phone]     Child: [phone]  [place]:  [phone]  List of all [facility] resources:   https://[place].gov/dhs/people-we-serve/adults/health-care/mental-health/resources/crisis-contacts.jsp    National Crisis Information:   Crisis Text Line: Text  [place]  to 248425  Suicide & Crisis Lifeline: 988  National Suicide Prevention Lifeline: 7-612-086-TALK (1-625.753.8483)       For online chat options, visit https://suicidepreventionlifeline.org/chat/  Poison Control Center: 7-505-058-6826  Trans Lifeline: 8-172-126-6239 - Hotline for transgender people of all ages  The Stef Project: 2-340-677-9364 - Hotline for LGBT youth     For Non-Emergency Support:   Fast Tracker: Mental Health & Substance Use Disorder Resources -   https://www.[employer].org/

## 2023-11-06 ENCOUNTER — TELEPHONE (OUTPATIENT)
Dept: NEUROLOGY | Facility: CLINIC | Age: 28
End: 2023-11-06

## 2023-11-06 ENCOUNTER — OFFICE VISIT (OUTPATIENT)
Dept: NEUROLOGY | Facility: CLINIC | Age: 28
End: 2023-11-06
Payer: COMMERCIAL

## 2023-11-06 VITALS
HEART RATE: 76 BPM | OXYGEN SATURATION: 97 % | DIASTOLIC BLOOD PRESSURE: 75 MMHG | RESPIRATION RATE: 17 BRPM | SYSTOLIC BLOOD PRESSURE: 125 MMHG

## 2023-11-06 DIAGNOSIS — G43.719 INTRACTABLE CHRONIC MIGRAINE WITHOUT AURA AND WITHOUT STATUS MIGRAINOSUS: Primary | ICD-10-CM

## 2023-11-06 PROBLEM — N13.2 HYDRONEPHROSIS WITH URINARY OBSTRUCTION DUE TO URETERAL CALCULUS: Status: ACTIVE | Noted: 2020-01-15

## 2023-11-06 PROBLEM — N20.0 CALCULUS OF KIDNEY: Status: ACTIVE | Noted: 2020-01-15

## 2023-11-06 PROCEDURE — 99214 OFFICE O/P EST MOD 30 MIN: CPT | Performed by: PSYCHIATRY & NEUROLOGY

## 2023-11-06 RX ORDER — INDOMETHACIN 50 MG/1
50 CAPSULE ORAL 2 TIMES DAILY PRN
Qty: 30 CAPSULE | Refills: 11 | Status: SHIPPED | OUTPATIENT
Start: 2023-11-06

## 2023-11-06 RX ORDER — SUMATRIPTAN 100 MG/1
100 TABLET, FILM COATED ORAL
Qty: 18 TABLET | Refills: 11 | Status: SHIPPED | OUTPATIENT
Start: 2023-11-06 | End: 2024-04-29

## 2023-11-06 RX ORDER — PROMETHAZINE HYDROCHLORIDE 25 MG/1
25 TABLET ORAL EVERY 8 HOURS PRN
Qty: 10 TABLET | Refills: 11 | Status: SHIPPED | OUTPATIENT
Start: 2023-11-06 | End: 2024-04-29

## 2023-11-06 RX ORDER — METHOCARBAMOL 750 MG/1
TABLET, FILM COATED ORAL
COMMUNITY
Start: 2023-04-19

## 2023-11-06 ASSESSMENT — PAIN SCALES - GENERAL: PAINLEVEL: SEVERE PAIN (6)

## 2023-11-06 NOTE — NURSING NOTE
Chief Complaint   Patient presents with    RECHECK    Headache     /75   Pulse 76   Resp 17   SpO2 97%     Aubrey Watson, EMT

## 2023-11-06 NOTE — TELEPHONE ENCOUNTER
Prior Authorization Retail Medication Request    Medication/Dose: Emgality 240 mg loading dose then 120 mg every 28 days  ICD code (if different than what is on RX):    Previously Tried and Failed:  She currently takes propranolol, doxepin, gabapentin, without headache benefit.  She is continued sumatriptan, promethazine        Rationale:  Headaches have worsened to 30 out of 30 days a month, with 10 out of 30 severe headache days a month     Insurance Name:  [employer]  Insurance ID:  [id number]    Pharmacy Information (if different than what is on RX)  Name:    Phone:

## 2023-11-06 NOTE — PROGRESS NOTES
[facility]    Headache Neurology Progress Note  November 6, 2023    Subjective:    Ingris Trevino returns for follow up of chronic migraine without aura.  She is continued sumatriptan, promethazine, indomethacin as needed.  Headaches have worsened.    Today, she reports headaches have increased in the setting of work stress. She estimates 30/30 headache days per month, with 10/30 severe headache/migraine days per month.    No change in headache quality. Vice- like pain continues.    Her acute treatments remain effective, but she is needing to use them more.    She currently takes propranolol, doxepin, gabapentin, without headache benefit.    Objective:    Vitals: /75   Pulse 76   Resp 17   SpO2 97%   General: Cooperative, NAD  Neurologic:  Mental Status: Fully alert, attentive and oriented. Speech clear and fluent.   Cranial Nerves: Facial movements symmetric.   Motor: No abnormal movements.      Assessment/Plan:   Ingris Trevino is a 28 year old woman who returns for follow-up of chronic headaches, with a chronic migraine without aura phenotype.  There was also a consideration of paroxysmal hemicrania, and has been on preventative indomethacin.  Headaches have worsened to 30 out of 30 days a month, with 10 out of 30 severe headache days a month.  I suspect this is secondary to increase in triggers with stress.     She will continue her current acute regimen, which remains effective but she is needing to use it more often.  We will add on a preventative therapy.     -Continue indomethacin 50 mg twice a day as needed rather than daily  -Continue sumatriptan 100 mg as needed for severe headaches  -For nausea related to headache, she will continue promethazine 25 mg as needed.    -She is also currently taking propranolol, doxepin, and gabapentin.  -I recommend a trial of Emgality subcutaneous injections starting with a loading dose, followed by maintenance dosing  every 28 days thereafter.  Potential side effects were discussed.  We will attempt to get preauthorization.  I recommend a 3 to 6-month trial prior to determining effectiveness.  -If CGRP inhibitors are not tolerated or not effective, botulinum toxin injections using a chronic migraine protocol could be considered.    -Given her good response to steroid injections in the past to the pain clinic, I think would also be reasonable to repeat these injections in the future, if she has a flare of symptoms again.    I will plan to see her back in 3 to 6 months.    Margarita Corbin MD  Neurology

## 2023-11-06 NOTE — LETTER
11/6/2023       RE: Ingris Trevino  [address] [place] [address]       Dear Colleague,    Thank you for referring your patient, Ingris Trevino, to the [facility] NEUROLOGY [facility]IC [place] at [facility] [place]. Please see a copy of my visit note below.    [facility]    Headache Neurology Progress Note  November 6, 2023    Subjective:    Ingris Trevino returns for follow up of chronic migraine without aura.  She is continued sumatriptan, promethazine, indomethacin as needed.  Headaches have worsened.    Today, she reports headaches have increased in the setting of work stress. She estimates 30/30 headache days per month, with 10/30 severe headache/migraine days per month.    No change in headache quality. Vice- like pain continues.    Her acute treatments remain effective, but she is needing to use them more.    She currently takes propranolol, doxepin, gabapentin, without headache benefit.    Objective:    Vitals: /75   Pulse 76   Resp 17   SpO2 97%   General: Cooperative, NAD  Neurologic:  Mental Status: Fully alert, attentive and oriented. Speech clear and fluent.   Cranial Nerves: Facial movements symmetric.   Motor: No abnormal movements.      Assessment/Plan:   Ingris Trevino is a 28 year old woman who returns for follow-up of chronic headaches, with a chronic migraine without aura phenotype.  There was also a consideration of paroxysmal hemicrania, and has been on preventative indomethacin.  Headaches have worsened to 30 out of 30 days a month, with 10 out of 30 severe headache days a month.  I suspect this is secondary to increase in triggers with stress.     She will continue her current acute regimen, which remains effective but she is needing to use it more often.  We will add on a preventative therapy.     -Continue indomethacin 50 mg twice a day as needed rather than daily  -Continue  sumatriptan 100 mg as needed for severe headaches  -For nausea related to headache, she will continue promethazine 25 mg as needed.    -She is also currently taking propranolol, doxepin, and gabapentin.  -I recommend a trial of Emgality subcutaneous injections starting with a loading dose, followed by maintenance dosing every 28 days thereafter.  Potential side effects were discussed.  We will attempt to get preauthorization.  I recommend a 3 to 6-month trial prior to determining effectiveness.  -If CGRP inhibitors are not tolerated or not effective, botulinum toxin injections using a chronic migraine protocol could be considered.    -Given her good response to steroid injections in the past to the pain clinic, I think would also be reasonable to repeat these injections in the future, if she has a flare of symptoms again.    I will plan to see her back in 3 to 6 months.        Again, thank you for allowing me to participate in the care of your patient.      Sincerely,    Margarita Corbin MD

## 2023-11-08 NOTE — TELEPHONE ENCOUNTER
Central Prior Authorization Team - Phone: [phone]     PA Initiation    Medication: EMGALITY 120 MG/ML SC SOAJ  Insurance Company: [employer] - Phone [phone] Fax [phone]  [facility] [address]  Filling Pharmacy Phone: [phone]  Filling Pharmacy Fax:    Start Date: 11/8/2023

## 2023-11-10 NOTE — TELEPHONE ENCOUNTER
Prior Authorization Follow Up    Received message via [employer]. Case is in review. Waiting for determination.    [place] DEPT  will follow up again on status in 1 to 2 business days.

## 2023-11-14 NOTE — TELEPHONE ENCOUNTER
"Prior Authorization Follow Up    Called [facility]  to check status of EMGALITY    [employer] partners has different LAST NAME \"CHRISTIANO\". [place] chart has \"LEONORA\"    Have to resubmit PA using last name that Insurance has or cannot accept due to HIPAA.    Tried to contact patient to verify, no answer left message (cannot leave name of medication on VM) let know to contact insurance and/or [place] to verify last name and to update.    (Did not leave any last name on VM  due to HIPAA only referred to patient as Margarita per VM greeting.)    [place] DEPT  will follow up again on status in 1 to 2 business days.            "

## 2023-11-16 NOTE — TELEPHONE ENCOUNTER
Central Prior Authorization Team - Phone: [phone]     Prior Authorization Approval (NOTE patient insurance has last name as Melquiades)    Medication: EMGALITY 120 MG/ML SC SOAJ  Authorization Effective Date: 10/17/2023  Authorization Expiration Date: 11/15/2024  Approved Dose/Quantity: 1  Reference #:     Insurance Company: [employer] - Phone [phone] Fax [phone]  Expected CoPay: $    CoPay Card Available:      Financial Assistance Needed:   Which [facility] is filling the prescription[facility] [address] [place] [address]  Pharmacy Notified: YES  Patient Notified: YES PHARMACY WILL NOTIFY WHEN READY

## 2023-12-12 NOTE — PROGRESS NOTES
Virtual Visit Details    Type of service:  Video Visit   Originating Location (pt. Location): Home  Distant Location (provider location):  Off-site  Platform used for Video Visit: [facility]  Psychiatry Clinic  MEDICAL PROGRESS NOTE     CARE TEAM:  PCP- Tia Leonardo    Psychotherapist- Julissa, once a week appointments, Neuro, Ob- Gyn, Pain     This person is a 28 year old who uses the name Margarita and pronouns she, her.      DIAGNOSIS   Major Depressive Disorder, recurrent episode, partial remission to mild  Generalized Anxiety Disorder  Obsessive-Compulsive Disorder     ASSESSMENT   Margarita states she is dealing with increased stress and anxiety due to work, family, pets and health. This has been a very difficult year for her. She has a therapist who she sees weekly and sometimes twice a week. She denied any safety concerns and we discussed her safety plan. We discussed medication adjustments for today and she would like to keep the sertraline. We will increase the prazosin at bedtime and switch the propranolol long acting to the morning to target her anxiety. We talked through other ways to help her cope - TIPPs skills, meditation, movement and light therapy. We also discussed vitamin D for mood improvement.     [place] today:  Indicates taking controlled medication as prescribed.     PLAN                                                                                                                1) Meds-   - Increase Prazosin to 5 mg at bedtime   - Continue sertraline 200 mg daily   - Continue propranolol LA 60 mg in the morning   - Continue propranolol IR 20 mg PRN daily for acute anxiety      Prescribed by her pain specialist:  Gabapentin 600 mg TID  Hydrocodone-Acetamin 7.5-325 mg TID PRN for pain     2) Psychotherapy- Continue weekly individual therapy    3) Next due-  Labs- No routine monitoring needed for current psychotropic  "regimen.  EKG- No routine monitoring needed for current psychotropic regimen.  Rating scales- PHQ9    4) Referrals-  none    5) Dispo- RTC in 4 weeks     PERTINENT BACKGROUND                                                    [most recent eval 08/01/22]   Previous diagnoses include MDD, OCD, Panic Disorder, and chronic pain. She first began citalopram for depression at the age of 14 and then later switched to sertraline in college which she reports was helpful for both mood and OCD.  Has also done CBT for OCD to endorsed benefit. No history of hospitalizations but patient allows at least one suicide attempt via CO poisoning in 2014 (her most recent SA) and chart review suggests as many as 6 other SA's prior to that.  Notably, patient's first contact with this clinic was a diagnostic assessment completed as a one time SACHIN eval on 1/10/19, tried to make it to the end of the day had been recommended in the context of establishing care with new providers upon recently returning to live in [place] given her prolonged use of opiates.  From that DA: \"Unclear that chronic opioids appropriate for her chronic non-malignant pain states. That said, with what information we have, no clear indication that she has used non-medically, escalated in use, or any other concerns.\" Chronic pain condition(s) experienced by the patient include endometriosis, herniated lumbar disk and migraines.    Social history highlights include raised in [place], received BA in Psychology from [employer] in [place], [place], where she met her present wife Jacqui, then lived in [place] from Dec 2015 up to recent return to [place] in Nov 2018. Now  to ?      Psych pertinent item history includes suicide attempt [multiple, most recent 2023] and suicidal ideation.     SUBJECTIVE     SINCE LAST VISIT:   - This has been a \"shit year\",  recently hospitalized - doing better, sister in law pregnant and baby will not make it, family health problems " "  - Down 4 staff at work, very busy at work   - Dog got cancer and other dog diagnosed with diabetes     - \"Crashed and burned over the summer\"   - Thought things would get better and things keep piling on, feeling like surviving and not living   - Not sleeping well, more migraines, more headaches, chronic pain more worse    - A lot of anxiety about work   - Have been having weekly therapy and several weeks met twice in one week to try and cope with some of the stuff going on, therapist has been great about making time for her, really enjoys her meetings with her   - She feels anxious all the time    - Does not want to go off sertraline  - Problems sleeping - maybe something to help with that     - How often taking propranolol PRN? Everyday sometimes twice a day, usually around 9 or 10 am and then good for a few hours and again at night time to slow brain down a little big, takes long acting at night   - Zoloft every morning     -  bought her 2 happy lights and uses them - that does help, helped more before moving to new building because there was no window     - Movement: stretching in morning, tries to break throughout the day and walk outside, PT excercises that she does, thinking about starting to walk on treadmill   - TIPPs when she remembers to do its helpful, not always first thought in panic   - Therapist told her about Calm batsheva and she has been doing that- tries to do a meditation once a day   - Working on consistency   - Trying to make things more of a habit     - Safety: has not had any SI thoughts, just that she is feeling tired, no self harm thoughts   - Has a safety plan - mother, therapy,  and has our info as well     Recent Psych Symptoms:   Elevated:  none  Psychosis:  none  Anxiety:  excessive worry and nervous/overwhelmed  Trauma Related:  none  Sleep:  Nightmares but improved (Prazosin helps)     Recent Substance Use:     -alcohol: Very rare , does not mix well with drugs "   -cannabis: No   -tobacco: No  -caffeine:  one cup coffee per day   -opioids: Yes prescribed for pain   Narcan Kit currrent: No     Pertinent Negatives: No suicidal ideation, violent ideation, self-injury, psychosis, hallucinations, jackie and harmful substance use  Adverse Effects: None    PSYCH and SUBSTANCE USE Critical Summary Points since July 2022 8/1/22: Transfer visit, no changes   10/3/22: No changes  12/5/22: Added Prazosin 1 mg at bedtime for nightmares back to list (restarted between appointments)  12/13/23: Moved Propranolol LA to morning and increased Prazosin to 5 mg at bedtime     FAMILY and SOCIAL HISTORY                            Per chart review; reviewed and confirmed with pt     FAMILY HX:  Maternal Great-Grandmother had BPAD; Depression in family, both mom and dad, maternal aunt.     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 Patient reports they have a great supportive relationship.   Children- None  Living situation- lives in [place]    Social/ Spiritual Support- spouse, family, three dogs (Kerline a border collie aged 5-6 years, Serene a bichon frise aged 3 years, and Sarah luisana constantin-poo aged 2 years)  Legal- no  FEELS SAFE AT HOME- yes      MEDICAL HISTORY and ALLERGY     ALLERGIES: Other environmental allergy and Seasonal allergies    Patient Active Problem List   Diagnosis     Intractable chronic migraine without aura and without status migrainosus     Nausea vomiting and diarrhea     Cervicalgia     Chronic bilateral low back pain with bilateral sciatica     Right ureteral stone     Calculus of kidney     Hydronephrosis with urinary obstruction due to ureteral calculus        MEDICAL REVIEW OF SYSTEMS     none in addition to that documented above     MEDICATIONS     Current Outpatient Medications   Medication Sig Dispense Refill      "cyclobenzaprine (FLEXERIL) 10 MG tablet Take 10 mg by mouth 3 times daily as needed for muscle spasms (Patient not taking: Reported on 11/6/2023)       gabapentin (NEURONTIN) 300 MG capsule Take 600 mg by mouth 3 times daily       galcanezumab-gnlm (EMGALITY) 120 MG/ML injection Inject 1 mL (120 mg) Subcutaneous every 28 days 1 mL 11     HYDROcodone-acetaminophen (NORCO) 7.5-325 MG per tablet Take 1 tablet by mouth 3 times daily as needed       indomethacin (INDOCIN) 50 MG capsule Take 1 capsule (50 mg) by mouth 2 times daily as needed for moderate pain 30 capsule 11     methocarbamol (ROBAXIN) 750 MG tablet TAKE 1 TABLET ORALLY EVERY 8 HOURS AS NEEDED       prazosin (MINIPRESS) 1 MG capsule Take 3 capsules (3 mg) by mouth At Bedtime 90 capsule 2     promethazine (PHENERGAN) 25 MG tablet Take 1 tablet (25 mg) by mouth every 8 hours as needed for nausea associated with migranes 10 tablet 11     propranolol (INDERAL) 20 MG tablet Take 1 tablet (20 mg) by mouth daily as needed (for anxiety) 30 tablet 2     propranolol ER (INDERAL LA) 60 MG 24 hr capsule Take 1 capsule (60 mg) by mouth daily 30 capsule 2     sertraline (ZOLOFT) 100 MG tablet Take 2 tablets (200 mg) by mouth daily 60 tablet 2     SUMAtriptan (IMITREX) 100 MG tablet Take 1 tablet (100 mg) by mouth at onset of headache for migraine (repeat in 2 hours if needed) 18 tablet 11      VITALS   There were no vitals taken for this visit.    MENTAL STATUS EXAM     Alertness: alert  and oriented  Appearance: casually groomed  Behavior/Demeanor: cooperative, pleasant and calm, with good  eye contact   Speech: normal and regular rate and rhythm  Language: intact and no problems  Psychomotor: normal or unremarkable  Mood:  \"Shit year\"  Affect: full range; congruent to: mood- yes, content- yes  Thought Process/Associations: unremarkable  Thought Content:  Reports none;  Denies suicidal & violent ideation and delusions  Perception:  Reports none;  Denies " hallucinations  Insight: good  Judgment: good  Cognition: does  appear grossly intact; formal cognitive testing was not done  Gait and Station: N/A (telehealth)     LABS and DATA         4/2/2019     8:00 AM 8/23/2019     3:15 PM 12/19/2019     8:30 AM   PHQ   PHQ-9 Total Score 5 8 14   Q9: Thoughts of better off dead/self-harm past 2 weeks Not at all Not at all Not at all     Answers for HPI/ROS submitted by the patient on 12/5/2022  MADIHA 7 TOTAL SCORE: 9    Recent Labs   Lab Test 01/24/20  1054 01/10/20  0700   CR 0.72  0.72 0.73   GFRESTIMATED >60  >60 >90     Recent Labs   Lab Test 01/09/20  1112 03/29/19  0647   AST 16 25   ALT 33 41   ALKPHOS 114 91        PSYCHOTROPIC DRUG INTERACTIONS                                                       PSYCHCLINICDDI   Concurrent use of OXYCODONE and SERTRALINE may result in an increased risk of serotonin syndrome (tachycardia, hyperthermia, myoclonus, mental status changes).     Concurrent use of OXYCODONE and CNS DEPRESSANTS may result in increased risk of respiratory and CNS depression.     Concurrent use of PRAZOSIN and PROPRANOLOL may may enhance the orthostatic hypotensive effect of Alpha1-Blockers     Sertraline and Propranolol: Concurrent use of PROPRANOLOL and CYP2D6 INHIBITORS may result in increased propranolol exposure.    MANAGEMENT:  Monitoring for adverse effects, routine vitals and patient is aware of risks     RISK STATEMENT for SAFETY     Margarita did not appear to be an imminent safety risk to self or others.    TREATMENT RISK STATEMENT: The risks, benefits, alternatives and potential adverse effects have been discussed and are understood by the pt. The pt understands the risks of using street drugs or alcohol. There are no medical contraindications, the pt agrees to treatment with the ability to do so. The pt knows to call the clinic for any problems or to access emergency care if needed.  Medical and substance use concerns are documented above.   Psychotropic drug interaction check was done, including changes made today.     PSYCHIATRIST: Beverly Barros MD        Psychiatry Individual Psychotherapy Note    (May use .psychbilltxplan SMART PHRASE)    Psychotherapy start time - N/A  Psychotherapy end time -N/A  Date treatment plan last reviewed with patient -     Psychotherapy services during this visit included myself and the patient.   Treatment Plan      SYMPTOMS; PROBLEMS   MEASURABLE GOALS;    FUNCTIONAL IMPROVEMENT / GAINS INTERVENTIONS DISCHARGE CRITERIA       Supportive / psychodynamic    [id number]}      Patient not staffed in clinic.  Note will be reviewed and signed by supervisor Dr. Martinez.

## 2023-12-13 ENCOUNTER — VIRTUAL VISIT (OUTPATIENT)
Dept: PSYCHIATRY | Facility: CLINIC | Age: 28
End: 2023-12-13
Attending: PSYCHIATRY & NEUROLOGY
Payer: COMMERCIAL

## 2023-12-13 DIAGNOSIS — F33.41 RECURRENT MAJOR DEPRESSIVE DISORDER, IN PARTIAL REMISSION (H): ICD-10-CM

## 2023-12-13 DIAGNOSIS — F41.9 ANXIETY: ICD-10-CM

## 2023-12-13 PROCEDURE — 99214 OFFICE O/P EST MOD 30 MIN: CPT | Mod: VID | Performed by: STUDENT IN AN ORGANIZED HEALTH CARE EDUCATION/TRAINING PROGRAM

## 2023-12-13 RX ORDER — PROPRANOLOL HYDROCHLORIDE 20 MG/1
20 TABLET ORAL 2 TIMES DAILY
Qty: 60 TABLET | Refills: 2 | Status: SHIPPED | OUTPATIENT
Start: 2023-12-13 | End: 2024-01-10

## 2023-12-13 RX ORDER — PROPRANOLOL HCL 60 MG
60 CAPSULE, EXTENDED RELEASE 24HR ORAL DAILY
Qty: 30 CAPSULE | Refills: 2 | Status: SHIPPED | OUTPATIENT
Start: 2023-12-13 | End: 2024-03-06

## 2023-12-13 RX ORDER — SERTRALINE HYDROCHLORIDE 100 MG/1
200 TABLET, FILM COATED ORAL DAILY
Qty: 60 TABLET | Refills: 2 | Status: SHIPPED | OUTPATIENT
Start: 2023-12-13 | End: 2024-03-06

## 2023-12-13 RX ORDER — PRAZOSIN HYDROCHLORIDE 5 MG/1
5 CAPSULE ORAL AT BEDTIME
Qty: 30 CAPSULE | Refills: 2 | Status: SHIPPED | OUTPATIENT
Start: 2023-12-13 | End: 2024-03-06

## 2023-12-13 ASSESSMENT — ANXIETY QUESTIONNAIRES
5. BEING SO RESTLESS THAT IT IS HARD TO SIT STILL: SEVERAL DAYS
2. NOT BEING ABLE TO STOP OR CONTROL WORRYING: NEARLY EVERY DAY
1. FEELING NERVOUS, ANXIOUS, OR ON EDGE: NEARLY EVERY DAY
GAD7 TOTAL SCORE: 16
6. BECOMING EASILY ANNOYED OR IRRITABLE: MORE THAN HALF THE DAYS
3. WORRYING TOO MUCH ABOUT DIFFERENT THINGS: NEARLY EVERY DAY
GAD7 TOTAL SCORE: 16
7. FEELING AFRAID AS IF SOMETHING AWFUL MIGHT HAPPEN: SEVERAL DAYS
4. TROUBLE RELAXING: NEARLY EVERY DAY
IF YOU CHECKED OFF ANY PROBLEMS ON THIS QUESTIONNAIRE, HOW DIFFICULT HAVE THESE PROBLEMS MADE IT FOR YOU TO DO YOUR WORK, TAKE CARE OF THINGS AT HOME, OR GET ALONG WITH OTHER PEOPLE: VERY DIFFICULT

## 2023-12-13 NOTE — NURSING NOTE
Is the patient currently in the state of [place]? YES    Visit mode:VIDEO    If the visit is dropped, the patient can be reconnected by: VIDEO VISIT: Text to cell phone:   Telephone Information:   Mobile [phone]       Will anyone else be joining the visit? NO  (If patient encounters technical issues they should call [phone])    How would you like to obtain your AVS? MyChart    Are changes needed to the allergy or medication list? Pt stated no changes to allergies and Pt stated no med changes    Reason for visit: JULIA JIMENEZF

## 2023-12-13 NOTE — Clinical Note
Tawanda Paul,  Just a reminder to incorporate add-on psychotherapy especially with patients that you are able to discuss supportive psychotherapy.  From your assessment it sounds like ou did talk about stress reduction strategies and that could be captured using the add-on code. I added the template to your note of the psychotherapy treatment plan as a cue to use it (if appropriate) next time. This often helps to think of doing this at subsequent visits.    Let me know if questions.  Mary
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
## 2024-01-10 ENCOUNTER — VIRTUAL VISIT (OUTPATIENT)
Dept: PSYCHIATRY | Facility: CLINIC | Age: 29
End: 2024-01-10
Attending: PSYCHIATRY & NEUROLOGY
Payer: COMMERCIAL

## 2024-01-10 DIAGNOSIS — F33.1 MODERATE EPISODE OF RECURRENT MAJOR DEPRESSIVE DISORDER (H): ICD-10-CM

## 2024-01-10 DIAGNOSIS — F41.9 ANXIETY: Primary | ICD-10-CM

## 2024-01-10 DIAGNOSIS — F42.9 OBSESSIVE-COMPULSIVE DISORDER, UNSPECIFIED TYPE: ICD-10-CM

## 2024-01-10 PROCEDURE — 99214 OFFICE O/P EST MOD 30 MIN: CPT | Mod: 95 | Performed by: STUDENT IN AN ORGANIZED HEALTH CARE EDUCATION/TRAINING PROGRAM

## 2024-01-10 PROCEDURE — 90833 PSYTX W PT W E/M 30 MIN: CPT | Mod: 95 | Performed by: STUDENT IN AN ORGANIZED HEALTH CARE EDUCATION/TRAINING PROGRAM

## 2024-01-10 RX ORDER — PROPRANOLOL HYDROCHLORIDE 40 MG/1
40 TABLET ORAL 2 TIMES DAILY PRN
Qty: 60 TABLET | Refills: 1 | Status: SHIPPED | OUTPATIENT
Start: 2024-01-10 | End: 2024-03-06

## 2024-01-10 ASSESSMENT — PATIENT HEALTH QUESTIONNAIRE - PHQ9
10. IF YOU CHECKED OFF ANY PROBLEMS, HOW DIFFICULT HAVE THESE PROBLEMS MADE IT FOR YOU TO DO YOUR WORK, TAKE CARE OF THINGS AT HOME, OR GET ALONG WITH OTHER PEOPLE: EXTREMELY DIFFICULT
SUM OF ALL RESPONSES TO PHQ QUESTIONS 1-9: 19
SUM OF ALL RESPONSES TO PHQ QUESTIONS 1-9: 19

## 2024-01-10 ASSESSMENT — PAIN SCALES - GENERAL: PAINLEVEL: SEVERE PAIN (7)

## 2024-01-10 NOTE — Clinical Note
Tawanda Paul,  I would recommend that we have our nurse to reach out to the patient and see how she is doing with the increased propranolol given she is also on prazosin. I would ask about sxs such as dizziness , headache or orthostatic sxs. We can see if she was able to get a blood pressure cuff as well.   I am including our nursing team on this message as well.  I also think we should have her next visit in person so we can get vitals especially if she is not able to give us several measures at home.    Thanks  TANNA

## 2024-01-10 NOTE — PATIENT INSTRUCTIONS
**For crisis resources, please see the information at the end of this document**   Patient Education    Thank you for coming to the [facility] [place] [facility].     Lab Testing:  If you had lab testing today and your results are reassuring or normal they will be mailed to you or sent through [employer] within 7 days. If the lab tests need quick action we will call you with the results. The phone number we will call with results is # [phone]. If this is not the best number please call our clinic and change the number.     Medication Refills:  If you need any refills please call your pharmacy and they will contact us. Our fax number for refills is [phone].   Three business days of notice are needed for general medication refill requests.   Five business days of notice are needed for controlled substance refill requests.   If you need to change to a different pharmacy, please contact the new pharmacy directly. The new pharmacy will help you get your medications transferred.     Contact Us:  Please call [phone] during business hours (8-5:00 M-F).   If you have medication related questions after clinic hours, or on the weekend, please call [phone].     Financial Assistance [phone]   Medical Records [phone]       MENTAL HEALTH CRISIS RESOURCES:  For a emergency help, please call 911 or go to the nearest Emergency Department.     Emergency Walk-In Options:   EmPATH Unit @ [facility] ([place]): [phone] - Specialized mental health emergency area designed to be calming  [facility] ([place]): [phone]  [facility] ([place]): [phone]  [facility]): [phone]    [facility] Crisis Information:   [place]: [phone]  [place]: [phone]  [place] ([place]) - Adult: [phone]     Child: [phone]  [place] - Adult: [phone]     Child: [phone]  [place]:  [phone]  List of all [facility] resources:   https://[place].gov/dhs/people-we-serve/adults/health-care/mental-health/resources/crisis-contacts.jsp    National Crisis Information:   Crisis Text Line: Text  [place]  to 621862  Suicide & Crisis Lifeline: 988  National Suicide Prevention Lifeline: 4-216-365-TALK (1-602.840.3880)       For online chat options, visit https://suicidepreventionlifeline.org/chat/  Poison Control Center: 7-918-579-2515  Trans Lifeline: 4-812-241-2444 - Hotline for transgender people of all ages  The [employer]f Project: 0-236-001-8677 - Hotline for LGBT youth     For Non-Emergency Support:   Fast Tracker: Mental Health & Substance Use Disorder Resources -   https://www.IterableckGridAntsn.org/

## 2024-01-10 NOTE — NURSING NOTE
Is the patient currently in the state of [place]? YES    Visit mode:VIDEO    If the visit is dropped, the patient can be reconnected by: VIDEO VISIT: Text to cell phone:   Telephone Information:   Mobile [phone]       Will anyone else be joining the visit? NO  (If patient encounters technical issues they should call [phone]    How would you like to obtain your AVS? MyChart    Are changes needed to the allergy or medication list? No    Reason for visit: RECHECK    Patient will complete questionnaires prior to joining video.    Jamilah CROFT

## 2024-01-10 NOTE — PROGRESS NOTES
Virtual Visit Details  Type of service:  Video Visit     Originating Location (pt. Location): Home  Distant Location (provider location):  On-site  Platform used for Video Visit: [facility]  Psychiatry Clinic  MEDICAL PROGRESS NOTE     CARE TEAM:  PCP- Tia Leonardo    Psychotherapist- Julissa, once a week appointments, Neuro, Ob- Gyn, Pain     This person is a 28 year old who uses the name Margarita and pronouns she, her.      DIAGNOSIS   Major Depressive Disorder, recurrent episode, partial remission to mild  Generalized Anxiety Disorder  Obsessive-Compulsive Disorder     ASSESSMENT   Margarita continues to deal with ongoing stressors related to work, family, pets and health. She has a therapist who she sees weekly and sometimes twice a week. She denied any safety concerns. Prazosin has been very helpful for her nighttime hyperarousal symptoms. We will also increase her propranolol today to target her anxiety. We discussed the risk of low blood pressure due to her medications and she will monitor for headaches, dizziness and orthostatic hypotension. She was encouraged to stay hydrated. She also plans to get a blood pressure cuff so she can monitor at home. She will continue to manage her anxiety and low mood with her other non-pharmacological tools.     [place]:  Indicates taking controlled medication as prescribed.     PLAN                                                                                                                1) Meds-   - Increase propranolol IR to 40 mg BID PRN daily for acute anxiety   - Continue Prazosin to 5 mg at bedtime   - Continue sertraline 200 mg daily   - Continue propranolol LA 60 mg in the morning      Prescribed by her pain specialist:  Gabapentin 600 mg TID  Hydrocodone-Acetamin 7.5-325 mg TID PRN for pain     2) Psychotherapy- Continue weekly individual therapy    3) Next due-  Labs- No routine monitoring  "needed for current psychotropic regimen.  EKG- No routine monitoring needed for current psychotropic regimen.  Rating scales- PHQ9    4) Referrals-  none    5) Dispo- RTC in 4 weeks     PERTINENT BACKGROUND                                                    [most recent eval 08/01/22]   Previous diagnoses include MDD, OCD, Panic Disorder, and chronic pain. She first began citalopram for depression at the age of 14 and then later switched to sertraline in college which she reports was helpful for both mood and OCD.  Has also done CBT for OCD to endorsed benefit. No history of hospitalizations but patient allows at least one suicide attempt via CO poisoning in 2014 (her most recent SA) and chart review suggests as many as 6 other SA's prior to that.  Notably, patient's first contact with this clinic was a diagnostic assessment completed as a one time SACHIN eval on 1/10/19, tried to make it to the end of the day had been recommended in the context of establishing care with new providers upon recently returning to live in [place] given her prolonged use of opiates.  From that DA: \"Unclear that chronic opioids appropriate for her chronic non-malignant pain states. That said, with what information we have, no clear indication that she has used non-medically, escalated in use, or any other concerns.\" Chronic pain condition(s) experienced by the patient include endometriosis, herniated lumbar disk and migraines.    Social history highlights include raised in [place], received BA in Psychology from [employer] in [place], [place], where she met her present wife Jacqui, then lived in [place] from Dec 2015 up to recent return to [place] in Nov 2018. Now  to ?      Psych pertinent item history includes suicide attempt [multiple, most recent 2023] and suicidal ideation.     SUBJECTIVE     SINCE LAST VISIT:     - Grandfather, grandmother and mother and COVID and didn't have [place]  - Grandparents 45 min away and mother 1.5 " "hours away   - Out of money for her dog \"Diana\" and vet has refused to treat her because they can't get her diabetes under control  - Dog is not getting better  - Other dog cancer came back   - Next week her sister in law is being induced and nephew will shortly die   - \"Continuing to be a shit show\"    - Managing things by herself,  in IOP and not working, taking care of house, dogs and    - Tired and stressed, messing things up at work because tired and stressed     - PRN propranolol 2-3 times a day, probably helping, feeling bombarded with crap   - Helpful in the past   - Never had low BP, high in the past   - Will get blood pressure cuff and monitor at home for a week   - Discussed low blood pressure risk and to monitor for headache, dizziness and orthostatic hypotension     - Uses the calm batsheva a lot, have not been great about getting outside and walking around, just feeling tired all the time, no energy and motivation   - Was supposed to get [employer] day off but told to attend an event from boss   - The higher dose of Prazosin has been very helpful, helping with nightmares, easier to get to sleep and doesn't ware off    Recent Psych Symptoms:   Elevated:  none  Psychosis:  none  Anxiety:  excessive worry and nervous/overwhelmed  Trauma Related:  none  Sleep:  Nightmares but improved (Prazosin helps)     Recent Substance Use:     -alcohol: Very rare , does not mix well with drugs   -cannabis: No   -tobacco: No  -caffeine:  one cup coffee per day   -opioids: Yes prescribed for pain   Narcan Kit currrent: No     Pertinent Negatives: No suicidal ideation, violent ideation, self-injury, psychosis, hallucinations, jackie and harmful substance use  Adverse Effects: None    PSYCH and SUBSTANCE USE Critical Summary Points since July 2022 8/1/22: Transfer visit, no changes   10/3/22: No changes  12/5/22: Added Prazosin 1 mg at bedtime for nightmares back to list (restarted between appointments)  12/13/23: Moved " Propranolol LA to morning and increased Prazosin to 5 mg at bedtime  1/10/24: Increased Propranolol IR to 40 mg BID PRN for anxiety      FAMILY and SOCIAL HISTORY                            Per chart review; reviewed and confirmed with pt     FAMILY HX:  Maternal Great-Grandmother had BPAD; Depression in family, both mom and dad, maternal aunt.     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 Patient reports they have a great supportive relationship.   Children- None  Living situation- lives in [place]    Social/ Spiritual Support- spouse, family, three dogs (Kerline a border collie aged 5-6 years, SheilaMala a bichon frise aged 3 years, and Sarah lieberman constantin-poo aged 2 years)  Legal- no  FEELS SAFE AT HOME- yes      MEDICAL HISTORY and ALLERGY     ALLERGIES: Other environmental allergy and Seasonal allergies    Patient Active Problem List   Diagnosis    Intractable chronic migraine without aura and without status migrainosus    Nausea vomiting and diarrhea    Cervicalgia    Chronic bilateral low back pain with bilateral sciatica    Right ureteral stone    Calculus of kidney    Hydronephrosis with urinary obstruction due to ureteral calculus        MEDICAL REVIEW OF SYSTEMS     none in addition to that documented above     MEDICATIONS     Current Outpatient Medications   Medication Sig Dispense Refill    cyclobenzaprine (FLEXERIL) 10 MG tablet Take 10 mg by mouth 3 times daily as needed for muscle spasms (Patient not taking: Reported on 11/6/2023)      gabapentin (NEURONTIN) 300 MG capsule Take 600 mg by mouth 3 times daily      galcanezumab-gnlm (EMGALITY) 120 MG/ML injection Inject 1 mL (120 mg) Subcutaneous every 28 days 1 mL 11    HYDROcodone-acetaminophen (NORCO) 7.5-325 MG per tablet Take 1 tablet by mouth 3 times daily as needed      indomethacin (INDOCIN) 50 MG  "capsule Take 1 capsule (50 mg) by mouth 2 times daily as needed for moderate pain 30 capsule 11    methocarbamol (ROBAXIN) 750 MG tablet TAKE 1 TABLET ORALLY EVERY 8 HOURS AS NEEDED      prazosin (MINIPRESS) 5 MG capsule Take 1 capsule (5 mg) by mouth at bedtime 30 capsule 2    promethazine (PHENERGAN) 25 MG tablet Take 1 tablet (25 mg) by mouth every 8 hours as needed for nausea associated with migranes 10 tablet 11    propranolol (INDERAL) 20 MG tablet Take 1 tablet (20 mg) by mouth 2 times daily 60 tablet 2    propranolol ER (INDERAL LA) 60 MG 24 hr capsule Take 1 capsule (60 mg) by mouth daily 30 capsule 2    sertraline (ZOLOFT) 100 MG tablet Take 2 tablets (200 mg) by mouth daily 60 tablet 2    SUMAtriptan (IMITREX) 100 MG tablet Take 1 tablet (100 mg) by mouth at onset of headache for migraine (repeat in 2 hours if needed) 18 tablet 11      VITALS   There were no vitals taken for this visit.    MENTAL STATUS EXAM     Alertness: alert  and oriented  Appearance: casually groomed  Behavior/Demeanor: cooperative, pleasant and calm, with good  eye contact   Speech: normal and regular rate and rhythm  Language: intact and no problems  Psychomotor: normal or unremarkable  Mood:  \"Shit year\"  Affect: full range; congruent to: mood- yes, content- yes  Thought Process/Associations: unremarkable  Thought Content:  Reports none;  Denies suicidal & violent ideation and delusions  Perception:  Reports none;  Denies hallucinations  Insight: good  Judgment: good  Cognition: does  appear grossly intact; formal cognitive testing was not done  Gait and Station: N/A ([facility])     LABS and DATA         4/2/2019     8:00 AM 8/23/2019     3:15 PM 12/19/2019     8:30 AM   PHQ   PHQ-9 Total Score 5 8 14   Q9: Thoughts of better off dead/self-harm past 2 weeks Not at all Not at all Not at all     Answers submitted by the patient for this visit:  Patient Health Questionnaire (Submitted on 1/10/2024)  If you checked off any problems, " how difficult have these problems made it for you to do your work, take care of things at home, or get along with other people?: Extremely difficult  PHQ9 TOTAL SCORE: 19    Recent Labs   Lab Test 01/24/20  1054 01/10/20  0700   CR 0.72  0.72 0.73   GFRESTIMATED >60  >60 >90     Recent Labs   Lab Test 01/09/20  1112 03/29/19  0647   AST 16 25   ALT 33 41   ALKPHOS 114 91        PSYCHOTROPIC DRUG INTERACTIONS                                                       PSYCHCLINICDDI   Concurrent use of OXYCODONE and SERTRALINE may result in an increased risk of serotonin syndrome (tachycardia, hyperthermia, myoclonus, mental status changes).     Concurrent use of OXYCODONE and CNS DEPRESSANTS may result in increased risk of respiratory and CNS depression.     Concurrent use of PRAZOSIN and PROPRANOLOL may may enhance the orthostatic hypotensive effect of Alpha1-Blockers     Sertraline and Propranolol: Concurrent use of PROPRANOLOL and CYP2D6 INHIBITORS may result in increased propranolol exposure.    MANAGEMENT:  Monitoring for adverse effects, routine vitals and patient is aware of risks     RISK STATEMENT for SAFETY     Margarita did not appear to be an imminent safety risk to self or others.    TREATMENT RISK STATEMENT: The risks, benefits, alternatives and potential adverse effects have been discussed and are understood by the pt. The pt understands the risks of using street drugs or alcohol. There are no medical contraindications, the pt agrees to treatment with the ability to do so. The pt knows to call the clinic for any problems or to access emergency care if needed.  Medical and substance use concerns are documented above.  Psychotropic drug interaction check was done, including changes made today.     PSYCHIATRIST: Beverly Barros MD      Psychiatry Individual Psychotherapy Note   Psychotherapy start time - 2:09 PM  Psychotherapy end time - 2:26 PM  Date treatment plan last reviewed with patient -  01/10/24  Subjective: This supportive psychotherapy session addressed issues related to goals of therapy and current psychosocial stressors. Patient's reaction: Action in the context of mental status appropriate for ambulatory setting.    Interactive complexity indicated? No  Plan: RTC in timeframe noted above  Psychotherapy services during this visit included myself and the patient.   Treatment Plan      SYMPTOMS; PROBLEMS   MEASURABLE GOALS;    FUNCTIONAL IMPROVEMENT / GAINS INTERVENTIONS DISCHARGE CRITERIA   Depression: depressed mood and low energy  Anxiety: excessive worry   reduce depressive symptoms, find enjoyment at least once a day, develop strategies for thought distraction when ruminating, and make a plan to manage 2-3 anxiety-provoking situations Supportive / psychodynamic marked symptom improvement        Patient not staffed in clinic.  Note will be reviewed and signed by supervisor Dr. Martinez.

## 2024-01-25 ENCOUNTER — TELEPHONE (OUTPATIENT)
Dept: PSYCHIATRY | Facility: CLINIC | Age: 29
End: 2024-01-25
Payer: COMMERCIAL

## 2024-01-25 NOTE — TELEPHONE ENCOUNTER
Mary Martinez MD Garcia, Valencia, MD; P Psychiatry Nurse-malissa Paul,  I would recommend that we have our nurse to reach out to the patient and see how she is doing with the increased propranolol given she is also on prazosin. I would ask about sxs such as dizziness , headache or orthostatic sxs. We can see if she was able to get a blood pressure cuff as well.   I am including our nursing team on this message as well.  I also think we should have her next visit in person so we can get vitals especially if she is not able to give us several measures at home.      Thanks  L    Follow up:    Reached out to patient to check in on side effects after increasing Propranolol. No answer at number provided. LVM, requesting a call back. Clinic number provided.

## 2024-01-26 NOTE — TELEPHONE ENCOUNTER
Beverly Barros MD Patel, Radhika, JAYLYN Mcbride,    Thanks for calling Margarita!  When you speak with her can you also ask about scheduling a follow-up appointment in person please.    Thank you,  Beverly    Follow up:      Second attempt made to reach out to patient to check in and help reschedule. No answer at number provided. LVM, requesting a call back. Clinic number provided.

## 2024-01-29 NOTE — TELEPHONE ENCOUNTER
Attempted to call Margarita for check in, no answer. Left VM requesting return call at clinic.  v[facility] message sent.

## 2024-01-31 NOTE — TELEPHONE ENCOUNTER
Attempted to reach out to patient to connect. No answer at number provided. LVM, requesting a call back. Clinic number provided.

## 2024-02-01 NOTE — TELEPHONE ENCOUNTER
Placed a call to patient to connect regarding meds. No answer at number provided. LVM, requesting a call back. Clinic number provided.

## 2024-02-01 NOTE — TELEPHONE ENCOUNTER
Writer received incoming call from patient returning this writers call. She was frustrated that the clinic reached out to her several times in the past week. Shared she was asked to call the clinic if she started to experience symptoms after increasing Propranolol. She denies any dizziness, headaches, lightheadedness or other orthostatic symptoms. She also has not had a chance to get the blood pressure cuff the plans to do that in the next few days. Patient was short with this writer and did not share further. She is scheduled with provider on 3/6.     Routed to provider as DENISA

## 2024-03-06 ENCOUNTER — VIRTUAL VISIT (OUTPATIENT)
Dept: PSYCHIATRY | Facility: CLINIC | Age: 29
End: 2024-03-06
Attending: PSYCHIATRY & NEUROLOGY
Payer: COMMERCIAL

## 2024-03-06 DIAGNOSIS — F41.9 ANXIETY: ICD-10-CM

## 2024-03-06 DIAGNOSIS — F33.1 MODERATE EPISODE OF RECURRENT MAJOR DEPRESSIVE DISORDER (H): Primary | ICD-10-CM

## 2024-03-06 DIAGNOSIS — F42.9 OBSESSIVE-COMPULSIVE DISORDER, UNSPECIFIED TYPE: ICD-10-CM

## 2024-03-06 PROCEDURE — 99214 OFFICE O/P EST MOD 30 MIN: CPT | Mod: 95 | Performed by: STUDENT IN AN ORGANIZED HEALTH CARE EDUCATION/TRAINING PROGRAM

## 2024-03-06 PROCEDURE — 90833 PSYTX W PT W E/M 30 MIN: CPT | Mod: 95 | Performed by: STUDENT IN AN ORGANIZED HEALTH CARE EDUCATION/TRAINING PROGRAM

## 2024-03-06 RX ORDER — PRAZOSIN HYDROCHLORIDE 5 MG/1
5 CAPSULE ORAL AT BEDTIME
Qty: 30 CAPSULE | Refills: 2 | Status: SHIPPED | OUTPATIENT
Start: 2024-03-06 | End: 2024-05-08

## 2024-03-06 RX ORDER — PROPRANOLOL HCL 60 MG
60 CAPSULE, EXTENDED RELEASE 24HR ORAL DAILY
Qty: 30 CAPSULE | Refills: 2 | Status: SHIPPED | OUTPATIENT
Start: 2024-03-06 | End: 2024-05-08

## 2024-03-06 RX ORDER — SERTRALINE HYDROCHLORIDE 100 MG/1
200 TABLET, FILM COATED ORAL DAILY
Qty: 60 TABLET | Refills: 2 | Status: SHIPPED | OUTPATIENT
Start: 2024-03-06 | End: 2024-05-08

## 2024-03-06 RX ORDER — PROPRANOLOL HYDROCHLORIDE 40 MG/1
40 TABLET ORAL 2 TIMES DAILY PRN
Qty: 60 TABLET | Refills: 2 | Status: SHIPPED | OUTPATIENT
Start: 2024-03-06 | End: 2024-05-08

## 2024-03-06 ASSESSMENT — PATIENT HEALTH QUESTIONNAIRE - PHQ9
SUM OF ALL RESPONSES TO PHQ QUESTIONS 1-9: 13
SUM OF ALL RESPONSES TO PHQ QUESTIONS 1-9: 13
10. IF YOU CHECKED OFF ANY PROBLEMS, HOW DIFFICULT HAVE THESE PROBLEMS MADE IT FOR YOU TO DO YOUR WORK, TAKE CARE OF THINGS AT HOME, OR GET ALONG WITH OTHER PEOPLE: VERY DIFFICULT

## 2024-03-06 ASSESSMENT — PAIN SCALES - GENERAL: PAINLEVEL: SEVERE PAIN (7)

## 2024-03-06 NOTE — PATIENT INSTRUCTIONS
**For crisis resources, please see the information at the end of this document**   Patient Education    Thank you for coming to the [facility] [place] [facility].     Lab Testing:  If you had lab testing today and your results are reassuring or normal they will be mailed to you or sent through [facility] within 7 days. If the lab tests need quick action we will call you with the results. The phone number we will call with results is # [phone]. If this is not the best number please call our clinic and change the number.     Medication Refills:  If you need any refills please call your pharmacy and they will contact us. Our fax number for refills is [phone].   Three business days of notice are needed for general medication refill requests.   Five business days of notice are needed for controlled substance refill requests.   If you need to change to a different pharmacy, please contact the new pharmacy directly. The new pharmacy will help you get your medications transferred.     Contact Us:  Please call [phone] during business hours (8-5:00 M-F).   If you have medication related questions after clinic hours, or on the weekend, please call [phone].     Financial Assistance [phone]   Medical Records [phone]       MENTAL HEALTH CRISIS RESOURCES:  For a emergency help, please call 911 or go to the nearest Emergency Department.     Emergency Walk-In Options:   EmPATH Unit @ [facility] ([place]): [phone] - Specialized mental health emergency area designed to be calming  [facility] ([place]): [phone]  [facility] ([place]): [phone]  [facility]): [phone]    [facility] Crisis Information:   [place]: [phone]  [place]: [phone]  [place] ([place]) - Adult: [phone]     Child: [phone]  [place] - Adult: [phone]     Child: [phone]  [place]:  [phone]  List of all [facility] resources:   https://[place].gov/dhs/people-we-serve/adults/health-care/mental-health/resources/crisis-contacts.jsp    National Crisis Information:   Crisis Text Line: Text  [place]  to 721754  Suicide & Crisis Lifeline: 988  National Suicide Prevention Lifeline: 9-934-447-TALK (1-402.947.7777)       For online chat options, visit https://suicidepreventionlifeline.org/chat/  Poison Control Center: 7-901-761-4142  Trans Lifeline: 3-496-048-4657 - Hotline for transgender people of all ages  The Stef Project: 8-907-304-7067 - Hotline for LGBT youth     For Non-Emergency Support:   Fast Tracker: Mental Health & Substance Use Disorder Resources -   https://www.[employer].org/

## 2024-03-06 NOTE — PROGRESS NOTES
Virtual Visit Details  Type of service:  Video Visit     Originating Location (pt. Location): Home  Distant Location (provider location):  Off-site  Platform used for Video Visit: [facility]  Psychiatry Clinic  MEDICAL PROGRESS NOTE     CARE TEAM:  PCP- Tia Leonardo    Psychotherapist- Julissa, once a week appointments, Neuro, Ob- Gyn, Pain     This person is a 29 year old who uses the name Margarita and pronouns she, her.      DIAGNOSIS   Major Depressive Disorder, recurrent episode, moderate  Generalized Anxiety Disorder  Obsessive-Compulsive Disorder     ASSESSMENT   Margarita presents for a follow-up appointment and is overall doing okay. Her dog had surgery to remove a tumor and may need chemo now. She is also dealing with the devastating experience of her sister in law losing her baby. She reports the increased propranolol has been helpful and she denied any adverse side effects. Prazosin continues to be helpful for her nighttime hyperarousal symptoms.     She expressed interest in wanting to learn more about Ketamine and we think this may be a good option for her many years of difficult to treat depression. A referral will be placed at the [facility]. She denied any safety concerns.     [place]:  Indicates taking controlled medication as prescribed.     PLAN                                                                                                                1) Meds-   - Continue propranolol IR to 40 mg BID PRN daily for acute anxiety   - Continue Prazosin to 5 mg at bedtime   - Continue sertraline 200 mg daily   - Continue propranolol LA 60 mg in the morning      Prescribed by her pain specialist:  Gabapentin 600 mg TID  Hydrocodone-Acetamin 7.5-325 mg TID PRN for pain     2) Psychotherapy- Continue weekly individual therapy    3) Next due-  Labs- No routine monitoring needed for current psychotropic regimen.  EKG- No routine  "monitoring needed for current psychotropic regimen.  Rating scales- PHQ9    4) Referrals-  Ketamine consultation with difficult to treat depression clinic    5) Dispo- RTC in 2 months     PERTINENT BACKGROUND                                                    [most recent eval 08/01/22]   Previous diagnoses include MDD, OCD, Panic Disorder, and chronic pain. She first began citalopram for depression at the age of 14 and then later switched to sertraline in college which she reports was helpful for both mood and OCD.  Has also done CBT for OCD to endorsed benefit. No history of hospitalizations but patient allows at least one suicide attempt via CO poisoning in 2014 (her most recent SA) and chart review suggests as many as 6 other SA's prior to that.  Notably, patient's first contact with this clinic was a diagnostic assessment completed as a one time SACHIN eval on 1/10/19, tried to make it to the end of the day had been recommended in the context of establishing care with new providers upon recently returning to live in [place] given her prolonged use of opiates.  From that DA: \"Unclear that chronic opioids appropriate for her chronic non-malignant pain states. That said, with what information we have, no clear indication that she has used non-medically, escalated in use, or any other concerns.\" Chronic pain condition(s) experienced by the patient include endometriosis, herniated lumbar disk and migraines.    Social history highlights include raised in [place], received BA in Psychology from [employer] in [place], [place], where she met her present wife Jacqui, then lived in [place] from Dec 2015 up to recent return to [place] in Nov 2018. Now  to ?      Psych pertinent item history includes suicide attempt [multiple, most recent 2023] and suicidal ideation.     SUBJECTIVE     SINCE LAST VISIT:   \"Okay\"    - Sister in law lost her baby  - Dog had surgery because of tumor, chemo, expensive    - Liking the higher " dose of prazosin, helping, not having any side effects or blood pressure drops   - Taking it 1-1.5 hr before going to sleep, not having extreme night terrors - used to wake up soaked in sweat, does make her a little sleepy, but not drowsy - helpful for her since she has a little trouble sleeping   - Higher dose of propranolol has been helpful, taking regularly, still a lot going on in her day to day  - Will have anxiety spikes in evening before bed     - Wondering about ketamine  - Has thought that sertraline is doing its part, not having SI and suicide attempts as she did when she was a teen and young adult  - Talking to other people who don't have depression, wonders if she could respond a little better   - Open to learn more about Ketamine and speaking with someone about it  - Feeling stuck in her progress, thought that she was doing okay, excited about referral      - No SI or thoughts of not wanting to be alive, more so feeling tired and fatigue which makes things difficult       Recent Substance Use:     -alcohol: Very rare , does not mix well with drugs   -cannabis: No   -tobacco: No  -caffeine:  one cup coffee per day   -opioids: Yes prescribed for pain   Narcan Kit currrent: No     Pertinent Negatives: No suicidal ideation, violent ideation, self-injury, psychosis, hallucinations, jackie and harmful substance use  Adverse Effects: None    PSYCH and SUBSTANCE USE Critical Summary Points since July 2022 8/1/22: Transfer visit, no changes   10/3/22: No changes  12/5/22: Added Prazosin 1 mg at bedtime for nightmares back to list (restarted between appointments)  12/13/23: Moved Propranolol LA to morning and increased Prazosin to 5 mg at bedtime  1/10/24: Increased Propranolol IR to 40 mg BID PRN for anxiety      FAMILY and SOCIAL HISTORY                            Per chart review; reviewed and confirmed with pt     FAMILY HX:  Maternal Great-Grandmother had BPAD; Depression in family, both mom and dad,  maternal aunt.     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 Patient reports they have a great supportive relationship.   Children- None  Living situation- lives in [place]    Social/ Spiritual Support- spouse, family, three dogs (Kerline a border collie aged 5-6 years, Serene a bichon frise aged 3 years, and Sarah lieberman constantin-poo aged 2 years)  Legal- no  FEELS SAFE AT HOME- yes      MEDICAL HISTORY and ALLERGY     ALLERGIES: Other environmental allergy and Seasonal allergies    Patient Active Problem List   Diagnosis    Intractable chronic migraine without aura and without status migrainosus    Nausea vomiting and diarrhea    Cervicalgia    Chronic bilateral low back pain with bilateral sciatica    Right ureteral stone    Calculus of kidney    Hydronephrosis with urinary obstruction due to ureteral calculus        MEDICAL REVIEW OF SYSTEMS     none in addition to that documented above     MEDICATIONS     Current Outpatient Medications   Medication Sig Dispense Refill    cyclobenzaprine (FLEXERIL) 10 MG tablet Take 10 mg by mouth 3 times daily as needed for muscle spasms (Patient not taking: Reported on 11/6/2023)      gabapentin (NEURONTIN) 300 MG capsule Take 600 mg by mouth 3 times daily      galcanezumab-gnlm (EMGALITY) 120 MG/ML injection Inject 1 mL (120 mg) Subcutaneous every 28 days 1 mL 11    HYDROcodone-acetaminophen (NORCO) 7.5-325 MG per tablet Take 1 tablet by mouth 3 times daily as needed      indomethacin (INDOCIN) 50 MG capsule Take 1 capsule (50 mg) by mouth 2 times daily as needed for moderate pain 30 capsule 11    methocarbamol (ROBAXIN) 750 MG tablet TAKE 1 TABLET ORALLY EVERY 8 HOURS AS NEEDED      prazosin (MINIPRESS) 5 MG capsule Take 1 capsule (5 mg) by mouth at bedtime 30 capsule 2    promethazine (PHENERGAN) 25 MG tablet Take 1 tablet (25 mg)  "by mouth every 8 hours as needed for nausea associated with migranes 10 tablet 11    propranolol (INDERAL) 40 MG tablet Take 1 tablet (40 mg) by mouth 2 times daily as needed (For anxiety) 60 tablet 1    propranolol ER (INDERAL LA) 60 MG 24 hr capsule Take 1 capsule (60 mg) by mouth daily 30 capsule 2    sertraline (ZOLOFT) 100 MG tablet Take 2 tablets (200 mg) by mouth daily 60 tablet 2    SUMAtriptan (IMITREX) 100 MG tablet Take 1 tablet (100 mg) by mouth at onset of headache for migraine (repeat in 2 hours if needed) 18 tablet 11      VITALS   There were no vitals taken for this visit.    MENTAL STATUS EXAM     Alertness: alert  and oriented  Appearance: casually groomed  Behavior/Demeanor: cooperative, pleasant and calm, with good  eye contact   Speech: normal and regular rate and rhythm  Language: intact and no problems  Psychomotor: normal or unremarkable  Mood:  \"Okay\"  Affect: full range; congruent to: mood- yes, content- yes  Thought Process/Associations: unremarkable  Thought Content:  Reports none;  Denies suicidal & violent ideation and delusions  Perception:  Reports none;  Denies hallucinations  Insight: good  Judgment: good  Cognition: does  appear grossly intact; formal cognitive testing was not done  Gait and Station: N/A ([facility])     LABS and DATA         8/23/2019     3:15 PM 12/19/2019     8:30 AM 1/10/2024     1:54 PM   PHQ   PHQ-9 Total Score 8 14 19   Q9: Thoughts of better off dead/self-harm past 2 weeks Not at all Not at all Not at all     Answers submitted by the patient for this visit:  Patient Health Questionnaire (Submitted on 3/6/2024)  If you checked off any problems, how difficult have these problems made it for you to do your work, take care of things at home, or get along with other people?: Very difficult  PHQ9 TOTAL SCORE: 13    Recent Labs   Lab Test 01/24/20  1054 01/10/20  0700   CR 0.72  0.72 0.73   GFRESTIMATED >60  >60 >90     Recent Labs   Lab Test 01/09/20  1112 " 03/29/19  0647   AST 16 25   ALT 33 41   ALKPHOS 114 91        PSYCHOTROPIC DRUG INTERACTIONS                                                       PSYCHCLINICDDI   Concurrent use of OXYCODONE and SERTRALINE may result in an increased risk of serotonin syndrome (tachycardia, hyperthermia, myoclonus, mental status changes).     Concurrent use of OXYCODONE and CNS DEPRESSANTS may result in increased risk of respiratory and CNS depression.     Concurrent use of PRAZOSIN and PROPRANOLOL may may enhance the orthostatic hypotensive effect of Alpha1-Blockers     Sertraline and Propranolol: Concurrent use of PROPRANOLOL and CYP2D6 INHIBITORS may result in increased propranolol exposure.    MANAGEMENT:  Monitoring for adverse effects, routine vitals and patient is aware of risks     RISK STATEMENT for SAFETY     Margarita did not appear to be an imminent safety risk to self or others.    TREATMENT RISK STATEMENT: The risks, benefits, alternatives and potential adverse effects have been discussed and are understood by the pt. The pt understands the risks of using street drugs or alcohol. There are no medical contraindications, the pt agrees to treatment with the ability to do so. The pt knows to call the clinic for any problems or to access emergency care if needed.  Medical and substance use concerns are documented above.  Psychotropic drug interaction check was done, including changes made today.     PSYCHIATRIST: Beverly Barros MD      Psychiatry Individual Psychotherapy Note   Psychotherapy start time - 4:09 PM  Psychotherapy end time - 4:26 PM  Date treatment plan last reviewed with patient - 01/10/24  Subjective: This supportive psychotherapy session addressed issues related to goals of therapy and current psychosocial stressors. Patient's reaction: Action in the context of mental status appropriate for ambulatory setting.    Interactive complexity indicated? No  Plan: RTC in timeframe noted above  Psychotherapy services  during this visit included myself and the patient.   Treatment Plan      SYMPTOMS; PROBLEMS   MEASURABLE GOALS;    FUNCTIONAL IMPROVEMENT / GAINS INTERVENTIONS DISCHARGE CRITERIA   Depression: depressed mood and low energy  Anxiety: excessive worry   reduce depressive symptoms, find enjoyment at least once a day, develop strategies for thought distraction when ruminating, and make a plan to manage 2-3 anxiety-provoking situations Supportive / psychodynamic marked symptom improvement        Patient not staffed in clinic.  Note will be reviewed and signed by supervisor Dr. Martinez.

## 2024-04-03 ENCOUNTER — TELEPHONE (OUTPATIENT)
Dept: PSYCHIATRY | Facility: CLINIC | Age: 29
End: 2024-04-03

## 2024-04-14 NOTE — NURSING NOTE
Is the patient currently in the state of [place]? YES    Visit mode:VIDEO    If the visit is dropped, the patient can be reconnected by: VIDEO VISIT: Text to cell phone:   Telephone Information:   Mobile [phone]       Will anyone else be joining the visit? NO  (If patient encounters technical issues they should call [phone]    How would you like to obtain your AVS? MyChart    Are changes needed to the allergy or medication list? Yes PT not taking flexeril or emgality and Pt stated no changes to allergies    Reason for visit: RECHECK    PT scored high on PHQ 9    PT appeared very irritated that VF called for check in and stated, why do I have to do this over the phone, can't I do it online? VF stated, yes of course and thanked PT.       Howard Alamo F     124

## 2024-04-29 ENCOUNTER — OFFICE VISIT (OUTPATIENT)
Dept: NEUROLOGY | Facility: CLINIC | Age: 29
End: 2024-04-29
Payer: COMMERCIAL

## 2024-04-29 VITALS
HEART RATE: 75 BPM | OXYGEN SATURATION: 99 % | SYSTOLIC BLOOD PRESSURE: 130 MMHG | DIASTOLIC BLOOD PRESSURE: 82 MMHG | RESPIRATION RATE: 14 BRPM

## 2024-04-29 DIAGNOSIS — G43.719 INTRACTABLE CHRONIC MIGRAINE WITHOUT AURA AND WITHOUT STATUS MIGRAINOSUS: Primary | ICD-10-CM

## 2024-04-29 PROCEDURE — 99214 OFFICE O/P EST MOD 30 MIN: CPT | Performed by: PSYCHIATRY & NEUROLOGY

## 2024-04-29 PROCEDURE — G2211 COMPLEX E/M VISIT ADD ON: HCPCS | Performed by: PSYCHIATRY & NEUROLOGY

## 2024-04-29 RX ORDER — SUMATRIPTAN 100 MG/1
100 TABLET, FILM COATED ORAL
Qty: 18 TABLET | Refills: 11 | Status: SHIPPED | OUTPATIENT
Start: 2024-04-29

## 2024-04-29 RX ORDER — PROMETHAZINE HYDROCHLORIDE 25 MG/1
25 TABLET ORAL EVERY 8 HOURS PRN
Qty: 10 TABLET | Refills: 11 | Status: SHIPPED | OUTPATIENT
Start: 2024-04-29

## 2024-04-29 ASSESSMENT — PAIN SCALES - GENERAL: PAINLEVEL: SEVERE PAIN (7)

## 2024-04-29 NOTE — LETTER
"4/29/2024       RE: Ingris Arnett  [address] [place] [address]     Dear Colleague,    Thank you for referring your patient, Ingris Arnett, to the [facility] [place] [facility]. Please see a copy of my visit note below.    [facility]    Headache Neurology Progress Note  April 29, 2024    Subjective:    Ingris Arnett returns for follow up of chronic headaches with a chronic phenotype migraine without aura.  There is also been consideration for paroxysmal hemicrania in the past.    Headaches have been about the same since I last saw her in November 2023.  Unfortunately, she reports that she was not able to get Emgality, so has not been on a preventative medication.  She describes an issue with insurance and possibly the pharmacy.  No change in headache quality.    For acute treatment of headache, she reports that sumatriptan is \"fantastic\". This works well with one dose. Only needed a second dose a handful of times.    Phenergan 25 mg makes her sleepy and helps nausea.    She hasn't been taking the indomethacin much. She adds this to sumatriptan.     She was not able to get Emgality, but would still like to try it due to headache frequency and severity.    Objective:    Vitals: /82 (BP Location: Right arm, Patient Position: Sitting, Cuff Size: Adult Regular)   Pulse 75   Resp 14   SpO2 99%   General: Cooperative, NAD  Neurologic:  Mental Status: Fully alert, attentive and oriented. Speech clear and fluent.   Cranial Nerves: Facial movements symmetric.   Motor: No abnormal movements.      Assessment/Plan:   Ingris Arnett is a 29 year old woman who returns for follow-up of chronic headaches, with a chronic migraine without aura phenotype.  There was also a consideration of paroxysmal hemicrania, and has been on preventative indomethacin.  Headaches have continued  at 30 out of 30 days " a month, with 10 out of 30 severe headache days a month.       She will continue her current acute regimen, which remains effective but she is needing to use it more often.  We will again try to add on a preventative therapy.     -Continue indomethacin 50 mg twice a day as needed rather than daily (rarely needed)  -Continue sumatriptan 100 mg as needed for severe headaches  -For nausea related to headache, she will continue promethazine 25 mg as needed.     -She is currently taking propranolol, sertraline, and gabapentin.  These are not helpful for headache prevention.  She has previously taken doxepin without benefit.  -I recommend a trial of Emgality subcutaneous injections starting with a loading dose, followed by maintenance dosing every 28 days thereafter.  Potential side effects were discussed.  We will attempt to get preauthorization.  I recommend a 3 to 6-month trial prior to determining effectiveness.  -If CGRP inhibitors are not tolerated or not effective, botulinum toxin injections using a chronic migraine protocol could be considered.     -Given her good response to steroid injections in the past to the pain clinic, I think would also be reasonable to repeat these injections in the future, if she has a flare of symptoms again.    The longitudinal plan of care for Margarita was addressed during this visit. Due to the added complexity in care, I will continue to support Margarita in the subsequent management of this condition(s) and with the ongoing continuity of care of this condition(s).  I will plan to see her back in 3 to 6 months to monitor her progress.        Again, thank you for allowing me to participate in the care of your patient.      Sincerely,    Margarita Corbin MD

## 2024-04-29 NOTE — PROGRESS NOTES
"[facility]    Headache Neurology Progress Note  April 29, 2024    Subjective:    Ingris Arnett returns for follow up of chronic headaches with a chronic phenotype migraine without aura.  There is also been consideration for paroxysmal hemicrania in the past.    Headaches have been about the same since I last saw her in November 2023.  Unfortunately, she reports that she was not able to get Emgality, so has not been on a preventative medication.  She describes an issue with insurance and possibly the pharmacy.  No change in headache quality.    For acute treatment of headache, she reports that sumatriptan is \"fantastic\". This works well with one dose. Only needed a second dose a handful of times.    Phenergan 25 mg makes her sleepy and helps nausea.    She hasn't been taking the indomethacin much. She adds this to sumatriptan.     She was not able to get Emgality, but would still like to try it due to headache frequency and severity.    Objective:    Vitals: /82 (BP Location: Right arm, Patient Position: Sitting, Cuff Size: Adult Regular)   Pulse 75   Resp 14   SpO2 99%   General: Cooperative, NAD  Neurologic:  Mental Status: Fully alert, attentive and oriented. Speech clear and fluent.   Cranial Nerves: Facial movements symmetric.   Motor: No abnormal movements.      Assessment/Plan:   Ingris Arnett is a 29 year old woman who returns for follow-up of chronic headaches, with a chronic migraine without aura phenotype.  There was also a consideration of paroxysmal hemicrania, and has been on preventative indomethacin.  Headaches have continued  at 30 out of 30 days a month, with 10 out of 30 severe headache days a month.       She will continue her current acute regimen, which remains effective but she is needing to use it more often.  We will again try to add on a preventative therapy.     -Continue indomethacin 50 mg twice a day as needed rather than daily (rarely " needed)  -Continue sumatriptan 100 mg as needed for severe headaches  -For nausea related to headache, she will continue promethazine 25 mg as needed.     -She is currently taking propranolol, sertraline, and gabapentin.  These are not helpful for headache prevention.  She has previously taken doxepin without benefit.  -I recommend a trial of Emgality subcutaneous injections starting with a loading dose, followed by maintenance dosing every 28 days thereafter.  Potential side effects were discussed.  We will attempt to get preauthorization.  I recommend a 3 to 6-month trial prior to determining effectiveness.  -If CGRP inhibitors are not tolerated or not effective, botulinum toxin injections using a chronic migraine protocol could be considered.     -Given her good response to steroid injections in the past to the pain clinic, I think would also be reasonable to repeat these injections in the future, if she has a flare of symptoms again.    The longitudinal plan of care for Margarita was addressed during this visit. Due to the added complexity in care, I will continue to support Margarita in the subsequent management of this condition(s) and with the ongoing continuity of care of this condition(s).  I will plan to see her back in 3 to 6 months to monitor her progress.    Margarita Corbin MD  Neurology

## 2024-04-29 NOTE — NURSING NOTE
Chief Complaint   Patient presents with    RECHECK    Headache       Vitals were taken and medications were reconciled.    Manfred Mayo, Technician  8:34 AM  April 29, 2024

## 2024-04-30 ENCOUNTER — TELEPHONE (OUTPATIENT)
Dept: NEUROLOGY | Facility: CLINIC | Age: 29
End: 2024-04-30
Payer: COMMERCIAL

## 2024-04-30 NOTE — TELEPHONE ENCOUNTER
Prior Authorization Retail Medication Request    Medication/Dose: Emgality 240mg loading dose  Diagnosis and ICD code (if different than what is on RX):    New/renewal/insurance change PA/secondary ins. PA:  Previously Tried and Failed:  She currently takes propranolol, doxepin, gabapentin, without headache benefit.  She is continued sumatriptan, promethazine         Rationale:  Headaches have worsened to 30 out of 30 days a month, with 10 out of 30 severe headache days a month, needing loading dose, did not  yet since being prescribed.      Insurance Name:  [employer]  Insurance ID:  [id number]     Pharmacy Information (if different than what is on RX)  Name:    Phone:

## 2024-04-30 NOTE — TELEPHONE ENCOUNTER
Retail Pharmacy Prior Authorization Team   Phone: [phone]    PA Initiation    Medication: EMGALITY 120 MG/ML SC SOAJ  Insurance Company: [employer] - Phone [phone] Fax [phone]  Pharmacy Filling the Rx: [facility] [place] - [address] [facility]  Filling Pharmacy Phone: [phone]  Filling Pharmacy Fax:    Start Date: 4/30/2024      Note: Due to record-high volumes, our turn-around time is taking longer than usual . We are currently 10 business days behind in the pools.   We are working diligently to submit all requests in a timely manner and in the order they are received. Please only flag TRUE URGENT requests as high priority to the pool at this time.   If you have questions - please send a note/message in the active PA encounter and send back to the [facility] PA pool [[id number]].    If you have more specific questions about our process please reach out to our supervisor Alicia Tang.   Thank you!   RPPA (Retail Pharmacy Prior Authorization) team

## 2024-05-06 NOTE — PROGRESS NOTES
Urology PA paged for pre-op orders and Case Request.   Detail Level: Detailed Quality 431: Preventive Care And Screening: Unhealthy Alcohol Use - Screening: Patient not identified as an unhealthy alcohol user when screened for unhealthy alcohol use using a systematic screening method

## 2024-05-08 ENCOUNTER — VIRTUAL VISIT (OUTPATIENT)
Dept: PSYCHIATRY | Facility: CLINIC | Age: 29
End: 2024-05-08
Attending: PSYCHIATRY & NEUROLOGY
Payer: COMMERCIAL

## 2024-05-08 VITALS — HEIGHT: 64 IN | WEIGHT: 210 LBS | BODY MASS INDEX: 35.85 KG/M2

## 2024-05-08 DIAGNOSIS — G43.719 INTRACTABLE CHRONIC MIGRAINE WITHOUT AURA AND WITHOUT STATUS MIGRAINOSUS: ICD-10-CM

## 2024-05-08 DIAGNOSIS — F41.9 ANXIETY: ICD-10-CM

## 2024-05-08 PROCEDURE — 99214 OFFICE O/P EST MOD 30 MIN: CPT | Mod: 95 | Performed by: STUDENT IN AN ORGANIZED HEALTH CARE EDUCATION/TRAINING PROGRAM

## 2024-05-08 PROCEDURE — 90833 PSYTX W PT W E/M 30 MIN: CPT | Mod: 95 | Performed by: STUDENT IN AN ORGANIZED HEALTH CARE EDUCATION/TRAINING PROGRAM

## 2024-05-08 RX ORDER — PROPRANOLOL HCL 60 MG
60 CAPSULE, EXTENDED RELEASE 24HR ORAL DAILY
Qty: 90 CAPSULE | Refills: 1 | Status: SHIPPED | OUTPATIENT
Start: 2024-05-08 | End: 2024-07-11

## 2024-05-08 RX ORDER — SUMATRIPTAN 100 MG/1
100 TABLET, FILM COATED ORAL
Qty: 18 TABLET | Refills: 11 | Status: CANCELLED | OUTPATIENT
Start: 2024-05-08

## 2024-05-08 RX ORDER — SERTRALINE HYDROCHLORIDE 100 MG/1
200 TABLET, FILM COATED ORAL DAILY
Qty: 180 TABLET | Refills: 1 | Status: SHIPPED | OUTPATIENT
Start: 2024-05-08 | End: 2024-07-11

## 2024-05-08 RX ORDER — PROMETHAZINE HYDROCHLORIDE 25 MG/1
25 TABLET ORAL EVERY 8 HOURS PRN
Qty: 10 TABLET | Refills: 11 | Status: CANCELLED | OUTPATIENT
Start: 2024-05-08

## 2024-05-08 RX ORDER — PRAZOSIN HYDROCHLORIDE 5 MG/1
5 CAPSULE ORAL AT BEDTIME
Qty: 90 CAPSULE | Refills: 1 | Status: SHIPPED | OUTPATIENT
Start: 2024-05-08 | End: 2024-07-11

## 2024-05-08 RX ORDER — PROPRANOLOL HYDROCHLORIDE 40 MG/1
40 TABLET ORAL 2 TIMES DAILY
Qty: 180 TABLET | Refills: 1 | Status: SHIPPED | OUTPATIENT
Start: 2024-05-08 | End: 2024-07-11

## 2024-05-08 ASSESSMENT — ANXIETY QUESTIONNAIRES
1. FEELING NERVOUS, ANXIOUS, OR ON EDGE: NEARLY EVERY DAY
2. NOT BEING ABLE TO STOP OR CONTROL WORRYING: NEARLY EVERY DAY
8. IF YOU CHECKED OFF ANY PROBLEMS, HOW DIFFICULT HAVE THESE MADE IT FOR YOU TO DO YOUR WORK, TAKE CARE OF THINGS AT HOME, OR GET ALONG WITH OTHER PEOPLE?: VERY DIFFICULT
GAD7 TOTAL SCORE: 17
GAD7 TOTAL SCORE: 17
7. FEELING AFRAID AS IF SOMETHING AWFUL MIGHT HAPPEN: MORE THAN HALF THE DAYS
5. BEING SO RESTLESS THAT IT IS HARD TO SIT STILL: SEVERAL DAYS
3. WORRYING TOO MUCH ABOUT DIFFERENT THINGS: NEARLY EVERY DAY
7. FEELING AFRAID AS IF SOMETHING AWFUL MIGHT HAPPEN: MORE THAN HALF THE DAYS
GAD7 TOTAL SCORE: 17
6. BECOMING EASILY ANNOYED OR IRRITABLE: MORE THAN HALF THE DAYS
4. TROUBLE RELAXING: NEARLY EVERY DAY
IF YOU CHECKED OFF ANY PROBLEMS ON THIS QUESTIONNAIRE, HOW DIFFICULT HAVE THESE PROBLEMS MADE IT FOR YOU TO DO YOUR WORK, TAKE CARE OF THINGS AT HOME, OR GET ALONG WITH OTHER PEOPLE: VERY DIFFICULT

## 2024-05-08 ASSESSMENT — PAIN SCALES - GENERAL: PAINLEVEL: EXTREME PAIN (8)

## 2024-05-08 NOTE — NURSING NOTE
Is the patient currently in the state of [place]? YES    Visit mode:VIDEO    If the visit is dropped, the patient can be reconnected by: VIDEO VISIT: Text to cell phone:   Telephone Information:   Mobile [phone]       Will anyone else be joining the visit? NO  (If patient encounters technical issues they should call [phone])    How would you like to obtain your AVS? MyChart    Are changes needed to the allergy or medication list? No    Are refills needed on medications prescribed by this physician? NO    Reason for visit: RECHECK    Larisa CROFT
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
## 2024-05-08 NOTE — PATIENT INSTRUCTIONS
**For crisis resources, please see the information at the end of this document**   Patient Education    Thank you for coming to the [facility] [place] [facility].     Lab Testing:  If you had lab testing today and your results are reassuring or normal they will be mailed to you or sent through payas[employer]gym within 7 days. If the lab tests need quick action we will call you with the results. The phone number we will call with results is # [phone]. If this is not the best number please call our clinic and change the number.     Medication Refills:  If you need any refills please call your pharmacy and they will contact us. Our fax number for refills is [phone].   Three business days of notice are needed for general medication refill requests.   Five business days of notice are needed for controlled substance refill requests.   If you need to change to a different pharmacy, please contact the new pharmacy directly. The new pharmacy will help you get your medications transferred.     Contact Us:  Please call [phone] during business hours (8-5:00 M-F).   If you have medication related questions after clinic hours, or on the weekend, please call [phone].     Financial Assistance [phone]   Medical Records [phone]       MENTAL HEALTH CRISIS RESOURCES:  For a emergency help, please call 911 or go to the nearest Emergency Department.     Emergency Walk-In Options:   EmPATH Unit @ [facility] ([place]): [phone] - Specialized mental health emergency area designed to be calming  [facility] ([place]): [phone]  [facility] ([place]): [phone]  [facility]): [phone]    [facility] Crisis Information:   [place]: [phone]  [place]: [phone]  [place] ([place]) - Adult: [phone]     Child: [phone]  [place] - Adult: [phone]     Child: [phone]  [place]:  [phone]  List of all [facility] resources:   https://[place].gov/dhs/people-we-serve/adults/health-care/mental-health/resources/crisis-contacts.jsp    National Crisis Information:   Crisis Text Line: Text  [place]  to 492268  Suicide & Crisis Lifeline: 988  National Suicide Prevention Lifeline: 8-310-907-TALK (1-998.135.9663)       For online chat options, visit https://suicidepreventionlifeline.org/chat/  Poison Control Center: 1-523-297-8902  Trans Lifeline: 6-185-500-9010 - Hotline for transgender people of all ages  The Stef Project: 2-197-092-2567 - Hotline for LGBT youth     For Non-Emergency Support:   Fast Tracker: Mental Health & Substance Use Disorder Resources -   https://www.[place].org/

## 2024-05-10 DIAGNOSIS — G43.719 INTRACTABLE CHRONIC MIGRAINE WITHOUT AURA AND WITHOUT STATUS MIGRAINOSUS: Primary | ICD-10-CM

## 2024-05-10 DIAGNOSIS — G43.719 INTRACTABLE CHRONIC MIGRAINE WITHOUT AURA AND WITHOUT STATUS MIGRAINOSUS: ICD-10-CM

## 2024-05-14 ENCOUNTER — TELEPHONE (OUTPATIENT)
Dept: NEUROLOGY | Facility: CLINIC | Age: 29
End: 2024-05-14
Payer: COMMERCIAL

## 2024-05-14 NOTE — TELEPHONE ENCOUNTER
Prior Authorization Retail Medication Request     Medication/Dose: Emgality 120 mg every 28 days  ICD code (if different than what is on RX):    Previously Tried and Failed:  She currently takes propranolol, doxepin, gabapentin, without headache benefit.  She is continued sumatriptan, promethazine         Rationale:  Headaches have worsened to 30 out of 30 days a month, with 10 out of 30 severe headache days a month      Insurance Name:    Insurance ID:      Pharmacy Information (if different than what is on RX)  Name:    Phone:

## 2024-05-26 ENCOUNTER — HEALTH MAINTENANCE LETTER (OUTPATIENT)
Age: 29
End: 2024-05-26

## 2024-07-11 ENCOUNTER — VIRTUAL VISIT (OUTPATIENT)
Dept: PSYCHIATRY | Facility: CLINIC | Age: 29
End: 2024-07-11
Attending: PSYCHIATRY & NEUROLOGY
Payer: COMMERCIAL

## 2024-07-11 VITALS — HEIGHT: 64 IN | BODY MASS INDEX: 35.85 KG/M2 | WEIGHT: 210 LBS

## 2024-07-11 DIAGNOSIS — F42.9 OBSESSIVE-COMPULSIVE DISORDER, UNSPECIFIED TYPE: ICD-10-CM

## 2024-07-11 DIAGNOSIS — F33.1 MODERATE EPISODE OF RECURRENT MAJOR DEPRESSIVE DISORDER (H): Primary | ICD-10-CM

## 2024-07-11 DIAGNOSIS — F41.9 ANXIETY: ICD-10-CM

## 2024-07-11 PROCEDURE — 99214 OFFICE O/P EST MOD 30 MIN: CPT | Mod: 95 | Performed by: STUDENT IN AN ORGANIZED HEALTH CARE EDUCATION/TRAINING PROGRAM

## 2024-07-11 PROCEDURE — 90833 PSYTX W PT W E/M 30 MIN: CPT | Mod: 95 | Performed by: STUDENT IN AN ORGANIZED HEALTH CARE EDUCATION/TRAINING PROGRAM

## 2024-07-11 RX ORDER — PRAZOSIN HYDROCHLORIDE 5 MG/1
5 CAPSULE ORAL AT BEDTIME
Qty: 90 CAPSULE | Refills: 1 | Status: SHIPPED | OUTPATIENT
Start: 2024-07-11

## 2024-07-11 RX ORDER — PROPRANOLOL HYDROCHLORIDE 40 MG/1
40 TABLET ORAL 2 TIMES DAILY
Qty: 180 TABLET | Refills: 1 | Status: SHIPPED | OUTPATIENT
Start: 2024-07-11

## 2024-07-11 RX ORDER — PROPRANOLOL HCL 60 MG
60 CAPSULE, EXTENDED RELEASE 24HR ORAL DAILY
Qty: 90 CAPSULE | Refills: 1 | Status: SHIPPED | OUTPATIENT
Start: 2024-07-11

## 2024-07-11 RX ORDER — SERTRALINE HYDROCHLORIDE 100 MG/1
200 TABLET, FILM COATED ORAL DAILY
Qty: 180 TABLET | Refills: 1 | Status: SHIPPED | OUTPATIENT
Start: 2024-07-11

## 2024-07-11 ASSESSMENT — PAIN SCALES - GENERAL: PAINLEVEL: SEVERE PAIN (6)

## 2024-07-11 NOTE — PROGRESS NOTES
Virtual Visit Details  Type of service:  Video Visit   Originating Location (pt. Location): Home  Distant Location (provider location):  On-site  Platform used for Video Visit: [facility]  Psychiatry Clinic  MEDICAL PROGRESS NOTE       CARE TEAM:  PCP- Tia Leonardo    Psychotherapist- Julissa, once a week appointments, Neuro, Ob- Gyn, Pain     This person is a 29 year old who uses the name Margarita and pronouns she, her.      DIAGNOSIS   Major Depressive Disorder, recurrent episode, moderate  Generalized Anxiety Disorder  Obsessive-Compulsive Disorder     ASSESSMENT   Margarita describes her mood as okay overall. She reports feeling like things are moving in the right direction after a hard 1-2 years. She is actively trying to get a new job, getting ready to put her house on the market and is hoping to move closer to family and make a fresh start.     She is taking her propranolol more regularly now and she finds this helpful and her anxiety has been more manageable. She also reports that her passive SI has significantly reduced as well. Individual therapy continues to be helpful. Appointments at the [facility] clinic for Ketamine hankal booked. She will follow up in 2 months with next resident.     [place] was [place] today:  Indicates taking controlled medication as prescribed.     PLAN                                                                                                                1) Meds-   - Continue propranolol IR to 40 mg BID PRN daily for acute anxiety   - Continue Prazosin to 5 mg at bedtime   - Continue sertraline 200 mg daily   - Continue propranolol LA 60 mg in the morning      Prescribed by her pain specialist:  Gabapentin 600 mg TID  Hydrocodone-Acetamin 7.5-325 mg TID PRN for pain     2) Psychotherapy- Continue weekly individual therapy    3) Next due-  Labs- No routine monitoring needed for current psychotropic regimen.  EKG- No routine monitoring  "needed for current psychotropic regimen.  Rating scales- PHQ9    4) Referrals-  Ketamine consultation with difficult to treat depression clinic    5) Dispo- RTC in 2 months     PERTINENT BACKGROUND                                                    [most recent eval 08/01/22]   Previous diagnoses include MDD, OCD, Panic Disorder, and chronic pain. She first began citalopram for depression at the age of 14 and then later switched to sertraline in college which she reports was helpful for both mood and OCD.  Has also done CBT for OCD to endorsed benefit. No history of hospitalizations but patient allows at least one suicide attempt via CO poisoning in 2014 (her most recent SA) and chart review suggests as many as 6 other SA's prior to that.  Notably, patient's first contact with this clinic was a diagnostic assessment completed as a one time SACHIN eval on 1/10/19, tried to make it to the end of the day had been recommended in the context of establishing care with new providers upon recently returning to live in [place] given her prolonged use of opiates.  From that DA: \"Unclear that chronic opioids appropriate for her chronic non-malignant pain states. That said, with what information we have, no clear indication that she has used non-medically, escalated in use, or any other concerns.\" Chronic pain condition(s) experienced by the patient include endometriosis, herniated lumbar disk and migraines.    Social history highlights include raised in [place], received BA in Psychology from [employer] in [place], [place], where she met her present wife Jacqui, then lived in [place] from Dec 2015 up to recent return to [place] in Nov 2018. Now  to ?      Psych pertinent item history includes suicide attempt [multiple, most recent 2023] and suicidal ideation.     SUBJECTIVE     \"Okay\"  - Work is very exhausting  - Friction between leadership and actual people on the ground   - Trying to get a new job, would be a hybrid role "   - Had to but diabetic dog down and that was hard  - Feeling like she is starting to come out of how hard 2023 was  - Getting ready to put house on market and thinking about moving closer to family  - Hoping to move and get a fresh start   - Home life is okay    - Medications going okay, appreciated the 90 day supply   - Taking propranolol more regular now and thinks this has been helpful for her anxiety, not always feeling the stress in her body like she previously was     - Passive SI has actually been better in the last couple months, thinks it's a seasonal aspect for her, spending more time outside and feeling happier  - Not a lot of SI maybe 1 or 2 days floating in that space, significantly better   - Also not as fatigued     - [facility]D apts scheduled     Recent Substance Use:     -alcohol: Very rare , does not mix well with drugs   -cannabis: No   -tobacco: No  -caffeine:  one cup coffee per day   -opioids: Yes prescribed for pain   Narcan Kit currrent: No     Pertinent Negatives: No suicidal ideation, violent ideation, self-injury, psychosis, hallucinations, jackie and harmful substance use  Adverse Effects: None    PSYCH and SUBSTANCE USE Critical Summary Points since July 2022 8/1/22: Transfer visit, no changes   10/3/22: No changes  12/5/22: Added Prazosin 1 mg at bedtime for nightmares back to list (restarted between appointments)  12/13/23: Moved Propranolol LA to morning and increased Prazosin to 5 mg at bedtime  1/10/24: Increased Propranolol IR to 40 mg BID PRN for anxiety      FAMILY and SOCIAL HISTORY                            Per chart review; reviewed and confirmed with pt     FAMILY HX:  Maternal Great-Grandmother had BPAD; Depression in family, both mom and dad, maternal aunt.     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 Patient reports  they have a great supportive relationship.   Children- None  Living situation- lives in [place]    Social/ Spiritual Support- spouse, family, three dogs (Kerline a border collie aged 5-6 years, Serene a bichon frise aged 3 years, and Sarah billyh-poo aged 2 years)  Legal- no  FEELS SAFE AT HOME- yes      MEDICAL HISTORY and ALLERGY     ALLERGIES: Other environmental allergy and Seasonal allergies    Patient Active Problem List   Diagnosis    Intractable chronic migraine without aura and without status migrainosus    Nausea vomiting and diarrhea    Cervicalgia    Chronic bilateral low back pain with bilateral sciatica    Right ureteral stone    Calculus of kidney    Hydronephrosis with urinary obstruction due to ureteral calculus        MEDICAL REVIEW OF SYSTEMS     none in addition to that documented above     MEDICATIONS     Current Outpatient Medications   Medication Sig Dispense Refill    gabapentin (NEURONTIN) 300 MG capsule Take 600 mg by mouth 3 times daily      galcanezumab-gnlm (EMGALITY) 120 MG/ML injection Inject 1 mL (120 mg) Subcutaneous every 28 days 1 mL 11    HYDROcodone-acetaminophen (NORCO) 7.5-325 MG per tablet Take 1 tablet by mouth 3 times daily as needed      indomethacin (INDOCIN) 50 MG capsule Take 1 capsule (50 mg) by mouth 2 times daily as needed for moderate pain 30 capsule 11    methocarbamol (ROBAXIN) 750 MG tablet TAKE 1 TABLET ORALLY EVERY 8 HOURS AS NEEDED      prazosin (MINIPRESS) 5 MG capsule Take 1 capsule (5 mg) by mouth at bedtime 90 capsule 1    promethazine (PHENERGAN) 25 MG tablet Take 1 tablet (25 mg) by mouth every 8 hours as needed for nausea associated with migranes 10 tablet 11    propranolol (INDERAL) 40 MG tablet Take 1 tablet (40 mg) by mouth 2 times daily 180 tablet 1    propranolol ER (INDERAL LA) 60 MG 24 hr capsule Take 1 capsule (60 mg) by mouth daily 90 capsule 1    sertraline (ZOLOFT) 100 MG tablet Take 2 tablets (200 mg) by mouth daily 180 tablet 1    SUMAtriptan  "(IMITREX) 100 MG tablet Take 1 tablet (100 mg) by mouth at onset of headache for migraine (repeat in 2 hours if needed) 18 tablet 11      VITALS   There were no vitals taken for this visit.    MENTAL STATUS EXAM     Alertness: alert  and oriented  Appearance: casually groomed  Behavior/Demeanor: cooperative, pleasant and calm, with good  eye contact   Speech: normal and regular rate and rhythm  Language: intact and no problems  Psychomotor: normal or unremarkable  Mood:  \"Okay\"  Affect: full range; congruent to: mood- yes, content- yes  Thought Process/Associations: unremarkable  Thought Content:  Reports none;  Denies suicidal & violent ideation and delusions  Perception:  Reports none;  Denies hallucinations  Insight: good  Judgment: good  Cognition: does  appear grossly intact; formal cognitive testing was not done  Gait and Station: N/A ([facility])     LABS and DATA         12/19/2019     8:30 AM 1/10/2024     1:54 PM 3/6/2024     3:46 PM   PHQ   PHQ-9 Total Score 14 19 13   Q9: Thoughts of better off dead/self-harm past 2 weeks Not at all Not at all Not at all     Recent Labs   Lab Test 01/24/20  1054 01/10/20  0700   CR 0.72  0.72 0.73   GFRESTIMATED >60  >60 >90     Recent Labs   Lab Test 01/09/20  1112 03/29/19  0647   AST 16 25   ALT 33 41   ALKPHOS 114 91        PSYCHOTROPIC DRUG INTERACTIONS                                                       PSYCHCLINICDDI   Concurrent use of OXYCODONE and SERTRALINE may result in an increased risk of serotonin syndrome (tachycardia, hyperthermia, myoclonus, mental status changes).     Concurrent use of OXYCODONE and CNS DEPRESSANTS may result in increased risk of respiratory and CNS depression.     Concurrent use of PRAZOSIN and PROPRANOLOL may may enhance the orthostatic hypotensive effect of Alpha1-Blockers     Sertraline and Propranolol: Concurrent use of PROPRANOLOL and CYP2D6 INHIBITORS may result in increased propranolol exposure.    MANAGEMENT:  Monitoring " for adverse effects, routine vitals and patient is aware of risks     RISK STATEMENT for SAFETY     Margarita did not appear to be an imminent safety risk to self or others.    TREATMENT RISK STATEMENT: The risks, benefits, alternatives and potential adverse effects have been discussed and are understood by the pt. The pt understands the risks of using street drugs or alcohol. There are no medical contraindications, the pt agrees to treatment with the ability to do so. The pt knows to call the clinic for any problems or to access emergency care if needed.  Medical and substance use concerns are documented above.  Psychotropic drug interaction check was done, including changes made today.     PSYCHIATRIST: Beverly Barros MD    Psychiatry Individual Psychotherapy Note   Psychotherapy start time - 4:11PM  Psychotherapy end time - 4:29 PM  Date treatment plan last reviewed with patient - 01/10/24  Subjective: This supportive psychotherapy session addressed issues related to goals of therapy and current psychosocial stressors. Patient's reaction: Action in the context of mental status appropriate for ambulatory setting.    Interactive complexity indicated? No  Plan: RTC in timeframe noted above  Psychotherapy services during this visit included myself and the patient.   Treatment Plan      SYMPTOMS; PROBLEMS   MEASURABLE GOALS;    FUNCTIONAL IMPROVEMENT / GAINS INTERVENTIONS DISCHARGE CRITERIA   Depression: depressed mood and low energy  Anxiety: excessive worry   reduce depressive symptoms, find enjoyment at least once a day, develop strategies for thought distraction when ruminating, and make a plan to manage 2-3 anxiety-provoking situations Supportive / psychodynamic marked symptom improvement        Patient not staffed in clinic.  Note will be reviewed and signed by supervisor Dr. Martinez.

## 2024-07-11 NOTE — PATIENT INSTRUCTIONS
**For crisis resources, please see the information at the end of this document**   Patient Education    Thank you for coming to the [facility] [place] [facility].     Lab Testing:  If you had lab testing today and your results are reassuring or normal they will be mailed to you or sent through [facility] & bone within 7 days. If the lab tests need quick action we will call you with the results. The phone number we will call with results is # [phone]. If this is not the best number please call our clinic and change the number.     Medication Refills:  If you need any refills please call your pharmacy and they will contact us. Our fax number for refills is [phone].   Three business days of notice are needed for general medication refill requests.   Five business days of notice are needed for controlled substance refill requests.   If you need to change to a different pharmacy, please contact the new pharmacy directly. The new pharmacy will help you get your medications transferred.     Contact Us:  Please call [phone] during business hours (8-5:00 M-F).   If you have medication related questions after clinic hours, or on the weekend, please call [phone].     Financial Assistance [phone]   Medical Records [phone]       MENTAL HEALTH CRISIS RESOURCES:  For a emergency help, please call 911 or go to the nearest Emergency Department.     Emergency Walk-In Options:   EmPATH Unit @ [facility] ([place]): [phone] - Specialized mental health emergency area designed to be calming  [facility] ([place]): [phone]  [facility] ([place]): [phone]  [facility]): [phone]    [facility] Crisis Information:   [place]: [phone]  [place]: [phone]  [place] ([place]) - Adult: [phone]     Child: [phone]  [place] - Adult: [phone]     Child: [phone]  [place]:  [phone]  List of all [facility] resources:   https://[place].gov/dhs/people-we-serve/adults/health-care/mental-health/resources/crisis-contacts.jsp    National Crisis Information:   Crisis Text Line: Text  [place]  to 977233  Suicide & Crisis Lifeline: 988  National Suicide Prevention Lifeline: 2-412-437-TALK (1-845.638.5793)       For online chat options, visit https://suicidepreventionlifeline.org/chat/  Poison Control Center: 8-380-858-2334  Trans Lifeline: 3-007-773-6460 - Hotline for transgender people of all ages  The Stef Project: 4-339-494-3485 - Hotline for LGBT youth     For Non-Emergency Support:   Fast Tracker: Mental Health & Substance Use Disorder Resources -   https://www.[employer]ckBosidengn.org/

## 2024-07-11 NOTE — NURSING NOTE
Current patient location:  Home    Is the patient currently in the state of [place]? YES    Visit mode:VIDEO    If the visit is dropped, the patient can be reconnected by: VIDEO VISIT: Text to cell phone:   Telephone Information:   Mobile [phone]       Will anyone else be joining the visit? NO  (If patient encounters technical issues they should call [phone]    How would you like to obtain your AVS? MyChart    Are changes needed to the allergy or medication list? No    Are refills needed on medications prescribed by this physician? Yes    Reason for visit: RECHJAZMIN CROFT

## 2024-08-27 ENCOUNTER — OFFICE VISIT (OUTPATIENT)
Dept: PSYCHIATRY | Facility: CLINIC | Age: 29
End: 2024-08-27
Attending: STUDENT IN AN ORGANIZED HEALTH CARE EDUCATION/TRAINING PROGRAM
Payer: COMMERCIAL

## 2024-08-27 DIAGNOSIS — F41.1 GENERALIZED ANXIETY DISORDER: ICD-10-CM

## 2024-08-27 DIAGNOSIS — F33.2 SEVERE RECURRENT MAJOR DEPRESSION WITHOUT PSYCHOTIC FEATURES (H): Primary | ICD-10-CM

## 2024-08-27 DIAGNOSIS — F43.10 PTSD (POST-TRAUMATIC STRESS DISORDER): ICD-10-CM

## 2024-09-08 NOTE — PROGRESS NOTES
"[facility] Treatment Resistant Depression Program  Diagnostic Assessment  A part of the [facility] Psychiatry Mood Disorders Program    Ingris Arnett MRN# [id number]   Age: 29 year old YOB: 1995     Date of Evaluation: 8/27/24  Start Time: 3:00; End Time: 4:13           Care Team     PCP- Tia Leonardo  Specialty Providers- none  Therapist- Julissa Briceño at [facility]  Psychiatric Med Management Provider- Dr Barros, resident clinic  Other Mental Health Providers- no    Referred by: psychiatrist  Referred for evaluation of:  depression         Contributors to the Assessment     Chart Reviewed.   Interview completed with Ingris Arnett.  Releases of information signed?  yes  Collateral information obtained? no           Chief Complaint     \"Even when life is good\" still has depression and SI        Psychiatric History and Present Illness      Ingris Arnett is a 29 year old  female who goes by Margarita and uses she/her and they/them pronouns.    Margarita reports one lifetime episode(s) of depression and has a history of no psychiatric hospitalization(s).     Gavins first episode of depression started in childhood.  She reports wanting to kill herself when she was 8 years old.  Her first treatment was in high school. Margarita reports that since then there has not been a period of at least two months with full resolution of symptoms.    Current psychosocial stressors include medical concerns,     Margarita's psychiatrist recommended ketamine treatment and she would like to learn more about that and other options as she has some hesitation about ketamine.      Past diagnoses: MDD, anxiety, OCD, panic disorder, PTSD/C-PTSD    Past medication trials: multiple trials    Hospitalizations: no    Commitment: No, Current Rizvi order: No    ECT trials: No    TMS trials:  No      Ketamine:  No    Suicide attempts: Yes - \"several,\" most recent 2023    Self-injurious behavior: Yes - " "cutting on bottom of feet, mostly in high school. Most recent approximately 10 years ago    Aggressive or violent behavior: No    Past Outpatient Programs & Services  Medication management and Outpatient individual psychotherapy    Psych critical item history suicide attempt   suicidal ideation  SIB   trauma hx  mutiple psychotropic trials   major medical problems    Other mental health concerns discussed: anxiety, trauma, chronic pain    Sleep study: yes, no findings. Reports insomnia and \"fitful\" sleeping 3-4 hours/night    ADHD testing: no    ASD assessment:  no    Current Outpatient Programs & Services  Medication management, individual therapy         Psychiatric Review of Systems (Completed M.I.N.I. Version 7.0.2)     A. DEPRESSION  Past 2 Weeks:  low mood nearly every day, anhedonia most of the time, appetite change (increase), difficulties with sleep, low energy, worthlessness and/or guilt, and difficulty concentrating, thinking or making decisions    Past Episode:  low mood nearly every day, anhedonia most of the time, appetite change (decrease), difficulties with sleep, psychomotor changes (retardation), low energy, worthlessness and/or guilt, difficulty concentrating, thinking or making decisions, and suicidal ideation with plan, without intent    B. SUICIDALITY:  Risk: High  Current suicidal ideation: Yes, reports a plan, and denies intent.     -reports \"less than 10%\" in response to \"How likely are to you to try to kill yourself within the next 3 months on a scale from 0-100%?\"  -denies current SIB/Self Injurious Behavior and reports past SIB    -reports \"several\" lifetime suicide attempts.  She has notable risk factors for self-harm including previous suicide attempt, lives alone/ isolated, severe insomnia, and on opiates.  However, risk is mitigated by no h/o risky impulsive behavior, h/o seeking help when needed, future oriented, none to minimal alcohol use , commitment to family, and stable housing. "      C. MARIAH/HYPOMANIA  Current Episode:  none    Past Episode:  none    D. PANIC:  unprovoked anxiety/fear, peaking in < 10 minutes, provoked anxiety/fear, heart palpitations, sweaty or clammy hands, SOB, chest pain, flushing or chills, fear of losing control or going crazy, and fear of dying    E. AGORAPHOBIA:  marked fear/anxiety in standing in line,being alone at home, driving a car because of fear that escape might be difficult or help might not be available;, almost always, with active avoidance, a  or are endured with intense anxiety/fear, at a level out of proportion to the actual danger posed, and lasting 6 months or more    F. SOCIAL ANXIETY:  none    G. OBSESSIVE-COMPULSIVE:  obsessions, compulsions, and a diagnosis of OCD. Margarita reports symptoms are currently manageable after CBT for OCD. She reports some rumination, intrusive thoughts, and checking door locks at night, all of which she describes as manageable.    H. TRAUMA:  experienced traumatic event, witnessed traumatic event, re-experienced trauma, negative emotions, and difficulty sleeping    I. ALCOHOL & J. NON-ALCOHOL:  See below    K. PSYCHOSIS:   none    L-M. EATING DISORDER: none    N. GENERALIZED ANXIETY:  excessive anxiety or worry about several routine things, most days, with difficulty controlling worry, easily tired, weak or exhausted, difficulty concentrating or mind goes blank, and difficulty sleeping    O. RULE OUT MEDICAL, ORGANIC OR DRUG CAUSES FOR ALL DISORDERS  During any current disorder or past mood episode, patient reports:  A. Substance use or withdrawal: No  B. Medical illness: Yes    P. ANTISOCIAL PERSONALITY:  none     Other Cluster B Traits Identified (not formally assessed):  none discussed          SUBSTANCE USE HISTORY                                                                 CAGE-AID = 0    Have you felt you ought to cut down on your drinking or drug use? no  Have people annoyed you by criticizing your  "drinking or drug use? no  Have you felt bad or guilty about your drinking or drug use? no  Have you ever had a drink or used drugs first thing in the morning to steady your nerves or to get rid of a hangover? no      RECENT SUBSTANCE USE:     TOBACCO- none  CAFFEINE-  2-3 coffee/day  ALCOHOL- <1/week     CANNABIS- daily for pain            OTHER ILLICIT DRUGS- none    Past Use-   TOBACCO- none  CAFFEINE-  2-3/day  ALCOHOL- occasional  CANNABIS- daily for pain, starting 2020            OTHER ILLICIT DRUGS- none    CD Treatment history: No  Medical consequences due to use (eg HIV/Hepatitis)- No  Legal consequences due to use- No    SOCIAL and FAMILY HISTORY                                                             Ingris Arnett is a 29 year old    and  female with a psychiatric history of depression, who presents for a [facility] Treatment Resistant Depression program evaluation. Margarita was referred by her psychiatrist.    Living situation: Ingris lives with her spouse in a Private Residence.   Kids? No  Pets? Yes - three dogs  Guns, weapons, or other means to harm oneself in the home? No     Education: Ingris s highest level of education is college graduate    Occupation: Ingris is currently working full time for a [employer]    Finances: Ingris is financial supported by Employment    Relationships (kid/grandkids, spouse/partner, friends, support people): Specific Relationships & Quality of Relationship: Margarita reports their spouse is understanding and supportive, and knows when to bring in their best friend or mom.     Spiritual considerations: Yes - Native worldview atheist    Legal History: No    Trauma/Abuse History: Yes - has current diagnosis of CPTSD     History: No    Family Mental Health History: father - anxiety; paternal side is \"not discussed.\" Mother and maternal side \"literally everyone\" has depression, anxiety including mother, two aunts, sister, " grandfather. Maternal grandmother and great aunt -  bipolar    Strengths & Coping Strategies:  Margarita is bright, personable, and resilient. She has been engaged with mental health care services an tried multiple approaches to treat depression, OCD, and trauma.     PAST PSYCH MED TRIALS      Will be reviewed during MTM.    MEDICAL / SURGICAL HISTORY                                   Patient Active Problem List   Diagnosis    Intractable chronic migraine without aura and without status migrainosus    Nausea vomiting and diarrhea    Cervicalgia    Chronic bilateral low back pain with bilateral sciatica    Right ureteral stone    Calculus of kidney    Hydronephrosis with urinary obstruction due to ureteral calculus       Past Surgical History:   Procedure Laterality Date    COMBINED CYSTOSCOPY, INSERT STENT URETER(S) Left     CYSTOSCOPY, RETROGRADES, INSERT STENT URETER(S), COMBINED Right 1/9/2020    Procedure: CYSTOSCOPY, WITH RETROGRADE PYELOGRAM AND RIGHT URETERAL STENT INSERTION;  Surgeon: Irene Otto MD;  Location: [facility] OR     TOOTH EXTRACTION W/FORCEP      local anaesthesia        History of seizures: no   History of head trauma/loss of consciousness: no     ALLERGY                                Other environmental allergy and Seasonal allergies    MEDICATIONS                               Current Outpatient Medications   Medication Sig Dispense Refill    gabapentin (NEURONTIN) 300 MG capsule Take 600 mg by mouth 3 times daily      galcanezumab-gnlm (EMGALITY) 120 MG/ML injection Inject 1 mL (120 mg) Subcutaneous every 28 days 1 mL 11    HYDROcodone-acetaminophen (NORCO) 7.5-325 MG per tablet Take 1 tablet by mouth 3 times daily as needed      indomethacin (INDOCIN) 50 MG capsule Take 1 capsule (50 mg) by mouth 2 times daily as needed for moderate pain 30 capsule 11    methocarbamol (ROBAXIN) 750 MG tablet TAKE 1 TABLET ORALLY EVERY 8 HOURS AS NEEDED      prazosin (MINIPRESS) 5 MG capsule Take 1 capsule  (5 mg) by mouth at bedtime 90 capsule 1    promethazine (PHENERGAN) 25 MG tablet Take 1 tablet (25 mg) by mouth every 8 hours as needed for nausea associated with migranes 10 tablet 11    propranolol (INDERAL) 40 MG tablet Take 1 tablet (40 mg) by mouth 2 times daily 180 tablet 1    propranolol ER (INDERAL LA) 60 MG 24 hr capsule Take 1 capsule (60 mg) by mouth daily 90 capsule 1    sertraline (ZOLOFT) 100 MG tablet Take 2 tablets (200 mg) by mouth daily 180 tablet 1    SUMAtriptan (IMITREX) 100 MG tablet Take 1 tablet (100 mg) by mouth at onset of headache for migraine (repeat in 2 hours if needed) 18 tablet 11       VITALS                                                                                                                            3, 3   There were no vitals taken for this visit.     MENTAL STATUS EXAM                                                                                    9, 14 cog gs     Alertness: alert  and oriented  Appearance: well groomed  Behavior/Demeanor: cooperative, with good  eye contact   Speech: normal  Language: intact  Psychomotor: normal or unremarkable  Mood: depressed and anxious  Affect: full range; was congruent to mood; was congruent to content  Thought Process/Associations: unremarkable  Thought Content:  Reports suicidal ideation with plan; without intent [details in Interim History];  Denies violent ideation  Perception:  Reports none;  Denies auditory hallucinations and visual hallucinations  Insight: good  Judgment: adequate for safety  Cognition: does appear grossly intact; formal cognitive testing was not done    PSYCHIATRIC DIAGNOSES                                                                                               Major depressive disorder  Generalized anxiety disorder, with panic attacks   Post-traumatic stress disorder/C-PTSD    OCD, by history       ASSESSMENT                                                                                         "                  m2, h3     Please note, writer did not receive all pertinent medical records as of the time of this assessment. Ingris did sign BHARGAV's for additional records.     Today, Margarita presents as a Good historian with Good insight. Margarita s past and present depressive symptoms seem consistent with a diagnosis of major depressive disorder. Depressive symptoms seem to contribute to impaired functioning in the areas of social relationships, occupational performance, and emotional wellbeing . Precipitating factors may include family history and family dynamics. Perpetuating factors may consist of multiple medication trials, co-morbid diagnoses.     Margarita reports that when depression is at its worst she doesn't shower or eat, she is extremely fatigued, and irritable. When symptoms are more managed and things are going well, she still has suicidal ideation and struggles with mood.     The M.I.N.I. scores positively for a diagnosis/diagnoses of MADIHA and panic attacks. Margarita has a previous diagnosis of panic disorder.  Substance use does not seem to be a current problem.  Margarita reports a current diagnosis of CPTSD/PTSD.    Margarita reports wanting to kill herself with she was 8 years old. She started medication and therapy in high school and was on/off both during that time. In college she started sertraline, which was \"very helpful\" for symptoms of depression; however, it did not help with \"horrific anxiety\" and OCD. For the last two years of college she saw her therapist one to three times a week to help with anxiety an OCD symptoms.     Margarita reports chronic pain from L5, S1 and central pain syndrome/fibromyalgia. In 2017, she used a wheelchair because the pain was so bad. Currently, she is not using a wheelchair and her pain is managed enough that she can work. She notes that medical cannabis has been helpful with pain, and may also be helpful with anxiety. Margarita reports that she takes opiate medication only as prescribed " and that is does help with pain. Despite improvement in symptoms, pain is still present.     Further diagnostic information is not needed to clarify additional diagnosis.     Basic needs (food, housing, transportation, safety, income stability): met    Today, based on all available evidence, she does not appear to be at imminent risk for self-harm therefore does not meet criteria for a 72-hr hold/  involuntary hospitalization.     Additional steps to minimize risk discussed, include: people to reach out to, providers to reach out to, crisis numbers and texts, and Em[facility].  24/7 crisis resources were provided verbally.     PLAN                                                                                                                        m2, h3   Patient will meet with TRD program PharmD and TRD program Psychiatrist to complete comprehensive multi-disciplinary assessment. Informed Ingris that if appropriate for Interventional Psychiatry treatments, care will be provided with goal of stabilization with subsequent transfer back to the community (i.e. PCP or previous psychiatrist).    Medications: to be addressed during an MTM visit and new patient medication evaluation with a Psychiatrist    Therapy:  Yes - continue with current therapist. Could consider an IOP for additional support and skills building.    Crisis Resources provided:  24/7 crisis resources including but not limited to the following:   - National Suicide Prevention Hotline: 8-253-689-TALK (8101)   - Crisis Text Line: Text START to 496-145   - Mental Health Emergency Number: 988   - [facility] at [facility]    Other Referrals:  No    Discussed: consultation model of Treatment Resistant Depression (TRD) Program, limits of consultation model, requirements of the program    RTC: For MTM visit.    ESHA Gifford

## 2024-09-18 ENCOUNTER — VIRTUAL VISIT (OUTPATIENT)
Dept: PSYCHIATRY | Facility: CLINIC | Age: 29
End: 2024-09-18
Payer: COMMERCIAL

## 2024-09-18 DIAGNOSIS — F33.2 SEVERE RECURRENT MAJOR DEPRESSION WITHOUT PSYCHOTIC FEATURES (H): Primary | ICD-10-CM

## 2024-09-18 ASSESSMENT — ANXIETY QUESTIONNAIRES
2. NOT BEING ABLE TO STOP OR CONTROL WORRYING: NEARLY EVERY DAY
IF YOU CHECKED OFF ANY PROBLEMS ON THIS QUESTIONNAIRE, HOW DIFFICULT HAVE THESE PROBLEMS MADE IT FOR YOU TO DO YOUR WORK, TAKE CARE OF THINGS AT HOME, OR GET ALONG WITH OTHER PEOPLE: SOMEWHAT DIFFICULT
7. FEELING AFRAID AS IF SOMETHING AWFUL MIGHT HAPPEN: SEVERAL DAYS
3. WORRYING TOO MUCH ABOUT DIFFERENT THINGS: NEARLY EVERY DAY
GAD7 TOTAL SCORE: 17
5. BEING SO RESTLESS THAT IT IS HARD TO SIT STILL: MORE THAN HALF THE DAYS
GAD7 TOTAL SCORE: 17
6. BECOMING EASILY ANNOYED OR IRRITABLE: MORE THAN HALF THE DAYS
1. FEELING NERVOUS, ANXIOUS, OR ON EDGE: NEARLY EVERY DAY

## 2024-09-18 ASSESSMENT — PATIENT HEALTH QUESTIONNAIRE - PHQ9
5. POOR APPETITE OR OVEREATING: NEARLY EVERY DAY
SUM OF ALL RESPONSES TO PHQ QUESTIONS 1-9: 12

## 2024-09-18 NOTE — PROGRESS NOTES
"Patient:  Ingris Arnett  :  1995   Age:  29 year old   Today's Office Visit:  2024     [facility]                                                              [address], [address]  [place], [place]  [address]       [facility]istant [facility] (TRD) Program   Medication Therapy Management   Video Visit Note  This video visit is from my home to the patient's work office via Project Playlist to provide better access to patient. We used [employer] ([employer]) in case we get disconnected, we will use patient email [email or web address], or [employer] [phone].         Identifying Information and Introduction:                                Margarita is a 29 year old /  female who prefers the name Margarita and uses pronouns she, her,& they, them. Patient [facility] on [facility].      Patient lives in [place], [place]                                                                                                       This patient is a new adult to [facility].       Psychiatrist is: Dr. Eliel Taylor will see the patient in clinic on 2024 which was communicated to the patient                                                                                                                                        Chief Complaint                                   \" Depression, anxiety [OCD,C-PTSD,Insomnia,Migraines,PD] \"       Reason for Consultation                                 Rating medication trials for antidepressant failure and assessment of antidepressant drugs and their history.                                                  Informants include:  Patient and view past medical records : Please note that during the consultation I was not in the complete possession of all past medical records and psychiatrist medical records.  Pertinent Background                            "               [initial eval 09/18/24]      SIB cutting last time 10 years ago, chronic pain,several SA without hospitalization last one 2023 Narco overdose, slept it off.     Psych pertinent item history includes suicide attempt   suicidal ideation  SIB   trauma hx  mutiple psychotropic trials   major medical problems  FHx psych- maternal and paternal side     Medication Information                                                     Current Outpatient Medications   Medication Sig Dispense Refill    gabapentin (NEURONTIN) 300 MG capsule Take 600 mg by mouth 3 times daily      galcanezumab-gnlm (EMGALITY) 120 MG/ML injection Inject 1 mL (120 mg) Subcutaneous every 28 days 1 mL 11    HYDROcodone-acetaminophen (NORCO) 7.5-325 MG per tablet Take 1 tablet by mouth 3 times daily as needed      indomethacin (INDOCIN) 50 MG capsule Take 1 capsule (50 mg) by mouth 2 times daily as needed for moderate pain 30 capsule 11    methocarbamol (ROBAXIN) 750 MG tablet TAKE 1 TABLET ORALLY EVERY 8 HOURS AS NEEDED      prazosin (MINIPRESS) 5 MG capsule Take 1 capsule (5 mg) by mouth at bedtime 90 capsule 1    promethazine (PHENERGAN) 25 MG tablet Take 1 tablet (25 mg) by mouth every 8 hours as needed for nausea associated with migranes 10 tablet 11    propranolol (INDERAL) 40 MG tablet Take 1 tablet (40 mg) by mouth 2 times daily 180 tablet 1    propranolol ER (INDERAL LA) 60 MG 24 hr capsule Take 1 capsule (60 mg) by mouth daily 90 capsule 1    sertraline (ZOLOFT) 100 MG tablet Take 2 tablets (200 mg) by mouth daily 180 tablet 1    SUMAtriptan (IMITREX) 100 MG tablet Take 1 tablet (100 mg) by mouth at onset of headache for migraine (repeat in 2 hours if needed) 18 tablet 11        Current Psychotropic Medications:    Indomethacin 50 mg twice daily was discontinued not useful  Norco currently used 1 twice daily and the midday dose is as needed  All the other medications above are correct     Herbal Remedies:   Cannabis flower is used  "daily for pain and it helps also for anxiety, very effective is used since 2020                                                                                                           Drug to Drug Interaction       Norco and sertraline may result in increased risk of serotonin syndrome  Gabapentin and methocarbamol or Norco may result in increased risk of respiratory depression  So far patient is not complaining of any side effects     Current Reported Side Effects       Patient reports side effects to current psychiatric medications:  No   Gene testing:  No   Results:       ALLERGIES/SENSITIVITY      Allergies   Allergen Reactions    Other Environmental Allergy     Seasonal Allergies         MEDICAL HISTORY      Patient Active Problem List   Diagnosis    Intractable chronic migraine without aura and without status migrainosus    Nausea vomiting and diarrhea    Cervicalgia    Chronic bilateral low back pain with bilateral sciatica    Right ureteral stone    Calculus of kidney    Hydronephrosis with urinary obstruction due to ureteral calculus                               Psychiatric pertinent history                          Depression History  1. First time experienced:  Childhood, patient was an anxious child and around age 7 - 8 depression started :\"at age 8 patient wanted to kill herself the first time \"  2. First time Antidepressant Drug started:   High school. Drug: Patient started with a therapist and her mom gave her [employer] Wart and in college she started with BuSpar   3. Last Episode started: Date: Continues     4. Hospitalization for severe (SI) suicidal ideation or (SA) suicide attempt   No Date: Never was hospitalized for her suicide attempts and the most recent one was in 2023 when she overdosed on Narco and slept it off at home  Comments:    5. Suicidal ideation current:  She has passive thoughts  6. Therapist: She sees a therapist once to twice per week since 2019 and it is very helpful she " also did EMDR  6. (CBT) Cognitive behavioral therapy  . Effective:     7. (DBT) Dialectical behavior therapy    . Effective:     8. Emergency Plan: To distract herself she will try and put herself into another world in her fantasies, she is bad in reaching out to people she might reach out to her spouse or he is recognizing it and will help, she might also reach out to her grandmother, and she has all the emergency numbers.           Social and Environmental Aspects                          A. Living situation is: lives with their spouse  B. Does patient have a pet  Yes. Pet dog  C. Marital Status:                                 Pharmacotherapy Indicators: Pharmaceutical Aspects:       1. Economic Assessment: Has insurance coverage and prescription coverage through her 's work federal employee plan  Prescription drug copayment is $  Manageable                                     The cost of obtaining prescribed medications:  Does not interfere with compliance.   Comment:  2. Patient's [facility]: [facility]                                                             3. Compliance assessment shows that the patient is Independent in medication administration  4. Historian: the patient is a good historian  5. Pill box:  Is used  c. The type of pillbox used is: Weekly   d. Misses Medications: adherent                                Pharmacokinetic                  Habits and Chemical Use  A. Alcohol: Occasionally  B. Tobacco: Patient does not use Tobacco products.  C. Caffeine: Will drink 2 cups of coffee until 11 AM approximately 3 shots of espresso  D. Recreational Drugs: Cannabis flowers in the evening for pain and anxiety  E. Exercise: Does physical therapy tries to do walking, hiking and camping  F: Hobbies: Gardening, flowers, vegetables growing, bettye tomatoes, crafting, woodworking, card making, knitting     Rating of Antidepressant Drugs:                  Selective serotonin reuptake inhibitor:    Citalopram/Celexa was on it approximately 6 weeks was ineffective  Sertraline/Zoloft she is on it since college and it works for anxiety and depression, max dose 200 mg/day, after she came off patient had suicidal ideation and was put back on     Serotonin - Noradrenaline  reuptake inhibitor:   Duloxetine/Cymbalta was tried earlier in her life and it was ineffective     Serotonin Modulator and Stimulator:   Negative     Noradrenaline and Dopamine reuptake inhibitor:   Bupropion/Wellbutrin was tried and caused suicidal ideation and it was scary     Tricyclic Antidepressants (TCA):   Amitriptyline was tried in 2005 or 2006  Doxepin /Sinequan was tried in April 2021 for a short period of time 10 mg     Tetracyclic Antidepressants:   Mirtazapine/Remeron was tried in 2019 max dose 30 mg/day was ineffective would give it a rating of 3  Trazodone was tried a few times it worked  for 2 or 3 nights and then it was not longer effective and the dose had to be increased each time     Monoamine Oxidase Inhibitor (MAOI):   Negative     Augmentation/Bipolar Therapy:        Lithium was tried in college in 2014 for approximately 6 months and there was no change  Topiramate max dose 100 mg/day was on it for a few years in 2019 for migraines and it worked but patient developed 50 plus kidney stones          Miscellaneous Augmentation Therapy:       Antipsychotics:   Aripiprazole patient was on it for 8 till 9 months no change  Brexpiprazole/Rexulti was on it for 10 months in 2017 was on it together with Zoloft and it was helpful but the price was so high that she could not afford it and the withdrawal was very tough.  If her insurance company would pay for it at this time she is willing to try that again  Quetiapine/Seroquel worked for a week and they had to increase her dose was given for sleep         Stimulants:   Negative        Benzodiazepines:   Alprazolam/Xanax was given to her from her mother's own supply as a teenager when  she had severe panic attack  Clonazepam from 2013 till 2014 in [place] was used for approximately 6 months as needed for anxiety  Lorazepam 1 mg was tried in 2019      Miscellaneous:   BuSpar was tried ineffective  Gabapentin max dose 2700 mg/day, is currently still used for pain but not 2700 mg/day only 1800 mg/day, the higher dose caused too many side effects  Hydroxyzine was tried - ineffective  [employer] wart between age [age] not to helpful  Propranolol 100 mg/day from 2020 to present helps with his anxiety and panic attacks      Miscellaneous Sleep Aides:   Diphenhydramine did not make her sleepy  Ambien/zolpidem was tried in high school and patient had very severe hallucinations  Melatonin was tried causes her drowsiness  Clonazepam was tried  Gabapentin was tried  Trazodone was tried  Mirtazapine was tried  Amitriptyline was tried  Prazosin was tried        Ketamine Treatment:   Negative      Other Reported Treatment for Depression and Related Mood Disorder History:   Light box/bright lights she uses her happy light and it helps  CPAP had sleep study and patient does not have obstructive sleep apnea      Comments:         Patient will follow with MTM as needed. All MTM findings will be shared with clinic psychiatrist. Patient was instructed to return for upcoming appointment with Dr. Taylor for recommendations and future treatment options.         JUNG ACOSTA, ANALILIAD    Pharmaceutical Care Coordinator   Time spent was 120 minutes without the patient and 66 minutes with the patient.     Virtual Visit Details     Type of service:  Video Visit     Originating Location (pt. Location): Other Office at work    Distant Location (provider location):  Off-site  Platform used for Video Visit: [employer]

## 2024-10-30 ENCOUNTER — VIRTUAL VISIT (OUTPATIENT)
Dept: NEUROLOGY | Facility: CLINIC | Age: 29
End: 2024-10-30
Payer: COMMERCIAL

## 2024-10-30 DIAGNOSIS — G43.719 INTRACTABLE CHRONIC MIGRAINE WITHOUT AURA AND WITHOUT STATUS MIGRAINOSUS: ICD-10-CM

## 2024-10-30 PROCEDURE — 99213 OFFICE O/P EST LOW 20 MIN: CPT | Mod: 95 | Performed by: PSYCHIATRY & NEUROLOGY

## 2024-10-30 PROCEDURE — G2211 COMPLEX E/M VISIT ADD ON: HCPCS | Mod: 95 | Performed by: PSYCHIATRY & NEUROLOGY

## 2024-10-30 RX ORDER — SUMATRIPTAN SUCCINATE 100 MG/1
100 TABLET ORAL
Qty: 18 TABLET | Refills: 11 | Status: SHIPPED | OUTPATIENT
Start: 2024-10-30

## 2024-10-30 RX ORDER — PROMETHAZINE HYDROCHLORIDE 25 MG/1
25 TABLET ORAL EVERY 8 HOURS PRN
Qty: 10 TABLET | Refills: 11 | Status: SHIPPED | OUTPATIENT
Start: 2024-10-30

## 2024-10-30 ASSESSMENT — MIGRAINE DISABILITY ASSESSMENT (MIDAS)
HOW MANY DAYS DID YOU MISS WORK OR SCHOOL BECAUSE OF HEADACHES: 4
HOW MANY DAYS DID YOU NOT DO HOUSEWORK BECAUSE OF HEADACHES: 5
HOW MANY DAYS IN THE PAST 3 MONTHS HAVE YOU HAD A HEADACHE: 20
HOW MANY DAYS WAS HOUSEWORK PRODUCTIVITY CUT IN HALF DUE TO HEADACHES: 4
ON A SCALE FROM 0-10 ON AVERAGE HOW PAINFUL WERE HEADACHES: 5
HOW MANY DAYS WAS YOUR PRODUCTIVITY CUT IN HALF BECAUSE OF HEADACHES: 7
TOTAL SCORE: 25
HOW OFTEN WERE SOCIAL ACTIVITIES MISSED DUE TO HEADACHES: 5

## 2024-10-30 ASSESSMENT — PAIN SCALES - GENERAL: PAINLEVEL_OUTOF10: MODERATE PAIN (5)

## 2024-10-30 ASSESSMENT — HEADACHE IMPACT TEST (HIT 6)
WHEN YOU HAVE HEADACHES HOW OFTEN IS THE PAIN SEVERE: VERY OFTEN
HOW OFTEN DO HEADACHES LIMIT YOUR DAILY ACTIVITIES: SOMETIMES
HOW OFTEN HAVE YOU FELT FED UP OR IRRITATED BECAUSE OF YOUR HEADACHES: SOMETIMES
HOW OFTEN HAVE YOU FELT TOO TIRED TO WORK BECAUSE OF YOUR HEADACHES: RARELY
HIT6 TOTAL SCORE: 62
HOW OFTEN DID HEADACHS LIMIT CONCENTRATION ON WORK OR DAILY ACTIVITY: SOMETIMES
WHEN YOU HAVE A HEADACHE HOW OFTEN DO YOU WISH YOU COULD LIE DOWN: ALWAYS

## 2024-10-30 NOTE — LETTER
10/30/2024       RE: Ingris Arnett  [address] [place] [address]     Dear Colleague,    Thank you for referring your patient, Ingris Arnett, to the [facility] [place] at [facility]. Please see a copy of my visit note below.    Virtual Visit Details    Type of service:  Video Visit     Originating Location (pt. Location): Home    Distant Location (provider location):  Off-site  Platform used for Video Visit: [facility]    Headache Neurology Progress Note  October 30, 2024      Assessment/Plan:   Ingris Arnett is a 29 year old woman who returns for follow-up of chronic headaches, with a chronic migraine without aura phenotype.  There was also a consideration of paroxysmal hemicrania, and has been on preventative indomethacin.  Headaches have improved significantly with Emgality to 0-1 out of 30 severe headache days a month. Daily headaches are also better.     She will continue her current acute regimen, which remains effective. She is no longer using indomethacin.    -Continue sumatriptan 100 mg as needed for severe headaches.  -For nausea related to headache, she will continue promethazine 25 mg as needed.     -She is currently taking propranolol, sertraline, and gabapentin.  These are not helpful for headache prevention.  She has previously taken doxepin without benefit.  -I recommend she continue Emgality every 28 days.  -In the future, other CGRP inhibitors or botulinum toxin injections using a chronic migraine protocol could be considered.     -Given her good response to steroid injections in the past to the pain clinic, I think would also be reasonable to repeat these injections in the future, if she has a flare of symptoms again.     The longitudinal plan of care for Margarita was addressed during this visit. Due to the added complexity in care, I will continue to support Margariat in the  "subsequent management of this condition(s) and with the ongoing continuity of care of this condition(s). I will see her back in 1 year, sooner if needed.    Margarita Corbin MD  Neurology     Subjective:    Ingris Arnett returns for follow up of chronic headaches, with a chronic migraine without aura phenotype.  There was also a consideration of paroxysmal hemicrania, and has been on preventative indomethacin.      Today, she reports her migraine attacks have improved some. Emgality is \"super effective\". She has only had 3-4 migraine attacks in the past 3 months.    Mild headaches are improved as well. These are more triggered by stress, which has also improved with job change. Excedrin is helpful as needed. This didn't used to be effective.    She denies side effects with Emgality, outside of injection site pain and lightheadedness x 10 minutes. She has taken it 6 months now.    For treatment, she takes sumatriptan as needed. This is helpful for all but two headaches.  She has promethazine available too.    She has only missed 2 days of activity in the past 6 months.    She started a new job in September, with less stress.   She is sleeping better. Now getting 7-8 hours of sleep per night.    Objective:    Vitals: There were no vitals taken for this visit.  General: Cooperative, NAD  Neurologic:  Mental Status: Fully alert, attentive and oriented. Speech clear and fluent.   Cranial Nerves: Facial movements symmetric.   Motor: No abnormal movements.      Pertinent Investigations:          10/30/2024     7:38 AM   HIT-6   When you have headaches, how often is the pain severe 11    How often do headaches limit your ability to do usual daily activities including household work, work, school, or social activities? 10    When you have a headache, how often do you wish you could lie down? 13    In the past 4 weeks, how often have you felt too tired to do work or daily activities because of your headaches 8    In the past " 4 weeks, how often have you felt fed up or irritated because of your headaches 10    In the past 4 weeks, how often did headaches limit your ability to concentrate on work or daily activities 10    HIT-6 Total Score 62        Patient-reported           10/30/2024     7:40 AM   MIDAS - in the past three months:   On how many days did you miss work or school because of your headaches? 4   How many days was your productivity at work or school reduced by half or more because of your headaches? 7   On how many days did you not do household work because of your headaches? 5   How many days was your productivity in household work reduced by half or more because of your headaches? 4   On how many days did you miss family, social, or leisure activities because of your headaches? 5   On how many days did you have a headache? 20   On a scale of 0-10, on average how painful were these headaches? 5   MIDAS Score 25 (IV - Severe Disability)          Again, thank you for allowing me to participate in the care of your patient.      Sincerely,    Margarita Corbin MD

## 2024-10-30 NOTE — NURSING NOTE
Current patient location: [address] [place] [address]    Is the patient currently in the state of [place]? YES    Visit mode:VIDEO    If the visit is dropped, the patient can be reconnected by: VIDEO VISIT: Text to cell phone:   Telephone Information:   Mobile [phone]       Will anyone else be joining the visit? NO  (If patient encounters technical issues they should call [phone]    Are changes needed to the allergy or medication list? No    Are refills needed on medications prescribed by this physician? YES    Rooming Documentation:  Questionnaire(s) completed    Reason for visit: Follow Up    Eva CROFT

## 2024-10-30 NOTE — PROGRESS NOTES
Virtual Visit Details    Type of service:  Video Visit     Originating Location (pt. Location): Home    Distant Location (provider location):  Off-site  Platform used for Video Visit: [facility]    Headache Neurology Progress Note  October 30, 2024      Assessment/Plan:   Ingris Arnett is a 29 year old woman who returns for follow-up of chronic headaches, with a chronic migraine without aura phenotype.  There was also a consideration of paroxysmal hemicrania, and has been on preventative indomethacin.  Headaches have improved significantly with Emgality to 0-1 out of 30 severe headache days a month. Daily headaches are also better.     She will continue her current acute regimen, which remains effective. She is no longer using indomethacin.    -Continue sumatriptan 100 mg as needed for severe headaches.  -For nausea related to headache, she will continue promethazine 25 mg as needed.     -She is currently taking propranolol, sertraline, and gabapentin.  These are not helpful for headache prevention.  She has previously taken doxepin without benefit.  -I recommend she continue Emgality every 28 days.  -In the future, other CGRP inhibitors or botulinum toxin injections using a chronic migraine protocol could be considered.     -Given her good response to steroid injections in the past to the pain clinic, I think would also be reasonable to repeat these injections in the future, if she has a flare of symptoms again.     The longitudinal plan of care for Margarita was addressed during this visit. Due to the added complexity in care, I will continue to support Margarita in the subsequent management of this condition(s) and with the ongoing continuity of care of this condition(s). I will see her back in 1 year, sooner if needed.    Margarita Corbin MD  Neurology     Subjective:    Ingris Arnett returns for follow up of chronic headaches, with a chronic migraine without aura phenotype.   "There was also a consideration of paroxysmal hemicrania, and has been on preventative indomethacin.      Today, she reports her migraine attacks have improved some. Emgality is \"super effective\". She has only had 3-4 migraine attacks in the past 3 months.    Mild headaches are improved as well. These are more triggered by stress, which has also improved with job change. Excedrin is helpful as needed. This didn't used to be effective.    She denies side effects with Emgality, outside of injection site pain and lightheadedness x 10 minutes. She has taken it 6 months now.    For treatment, she takes sumatriptan as needed. This is helpful for all but two headaches.  She has promethazine available too.    She has only missed 2 days of activity in the past 6 months.    She started a new job in September, with less stress.   She is sleeping better. Now getting 7-8 hours of sleep per night.    Objective:    Vitals: There were no vitals taken for this visit.  General: Cooperative, NAD  Neurologic:  Mental Status: Fully alert, attentive and oriented. Speech clear and fluent.   Cranial Nerves: Facial movements symmetric.   Motor: No abnormal movements.      Pertinent Investigations:          10/30/2024     7:38 AM   HIT-6   When you have headaches, how often is the pain severe 11    How often do headaches limit your ability to do usual daily activities including household work, work, school, or social activities? 10    When you have a headache, how often do you wish you could lie down? 13    In the past 4 weeks, how often have you felt too tired to do work or daily activities because of your headaches 8    In the past 4 weeks, how often have you felt fed up or irritated because of your headaches 10    In the past 4 weeks, how often did headaches limit your ability to concentrate on work or daily activities 10    HIT-6 Total Score 62        Patient-reported           10/30/2024     7:40 AM   MIDAS - in the past three months: "   On how many days did you miss work or school because of your headaches? 4   How many days was your productivity at work or school reduced by half or more because of your headaches? 7   On how many days did you not do household work because of your headaches? 5   How many days was your productivity in household work reduced by half or more because of your headaches? 4   On how many days did you miss family, social, or leisure activities because of your headaches? 5   On how many days did you have a headache? 20   On a scale of 0-10, on average how painful were these headaches? 5   MIDAS Score 25 (IV - Severe Disability)

## 2024-11-13 DIAGNOSIS — F41.9 ANXIETY: ICD-10-CM

## 2024-11-13 NOTE — TELEPHONE ENCOUNTER
Last seen: 07/11/2024  RTC: 2 months  Cancel: 3, 2 provider initiated  No-show: 0  Next appt: 12/02/2024    Last refilled: 07/16/2024 for 90 days     Medication requested:   Pending Prescriptions:                       Disp   Refills    sertraline (ZOLOFT) 100 MG tablet         180 ta*1            Sig: Take 2 tablets (200 mg) by mouth daily.    propranolol ER (INDERAL LA) 60 MG 24 hr c*90 cap*1            Sig: Take 1 capsule (60 mg) by mouth daily.    propranolol (INDERAL) 40 MG tablet        180 ta*1            Sig: Take 1 tablet (40 mg) by mouth 2 times daily.    From chart note:   - Continue propranolol IR to 40 mg BID PRN daily for acute anxiety   - Continue Prazosin to 5 mg at bedtime   - Continue sertraline 200 mg daily   - Continue propranolol LA 60 mg in the morning     Medication unable to be refilled by RN due to criteria not met as indicated.                 []Eligibility - not seen in the last year              []Supervision - no future appointment              [x]Compliance - no shows, cancellations or lapse in therapy              []Verification - order discrepancy              []Controlled medication              []Medication not included in policy              []90-day supply request              []Other:

## 2024-11-13 NOTE — TELEPHONE ENCOUNTER
[facility] Call Center    Phone Message    May a detailed message be left on voicemail: yes     Reason for Call: Medication Refill Request    Has the patient contacted the pharmacy for the refill? Yes   Name of medication being requested: Zoloft 100mg, Propranolol 40mg/60mg  Provider who prescribed the medication: Dr. Yolis Andrea   Pharmacy:   [address]    Date medication is needed: Patient called to schedule a follow-up with one of our attendings (Dr. Bansal's patient). She also wanted refills on both of her medications. She has 3 to 5 days left. She said she wanted it by the end of today, but writer told her requests may take up to 72 hours.     Action Taken: Other: [facility] Psychiat[facility] Nurse Pool     Travel Screening: Not Applicable     Date of Service: 11/13/24

## 2024-11-14 RX ORDER — SERTRALINE HYDROCHLORIDE 100 MG/1
200 TABLET, FILM COATED ORAL DAILY
Qty: 180 TABLET | Refills: 0 | Status: SHIPPED | OUTPATIENT
Start: 2024-11-14

## 2024-11-14 RX ORDER — PROPRANOLOL HYDROCHLORIDE 60 MG/1
60 CAPSULE, EXTENDED RELEASE ORAL DAILY
Qty: 90 CAPSULE | Refills: 0 | Status: SHIPPED | OUTPATIENT
Start: 2024-11-14

## 2024-11-14 RX ORDER — PROPRANOLOL HYDROCHLORIDE 40 MG/1
40 TABLET ORAL 2 TIMES DAILY
Qty: 180 TABLET | Refills: 0 | Status: SHIPPED | OUTPATIENT
Start: 2024-11-14

## 2024-12-02 ENCOUNTER — VIRTUAL VISIT (OUTPATIENT)
Dept: PSYCHIATRY | Facility: CLINIC | Age: 29
End: 2024-12-02
Attending: PSYCHIATRY & NEUROLOGY
Payer: COMMERCIAL

## 2024-12-02 DIAGNOSIS — F33.41 RECURRENT MAJOR DEPRESSIVE DISORDER, IN PARTIAL REMISSION (H): Primary | ICD-10-CM

## 2024-12-02 DIAGNOSIS — Z79.899 PHARMACOLOGIC THERAPY: ICD-10-CM

## 2024-12-02 DIAGNOSIS — F41.1 GENERALIZED ANXIETY DISORDER: ICD-10-CM

## 2024-12-02 PROCEDURE — 99214 OFFICE O/P EST MOD 30 MIN: CPT | Mod: 95 | Performed by: PSYCHIATRY & NEUROLOGY

## 2024-12-02 RX ORDER — GABAPENTIN 600 MG/1
1 TABLET ORAL
COMMUNITY
Start: 2024-09-28

## 2024-12-02 ASSESSMENT — ANXIETY QUESTIONNAIRES
GAD7 TOTAL SCORE: 11
8. IF YOU CHECKED OFF ANY PROBLEMS, HOW DIFFICULT HAVE THESE MADE IT FOR YOU TO DO YOUR WORK, TAKE CARE OF THINGS AT HOME, OR GET ALONG WITH OTHER PEOPLE?: VERY DIFFICULT
7. FEELING AFRAID AS IF SOMETHING AWFUL MIGHT HAPPEN: NOT AT ALL
GAD7 TOTAL SCORE: 11
4. TROUBLE RELAXING: NEARLY EVERY DAY
1. FEELING NERVOUS, ANXIOUS, OR ON EDGE: MORE THAN HALF THE DAYS
IF YOU CHECKED OFF ANY PROBLEMS ON THIS QUESTIONNAIRE, HOW DIFFICULT HAVE THESE PROBLEMS MADE IT FOR YOU TO DO YOUR WORK, TAKE CARE OF THINGS AT HOME, OR GET ALONG WITH OTHER PEOPLE: VERY DIFFICULT
3. WORRYING TOO MUCH ABOUT DIFFERENT THINGS: MORE THAN HALF THE DAYS
5. BEING SO RESTLESS THAT IT IS HARD TO SIT STILL: SEVERAL DAYS
6. BECOMING EASILY ANNOYED OR IRRITABLE: SEVERAL DAYS
2. NOT BEING ABLE TO STOP OR CONTROL WORRYING: MORE THAN HALF THE DAYS
GAD7 TOTAL SCORE: 11
7. FEELING AFRAID AS IF SOMETHING AWFUL MIGHT HAPPEN: NOT AT ALL

## 2024-12-02 ASSESSMENT — PAIN SCALES - GENERAL: PAINLEVEL_OUTOF10: SEVERE PAIN (6)

## 2024-12-02 NOTE — PATIENT INSTRUCTIONS
It was a pleasure to meet you, Margarita!  Our plan for today:  We'll continue your current medications without changes.  I put in some lab orders for you to get drawn at any [place] lab.  We'll see you back in clinic sometime in February or March after your visit with Dr. Taylor.  Call or contact by [employer] if any issues before then.    **For crisis resources, please see the information at the end of this document**   Patient Education    Thank you for coming to the [facility] [place] [facility].     Lab Testing:  If you had lab testing today and your results are reassuring or normal they will be mailed to you or sent through [employer] within 7 days. If the lab tests need quick action we will call you with the results. The phone number we will call with results is # [phone]. If this is not the best number please call our clinic and change the number.     Medication Refills:  If you need any refills please call your pharmacy and they will contact us. Our fax number for refills is [phone].   Three business days of notice are needed for general medication refill requests.   Five business days of notice are needed for controlled substance refill requests.   If you need to change to a different pharmacy, please contact the new pharmacy directly. The new pharmacy will help you get your medications transferred.     Contact Us:  Please call [phone] during business hours (8-5:00 M-F).   If you have medication related questions after clinic hours, or on the weekend, please call [phone].     Financial Assistance [phone]   Medical Records [phone]       MENTAL HEALTH CRISIS RESOURCES:  For a emergency help, please call 911 or go to the nearest Emergency Department.     Emergency Walk-In Options:   EmPATH Unit @ [facility] ([place]): [phone] - Specialized mental health emergency area designed to be calming  [facility] ([place]):  [phone]  [facility] ([place]): [phone]  [facility] ([place]): [phone]    [facility] Crisis Information:   [place]: [phone]  [place]: [phone]  [place] ([place]) - Adult: [phone]     Child: [phone]  [place] - Adult: [phone]     Child: [phone]  [place]: [phone]  List of all [facility] resources:   https://[place].gov/dhs/people-we-serve/adults/health-care/mental-health/resources/crisis-contacts.jsp    National Crisis Information:   Crisis Text Line: Text  [place]  to 637570  Suicide & Crisis Lifeline: 988  National Suicide Prevention Lifeline: 0-624-532-TALK (1-674.591.6763)       For online chat options, visit https://suicidepreventionlifeline.org/chat/  Poison Control Center: 1-483.826.4666  Trans Lifeline: 1-782-258-4053 - Hotline for transgender people of all ages  The [employer]f Project: 2-644-039-5734 - Hotline for LGBT youth     For Non-Emergency Support:   Fast Tracker: Mental Health & Substance Use Disorder Resources -   https://www.Appointuitn.org/

## 2024-12-02 NOTE — NURSING NOTE
Current patient location: [address] [place] [address]    Is the patient currently in the state of [place]? YES    Visit mode:VIDEO    If the visit is dropped, the patient can be reconnected by:VIDEO VISIT: Text to cell phone:   Telephone Information:   Mobile [phone]       Will anyone else be joining the visit? NO  (If patient encounters technical issues they should call [phone])    Are changes needed to the allergy or medication list? Yes please remove med flagged for removal: gabapentin (NEURONTIN) 300 MG cap    Are refills needed on medications prescribed by this physician? NO    Rooming Documentation:  Patient will complete questionnaire(s) in [facility]    Reason for visit: JULIA JIMENEZF

## 2024-12-02 NOTE — PROGRESS NOTES
"Virtual Visit Details    Type of service:  Video Visit     Originating Location (pt. Location): {video visit patient location:321127::\"Home\"}  {PROVIDER LOCATION On-site should be selected for visits conducted from your clinic location or adjoining [facility] hospital, academic office, or other nearby [facility] building. Off-site should be selected for all other provider locations, including home:477161}  Distant Location (provider location):  {virtual location provider:762696}  Platform used for Video Visit: {Virtual Visit Platforms:815693::\"SPORTLOGiQ\"}  "
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
## 2024-12-09 ENCOUNTER — TELEPHONE (OUTPATIENT)
Dept: NEUROLOGY | Facility: CLINIC | Age: 29
End: 2024-12-09
Payer: COMMERCIAL

## 2024-12-09 NOTE — TELEPHONE ENCOUNTER
Left Voicemail (1st Attempt) and Sent Mychart (1st Attempt) for the patient to call back and schedule the following:    Appointment type: Return Headache  Provider: Dr. Corbin  Return date: October 2025  Specialty phone number: [phone]  Additional appointment(s) needed:   Additonal Notes:     Zenaida Valdez on 12/9/2024 at 11:57 AM

## 2025-01-29 ENCOUNTER — OFFICE VISIT (OUTPATIENT)
Dept: PSYCHIATRY | Facility: CLINIC | Age: 30
End: 2025-01-29
Payer: COMMERCIAL

## 2025-01-29 VITALS
HEART RATE: 105 BPM | HEIGHT: 64 IN | BODY MASS INDEX: 40.19 KG/M2 | SYSTOLIC BLOOD PRESSURE: 131 MMHG | DIASTOLIC BLOOD PRESSURE: 86 MMHG | TEMPERATURE: 97.1 F | WEIGHT: 235.4 LBS

## 2025-01-29 DIAGNOSIS — F33.1 MODERATE EPISODE OF RECURRENT MAJOR DEPRESSIVE DISORDER (H): Primary | ICD-10-CM

## 2025-01-29 ASSESSMENT — PATIENT HEALTH QUESTIONNAIRE - PHQ9: SUM OF ALL RESPONSES TO PHQ QUESTIONS 1-9: 12

## 2025-01-29 NOTE — PROGRESS NOTES
" [facility] TMS Program  [address], [address]  [place] [address]  New Patient Evaluation     PCP- Tia Leonardo  Specialty Providers- none  Therapist- Julissa Briceño at [facility]  Psychiatric Med Management Provider- Dr Barros, resident clinic  Other Mental Health Providers- no    Referred by: psychiatrist  Referred for evaluation of:  depression      Chief Complaint                                                                                                       TRD     History of Present Illness                                                                                       Has been seeing residents at the resident clinic since 2019. Had depression, anxiety self harm starting around 10 years ol. Starting seeing psychiatrist and therapist in college and started meds. Cycled thourhg several different medicatinos over a few years trying to find one that workd. Sertraline worked the best at 200mg. Prevented huge down swings. Also prevents suicidal ideation. Was \"forced off\" when she moved from one state to [place] in [place] and \"slipped into a very dangerous space\". Thought this was best she could expect until Dr. Barros asked her if she'd thought about being happy, suggested ketamine or TMS which led to referral here, might help her brain reset and not locked into depressed state.     Sees a therapist 1-2x a week since 2020, is trauma therapist. Even when not actively suicidal will still have a lot of intrusive thoughts not ego syntonic. Engaging in self-destrictive behaviors in attempt to \"feel something\". Detached from body, detached from feelings.       Depression is expereinced at fatiuge, disinterest in doing things she knows makes her feel better. Has hobbies she enjoys but once gets out of habit finds it hard to rengage with them. Hopelessness, \"perpetual anxiety/worried state\", doesn't believe things will get better for her. Feels grief not necessarily " "sadness. Feels like a weight in chest, tightness in jaw/shoulders.     OCD symptoms: germs/hand washing, doorknobs. Needing things in particular order to extent it would interfere with life. Under control at present.     Never tried an MAOI never tried stimulants. Tried Rexulti in 2017/2018 15 months. Benefit marginal. Was also on Seroquel for sleep, worked well for a week and then stopped.     Sleep is \"terrible\", takes prazosin to help with nightmares and propanolol.     Manic episode: none    No psychosis     Self harm: none currently.     Had times where didn't shower, didn't brush teeth, sleep all day. Has had about five of these episodes.      No head injury history.     Seizure: had one seizure-like event years ago. Has migraines.     WHen spouse and patient moved to [place]     Has family in the area. Not super-duper close but not estranged. Also has long-term friends.        Pertinent Background and Present Symptoms:      Psychiatric Review of Symptoms:   Additionally, the patient notes the following relevant symptoms:      Homicidal Ideation: Negative     Eating Disorder: denies     Homicidal Ideation: denies     Current Substance Use: denies    Personality Disorder:  Efforts to avoid abandonment: no    Unstable relationships, splitting: no  Identity disturbance: no  Impulsivity: no  Suicidal/Self-injurious behavior: yes  High mood reactivity: no  Emptiness: no  Intense anger: no  Dissociation: no         Recent Substance Use:  none reported       Past diagnoses: MDD, anxiety, OCD, panic disorder, PTSD/C-PTSD    Past medication trials: multiple trials    Hospitalizations: no    Commitment: No, Current Rizvi order: No    ECT trials: No    TMS trials:  No      Ketamine:  No    Suicide attempts: Yes - \"several,\" most recent 2023    Self-injurious behavior: Yes - cutting on bottom of feet, mostly in high school. Most recent approximately 10 years ago    Aggressive or violent behavior: No    Past Outpatient " "Programs & Services  Medication management and Outpatient individual psychotherapy    Psych critical item history suicide attempt   suicidal ideation  SIB   trauma hx  mutiple psychotropic trials   major medical problems    Other mental health concerns discussed: anxiety, trauma, chronic pain    Sleep study: yes, no findings. Reports insomnia and \"fitful\" sleeping 3-4 hours/night    ADHD testing: no    ASD assessment:  no    Current Outpatient Programs & Services  Medication management, individual therapy         Psychiatric Review of Systems (Completed M.I.N.I. Version 7.0.2)     A. DEPRESSION  Past 2 Weeks:  low mood nearly every day, anhedonia most of the time, appetite change (increase), difficulties with sleep, low energy, worthlessness and/or guilt, and difficulty concentrating, thinking or making decisions    Past Episode:  low mood nearly every day, anhedonia most of the time, appetite change (decrease), difficulties with sleep, psychomotor changes (retardation), low energy, worthlessness and/or guilt, difficulty concentrating, thinking or making decisions, and suicidal ideation with plan, without intent    B. SUICIDALITY:  Risk: High  Current suicidal ideation: Yes, reports a plan, and denies intent.     -reports \"less than 10%\" in response to \"How likely are to you to try to kill yourself within the next 3 months on a scale from 0-100%?\"  -denies current SIB/Self Injurious Behavior and reports past SIB    -reports \"several\" lifetime suicide attempts.  She has notable risk factors for self-harm including previous suicide attempt, lives alone/ isolated, severe insomnia, and on opiates.  However, risk is mitigated by no h/o risky impulsive behavior, h/o seeking help when needed, future oriented, none to minimal alcohol use , commitment to family, and stable housing.      C. MARIAH/HYPOMANIA  Current Episode:  none    Past Episode:  none    D. PANIC:  unprovoked anxiety/fear, peaking in < 10 minutes, provoked " anxiety/fear, heart palpitations, sweaty or clammy hands, SOB, chest pain, flushing or chills, fear of losing control or going crazy, and fear of dying    E. AGORAPHOBIA:  marked fear/anxiety in standing in line,being alone at home, driving a car because of fear that escape might be difficult or help might not be available;, almost always, with active avoidance, a  or are endured with intense anxiety/fear, at a level out of proportion to the actual danger posed, and lasting 6 months or more    F. SOCIAL ANXIETY:  none    G. OBSESSIVE-COMPULSIVE:  obsessions, compulsions, and a diagnosis of OCD. Margarita reports symptoms are currently manageable after CBT for OCD. She reports some rumination, intrusive thoughts, and checking door locks at night, all of which she describes as manageable.    H. TRAUMA:  experienced traumatic event, witnessed traumatic event, re-experienced trauma, negative emotions, and difficulty sleeping    I. ALCOHOL & J. NON-ALCOHOL:  See below    K. PSYCHOSIS:   none    L-M. EATING DISORDER: none    N. GENERALIZED ANXIETY:  excessive anxiety or worry about several routine things, most days, with difficulty controlling worry, easily tired, weak or exhausted, difficulty concentrating or mind goes blank, and difficulty sleeping    O. RULE OUT MEDICAL, ORGANIC OR DRUG CAUSES FOR ALL DISORDERS  During any current disorder or past mood episode, patient reports:  A. Substance use or withdrawal: No  B. Medical illness: Yes    P. ANTISOCIAL PERSONALITY:  none     Other Cluster B Traits Identified (not formally assessed):  none discussed          SUBSTANCE USE HISTORY                                                                 CAGE-AID = 0    Have you felt you ought to cut down on your drinking or drug use? no  Have people annoyed you by criticizing your drinking or drug use? no  Have you felt bad or guilty about your drinking or drug use? no  Have you ever had a drink or used drugs first thing in  "the morning to steady your nerves or to get rid of a hangover? no      RECENT SUBSTANCE USE:     TOBACCO- none  CAFFEINE-  2-3 coffee/day  ALCOHOL- <1/week     CANNABIS- daily for pain            OTHER ILLICIT DRUGS- none    Past Use-   TOBACCO- none  CAFFEINE-  2-3/day  ALCOHOL- occasional  CANNABIS- daily for pain, starting 2020            OTHER ILLICIT DRUGS- none    CD Treatment history: No  Medical consequences due to use (eg HIV/Hepatitis)- No  Legal consequences due to use- No    SOCIAL and FAMILY HISTORY                                                             Ingris Arnett is a 29 year old    and  female with a psychiatric history of depression, who presents for a [facility] Treatment Resistant Depression program evaluation. Margarita was referred by her psychiatrist.    Living situation: Ingris lives with her spouse in a Private Residence.   Kids? No  Pets? Yes - three dogs  Guns, weapons, or other means to harm oneself in the home? No     Education: Ingris s highest level of education is college graduate    Occupation: Ingris is currently working full time for a [employer]    Finances: Ingris is financial supported by Employment    Relationships (kid/grandkids, spouse/partner, friends, support people): Specific Relationships & Quality of Relationship: Margarita reports their spouse is understanding and supportive, and knows when to bring in their best friend or mom.     Spiritual considerations: Yes - Native worldview atheist    Legal History: No    Trauma/Abuse History: Yes - has current diagnosis of CPTSD     History: No    Family Mental Health History: father - anxiety; paternal side is \"not discussed.\" Mother and maternal side \"literally everyone\" has depression, anxiety including mother, two aunts, sister, grandfather. Maternal grandmother and great aunt -  bipolar    Strengths & Coping Strategies:  Margarita is bright, personable, and resilient. She has " been engaged with mental health care services an tried multiple approaches to treat depression, OCD, and trauma.    Current Medications     Current Outpatient Medications   Medication Sig Dispense Refill    gabapentin (NEURONTIN) 300 MG capsule Take 600 mg by mouth 3 times daily      gabapentin (NEURONTIN) 600 MG tablet Take 1 tablet by mouth 3 times daily.      galcanezumab-gnlm (EMGALITY) 120 MG/ML injection Inject 1 mL (120 mg) subcutaneously every 28 days. 1 mL 11    HYDROcodone-acetaminophen (NORCO) 7.5-325 MG per tablet Take 1 tablet by mouth 3 times daily as needed      methocarbamol (ROBAXIN) 750 MG tablet TAKE 1 TABLET ORALLY EVERY 8 HOURS AS NEEDED      prazosin (MINIPRESS) 5 MG capsule Take 1 capsule (5 mg) by mouth at bedtime 90 capsule 1    promethazine (PHENERGAN) 25 MG tablet Take 1 tablet (25 mg) by mouth every 8 hours as needed for nausea. associated with migranes 10 tablet 11    propranolol (INDERAL) 40 MG tablet Take 1 tablet (40 mg) by mouth 2 times daily. 180 tablet 0    propranolol ER (INDERAL LA) 60 MG 24 hr capsule Take 1 capsule (60 mg) by mouth daily. 90 capsule 0    sertraline (ZOLOFT) 100 MG tablet Take 2 tablets (200 mg) by mouth daily. 180 tablet 0    SUMAtriptan (IMITREX) 100 MG tablet Take 1 tablet (100 mg) by mouth at onset of headache for migraine (repeat in 2 hours if needed). 18 tablet 11        Psychiatric Medication Trials         Selective serotonin reuptake inhibitor:   Citalopram/Celexa was on it approximately 6 weeks was ineffective  Sertraline/Zoloft she is on it since college and it works for anxiety and depression, max dose 200 mg/day, after she came off patient had suicidal ideation and was put back on     Serotonin - Noradrenaline  reuptake inhibitor:   Duloxetine/Cymbalta was tried earlier in her life and it was ineffective     Serotonin Modulator and Stimulator:   Negative     Noradrenaline and Dopamine reuptake inhibitor:   Bupropion/Wellbutrin was tried and caused  suicidal ideation and it was scary     Tricyclic Antidepressants (TCA):   Amitriptyline was tried in 2005 or 2006  Doxepin /Sinequan was tried in April 2021 for a short period of time 10 mg     Tetracyclic Antidepressants:   Mirtazapine/Remeron was tried in 2019 max dose 30 mg/day was ineffective would give it a rating of 3  Trazodone was tried a few times it worked  for 2 or 3 nights and then it was not longer effective and the dose had to be increased each time     Monoamine Oxidase Inhibitor (MAOI):   Negative     Augmentation/Bipolar Therapy:        Lithium was tried in [facility] in 2014 for approximately 6 months and there was no change  Topiramate max dose 100 mg/day was on it for a few years in 2019 for migraines and it worked but patient developed 50 plus kidney stones          Miscellaneous Augmentation Therapy:       Antipsychotics:   Aripiprazole patient was on it for 8 till 9 months no change  Brexpiprazole/Rexulti was on it for 10 months in 2017 was on it together with Zoloft and it was helpful but the price was so high that she could not afford it and the withdrawal was very tough.  If her insurance company would pay for it at this time she is willing to try that again  Quetiapine/Seroquel worked for a week and they had to increase her dose was given for sleep         Stimulants:   Negative        Benzodiazepines:   Alprazolam/Xanax was given to her from her mother's own supply as a teenager when she had severe panic attack  Clonazepam from 2013 till 2014 in [place] was used for approximately 6 months as needed for anxiety  Lorazepam 1 mg was tried in 2019      Miscellaneous:   BuSpar was tried ineffective  Gabapentin max dose 2700 mg/day, is currently still used for pain but not 2700 mg/day only 1800 mg/day, the higher dose caused too many side effects  Hydroxyzine was tried - ineffective  Post's wart between age [age] not to helpful  Propranolol 100 mg/day from 2020 to present helps with his anxiety  and panic attacks      Miscellaneous Sleep Aides:   Diphenhydramine did not make her sleepy  Ambien/zolpidem was tried in high school and patient had very severe hallucinations  Melatonin was tried causes her drowsiness  Clonazepam was tried  Gabapentin was tried  Trazodone was tried  Mirtazapine was tried  Amitriptyline was tried  Prazosin was tried        Ketamine Treatment:   Negative      Other Reported Treatment for Depression and Related Mood Disorder History:   Light box/bright lights she uses her happy light and it helps  CPAP had sleep study and patient does not have obstructive sleep apnea        Medical / Surgical History     Patient Active Problem List   Diagnosis    Intractable chronic migraine without aura and without status migrainosus    Nausea vomiting and diarrhea    Cervicalgia    Chronic bilateral low back pain with bilateral sciatica    Right ureteral stone    Calculus of kidney    Hydronephrosis with urinary obstruction due to ureteral calculus       Past Surgical History:   Procedure Laterality Date    COMBINED CYSTOSCOPY, INSERT STENT URETER(S) Left     CYSTOSCOPY, RETROGRADES, INSERT STENT URETER(S), COMBINED Right 1/9/2020    Procedure: CYSTOSCOPY, WITH RETROGRADE PYELOGRAM AND RIGHT URETERAL STENT INSERTION;  Surgeon: Irene Otto MD;  Location: [facility] OR     TOOTH EXTRACTION W/FORCEP      local anaesthesia         Medical Review of Systems                                                                                                        As per HPI      Metals Screen   Yes No Item    x Implanted or lodged metals in body    x Implanted surgical devices    x Metal containing facial or scalp tattoos    x Non removable piercings   Seizure Screen  Yes No Item    x Current Seizure Disorder?   x  History of Seizure?     Does patient have a cochlear implant? ______n____  Does patient have any shunts?________n___  Does patient have a pacemaker?______n____  Does patient have a vagus  "nerve stimulator?_____n_____  Does patient have a deep brain stimulator?______n____  Any other implanted device?_______n_________       Allergy   Other environmental allergy and Seasonal allergies     Vitals                                                                                                                             /86   Pulse 105   Temp 97.1  F (36.2  C) (Temporal)   Ht 1.626 m (5' 4\")   Wt 106.8 kg (235 lb 6.4 oz)   BMI 40.41 kg/m        Mental Status Exam                                                                                        Alertness: alert  and oriented  Appearance: well groomed  Behavior/Demeanor: cooperative and pleasant, with good  eye contact   Speech: normal and regular rate and rhythm  Language: intact  Psychomotor: normal or unremarkable  Mood: depressed  Affect: full range; was congruent to mood; was congruent to content  Thought Process/Associations: unremarkable  Thought Content:  Reports none;  Denies suicidal and violent ideation  Perception:  Reports none;  Denies auditory hallucinations and visual hallucinations  Insight: good  Judgment: good  Cognition: (6) does  appear grossly intact; formal cognitive testing was not done  oriented: time, person, and place  attention span: intact  concentration: intact  recent memory: intact  remote memory: intact  fund of knowledge: appropriate  Gait and Station: unremarkable     Labs and Data         PHQ9 Today:  12      1/10/2024     1:54 PM 3/6/2024     3:46 PM 9/18/2024     8:45 AM   PHQ   PHQ-9 Total Score 19 13 12   Q9: Thoughts of better off dead/self-harm past 2 weeks Not at all Not at all Not at all         Recent Labs   Lab Test 01/24/20  1054 01/10/20  0700 01/09/20  1112   CR 0.72  0.72 0.73 0.81   GFRESTIMATED >60  >60 >90 >90     Recent Labs   Lab Test 01/09/20  1112 03/29/19  0647   AST 16 25   ALT 33 41   ALKPHOS 114 91       Diagnosis and Assessment                                                      "                             Impression of MDD vs PDD, currently moderate, history of severe episodes with suicidality, SI improved significantly with Sertraline. Has h/o OCD currently well controlled. She has not tried stimulants but remembers not reacting well to bupropion. Atypical antipsychotics were minimally helpful. She is a good candidate for TMS, would consider ketamine if fails TMS. She has a history of a seizure like event no confirmed seizure, discussed that this may put her at increased risk of seizure from TMS.     She has a well documented failure of adequate trials of >= 4 antidepressants which represent multiple antidepressant classes as well as augmentation therapies. The patient has completed an adequate dose of individual psychotherapy.     Patient is burdened by her chronic symptoms of depression and her current episode has lasted over 12 months causing significant psychosocial dysfunction despite multiple trials of psychotropic medications and individual therapy. Due to remaining profound depression and numerous failed previous treatment modalities, the patient is a candidate for rTMS treament.     The risks, benefits, alternatives and potential adverse effects of the above have been explained and are understood by the patient. Ingris Arnett agrees to the treatment plan with the ability to do so. The pt knows to call the clinic for any problems or access emergency care if needed.   Medical considerations relevant to treatment are: Chronic pain  Substance concerns relevant to treatment are: NA    During the course of these treatments, the patient will be asked not to make any medication changes.   While waiting to initiate these treatments, it is absolutely reasonable to make medication changes, and suggestions (if any) are below.  After treatment is complete, the patient will transfer back to the referring provider. We have discussed that I am not able to offer long-term medication  management.    Suicide Risk Assessment:  Today Ingris Arnett reports no SI, intent or plan. In addition, she has notable risk factors for self-harm, including previous suicide attempt and hopelessness. However, risk is mitigated by no plan or intent, no access to lethal means, describes a safety plan, h/o seeking help when needed, none to minimal alcohol use , commitment to family, and good social support  . Therefore, based on all available evidence including the factors cited above, she does not appear to be at imminent risk for self-harm, does not meet criteria for a 72-hr hold, and therefore involuntary hospitalization will not be pursued at this  time.     Today the following issues were addressed:    1) Lethargy/fatigue   2)ANhedonia  3) Hopelessenss          Plan                                                                                                                          1) Major depressive disorder  -- Medications: Continue current outpatient psychotropic medications  -consider stimulant or MAOI in future although would need to balance this against OCD.     -- Psychotherapy: Continue regular individual psychotherapy       -- Procedures:    - rTMS, consider VNS.      -- Referrals: None          CRISIS NUMBERS:   Provided routinely in AVS.    Treatment Risk Statement:  The patient understands the risks, benefits, adverse effects and alternatives. Agrees to treatment with the capacity to do so. No medical contraindications to treatment. Agrees to call clinic for any problems. The patient understands to call 911 or go to the nearest ED if life threatening or urgent symptoms occur.            PROVIDER:  Eliel Taylor MD    Time based billing.  Time on day of service includes:  55min spent face to face with the patient   15 minutes pre-reviewing records  15 minutes preparing consultation note and follow up messages/documentation          TMS [id number] Left theta

## 2025-02-11 DIAGNOSIS — F41.9 ANXIETY: ICD-10-CM

## 2025-02-11 RX ORDER — PROPRANOLOL HYDROCHLORIDE 60 MG/1
60 CAPSULE, EXTENDED RELEASE ORAL DAILY
Qty: 30 CAPSULE | Refills: 0 | Status: SHIPPED | OUTPATIENT
Start: 2025-02-11

## 2025-02-11 RX ORDER — SERTRALINE HYDROCHLORIDE 100 MG/1
200 TABLET, FILM COATED ORAL DAILY
Qty: 60 TABLET | Refills: 0 | Status: SHIPPED | OUTPATIENT
Start: 2025-02-11

## 2025-02-11 RX ORDER — PROPRANOLOL HYDROCHLORIDE 40 MG/1
40 TABLET ORAL 2 TIMES DAILY
Qty: 60 TABLET | Refills: 0 | Status: SHIPPED | OUTPATIENT
Start: 2025-02-11

## 2025-02-11 NOTE — TELEPHONE ENCOUNTER
Health Call Center    Phone Message    May a detailed message be left on voicemail: yes     Reason for Call: Medication Refill Request    Has the patient contacted the pharmacy for the refill? Yes   Name of medication being requested: Sertraline 100mg, Propranolol 40mg & Propranolol 60mg  Provider who prescribed the medication: Dr. Andrea tx to Dr. Billings  Pharmacy:    [address]  Date medication is needed: Caller stated they have about 3 days left of medications and will need enough to get them through until their next appointment with provider on 3/5/25.    Action Taken: Message routed to:  Other: [facility] Nurse pool    Travel Screening: Not Applicable     Date of Service:

## 2025-02-11 NOTE — TELEPHONE ENCOUNTER
Last seen: 12/2  RTC: 3 months  Any patient initiated cancellations or no shows since last visit? No  Next appt: 3/5  Last filled: Sertraline 100 mg = 11/14 for 30 day supply, Propranolol 40 mg = 11/14 for 90 day supply, Propranolol 60 mg = 11/14 for 90 day supply     Incoming refill from patient via phone by patient or caregiver    Medication requested:   sertraline (ZOLOFT) 100 MG tablet   propranolol ER (INDERAL LA) 60 MG 24 hr capsule   propranolol (INDERAL) 40 MG tablet     From chart note:   1) Meds-   - Continue propranolol IR to 40 mg BID PRN daily for acute anxiety   - Continue Prazosin to 5 mg at bedtime   - Continue sertraline 200 mg daily   - Continue propranolol LA 60 mg in the morning     Is note signed/closed? Yes    Is this a 90 day request for a psych medication? No    If any red answers - send to provider    Otherwise -   LPNs - Please check  if controlled and send to provider  Others - Send to RNs

## 2025-02-11 NOTE — TELEPHONE ENCOUNTER
*Sertraline 100mg last filled 11/14 for 90 day supply.   *Propranolol IR 40mg last filled 11/14 for 90 day supply **note last visit note indicates BID PRN. Instructions indicate BID  *Propranolol ER 60mg last filled 11/14 for 90 day supply    Last refill per : NA    Medication unable to be refilled by RN due to criteria not met as indicated.                 []Eligibility - not seen in the last year              []Supervision - no future appointment              []Compliance - no shows, cancellations or lapse in therapy              [x]Verification - order discrepancy              []Controlled medication              []Medication not included in policy              []90-day supply request              []Other:      *Propranolol IR 40mg last filled 11/14 for 90 day supply **note last visit note indicates BID PRN. Instructions indicate BID and patient taking as BID based on refill request.    Will pend orders and route update to provider.    Lisa Newsome RN on 2/11/2025 at 11:33 AM

## 2025-03-05 ENCOUNTER — VIRTUAL VISIT (OUTPATIENT)
Dept: PSYCHIATRY | Facility: CLINIC | Age: 30
End: 2025-03-05
Attending: PSYCHIATRY & NEUROLOGY
Payer: COMMERCIAL

## 2025-03-05 DIAGNOSIS — F33.1 MODERATE EPISODE OF RECURRENT MAJOR DEPRESSIVE DISORDER (H): Primary | ICD-10-CM

## 2025-03-05 ASSESSMENT — ANXIETY QUESTIONNAIRES
GAD7 TOTAL SCORE: 16
8. IF YOU CHECKED OFF ANY PROBLEMS, HOW DIFFICULT HAVE THESE MADE IT FOR YOU TO DO YOUR WORK, TAKE CARE OF THINGS AT HOME, OR GET ALONG WITH OTHER PEOPLE?: VERY DIFFICULT
2. NOT BEING ABLE TO STOP OR CONTROL WORRYING: NEARLY EVERY DAY
3. WORRYING TOO MUCH ABOUT DIFFERENT THINGS: NEARLY EVERY DAY
4. TROUBLE RELAXING: NEARLY EVERY DAY
5. BEING SO RESTLESS THAT IT IS HARD TO SIT STILL: SEVERAL DAYS
IF YOU CHECKED OFF ANY PROBLEMS ON THIS QUESTIONNAIRE, HOW DIFFICULT HAVE THESE PROBLEMS MADE IT FOR YOU TO DO YOUR WORK, TAKE CARE OF THINGS AT HOME, OR GET ALONG WITH OTHER PEOPLE: VERY DIFFICULT
7. FEELING AFRAID AS IF SOMETHING AWFUL MIGHT HAPPEN: SEVERAL DAYS
GAD7 TOTAL SCORE: 16
6. BECOMING EASILY ANNOYED OR IRRITABLE: MORE THAN HALF THE DAYS
1. FEELING NERVOUS, ANXIOUS, OR ON EDGE: NEARLY EVERY DAY

## 2025-03-05 NOTE — PATIENT INSTRUCTIONS
**For crisis resources, please see the information at the end of this document**   Patient Education    Thank you for coming to the [facility] & [facility] [place] [facility].     Lab Testing:  If you had lab testing today and your results are reassuring or normal they will be mailed to you or sent through [employer] within 7 days. If the lab tests need quick action we will call you with the results. The phone number we will call with results is # [phone]. If this is not the best number please call our clinic and change the number.     Medication Refills:  If you need any refills please call your pharmacy and they will contact us. Our fax number for refills is [phone].   Three business days of notice are needed for general medication refill requests.   Five business days of notice are needed for controlled substance refill requests.   If you need to change to a different pharmacy, please contact the new pharmacy directly. The new pharmacy will help you get your medications transferred.     Contact Us:  Please call [phone] during business hours (8-5:00 M-F).   If you have medication related questions after clinic hours, or on the weekend, please call [phone].     Financial Assistance [phone]   Medical Records [phone]       MENTAL HEALTH CRISIS RESOURCES:  For a emergency help, please call 911 or go to the nearest Emergency Department.     Emergency Walk-In Options:   EmPATH Unit @ [facility] ([place]): [phone] - Specialized mental health emergency area designed to be calming  [facility] ([place]): [phone]  [facility] ([place]): [phone]  [facility]): [phone]    [facility] Crisis Information:   [place]: [phone]  [place]: [phone]  [place] ([place]) - Adult: [phone]     Child: [phone]  [place] - Adult: [phone]     Child: [phone]  [place]:  [phone]  List of all [facility] resources:   https://[place].gov/dhs/people-we-serve/adults/health-care/mental-health/resources/crisis-contacts.jsp    National Crisis Information:   Crisis Text Line: Text  [place]  to 108636  Suicide & Crisis Lifeline: 988  National Suicide Prevention Lifeline: 2-991-936-TALK (1-234.507.5577)       For online chat options, visit https://suicidepreventionlifeline.org/chat/  Poison Control Center: 2-970-520-4824  Trans Lifeline: 2-154-756-1202 - Hotline for transgender people of all ages  The Stef Project: 0-395-910-3333 - Hotline for LGBT youth     For Non-Emergency Support:   Fast Tracker: Mental Health & Substance Use Disorder Resources -   https://www.Prescription EyewearckApixion.org/

## 2025-03-05 NOTE — PROGRESS NOTES
Virtual Visit Details  Type of service:  Video Visit   Start time: 08:10  End time: 09:05  Originating Location (pt. Location): Home  Distant Location (provider location):  On-site  Platform used for Video Visit: [facility]  Psychiatry Clinic  MEDICAL PROGRESS NOTE  Transfer of Care visit       CARE TEAM:  PCP- Tia Leonardo    Psychotherapist- Julissa, once a week appointments, Neuro, Ob- Gyn, Pain     This person is a 30 year old who uses the name Margarita and pronouns she, her.      DIAGNOSIS   Major Depressive Disorder, recurrent, severe in partial remission  Generalized Anxiety Disorder with panic     ASSESSMENT   Margarita is a 30-year-old female (she/her) with a history of MDD, MADIHA with panic, OCD presenting for follow-up. She had partial relief of symptoms of MDD with sertraline but never had complete remission. Her OCD symptoms has been well controlled with sertraline and CBT. Anxiety and panic attacks are improved with a change in her job. Today, she reports mild worsening of depression including increased fatigue in the context of recent stressors but not to the degree of depressive episode. She can still function well, socialize, with no SI/HI. She has a good coping skills and addressing her mental health issues with therapist every week. However, she experienced several long-lasting depressive episode (up to 1yr and typically 4-6 months) and she experiences mild depression at her baseline even when she is not depressive episode. She wants to have more improvement and recently saw Dr. Taylor at [facility] this January. rTMS was recommended but she felt completing rTMS course at [facility] would not be feasible for her considering her current job and distance to the [place]. However she is interested in doing rTMS with provider near her and we provided some resources. She said she will reach out to those providers. Although it is not likely that she has a  "medical cause for her symptoms given the chronicity, since she has not had labs since 2020, and has treatment resistant depression, workup including CMP, CBC, and TSH to rule out medical contributors were ordered.     Will not make any medication changes today.     Future considerations  Per [facility] (Dr. Taylor)  - consider stimulant or MAOI in future although would need to balance this against OCD.   - Procedures: rTMS, consider VNS.       PLAN                                                                                                                1) Meds-   - Continue propranolol IR to 40 mg BID PRN daily for acute anxiety   - Continue Prazosin to 5 mg at bedtime   - Continue sertraline 200 mg daily   - Continue propranolol LA 60 mg in the morning      Prescribed by her pain specialist:  Gabapentin 600 mg TID  Hydrocodone-Acetamin 7.5-325 mg TID PRN for pain     2) Psychotherapy- Continue individual therapy    3) Next due-  Labs ordered - CMP, CBC, TSH    4) Referrals-  None. Looking for TMS provider near her place ([facility])    5) Dispo- [facility] in 2 months     PERTINENT BACKGROUND                                                    [most recent eval 08/01/22]   Previous diagnoses include MDD, OCD, Panic Disorder, and chronic pain. She first began citalopram for depression at the age of 14 and then later switched to sertraline in college which she reports was helpful for both mood and OCD.  Has also done CBT for OCD to endorsed benefit.  History of multiple suicide attempts, no psychiatric hospitalizations.  Notably, patient's first contact with this clinic was a diagnostic assessment completed as a one time SACHIN eval on 1/10/19, tried to make it to the end of the day had been recommended in the context of establishing care with new providers upon recently returning to live in [place] given her prolonged use of opiates.  From that DA: \"Unclear that chronic opioids appropriate for her chronic " "non-malignant pain states. That said, with what information we have, no clear indication that she has used non-medically, escalated in use, or any other concerns.\" Chronic pain condition(s) experienced by the patient include endometriosis, herniated lumbar disk and migraines.       Psych pertinent item history includes suicide attempt [multiple, most recent 2023] and suicidal ideation.     SUBJECTIVE   Since last visit with Dr. Andrea 12/2/24:  - Has been dealing with depression, anxiety, OCD since she was 8. Tried multiple antidepressant, finally Sertraline was helpful, it took 6 months for Sertraline to take effect. It was very helpful, especially for decreasing SI. Depression is improved but still experiencing depression  - OCD currently well controlled; CBT + sertraline has been helpful  - Still experiencing depressive episodes that usually last 4-6 months or sometimes up to year. In depressed episode she experiences fatigue, not being able to sleep + difficulty waking up, difficulty enjoy things, for example not doing hobbies. In her baseline (non-depressive episode), still having fatigue, but sleep is better, can function, go to work, socialize, and have \"capacity to lee\" and can enjoy hobbies. She want to decrease depressive episode + improve baseline  - Since starting a new job last September, first three months were good, but since last December, she has been experiencing higher level of stressors from her work, and has been also getting stress from current political situation that affected her 's job security. However, she did not have any \"depressive episode\". Although she has been experiencing a bit more fatigue and bit worse sleep (4-6 hours weekdays; 12-14 hours on weekend), her function at work has not been affected. She has been socializing with family and friends. She noticed she has been enjoying hobbies, but this could be related to increased work load.   - No SI for a while - maybe more " "than a year  - Anxiety has been \"relatively well managed\" now. No daily panic attacks as she were before, but still having occasional panic attack 5-7 times a year. Propranolol + therapy has been helpful, and she now has some coping skill. She usually utilize coping skills to escape before things get catastrophic but if she feels like her anxiety is out of control, she takes propranolol. Last panic attack was middle of January after having discussion with her  and she utilized propranolol + ice to calm her down. However, her overall frequency of panic attack and her anxiety has been better since she quit the last job.  - She has Seeing therapist every week. Has been helpful for dealing with anxiety, trauma, OCD and depression.  - No side effects from medications; Wants to continue current regimen without change  - Saw Dr. Taylor at [facility] clinic this January and she agrees that rTMS might be a good option but considering her current job and distnace between her place and [facility] clinic, doing TMS at [facility] did not sound feasible for her. However, she is still interested in doing rTMS with provider near her and we provided some resources.    RECENT SUBSTANCE USE:     Tobacco- none  Caffeine -  2-3 coffee/day  Alcohol - <1/week     Cannabis - medical marijuana 4-5 times a week for pain also helpful for anxiety and sleep;     Pertinent Negatives: No suicidal ideation, violent ideation, self-injury, psychosis, hallucinations, jackie and harmful substance use  Adverse Effects: None    PSYCH and SUBSTANCE USE Critical Summary Points since July 2022 8/1/22: Transfer visit, no changes   10/3/22: No changes  12/5/22: Added Prazosin 1 mg at bedtime for nightmares back to list (restarted between appointments)  12/13/23: Moved Propranolol LA to morning and increased Prazosin to 5 mg at bedtime  1/10/24: Increased Propranolol IR to 40 mg BID PRN for anxiety      FAMILY and SOCIAL HISTORY                            Per " chart review; reviewed and confirmed with pt     FAMILY HX:  Maternal Great-Grandmother had BPAD; Depression in family, both mom and dad, maternal aunt.     SOCIAL HX:  Financial/ Work-  for [employer], loves the work and mission.  Partner/ - , Kristopher,  in 2017. Patient reports they have a great supportive relationship.   Children- None  Living situation- lives in [place]    Social/ Spiritual Support- spouse, family, dog  Legal- no  FEELS SAFE AT HOME- yes      MEDICAL HISTORY and ALLERGY     ALLERGIES: Other environmental allergy and Seasonal allergies    Patient Active Problem List   Diagnosis    Intractable chronic migraine without aura and without status migrainosus    Nausea vomiting and diarrhea    Cervicalgia    Chronic bilateral low back pain with bilateral sciatica    Right ureteral stone    Calculus of kidney    Hydronephrosis with urinary obstruction due to ureteral calculus        MEDICAL REVIEW OF SYSTEMS     none in addition to that documented above     MEDICATIONS     Current Outpatient Medications   Medication Sig Dispense Refill    gabapentin (NEURONTIN) 300 MG capsule Take 600 mg by mouth 3 times daily      gabapentin (NEURONTIN) 600 MG tablet Take 1 tablet by mouth 3 times daily.      galcanezumab-gnlm (EMGALITY) 120 MG/ML injection Inject 1 mL (120 mg) subcutaneously every 28 days. 1 mL 11    HYDROcodone-acetaminophen (NORCO) 7.5-325 MG per tablet Take 1 tablet by mouth 3 times daily as needed      methocarbamol (ROBAXIN) 750 MG tablet TAKE 1 TABLET ORALLY EVERY 8 HOURS AS NEEDED      prazosin (MINIPRESS) 5 MG capsule Take 1 capsule (5 mg) by mouth at bedtime 90 capsule 1    promethazine (PHENERGAN) 25 MG tablet Take 1 tablet (25 mg) by mouth every 8 hours as needed for nausea. associated with migranes 10 tablet 11    propranolol (INDERAL) 40 MG tablet Take 1 tablet (40 mg) by mouth 2 times daily. 60 tablet 0    propranolol ER (INDERAL LA) 60 MG 24 hr  "capsule Take 1 capsule (60 mg) by mouth daily. 30 capsule 0    sertraline (ZOLOFT) 100 MG tablet Take 2 tablets (200 mg) by mouth daily. 60 tablet 0    SUMAtriptan (IMITREX) 100 MG tablet Take 1 tablet (100 mg) by mouth at onset of headache for migraine (repeat in 2 hours if needed). 18 tablet 11      VITALS   There were no vitals taken for this visit.    MENTAL STATUS EXAM     Alertness: alert  and oriented  Appearance: Well-groomed in professional attire  Behavior/Demeanor: cooperative, pleasant and calm, with good  eye contact   Speech: normal and regular rate and rhythm  Language: intact and no problems  Psychomotor: normal or unremarkable  Mood:  \"Okay\"  Affect: Mildly restricted to depressed; congruent to: mood- yes, content- yes  Thought Process/Associations: unremarkable  Thought Content:  Reports none;  Denies suicidal & violent ideation and delusions  Perception:  Reports none;  Denies hallucinations  Insight: good  Judgment: good  Cognition: does  appear grossly intact; formal cognitive testing was not done  Gait and Station: N/A ([facility])     LABS and DATA         3/6/2024     3:46 PM 9/18/2024     8:45 AM 1/29/2025     3:00 PM   PHQ   PHQ-9 Total Score 13 12 12   Q9: Thoughts of better off dead/self-harm past 2 weeks Not at all Not at all Not at all     Labs reviewed-nothing up to date, last checked in 2020       PSYCHOTROPIC DRUG INTERACTIONS                                                       PSYCHCLINICDDI   Concurrent use of OXYCODONE and SERTRALINE may result in an increased risk of serotonin syndrome (tachycardia, hyperthermia, myoclonus, mental status changes).     Concurrent use of OXYCODONE and CNS DEPRESSANTS may result in increased risk of respiratory and CNS depression.     Concurrent use of PRAZOSIN and PROPRANOLOL may may enhance the orthostatic hypotensive effect of Alpha1-Blockers     Sertraline and Propranolol: Concurrent use of PROPRANOLOL and CYP2D6 INHIBITORS may result in " increased propranolol exposure.    MANAGEMENT:  Monitoring for adverse effects, routine vitals and patient is aware of risks     RISK STATEMENT for SAFETY     Margarita did not appear to be an imminent safety risk to self or others.    TREATMENT RISK STATEMENT: The risks, benefits, alternatives and potential adverse effects have been discussed and are understood by the pt. The pt understands the risks of using street drugs or alcohol. There are no medical contraindications, the pt agrees to treatment with the ability to do so. The pt knows to call the clinic for any problems or to access emergency care if needed.  Medical and substance use concerns are documented above.  Psychotropic drug interaction check was done, including changes made today.     PSYCHIATRIST: Alli Billings MD PhD    This patient was staffed by Dr Burton who agrees with my assessment and plan    I saw the patient with the resident, and participated in key portions of the service, including the mental status examination and developing the plan of care. I reviewed key portions of the history with the resident. I agree with the findings and plan as documented in this note.    Cinthya Burton MD      Answers submitted by the patient for this visit:  Patient Health Questionnaire (G7) (Submitted on 12/2/2024)  MADIHA 7 TOTAL SCORE: 11    Answers submitted by the patient for this visit:  Patient Health Questionnaire (G7) (Submitted on 3/5/2025)  MADIHA 7 TOTAL SCORE: 16     normal... Well appearing, well nourished, awake, alert, oriented to person, place, time/situation and in no apparent distress.

## 2025-05-21 RX ORDER — PROPRANOLOL HYDROCHLORIDE 60 MG/1
60 CAPSULE, EXTENDED RELEASE ORAL DAILY
Qty: 90 CAPSULE | Refills: 1 | Status: CANCELLED | OUTPATIENT
Start: 2025-05-21

## 2025-05-22 ENCOUNTER — VIRTUAL VISIT (OUTPATIENT)
Dept: PSYCHIATRY | Facility: CLINIC | Age: 30
End: 2025-05-22
Attending: PSYCHIATRY & NEUROLOGY
Payer: COMMERCIAL

## 2025-05-22 DIAGNOSIS — F33.41 RECURRENT MAJOR DEPRESSIVE DISORDER, IN PARTIAL REMISSION: ICD-10-CM

## 2025-05-22 DIAGNOSIS — F41.9 ANXIETY: ICD-10-CM

## 2025-05-22 DIAGNOSIS — F41.1 GENERALIZED ANXIETY DISORDER: Primary | ICD-10-CM

## 2025-05-22 PROCEDURE — G2211 COMPLEX E/M VISIT ADD ON: HCPCS | Mod: 95

## 2025-05-22 PROCEDURE — 98006 SYNCH AUDIO-VIDEO EST MOD 30: CPT | Mod: U7

## 2025-05-22 PROCEDURE — 1125F AMNT PAIN NOTED PAIN PRSNT: CPT | Mod: 95

## 2025-05-22 RX ORDER — PROPRANOLOL HYDROCHLORIDE 40 MG/1
TABLET ORAL
Qty: 7 TABLET | Refills: 0 | Status: SHIPPED | OUTPATIENT
Start: 2025-05-22 | End: 2025-06-19

## 2025-05-22 RX ORDER — TRAZODONE HYDROCHLORIDE 50 MG/1
50 TABLET ORAL
Qty: 30 TABLET | Refills: 1 | Status: SHIPPED | OUTPATIENT
Start: 2025-05-22

## 2025-05-22 RX ORDER — PRAZOSIN HYDROCHLORIDE 5 MG/1
5 CAPSULE ORAL AT BEDTIME
Qty: 90 CAPSULE | Refills: 1 | Status: SHIPPED | OUTPATIENT
Start: 2025-05-22

## 2025-05-22 RX ORDER — SERTRALINE HYDROCHLORIDE 100 MG/1
200 TABLET, FILM COATED ORAL DAILY
Qty: 180 TABLET | Refills: 0 | Status: SHIPPED | OUTPATIENT
Start: 2025-05-22

## 2025-05-22 ASSESSMENT — PATIENT HEALTH QUESTIONNAIRE - PHQ9
10. IF YOU CHECKED OFF ANY PROBLEMS, HOW DIFFICULT HAVE THESE PROBLEMS MADE IT FOR YOU TO DO YOUR WORK, TAKE CARE OF THINGS AT HOME, OR GET ALONG WITH OTHER PEOPLE: VERY DIFFICULT
SUM OF ALL RESPONSES TO PHQ QUESTIONS 1-9: 10
SUM OF ALL RESPONSES TO PHQ QUESTIONS 1-9: 10

## 2025-05-22 ASSESSMENT — PAIN SCALES - GENERAL: PAINLEVEL_OUTOF10: SEVERE PAIN (7)

## 2025-05-22 NOTE — NURSING NOTE
Current patient location: [address] [place] [address]    Is the patient currently in the state of [place]? YES    Visit mode: VIDEO    If the visit is dropped, the patient can be reconnected by:VIDEO VISIT: Text to cell phone:   Telephone Information:   Mobile [phone]       Will anyone else be joining the visit? NO  (If patient encounters technical issues they should call [phone]    Are changes needed to the allergy or medication list? No    Are refills needed on medications prescribed by this physician? NO    Rooming Documentation:  Questionnaire(s) completed    Reason for visit: RECHECK    Larisa JIMENEZF

## 2025-05-22 NOTE — PATIENT INSTRUCTIONS
Aissatou Avila,    The plan to decrease Long acting propranolol is as below:  1) Take 40mg for 1 week  2) Take 20mg after that    You might feel the rebounding symptoms such as palpitation (heart pounding, fast heart beat), high blood pressure, headache.    Please let me know if you experiences these rebounding symptoms so that we can adjust the dose.    Thank you      **For crisis resources, please see the information at the end of this document**   Patient Education    Thank you for coming to the [facility] MENTAL HEALTH & [facility]DI[facility] [place] CLIN[facility].     Lab Testing:  If you had lab testing today and your results are reassuring or normal they will be mailed to you or sent through [employer] within 7 days. If the lab tests need quick action we will call you with the results. The phone number we will call with results is # [phone]. If this is not the best number please call our clinic and change the number.     Medication Refills:  If you need any refills please call your pharmacy and they will contact us. Our fax number for refills is [phone].   Three business days of notice are needed for general medication refill requests.   Five business days of notice are needed for controlled substance refill requests.   If you need to change to a different pharmacy, please contact the new pharmacy directly. The new pharmacy will help you get your medications transferred.     Contact Us:  Please call [phone] during business hours (8-5:00 M-F).   If you have medication related questions after clinic hours, or on the weekend, please call [phone].     Financial Assistance [phone]   Medical Records [phone]       MENTAL HEALTH CRISIS RESOURCES:  For a emergency help, please call 911 or go to the nearest Emergency Department.     Emergency Walk-In Options:   EmPATH Unit @ [facility] ([place]): [phone] - Specialized mental health emergency area designed to be calming  [facility] ([place]): [phone]  [facility] ([place]): [phone]  [facility] ([place]): [phone]    [facility] Crisis Information:   [place]: [phone]  [place]: [phone]  [place] ([place]) - Adult: [phone]     Child: [phone]  [place] - Adult: [phone]     Child: [phone]  [place]: [phone]  List of all [facility] resources:   https://[place].gov/dhs/people-we-serve/adults/health-care/mental-health/resources/crisis-contacts.jsp    National Crisis Information:   Crisis Text Line: Text  [place]  to 685809  Suicide & Crisis Lifeline: 988  National Suicide Prevention Lifeline: 3-418-295-TALK (1-635.369.3896)       For online chat options, visit https://suicidepreventionlifeline.org/chat/  Poison Control Center: 6-603-219-3192  Trans Lifeline: 4-615-947-0954 - Hotline for transgender people of all ages  The Stef Project: 3-729-228-5277 - Hotline for LGBT youth     For Non-Emergency Support:   Fast Tracker: Mental Health & Substance Use Disorder Resources -   https://www.HealthyOutckEfizityn.org/

## 2025-05-22 NOTE — PROGRESS NOTES
Virtual Visit Details  Type of service:  Video Visit   Start time: 08:10  End time: 08:45  Originating Location (pt. Location): Home  Distant Location (provider location):  On-site  Platform used for Video Visit: [facility]  Psychiatry Clinic  MEDICAL PROGRESS NOTE       CARE TEAM:  PCP- Physician No Ref-Primary    Psychotherapist- Julissa, once a week appointments, Neuro, Ob- Gyn, Pain     This person is a 30 year old who uses the name Margarita and pronouns she, her.      DIAGNOSIS   Major Depressive Disorder, recurrent, severe in partial remission  Generalized Anxiety Disorder with panic     ASSESSMENT   Margarita is a 30-year-old female (she/her) with a history of MDD, MADIHA with panic, OCD presenting for follow-up. She had partial relief of symptoms of MDD with sertraline but never had complete remission. Her OCD symptoms has been well controlled with sertraline and CBT. Anxiety and panic attacks are improved with a change in her job.     Today, she reports worsening of depression including increased fatigue in the context of her stressful relationship with her partner as well as job stressors. She has been feeling more fatigued, less motivated and less energetic. She also reports hard time falling into sleep. However, shed denied any safety concerns including  SI/HI. We reviewed safety plan and she has a good safety plan.    We discussed several options that could help with her current depression. As her therapist recommended, couples therapy would be helpful and she was interested in that option. She has been taking both propranolol and prazocin at the night time, which helped her to sleep well in the past and decreased anxiety but made her tired and dizzy. She noticed feeling fatigued and having hard time waking up in the morning even after 7-8 hours of sleeps. To decrease morning fatigue and dizziness during the night time, we discussed reducing night time propranolol and she  was interested in tapering down the propranolol (40 for 1 week and then 20 after that). She was also interested in trying trazodone 50mg prn at least temporarily to improve her sleep during this stressful period of time. She agreed to have short-term follow up given current situation. We will discuss more on other medication options to help with her motivation, fatigue and energy. Bupropion increased SI in the past. She has been seeing therapist twice a week, it has been helpful. She was willing to continue her therapy. She is still looking for TMS provider near her place ([facility]).    Although it is not likely that she has a medical cause for her symptoms given the chronicity, since she has not had labs since 2020, and has treatment resistant depression, workup including CMP, CBC, and TSH to rule out medical contributors were ordered.     Future considerations  Per TRD clinic (Dr. Taylor)  - consider stimulant or MAOI in future although would need to balance this against OCD.   - Procedures: rTMS, consider VNS.       PLAN                                                                                                                1) Meds-   - Continue propranolol IR to 40 mg BID PRN daily for acute anxiety   - Continue Prazosin to 5 mg at bedtime   - Continue sertraline 200 mg daily   - Decrease propranolol LA to 40 mg at bedtime for 1 week, then take 20mg  - Start Trazodone 50mg at bedtime prn     Prescribed by her pain specialist:  Gabapentin 600 mg TID  Hydrocodone-Acetamin 7.5-325 mg TID PRN for pain     2) Psychotherapy- Continue individual therapy    3) Next due-  Labs ordered - CMP, CBC, TSH    4) Referrals-  None. Looking for TMS provider near her place ([facility])    5) Dispo- RTC in 3 weeks     PERTINENT BACKGROUND                                                    [most recent eval 08/01/22]   Previous diagnoses include MDD, OCD, Panic Disorder, and chronic pain. She first began citalopram for  "depression at the age of 14 and then later switched to sertraline in college which she reports was helpful for both mood and OCD. Has also done CBT for OCD to endorsed benefit.  History of multiple suicide attempts, no psychiatric hospitalizations. Notably, patient's first contact with this clinic was a diagnostic assessment completed as a one time SACHIN eval on 1/10/19, tried to make it to the end of the day had been recommended in the context of establishing care with new providers upon recently returning to live in [place] given her prolonged use of opiates.  From that DA: \"Unclear that chronic opioids appropriate for her chronic non-malignant pain states. That said, with what information we have, no clear indication that she has used non-medically, escalated in use, or any other concerns.\" Chronic pain condition(s) experienced by the patient include endometriosis, herniated lumbar disk and migraines.       Psych pertinent item history includes suicide attempt [multiple, most recent 2023] and suicidal ideation.     SUBJECTIVE   Since last visit:  - Depression and anxiety has been worse in the context of relationship stressors with her partner, and job stressors. Her partner started trauma therapy and started being more focused on himself and has been invalidating for her. Her therapist recommended couples therapy and she was interested in that option. She has been feeling less motivated, and having no energy.  - Reports having trouble with sleeping. She takes both propranolol and prazocin and they make her tired and dizzy. She stays in the bed after taking them, but she still needed 1-2 hours to fall into sleep. She denied frequent waking up in the night time but states she feels fatigued in the morning and having hard time waking up, even after 7-8 hours of sleeps.   - Anxiety is now less, because she is depressed now. States she does not have energy to feel anxiety. She has been utilizing less propranolol " recently  - Denied any safety concerns. No SI/HI. Reviewed safety plan and she has a good safety plan.  - Has been seeing therapist twice a week, it has been helpful.   - Discussed several options to help with her depression and anxiety. To decrease morning fatigue and dizziness during the night time, we discussed decreasing night time propranolol and she was interested in tapering down the propranolol (40 for 1 week and then 20 after that). She was also interested in trying trazodone 50mg prn to help with her sleep, instead of propranolol.   - She is still looking for TMS provider near her place ([facility])    RECENT SUBSTANCE USE:     Tobacco- none  Caffeine -  2-3 coffee/day  Alcohol - <1/week     Cannabis - medical marijuana 4-5 times a week for pain also helpful for anxiety and sleep;     Pertinent Negatives: No suicidal ideation, violent ideation, self-injury, psychosis, hallucinations, jackie and harmful substance use  Adverse Effects: None    PSYCH and SUBSTANCE USE Critical Summary Points since July 2022 8/1/22: Transfer visit, no changes   10/3/22: No changes  12/5/22: Added Prazosin 1 mg at bedtime for nightmares back to list (restarted between appointments)  12/13/23: Moved Propranolol LA to morning and increased Prazosin to 5 mg at bedtime  1/10/24: Increased Propranolol IR to 40 mg BID PRN for anxiety      FAMILY and SOCIAL HISTORY                            Per chart review; reviewed and confirmed with pt     FAMILY HX:  Maternal Great-Grandmother had BPAD; Depression in family, both mom and dad, maternal aunt.     SOCIAL HX:  Financial/ Work-  for [employer], loves the work and mission.  Partner/ - , Kristopher,  in 2017. Patient reports they have a great supportive relationship.   Children- None  Living situation- lives in [place]    Social/ Spiritual Support- spouse, family, dog  Legal- no  FEELS SAFE AT HOME- yes      MEDICAL HISTORY and ALLERGY      ALLERGIES: Other environmental allergy and Seasonal allergies    Patient Active Problem List   Diagnosis    Intractable chronic migraine without aura and without status migrainosus    Nausea vomiting and diarrhea    Cervicalgia    Chronic bilateral low back pain with bilateral sciatica    Right ureteral stone    Calculus of kidney    Hydronephrosis with urinary obstruction due to ureteral calculus        MEDICAL REVIEW OF SYSTEMS     none in addition to that documented above     MEDICATIONS     Current Outpatient Medications   Medication Sig Dispense Refill    gabapentin (NEURONTIN) 300 MG capsule Take 600 mg by mouth 3 times daily      gabapentin (NEURONTIN) 600 MG tablet Take 1 tablet by mouth 3 times daily.      galcanezumab-gnlm (EMGALITY) 120 MG/ML injection Inject 1 mL (120 mg) subcutaneously every 28 days. 1 mL 11    HYDROcodone-acetaminophen (NORCO) 7.5-325 MG per tablet Take 1 tablet by mouth 3 times daily as needed      methocarbamol (ROBAXIN) 750 MG tablet TAKE 1 TABLET ORALLY EVERY 8 HOURS AS NEEDED      prazosin (MINIPRESS) 5 MG capsule Take 1 capsule (5 mg) by mouth at bedtime 90 capsule 1    promethazine (PHENERGAN) 25 MG tablet Take 1 tablet (25 mg) by mouth every 8 hours as needed for nausea. associated with migranes 10 tablet 11    propranolol (INDERAL) 40 MG tablet Take 1 tablet (40 mg) by mouth 2 times daily. 60 tablet 0    propranolol ER (INDERAL LA) 60 MG 24 hr capsule Take 1 capsule (60 mg) by mouth daily. 90 capsule 0    sertraline (ZOLOFT) 100 MG tablet Take 2 tablets (200 mg) by mouth daily. 180 tablet 0    SUMAtriptan (IMITREX) 100 MG tablet Take 1 tablet (100 mg) by mouth at onset of headache for migraine (repeat in 2 hours if needed). 18 tablet 11      VITALS   There were no vitals taken for this visit.    MENTAL STATUS EXAM     Alertness: alert  and oriented  Appearance: Well-groomed in professional attire  Behavior/Demeanor: cooperative, pleasant and calm, with good  eye contact  "  Speech: normal and regular rate and rhythm  Language: intact and no problems  Psychomotor: normal or unremarkable  Mood:  \"depressed\"  Affect: Mildly restricted to depressed; congruent to: mood- yes, content- yes  Thought Process/Associations: unremarkable  Thought Content:  Reports none;  Denies suicidal & violent ideation and delusions  Perception:  Reports none;  Denies hallucinations  Insight: good  Judgment: good  Cognition: does  appear grossly intact; formal cognitive testing was not done  Gait and Station: N/A (telehealth)     LABS and DATA         3/6/2024     3:46 PM 9/18/2024     8:45 AM 1/29/2025     3:00 PM   PHQ   PHQ-9 Total Score 13 12 12   Q9: Thoughts of better off dead/self-harm past 2 weeks Not at all Not at all Not at all     Labs reviewed-nothing up to date, last checked in 2020       PSYCHOTROPIC DRUG INTERACTIONS                                                       PSYCHCLINICDDI   Concurrent use of OXYCODONE and SERTRALINE may result in an increased risk of serotonin syndrome (tachycardia, hyperthermia, myoclonus, mental status changes).     Concurrent use of OXYCODONE and CNS DEPRESSANTS may result in increased risk of respiratory and CNS depression.     Concurrent use of PRAZOSIN and PROPRANOLOL may may enhance the orthostatic hypotensive effect of Alpha1-Blockers     Sertraline and Propranolol: Concurrent use of PROPRANOLOL and CYP2D6 INHIBITORS may result in increased propranolol exposure.    MANAGEMENT:  Monitoring for adverse effects, routine vitals and patient is aware of risks     RISK STATEMENT for SAFETY     Margarita did not appear to be an imminent safety risk to self or others.    TREATMENT RISK STATEMENT: The risks, benefits, alternatives and potential adverse effects have been discussed and are understood by the pt. The pt understands the risks of using street drugs or alcohol. There are no medical contraindications, the pt agrees to treatment with the ability to do so. The pt " knows to call the clinic for any problems or to access emergency care if needed.  Medical and substance use concerns are documented above.  Psychotropic drug interaction check was done, including changes made today.     PSYCHIATRIST: Alli Billings MD PhD    This patient was not staffed. My supervisor Dr Miller will review this note.    Level of Medical Decision Making:   - At least 1 chronic problem that is not stable  - Engaged in prescription drug management during visit (discussed any medication benefits, side effects, alternatives, etc.)  :[id number]}  The longitudinal plan of care for the diagnosis(es)/condition(s) as documented were addressed during this visit. Due to the added complexity in care, I will continue to support Margarita in the subsequent management and with ongoing continuity of care.      Answers submitted by the patient for this visit:  Patient Health Questionnaire (Submitted on 5/22/2025)  If you checked off any problems, how difficult have these problems made it for you to do your work, take care of things at home, or get along with other people?: Very difficult  PHQ9 TOTAL SCORE: 10

## 2025-05-28 NOTE — LETTER
"10/27/2020       RE: Ingris Trevino  [address]  [place] [address]     Dear Colleague,    Thank you for referring your patient, Ingris Trevino, to the [facility] [place] [facility]. Please see a copy of my visit note below.    Ingris Trevino is a 25 year old female who is being evaluated via a billable video visit.      The patient has been notified of following:     \"This video visit will be conducted via a call between you and your physician/provider. We have found that certain health care needs can be provided without the need for an in-person physical exam.  This service lets us provide the care you need with a video conversation.  If a prescription is necessary we can send it directly to your pharmacy.  If lab work is needed we can place an order for that and you can then stop by our lab to have the test done at a later time.    Video visits are billed at different rates depending on your insurance coverage.  Please reach out to your insurance provider with any questions.    If during the course of the call the physician/provider feels a video visit is not appropriate, you will not be charged for this service.\"    Patient has given verbal consent for Video visit? Yes  How would you like to obtain your AVS? MyChart  Will anyone else be joining your video visit? No        Video-Visit Details    Type of service:  Video Visit    Video Start Time: 1:57 PM  Video End Time: 2:35 PM  Time spent with patient: 38 minutes    Originating Location (pt. Location): Home    Distant Location (provider location):  [facility]     Platform used for Video Visit: Think Realtime    During this virtual visit the patient is located in [place], patient verifies this as the location during the entirety of this visit.     New Weight Management Nutrition Consultation    Ingris Trevino is a 25 year old " She has had extensive workup by GI with no identified cause and I did refill her Zofran.    "female presents today for new weight management nutrition consultation.  Patient referred by Dr. Daniels on October 27, 2020.    Patient with Co-morbidities of obesity including:  Type II DM no  Renal Failure no  Sleep apnea no  Hypertension no   Dyslipidemia no  Joint pain yes  Back pain yes  GERD no     Anthropometrics:  Estimated body mass index is 35.83 kg/m  as calculated from the following:    Height as of 12/28/19: 1.638 m (5' 4.5\").    Weight as of 1/16/20: 96.2 kg (212 lb).    Medications for Weight Loss:  none    NUTRITION HISTORY  See MD note for details.  Allergy to laffy taffy. Mild intolerance to red meats, too much upsets her stomach. Avoiding red meat.   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 Has also taken a couple nutrition courses.   Previous weight loss attempts: restricting calories, WW, Whole30, increased exercise - nothing appeared to work well, notes possible she did not stick to changes long enough.    Enjoys cooking. Cooking for her and spouse. Cooking skills passed down by family. Enjoys cooking traditional family meals -  chicken wild rice hotdish. Likes to try new recipes.   Wakes frequently (3-5 times) at night d/t dry mouth and pain. Also feels hungry when she wakes - will have bowl of cereal, pc of fruit or crackers.   Frequent nausea from chronic pain.      Recent food recall:  Do you typically eat breakfast? Yes has to eat with medications at 8-9 am   If you do eat breakfast, what types of food do you eat? cold cereal, oatmeal, Activia yogurt, 1 tbsp PB toast (ww), plain Greek yogurt with fruit and granola    Do you typically eat lunch? Yes 12-1 pm    If you do eat lunch, what types of food do you typically eat?  usually leftovers; Subway, Chipotle; [place];       PM snack: fruits (apple, peaches, pears, banana); yogurt    Do you typically eat supper? Yes at 5:30-6pm    If you do eat " supper, what types of food do you typically eat? Bake/grilling 6-8 oz salmon, bass/walleye, chicken, wild/brown rice (same portion size as meat), veggies (takes up most of plate) like zucchini, asparagus,  broccoli, green beans, etc.   Do you typically eat snacks? Yes   If you do snack, what types of food do you typically eat? fruit, yogurt, toast, veggies     Beverages: Water (26 oz bottle x 2-3 times), 1-2 cans gingerale per week for nausea  Alcohol: rarely, <1 glass wine if having   Dining out: 1-2 times per week    Physical Activity:  Has 3 dogs. Moving so packing more. Has a treadmill at home, trying to walk on it for 30 mins, 2 times per week.      Nutrition Prescription  Recommended energy/nutrient modification.  1200 calories/day (per MD)    Nutrition Diagnosis  Obesity r/t long history of self-monitoring deficit and excessive energy intake aeb BMI >30.    Nutrition Intervention  Materials/education provided on 1200 calorie/day diet and Volumetric eating to help satiety level on fewer calories; portion control and healthy food choices (Plate Method and Volumetrics handouts), 100 calorie snack choices, and meal and snack planning and websites. Discussed apps that may help track calories and provide guidance on portion control. Sent list of goals to pt via [employer].     Patient demonstrates understanding.    Expected Engagement: good  Follow-Up Plans: Meal planning     Nutrition Goals  1) Follow 1200 calorie/day plan. Consider downloading SafetyWebPal, Lose It!, Noom, Cronometer  2) Aim for 10 mins on treadmill for 3 days per week.   3) Use the Plate Method to structure meals.   - Reduce protein portion to 3-5 oz/meal  - Reduce starch portion to 1/2 cup/meal  - Fill remainder of plate with non-starchy vegetables/fruit     The Plate Method  [email or web address]    Protein Sources for Weight Loss  [email or web address]     Carbohydrates  [email or web address]     Mindful  "Eating  [email or web address]     Summary of Volumetrics Eating Plan  [email or web address]     Healthy Recipe Resources:  \"The Volumetrics Eating Plan\" by Lisandra Morfin, Ph.D.  https://www.[employer].Zaarly/  www.Prifloat.Zaarly  www.SocialF5.org  Dash Diet Recipes [facility]  www.extension.[facility] - the recipe box  \"Cooking that Counts\" by editors of edjing  https://www.[facility]/communityculinary/[facility]_Cookbook_KT_Final_11-6_NoCrops-compressed.pdf  Https://www.choosemyplate.gov/myplatekitchen  https://snaped.fns.usda.gov/recipes-menus - SNAP recipes      Follow-Up:  1 month, or CORDELLN      Hanny Valadez RD, LD      "

## 2025-06-14 ENCOUNTER — HEALTH MAINTENANCE LETTER (OUTPATIENT)
Age: 30
End: 2025-06-14

## 2025-07-07 ENCOUNTER — LAB (OUTPATIENT)
Dept: LAB | Facility: CLINIC | Age: 30
End: 2025-07-07
Payer: COMMERCIAL

## 2025-07-07 DIAGNOSIS — Z79.899 PHARMACOLOGIC THERAPY: ICD-10-CM

## 2025-07-07 DIAGNOSIS — F33.41 RECURRENT MAJOR DEPRESSIVE DISORDER, IN PARTIAL REMISSION: ICD-10-CM

## 2025-07-07 LAB
ALBUMIN SERPL BCG-MCNC: 4.3 G/DL (ref 3.5–5.2)
ALP SERPL-CCNC: 97 U/L (ref 40–150)
ALT SERPL W P-5'-P-CCNC: 14 U/L (ref 0–50)
ANION GAP SERPL CALCULATED.3IONS-SCNC: 10 MMOL/L (ref 7–15)
AST SERPL W P-5'-P-CCNC: 14 U/L (ref 0–45)
BASOPHILS # BLD AUTO: 0 10E3/UL (ref 0–0.2)
BASOPHILS NFR BLD AUTO: 0 %
BILIRUB SERPL-MCNC: 0.2 MG/DL
BUN SERPL-MCNC: 13.1 MG/DL (ref 6–20)
CALCIUM SERPL-MCNC: 9.7 MG/DL (ref 8.8–10.4)
CHLORIDE SERPL-SCNC: 104 MMOL/L (ref 98–107)
CREAT SERPL-MCNC: 0.72 MG/DL (ref 0.51–0.95)
EGFRCR SERPLBLD CKD-EPI 2021: >90 ML/MIN/1.73M2
EOSINOPHIL # BLD AUTO: 0.1 10E3/UL (ref 0–0.7)
EOSINOPHIL NFR BLD AUTO: 1 %
ERYTHROCYTE [DISTWIDTH] IN BLOOD BY AUTOMATED COUNT: 12.7 % (ref 10–15)
GLUCOSE SERPL-MCNC: 122 MG/DL (ref 70–99)
HCO3 SERPL-SCNC: 26 MMOL/L (ref 22–29)
HCT VFR BLD AUTO: 41.8 % (ref 35–47)
HGB BLD-MCNC: 13.4 G/DL (ref 11.7–15.7)
IMM GRANULOCYTES # BLD: 0 10E3/UL
IMM GRANULOCYTES NFR BLD: 0 %
LYMPHOCYTES # BLD AUTO: 2.4 10E3/UL (ref 0.8–5.3)
LYMPHOCYTES NFR BLD AUTO: 30 %
MCH RBC QN AUTO: 28.8 PG (ref 26.5–33)
MCHC RBC AUTO-ENTMCNC: 32.1 G/DL (ref 31.5–36.5)
MCV RBC AUTO: 90 FL (ref 78–100)
MONOCYTES # BLD AUTO: 0.4 10E3/UL (ref 0–1.3)
MONOCYTES NFR BLD AUTO: 5 %
NEUTROPHILS # BLD AUTO: 5.1 10E3/UL (ref 1.6–8.3)
NEUTROPHILS NFR BLD AUTO: 63 %
PLATELET # BLD AUTO: 185 10E3/UL (ref 150–450)
POTASSIUM SERPL-SCNC: 4.5 MMOL/L (ref 3.4–5.3)
PROT SERPL-MCNC: 7.1 G/DL (ref 6.4–8.3)
RBC # BLD AUTO: 4.66 10E6/UL (ref 3.8–5.2)
SODIUM SERPL-SCNC: 140 MMOL/L (ref 135–145)
TSH SERPL DL<=0.005 MIU/L-ACNC: 1.07 UIU/ML (ref 0.3–4.2)
WBC # BLD AUTO: 8.1 10E3/UL (ref 4–11)

## 2025-07-24 NOTE — PROGRESS NOTES
Virtual Visit Details  Type of service:  Video Visit   Start time: 15:20  End time: 15:50  Originating Location (pt. Location): Home  Distant Location (provider location):  On-site  Platform used for Video Visit: [facility]  Psychiatry Clinic  MEDICAL PROGRESS NOTE       CARE TEAM:  PCP- Physician No Ref-Primary    Psychotherapist- Julissa, once a week appointments, Neuro, Ob- Gyn, Pain     This person is a 30 year old who uses the name Margarita and pronouns she, her.      DIAGNOSIS   Major Depressive Disorder, recurrent, severe in partial remission  Generalized Anxiety Disorder with panic     ASSESSMENT   Margarita is a 30-year-old female (she/her) with a history of MDD, MADIHA with panic, OCD presenting for follow-up. She had partial relief of symptoms of MDD with sertraline but never had complete remission. Her OCD symptoms has been well controlled with sertraline and CBT. Anxiety and panic attacks are improved with a change in her job.     Today, she reports worsening of depression and anxiety including increased fatigue in the context of multiple life stressors. She has been feeling more anxious and fatigued with worsened sleep. Though trazodone and prazocin helped with falling into sleep, she noticed new problem of frequent waking up, which could be related to her pain. She also noticed increased nausea that is not likely associated with migraine, and she greed with seeing PCP or GI specialist to figure out these symptoms. Although there has been multiple stressors recently , she feels like things has been slowly improving. She is also planning a trip to [facility] soon and feels very excited about this. She feels good about temporarily escaping from life stressors.     Regarding medication, she has been taking 60mg of propranolol long acting in the morning and did not want to change this medication as this helped with anxiety. She also has been taking 40mg IR almost every day  d/t anxiety. Given that she identifies prn propranolol as a temporary relief and wants to have more long-term solution for this CBT referral was made. We also discussed some medication options to improve sleep. She was interested in re-trying mirtazapine to help with her depression, sleep, eating, and nausea but we decided not to make that change as she think trazodone is more helpful for sleep. However, she is still interested in mirtazapine as it can potentially helps with nausea as well, and we will further discuss this option after she meet the PCP or GI specialist for her nausea. She recently increased the therapy session and this helped.    Shed denied any safety concerns including SI/HI. We reviewed safety plan and she has a good safety plan.    Future considerations  Per [facility]D clinic (Dr. Taylor)  - consider stimulant or MAOI in future although would need to balance this against OCD.   - Procedures: rTMS, consider VNS.       PLAN                                                                                                                1) Meds-   - Continue propranolol IR to 40 mg BID PRN daily for acute anxiety   - Continue Prazosin to 5 mg at bedtime   - Continue sertraline 200 mg daily   - Continue propranolol 60mg LA daily  - Continue trazodone 50mg at bedtime prn     Prescribed by her pain specialist:  Gabapentin 600 mg TID  Hydrocodone-Acetamin 7.5-325 mg TID PRN for pain     2) Psychotherapy- Continue individual therapy      3) Next due-  Labs ordered - CMP, CBC, TSH    4) Referrals-  None. Looking for TMS provider near her place ([facility])    5) Dispo- RTC in 6 weeks     PERTINENT BACKGROUND                                                    [most recent eval 08/01/22]   Previous diagnoses include MDD, OCD, Panic Disorder, and chronic pain. She first began citalopram for depression at the age of 14 and then later switched to sertraline in college which she reports was helpful for both mood and  "OCD. Has also done CBT for OCD to endorsed benefit.  History of multiple suicide attempts, no psychiatric hospitalizations. Notably, patient's first contact with this clinic was a diagnostic assessment completed as a one time SACHIN justine on 1/10/19, tried to make it to the end of the day had been recommended in the context of establishing care with new providers upon recently returning to live in [place] given her prolonged use of opiates.  From that DA: \"Unclear that chronic opioids appropriate for her chronic non-malignant pain states. That said, with what information we have, no clear indication that she has used non-medically, escalated in use, or any other concerns.\" Chronic pain condition(s) experienced by the patient include endometriosis, herniated lumbar disk and migraines.       Psych pertinent item history includes suicide attempt [multiple, most recent 2023] and suicidal ideation.     SUBJECTIVE   Since last visit:  - Mood has been overall \"struggling\" in the context of stressors - things going around the house, work   and the world, and nausea that was significantly worsened recently with no clear reasons. Her  also had a forearm fracture recently and got surgery.  - When wake up in the morning, feels like throwing up, extremely nauseated, when trying to eat   something, she feels sick. Sometimes cannot eat until 7-10pm. Nutrition has been bad. This symptom worsens with stressors. She does not think this is entirely related to her migraine (previously this was associated with migraine), and she agreed with seeing PCP or GI specialist to figure out these symptoms.  - Sleep got even worse. She think trazodone has been helping with sleep but she noticed frequent waking up in the middle of the night with difficulty going back to sleep. Big part of this is her current pain and nausea.   - Anxiety has been bad. She has been taking 60mg of propranolol long acting in the morning, Also recently has been " taking 40mg IR almost every day. She frequently experiences severe anxiety and occasional panic attack. She has been trying to utilize some of coping skills such as taking a break when feels very anxious which helps with decreasing anxiety. She states prn propranolol feels like a temporary interventions and would want to have more long-term solution for this. Interested in CBT referral for interoceptive exposure therapy.  - No safety concerns including SI  - Although recently there has been multiple stressors, she feels like things has been slowly improving. She is also planning a trip to [place] [employer] [employer] soon and feels very excited about this. She feels good about temporarily escaping from life stressors.  - We discussed some medication options to improve sleep. She was interested in re-trying mirtazapine to help with her depression, sleep, eating, and nausea but we decided not to make that change as she think trazodone is more helpful for sleep.  - She recently increased the therapy session and this helped.    RECENT SUBSTANCE USE:     Tobacco- none  Caffeine -  2-3 coffee/day  Alcohol - <1/week     Cannabis - medical marijuana 4-5 times a week for pain also helpful for anxiety and sleep;     Pertinent Negatives: No suicidal ideation, violent ideation, self-injury, psychosis, hallucinations, jackie and harmful substance use  Adverse Effects: None    PSYCH and SUBSTANCE USE Critical Summary Points since July 2022 8/1/22: Transfer visit, no changes   10/3/22: No changes  12/5/22: Added Prazosin 1 mg at bedtime for nightmares back to list (restarted between appointments)  12/13/23: Moved Propranolol LA to morning and increased Prazosin to 5 mg at bedtime  1/10/24: Increased Propranolol IR to 40 mg BID PRN for anxiety      FAMILY and SOCIAL HISTORY                            Per chart review; reviewed and confirmed with pt     FAMILY HX:  Maternal Great-Grandmother had BPAD; Depression in family, both mom and dad,  maternal aunt.     SOCIAL HX:  Financial/ Work-  for [employer], loves the work and mission.  Partner/ - , Kristopher,  in 2017. Patient reports they have a great supportive relationship.   Children- None  Living situation- lives in [place]    Social/ Spiritual Support- spouse, family, dog  Legal- no  FEELS SAFE AT HOME- yes      MEDICAL HISTORY and ALLERGY     ALLERGIES: Other environmental allergy and Seasonal allergies    Patient Active Problem List   Diagnosis    Intractable chronic migraine without aura and without status migrainosus    Nausea vomiting and diarrhea    Cervicalgia    Chronic bilateral low back pain with bilateral sciatica    Right ureteral stone    Calculus of kidney    Hydronephrosis with urinary obstruction due to ureteral calculus        MEDICAL REVIEW OF SYSTEMS     none in addition to that documented above     MEDICATIONS     Current Outpatient Medications   Medication Sig Dispense Refill    gabapentin (NEURONTIN) 300 MG capsule Take 600 mg by mouth 3 times daily      gabapentin (NEURONTIN) 600 MG tablet Take 1 tablet by mouth 3 times daily.      galcanezumab-gnlm (EMGALITY) 120 MG/ML injection Inject 1 mL (120 mg) subcutaneously every 28 days. 1 mL 11    HYDROcodone-acetaminophen (NORCO) 7.5-325 MG per tablet Take 1 tablet by mouth 3 times daily as needed      methocarbamol (ROBAXIN) 750 MG tablet TAKE 1 TABLET ORALLY EVERY 8 HOURS AS NEEDED      prazosin (MINIPRESS) 5 MG capsule Take 1 capsule (5 mg) by mouth at bedtime. 90 capsule 1    promethazine (PHENERGAN) 25 MG tablet Take 1 tablet (25 mg) by mouth every 8 hours as needed for nausea. associated with migranes 10 tablet 11    propranolol (INDERAL) 40 MG tablet Take 1 tablet (40 mg) by mouth at bedtime for 7 days, THEN 0.5 tablets (20 mg) at bedtime for 21 days. 7 tablet 0    propranolol (INDERAL) 40 MG tablet Take 1 tablet (40 mg) by mouth 2 times daily. 60 tablet 0    propranolol ER (INDERAL  "LA) 60 MG 24 hr capsule Take 1 capsule (60 mg) by mouth daily. 90 capsule 0    sertraline (ZOLOFT) 100 MG tablet Take 2 tablets (200 mg) by mouth daily. 180 tablet 0    SUMAtriptan (IMITREX) 100 MG tablet Take 1 tablet (100 mg) by mouth at onset of headache for migraine (repeat in 2 hours if needed). 18 tablet 11    traZODone (DESYREL) 50 MG tablet Take 1 tablet (50 mg) by mouth nightly as needed for sleep. 30 tablet 1      VITALS   There were no vitals taken for this visit.    MENTAL STATUS EXAM     Alertness: alert  and oriented  Appearance: Well-groomed in professional attire  Behavior/Demeanor: cooperative, pleasant and calm, with good  eye contact   Speech: normal and regular rate and rhythm  Language: intact and no problems  Psychomotor: normal or unremarkable  Mood:  \"depressed\"  Affect: Mildly restricted to depressed; congruent to: mood- yes, content- yes  Thought Process/Associations: unremarkable  Thought Content:  Reports none;  Denies suicidal & violent ideation and delusions  Perception:  Reports none;  Denies hallucinations  Insight: good  Judgment: good  Cognition: does  appear grossly intact; formal cognitive testing was not done  Gait and Station: N/A ([facility])     LABS and DATA         9/18/2024     8:45 AM 1/29/2025     3:00 PM 5/22/2025     7:14 AM   PHQ   PHQ-9 Total Score 12 12 10    Q9: Thoughts of better off dead/self-harm past 2 weeks Not at all Not at all Not at all       Patient-reported     Labs reviewed-nothing up to date, last checked in 2020       PSYCHOTROPIC DRUG INTERACTIONS                                                       PSYCHCLINICDDI   Concurrent use of OXYCODONE and SERTRALINE may result in an increased risk of serotonin syndrome (tachycardia, hyperthermia, myoclonus, mental status changes).     Concurrent use of OXYCODONE and CNS DEPRESSANTS may result in increased risk of respiratory and CNS depression.     Concurrent use of PRAZOSIN and PROPRANOLOL may may enhance " the orthostatic hypotensive effect of Alpha1-Blockers     Sertraline and Propranolol: Concurrent use of PROPRANOLOL and CYP2D6 INHIBITORS may result in increased propranolol exposure.    MANAGEMENT:  Monitoring for adverse effects, routine vitals and patient is aware of risks     RISK STATEMENT for SAFETY     Margarita did not appear to be an imminent safety risk to self or others.    TREATMENT RISK STATEMENT: The risks, benefits, alternatives and potential adverse effects have been discussed and are understood by the pt. The pt understands the risks of using street drugs or alcohol. There are no medical contraindications, the pt agrees to treatment with the ability to do so. The pt knows to call the clinic for any problems or to access emergency care if needed.  Medical and substance use concerns are documented above.  Psychotropic drug interaction check was done, including changes made today.     PSYCHIATRIST: Alli Billings MD PhD    This patient was not staffed. My supervisor Dr Miller will review this note.    Level of Medical Decision Making:   - At least 1 chronic problem that is not stable  - Engaged in prescription drug management during visit (discussed any medication benefits, side effects, alternatives, etc.)  :[id number]}  The longitudinal plan of care for the diagnosis(es)/condition(s) as documented were addressed during this visit. Due to the added complexity in care, I will continue to support Margarita in the subsequent management and with ongoing continuity of care.    Answers submitted by the patient for this visit:  Patient Health Questionnaire (G7) (Submitted on 7/25/2025)  MADIHA 7 TOTAL SCORE: 16

## 2025-07-25 ENCOUNTER — VIRTUAL VISIT (OUTPATIENT)
Dept: PSYCHIATRY | Facility: CLINIC | Age: 30
End: 2025-07-25
Attending: PSYCHIATRY & NEUROLOGY
Payer: COMMERCIAL

## 2025-07-25 DIAGNOSIS — F41.9 ANXIETY: ICD-10-CM

## 2025-07-25 DIAGNOSIS — F33.41 RECURRENT MAJOR DEPRESSIVE DISORDER, IN PARTIAL REMISSION: ICD-10-CM

## 2025-07-25 PROCEDURE — 1125F AMNT PAIN NOTED PAIN PRSNT: CPT | Mod: 95

## 2025-07-25 PROCEDURE — 98006 SYNCH AUDIO-VIDEO EST MOD 30: CPT | Mod: U7

## 2025-07-25 PROCEDURE — G2211 COMPLEX E/M VISIT ADD ON: HCPCS | Mod: 95

## 2025-07-25 RX ORDER — PROPRANOLOL HYDROCHLORIDE 60 MG/1
60 CAPSULE, EXTENDED RELEASE ORAL DAILY
Qty: 90 CAPSULE | Refills: 0 | Status: CANCELLED | OUTPATIENT
Start: 2025-07-25

## 2025-07-25 RX ORDER — PROPRANOLOL HCL 20 MG
20 TABLET ORAL AT BEDTIME
Qty: 30 TABLET | Refills: 1 | Status: CANCELLED | OUTPATIENT
Start: 2025-07-25

## 2025-07-25 RX ORDER — PROPRANOLOL HYDROCHLORIDE 60 MG/1
60 CAPSULE, EXTENDED RELEASE ORAL DAILY
Qty: 90 CAPSULE | Refills: 0 | Status: SHIPPED | OUTPATIENT
Start: 2025-07-25

## 2025-07-25 RX ORDER — PRAZOSIN HYDROCHLORIDE 5 MG/1
5 CAPSULE ORAL AT BEDTIME
Qty: 90 CAPSULE | Refills: 1 | Status: SHIPPED | OUTPATIENT
Start: 2025-07-25

## 2025-07-25 RX ORDER — SERTRALINE HYDROCHLORIDE 100 MG/1
200 TABLET, FILM COATED ORAL DAILY
Qty: 180 TABLET | Refills: 0 | Status: SHIPPED | OUTPATIENT
Start: 2025-07-25

## 2025-07-25 RX ORDER — TRAZODONE HYDROCHLORIDE 50 MG/1
50 TABLET ORAL
Qty: 30 TABLET | Refills: 1 | Status: SHIPPED | OUTPATIENT
Start: 2025-07-25

## 2025-07-25 ASSESSMENT — ANXIETY QUESTIONNAIRES
6. BECOMING EASILY ANNOYED OR IRRITABLE: MORE THAN HALF THE DAYS
IF YOU CHECKED OFF ANY PROBLEMS ON THIS QUESTIONNAIRE, HOW DIFFICULT HAVE THESE PROBLEMS MADE IT FOR YOU TO DO YOUR WORK, TAKE CARE OF THINGS AT HOME, OR GET ALONG WITH OTHER PEOPLE: VERY DIFFICULT
5. BEING SO RESTLESS THAT IT IS HARD TO SIT STILL: SEVERAL DAYS
8. IF YOU CHECKED OFF ANY PROBLEMS, HOW DIFFICULT HAVE THESE MADE IT FOR YOU TO DO YOUR WORK, TAKE CARE OF THINGS AT HOME, OR GET ALONG WITH OTHER PEOPLE?: VERY DIFFICULT
1. FEELING NERVOUS, ANXIOUS, OR ON EDGE: NEARLY EVERY DAY
7. FEELING AFRAID AS IF SOMETHING AWFUL MIGHT HAPPEN: SEVERAL DAYS
GAD7 TOTAL SCORE: 16
4. TROUBLE RELAXING: NEARLY EVERY DAY
3. WORRYING TOO MUCH ABOUT DIFFERENT THINGS: NEARLY EVERY DAY
2. NOT BEING ABLE TO STOP OR CONTROL WORRYING: NEARLY EVERY DAY
GAD7 TOTAL SCORE: 16

## 2025-07-25 NOTE — NURSING NOTE
Current patient location: [address] [place] [address]    Is the patient currently in the state of [place]? YES    Visit mode: VIDEO    If the visit is dropped, the patient can be reconnected by:VIDEO VISIT: Text to cell phone:   Telephone Information:   Mobile [phone]       Will anyone else be joining the visit? NO  (If patient encounters technical issues they should call [phone])    Are changes needed to the allergy or medication list? No    Are refills needed on medications prescribed by this physician? YES    Rooming Documentation:  Questionnaire(s) completed    Reason for visit: No chief complaint on file.    Melanie JIMENEZF

## 2025-07-25 NOTE — PATIENT INSTRUCTIONS
**For crisis resources, please see the information at the end of this document**   Patient Education    Thank you for coming to the [facility] [place] [facility].     Lab Testing:  If you had lab testing today and your results are reassuring or normal they will be mailed to you or sent through [employer] within 7 days. If the lab tests need quick action we will call you with the results. The phone number we will call with results is # [phone]. If this is not the best number please call our clinic and change the number.     Medication Refills:  If you need any refills please call your pharmacy and they will contact us. Our fax number for refills is [phone].   Three business days of notice are needed for general medication refill requests.   Five business days of notice are needed for controlled substance refill requests.   If you need to change to a different pharmacy, please contact the new pharmacy directly. The new pharmacy will help you get your medications transferred.     Contact Us:  Please call [phone] during business hours (8-5:00 M-F).   If you have medication related questions after clinic hours, or on the weekend, please call [phone].     Financial Assistance [phone]   Medical Records [phone]       MENTAL HEALTH CRISIS RESOURCES:  For a emergency help, please call 911 or go to the nearest Emergency Department.     Emergency Walk-In Options:   EmPATH Unit @ [facility] ([place]): [phone] - Specialized mental health emergency area designed to be calming  [facility] ([place]): [phone]  [facility] ([place]): [phone]  [facility]): [phone]    [facility] Crisis Information:   [place]: [phone]  [place]: [phone]  [place] ([place]) - Adult: [phone]     Child: [phone]  [place] - Adult: [phone]     Child: [phone]  [place]:  [phone]  List of all [facility] resources:   https://[place].gov/dhs/people-we-serve/adults/health-care/mental-health/resources/crisis-contacts.jsp    National Crisis Information:   Crisis Text Line: Text  [place]  to 876435  Suicide & Crisis Lifeline: 988  National Suicide Prevention Lifeline: 2-877-916-TALK (1-551.437.7401)       For online chat options, visit https://suicidepreventionlifeline.org/chat/  Poison Control Center: 1-724-046-8631  Trans Lifeline: 6-308-522-7409 - Hotline for transgender people of all ages  The Stef Project: 5-079-974-7413 - Hotline for LGBT youth     For Non-Emergency Support:   Fast Tracker: Mental Health & Substance Use Disorder Resources -   https://www.[place] [place].org/

## 2025-07-25 NOTE — PROGRESS NOTES
"Virtual Visit Details    Type of service:  Video Visit     Originating Location (pt. Location): {video visit patient location:366574::\"Home\"}  {PROVIDER LOCATION On-site should be selected for visits conducted from your clinic location or adjoining [facility] hospital, academic office, or other nearby [facility] building. Off-site should be selected for all other provider locations, including home:788822}  Distant Location (provider location):  {virtual location provider:692430}  Platform used for Video Visit: {Virtual Visit Platforms:053645::\"[facility]\"}    "

## (undated) DEVICE — LINEN TOWEL PACK X5 5464

## (undated) DEVICE — GUIDEWIRE SENSOR DUAL FLEX STR 0.035"X150CM M0066703080

## (undated) DEVICE — DRAPE C-ARM W/STRAPS 42X72" 07-CA104

## (undated) DEVICE — CATH TRAY FOLEY SURESTEP 16FR W/URNE MTR STLK LATEX A303316A

## (undated) DEVICE — SOL NACL 0.9% IRRIG 3000ML BAG 2B7477

## (undated) DEVICE — GLOVE PROTEXIS W/NEU-THERA 6.5  2D73TE65

## (undated) DEVICE — CATH URETERAL OPEN END 05FR 70CM 020015

## (undated) DEVICE — PAD CHUX UNDERPAD 23X24" 7136

## (undated) DEVICE — PACK CYSTO UMMC CUSTOM

## (undated) RX ORDER — FENTANYL CITRATE 50 UG/ML
INJECTION, SOLUTION INTRAMUSCULAR; INTRAVENOUS
Status: DISPENSED
Start: 2020-01-09

## (undated) RX ORDER — CEFAZOLIN SODIUM 2 G/100ML
INJECTION, SOLUTION INTRAVENOUS
Status: DISPENSED
Start: 2020-01-09

## (undated) RX ORDER — ACETAMINOPHEN 325 MG/1
TABLET ORAL
Status: DISPENSED
Start: 2020-01-09

## (undated) RX ORDER — SODIUM CHLORIDE 9 MG/ML
INJECTION, SOLUTION INTRAVENOUS
Status: DISPENSED
Start: 2020-01-09

## (undated) RX ORDER — GABAPENTIN 300 MG/1
CAPSULE ORAL
Status: DISPENSED
Start: 2020-01-09

## (undated) RX ORDER — LIDOCAINE HYDROCHLORIDE 20 MG/ML
INJECTION, SOLUTION EPIDURAL; INFILTRATION; INTRACAUDAL; PERINEURAL
Status: DISPENSED
Start: 2020-01-09